# Patient Record
Sex: MALE | Race: WHITE | NOT HISPANIC OR LATINO | Employment: FULL TIME | URBAN - METROPOLITAN AREA
[De-identification: names, ages, dates, MRNs, and addresses within clinical notes are randomized per-mention and may not be internally consistent; named-entity substitution may affect disease eponyms.]

---

## 2018-01-19 ENCOUNTER — OFFICE VISIT (OUTPATIENT)
Dept: INTERNAL MEDICINE | Facility: CLINIC | Age: 58
End: 2018-01-19
Payer: COMMERCIAL

## 2018-01-19 ENCOUNTER — TELEPHONE (OUTPATIENT)
Dept: INTERNAL MEDICINE | Facility: CLINIC | Age: 58
End: 2018-01-19

## 2018-01-19 ENCOUNTER — LAB VISIT (OUTPATIENT)
Dept: LAB | Facility: HOSPITAL | Age: 58
End: 2018-01-19
Attending: FAMILY MEDICINE
Payer: COMMERCIAL

## 2018-01-19 VITALS
SYSTOLIC BLOOD PRESSURE: 110 MMHG | HEART RATE: 80 BPM | HEIGHT: 72 IN | WEIGHT: 198.44 LBS | DIASTOLIC BLOOD PRESSURE: 80 MMHG | TEMPERATURE: 98 F | BODY MASS INDEX: 26.88 KG/M2

## 2018-01-19 DIAGNOSIS — Z00.00 ANNUAL PHYSICAL EXAM: Primary | ICD-10-CM

## 2018-01-19 DIAGNOSIS — Z12.11 COLON CANCER SCREENING: ICD-10-CM

## 2018-01-19 DIAGNOSIS — Z00.00 ANNUAL PHYSICAL EXAM: ICD-10-CM

## 2018-01-19 LAB
BASOPHILS # BLD AUTO: 0.03 K/UL
BASOPHILS NFR BLD: 0.4 %
COMPLEXED PSA SERPL-MCNC: 0.37 NG/ML
DIFFERENTIAL METHOD: ABNORMAL
EOSINOPHIL # BLD AUTO: 0 K/UL
EOSINOPHIL NFR BLD: 0.4 %
ERYTHROCYTE [DISTWIDTH] IN BLOOD BY AUTOMATED COUNT: 13 %
HCT VFR BLD AUTO: 46.4 %
HGB BLD-MCNC: 15.6 G/DL
IMM GRANULOCYTES # BLD AUTO: 0.03 K/UL
IMM GRANULOCYTES NFR BLD AUTO: 0.4 %
LYMPHOCYTES # BLD AUTO: 2.3 K/UL
LYMPHOCYTES NFR BLD: 33.8 %
MCH RBC QN AUTO: 28.1 PG
MCHC RBC AUTO-ENTMCNC: 33.6 G/DL
MCV RBC AUTO: 84 FL
MONOCYTES # BLD AUTO: 1 K/UL
MONOCYTES NFR BLD: 15.4 %
NEUTROPHILS # BLD AUTO: 3.3 K/UL
NEUTROPHILS NFR BLD: 49.6 %
NRBC BLD-RTO: 0 /100 WBC
PLATELET # BLD AUTO: 197 K/UL
PMV BLD AUTO: 11.5 FL
RBC # BLD AUTO: 5.55 M/UL
TSH SERPL DL<=0.005 MIU/L-ACNC: 2.31 UIU/ML
WBC # BLD AUTO: 6.71 K/UL

## 2018-01-19 PROCEDURE — 99396 PREV VISIT EST AGE 40-64: CPT | Mod: S$GLB,,, | Performed by: FAMILY MEDICINE

## 2018-01-19 PROCEDURE — 84153 ASSAY OF PSA TOTAL: CPT

## 2018-01-19 PROCEDURE — 99999 PR PBB SHADOW E&M-EST. PATIENT-LVL III: CPT | Mod: PBBFAC,,, | Performed by: FAMILY MEDICINE

## 2018-01-19 PROCEDURE — 80061 LIPID PANEL: CPT

## 2018-01-19 PROCEDURE — 80053 COMPREHEN METABOLIC PANEL: CPT

## 2018-01-19 PROCEDURE — 85025 COMPLETE CBC W/AUTO DIFF WBC: CPT

## 2018-01-19 PROCEDURE — 84443 ASSAY THYROID STIM HORMONE: CPT

## 2018-01-19 PROCEDURE — 36415 COLL VENOUS BLD VENIPUNCTURE: CPT | Mod: PO

## 2018-01-19 RX ORDER — MELOXICAM 15 MG/1
15 TABLET ORAL DAILY PRN
Qty: 30 TABLET | Refills: 1 | Status: SHIPPED | OUTPATIENT
Start: 2018-01-19 | End: 2018-03-16 | Stop reason: SDUPTHER

## 2018-01-19 NOTE — TELEPHONE ENCOUNTER
Spoke with pt's wife, she said, pt complains of blood in stool. He can see the blood mixed in the brown stool. He recently had wrist surgery and was taking 4 ibuprofen every night. She doesn't know if that can be the cause. appt scheduled for today at 3:20

## 2018-01-19 NOTE — TELEPHONE ENCOUNTER
----- Message from Nazario Schwarz sent at 1/19/2018  8:17 AM CST -----  Contact: Pt-wife cheyenne    Pt-wife cheyenne called think may pt need a colonoscopy pt has blood in stools pt wife Cheyenne requested to be called back need to know how to proceed...[phone]

## 2018-01-20 LAB
ALBUMIN SERPL BCP-MCNC: 3.4 G/DL
ALP SERPL-CCNC: 98 U/L
ALT SERPL W/O P-5'-P-CCNC: 12 U/L
ANION GAP SERPL CALC-SCNC: 13 MMOL/L
AST SERPL-CCNC: 25 U/L
BILIRUB SERPL-MCNC: 0.8 MG/DL
BUN SERPL-MCNC: 16 MG/DL
CALCIUM SERPL-MCNC: 9.4 MG/DL
CHLORIDE SERPL-SCNC: 103 MMOL/L
CHOLEST SERPL-MCNC: 228 MG/DL
CHOLEST/HDLC SERPL: 5.1 {RATIO}
CO2 SERPL-SCNC: 21 MMOL/L
CREAT SERPL-MCNC: 1.3 MG/DL
EST. GFR  (AFRICAN AMERICAN): >60 ML/MIN/1.73 M^2
EST. GFR  (NON AFRICAN AMERICAN): >60 ML/MIN/1.73 M^2
GLUCOSE SERPL-MCNC: 92 MG/DL
HDLC SERPL-MCNC: 45 MG/DL
HDLC SERPL: 19.7 %
LDLC SERPL CALC-MCNC: 167.6 MG/DL
NONHDLC SERPL-MCNC: 183 MG/DL
POTASSIUM SERPL-SCNC: 4.6 MMOL/L
PROT SERPL-MCNC: 7.2 G/DL
SODIUM SERPL-SCNC: 137 MMOL/L
TRIGL SERPL-MCNC: 77 MG/DL
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
## 2018-01-24 RX ORDER — SODIUM, POTASSIUM,MAG SULFATES 17.5-3.13G
SOLUTION, RECONSTITUTED, ORAL ORAL
Qty: 354 ML | Refills: 0 | Status: ON HOLD | OUTPATIENT
Start: 2018-01-24 | End: 2018-02-09 | Stop reason: HOSPADM

## 2018-02-09 ENCOUNTER — SURGERY (OUTPATIENT)
Age: 58
End: 2018-02-09

## 2018-02-09 ENCOUNTER — ANESTHESIA (OUTPATIENT)
Dept: ENDOSCOPY | Facility: HOSPITAL | Age: 58
End: 2018-02-09
Payer: COMMERCIAL

## 2018-02-09 ENCOUNTER — ANESTHESIA EVENT (OUTPATIENT)
Dept: ENDOSCOPY | Facility: HOSPITAL | Age: 58
End: 2018-02-09
Payer: COMMERCIAL

## 2018-02-09 ENCOUNTER — HOSPITAL ENCOUNTER (OUTPATIENT)
Facility: HOSPITAL | Age: 58
Discharge: HOME | End: 2018-02-09
Attending: INTERNAL MEDICINE | Admitting: INTERNAL MEDICINE
Payer: COMMERCIAL

## 2018-02-09 DIAGNOSIS — D12.6 ADENOMATOUS POLYP OF COLON, UNSPECIFIED PART OF COLON: Primary | ICD-10-CM

## 2018-02-09 DIAGNOSIS — Z12.11 COLON CANCER SCREENING: ICD-10-CM

## 2018-02-09 DIAGNOSIS — K63.89 COLONIC MASS: ICD-10-CM

## 2018-02-09 PROCEDURE — 45385 COLONOSCOPY W/LESION REMOVAL: CPT | Mod: 33,,, | Performed by: INTERNAL MEDICINE

## 2018-02-09 PROCEDURE — 45380 COLONOSCOPY AND BIOPSY: CPT | Performed by: INTERNAL MEDICINE

## 2018-02-09 PROCEDURE — 27201089 HC SNARE, DISP (ANY): Performed by: INTERNAL MEDICINE

## 2018-02-09 PROCEDURE — 37000009 HC ANESTHESIA EA ADD 15 MINS: Performed by: INTERNAL MEDICINE

## 2018-02-09 PROCEDURE — 27201012 HC FORCEPS, HOT/COLD, DISP: Performed by: INTERNAL MEDICINE

## 2018-02-09 PROCEDURE — 45381 COLONOSCOPY SUBMUCOUS NJX: CPT | Mod: 51,,, | Performed by: INTERNAL MEDICINE

## 2018-02-09 PROCEDURE — 25000003 PHARM REV CODE 250: Performed by: NURSE ANESTHETIST, CERTIFIED REGISTERED

## 2018-02-09 PROCEDURE — 88305 TISSUE EXAM BY PATHOLOGIST: CPT | Performed by: PATHOLOGY

## 2018-02-09 PROCEDURE — 45385 COLONOSCOPY W/LESION REMOVAL: CPT | Performed by: INTERNAL MEDICINE

## 2018-02-09 PROCEDURE — 37000008 HC ANESTHESIA 1ST 15 MINUTES: Performed by: INTERNAL MEDICINE

## 2018-02-09 PROCEDURE — 63600175 PHARM REV CODE 636 W HCPCS: Performed by: NURSE ANESTHETIST, CERTIFIED REGISTERED

## 2018-02-09 PROCEDURE — 88305 TISSUE EXAM BY PATHOLOGIST: CPT | Mod: 26,,, | Performed by: PATHOLOGY

## 2018-02-09 PROCEDURE — 45380 COLONOSCOPY AND BIOPSY: CPT | Mod: 59,,, | Performed by: INTERNAL MEDICINE

## 2018-02-09 PROCEDURE — 45381 COLONOSCOPY SUBMUCOUS NJX: CPT | Performed by: INTERNAL MEDICINE

## 2018-02-09 RX ORDER — PROPOFOL 10 MG/ML
INJECTION, EMULSION INTRAVENOUS
Status: DISCONTINUED | OUTPATIENT
Start: 2018-02-09 | End: 2018-02-09

## 2018-02-09 RX ORDER — SODIUM CHLORIDE, SODIUM LACTATE, POTASSIUM CHLORIDE, CALCIUM CHLORIDE 600; 310; 30; 20 MG/100ML; MG/100ML; MG/100ML; MG/100ML
INJECTION, SOLUTION INTRAVENOUS CONTINUOUS
Status: DISCONTINUED | OUTPATIENT
Start: 2018-02-09 | End: 2018-02-09 | Stop reason: HOSPADM

## 2018-02-09 RX ORDER — LIDOCAINE HCL/PF 100 MG/5ML
SYRINGE (ML) INTRAVENOUS
Status: DISCONTINUED | OUTPATIENT
Start: 2018-02-09 | End: 2018-02-09

## 2018-02-09 RX ORDER — SODIUM CHLORIDE, SODIUM LACTATE, POTASSIUM CHLORIDE, CALCIUM CHLORIDE 600; 310; 30; 20 MG/100ML; MG/100ML; MG/100ML; MG/100ML
INJECTION, SOLUTION INTRAVENOUS CONTINUOUS PRN
Status: DISCONTINUED | OUTPATIENT
Start: 2018-02-09 | End: 2018-02-09

## 2018-02-09 RX ADMIN — PROPOFOL 40 MG: 10 INJECTION, EMULSION INTRAVENOUS at 03:02

## 2018-02-09 RX ADMIN — LIDOCAINE HYDROCHLORIDE 100 MG: 20 INJECTION, SOLUTION INTRAVENOUS at 03:02

## 2018-02-09 RX ADMIN — PROPOFOL 140 MG: 10 INJECTION, EMULSION INTRAVENOUS at 03:02

## 2018-02-09 RX ADMIN — SODIUM CHLORIDE, SODIUM LACTATE, POTASSIUM CHLORIDE, AND CALCIUM CHLORIDE: 600; 310; 30; 20 INJECTION, SOLUTION INTRAVENOUS at 02:02

## 2018-02-09 NOTE — ANESTHESIA RELEASE NOTE
Anesthesia Release from PACU Note    Patient: Armando Ingram JrAlthea    Procedure(s) Performed: Procedure(s) (LRB):  COLONOSCOPY (N/A)    Anesthesia type: MAC    Post pain: Adequate analgesia    Post assessment: no apparent anesthetic complications, tolerated procedure well and no evidence of recall    Last Vitals:   Visit Vitals  BP 90/62 (BP Location: Left arm, Patient Position: Lying)   Pulse 67   Temp 36.8 °C (98.2 °F) (Oral)   Resp 16   Ht 6' (1.829 m)   Wt 84.4 kg (186 lb)   SpO2 (!) 94%   BMI 25.23 kg/m²       Post vital signs: stable    Level of consciousness: awake    Nausea/Vomiting: no nausea/no vomiting    Complications: none    Airway Patency: patent    Respiratory: unassisted, spontaneous ventilation, room air    Cardiovascular: stable    Hydration: euvolemic

## 2018-02-09 NOTE — ANESTHESIA PREPROCEDURE EVALUATION
02/09/2018  Armando Ingram Jr. is a 57 y.o., male.    Anesthesia Evaluation    I have reviewed the Patient Summary Reports.    I have reviewed the Nursing Notes.   I have reviewed the Medications.     Review of Systems  Anesthesia Hx:  No problems with previous Anesthesia    Social:  Former Smoker, Social Alcohol Use    Hematology/Oncology:  Hematology Normal   Oncology Normal     EENT/Dental:EENT/Dental Normal   Pulmonary:  Pulmonary Normal Snore   Renal/:   Chronic Renal Disease    Hepatic/GI:   Bowel Prep. GERD, well controlled 0700 last drink of fluid.   Musculoskeletal:   Arthritis     Neurological:  Neurology Normal    Endocrine:  Endocrine Normal    Dermatological:  Skin Normal    Psych:  Psychiatric Normal           Physical Exam  General:  Well nourished    Airway/Jaw/Neck:  Airway Findings: Mallampati: II                Anesthesia Plan  Type of Anesthesia, risks & benefits discussed:  Anesthesia Type:  MAC  Patient's Preference:   Intra-op Monitoring Plan:   Intra-op Monitoring Plan Comments:   Post Op Pain Control Plan:   Post Op Pain Control Plan Comments:   Induction:   IV  Beta Blocker:  Patient is not currently on a Beta-Blocker (No further documentation required).       Informed Consent: Patient understands risks and agrees with Anesthesia plan.  Questions answered. Anesthesia consent signed with patient.  ASA Score: 2     Day of Surgery Review of History & Physical: I have interviewed and examined the patient. I have reviewed the patient's H&P dated: 02/09/18. There are no significant changes.  H&P update referred to the provider.         Ready For Surgery From Anesthesia Perspective.

## 2018-02-09 NOTE — DISCHARGE SUMMARY
[facility] -   Brief Operative Note     SUMMARY     Surgery Date: 2/9/2018     Surgeon(s) and Role:     * Ryan Villeda III, MD - Primary    Assisting Surgeon: None    Pre-op Diagnosis:  Colon cancer screening [Z12.11]    Post-op Diagnosis:  Post-Op Diagnosis Codes:     * Colon cancer screening [Z12.11]      - Colon Sigmoid Mass      - Colon Polyps  Procedure(s) (LRB):  COLONOSCOPY (N/A)    Anesthesia: Choice    Description of the findings of the procedure: Procedures completed. See Procedure note for full details.    Findings/Key Components: Procedures completed. See Procedure note for full details.    Prosthetics/Devices: None    Estimated Blood Loss: * No values recorded between 2/9/2018 12:00 AM and 2/9/2018  4:11 PM *         Specimens:   Specimen (12h ago through future)    Start     Ordered    02/09/18 1515  Specimen to Pathology - Surgery  Once     Comments:  1. Cecal polyps2. Transverse polyp3. Sigmoid polyp4. Sigmoid mass Bx      02/09/18 1540          Discharge Note    SUMMARY     Admit Date: 2/9/2018    Discharge Date and Time: 2/9/2018    Hospital Course (synopsis of major diagnoses, care, treatment, and services provided during the course of the hospital stay):  Procedures completed. See Procedure note for full details. Discharge patient when discharge criteria met.    Final Diagnosis: Post-Op Diagnosis Codes:     * Colon cancer screening [Z12.11]     Colon sigmoid mass     Colon Polyps  Disposition: Discharge patient when discharge criteria met.    Follow Up/Patient Instructions:       Medications:  Reconciled Home Medications: Current Discharge Medication List      CONTINUE these medications which have NOT CHANGED    Details   meloxicam (MOBIC) 15 MG tablet Take 1 tablet (15 mg total) by mouth daily as needed for Pain.  Qty: 30 tablet, Refills: 1         STOP taking these medications       sodium,potassium,mag sulfates (SUPREP BOWEL PREP KIT) 17.5-3.13-1.6 gram SolR Comments:    Reason for Stopping:                Discharge Procedure Orders  Diet general     Activity as tolerated

## 2018-02-09 NOTE — ANESTHESIA POSTPROCEDURE EVALUATION
Anesthesia Post Evaluation    Patient: Armando Ingram     Procedure(s) Performed: Procedure(s) (LRB):  COLONOSCOPY (N/A)      Patient location during evaluation: PACU  Patient participation: Yes- Able to Participate  Level of consciousness: awake and alert and oriented  Post-procedure vital signs: reviewed and stable  Pain management: adequate  Airway patency: patent  PONV status at discharge: No PONV  Anesthetic complications: no      Cardiovascular status: blood pressure returned to baseline  Respiratory status: unassisted, spontaneous ventilation and room air  Hydration status: euvolemic  Follow-up not needed.        Visit Vitals  BP 90/62 (BP Location: Left arm, Patient Position: Lying)   Pulse 67   Temp 36.8 °C (98.2 °F) (Oral)   Resp 16   Ht 6' (1.829 m)   Wt 84.4 kg (186 lb)   SpO2 (!) 94%   BMI 25.23 kg/m²       Pain/Brigitte Score: No Data Recorded

## 2018-02-09 NOTE — OR NURSING
+++++  1. Cecal polyps.  2. Transverse polyp.  3. Sigmoid polyp.  Injection 4ml SPOT at mass sigmoid colon. 4. Sigmoid mass Bx. Patient tolerated procedure well.

## 2018-02-09 NOTE — INTERVAL H&P NOTE
The patient has been examined and the H&P has been reviewed:I have reviewed this note and I agree with this assessment. The patient remains stable for endoscopy at the time of this present evaluation.         Anesthesia/Surgery risks, benefits and alternative options discussed and understood by patient/family.          There are no hospital problems to display for this patient.

## 2018-02-09 NOTE — TRANSFER OF CARE
Anesthesia Transfer of Care Note    Patient: Armando Ingram JrAlthea    Procedure(s) Performed: Procedure(s) (LRB):  COLONOSCOPY (N/A)    Patient location: PACU    Anesthesia Type: MAC    Transport from OR: Transported from OR on room air with adequate spontaneous ventilation    Post pain: adequate analgesia    Post assessment: no apparent anesthetic complications    Post vital signs: stable    Level of consciousness: sedated    Nausea/Vomiting: no nausea/vomiting    Complications: none    Transfer of care protocol was followed      Last vitals:   Visit Vitals  BP 90/62 (BP Location: Left arm, Patient Position: Lying)   Pulse 67   Temp 36.8 °C (98.2 °F) (Oral)   Resp 16   Ht 6' (1.829 m)   Wt 84.4 kg (186 lb)   SpO2 (!) 94%   BMI 25.23 kg/m²

## 2018-02-09 NOTE — DISCHARGE INSTRUCTIONS

## 2018-02-12 VITALS
BODY MASS INDEX: 25.19 KG/M2 | TEMPERATURE: 98 F | DIASTOLIC BLOOD PRESSURE: 70 MMHG | OXYGEN SATURATION: 95 % | RESPIRATION RATE: 18 BRPM | HEIGHT: 72 IN | WEIGHT: 186 LBS | SYSTOLIC BLOOD PRESSURE: 103 MMHG | HEART RATE: 66 BPM

## 2018-02-23 ENCOUNTER — TELEPHONE (OUTPATIENT)
Dept: SURGERY | Facility: CLINIC | Age: 58
End: 2018-02-23

## 2018-02-23 NOTE — TELEPHONE ENCOUNTER
----- Message from Cari Carrasquillo sent at 2/23/2018 11:08 AM CST -----  Contact: Richie Ingram/wife   States she needs to speak to the nurse, she wouldn't say what it was regarding. Please call Richie Ingram at [phone]. Thank you

## 2018-02-28 ENCOUNTER — OFFICE VISIT (OUTPATIENT)
Dept: SURGERY | Facility: CLINIC | Age: 58
End: 2018-02-28
Payer: COMMERCIAL

## 2018-02-28 ENCOUNTER — CLINICAL SUPPORT (OUTPATIENT)
Dept: CARDIOLOGY | Facility: CLINIC | Age: 58
End: 2018-02-28
Payer: COMMERCIAL

## 2018-02-28 ENCOUNTER — HOSPITAL ENCOUNTER (OUTPATIENT)
Dept: RADIOLOGY | Facility: HOSPITAL | Age: 58
Discharge: HOME | End: 2018-02-28
Attending: SURGERY
Payer: COMMERCIAL

## 2018-02-28 VITALS
HEIGHT: 72 IN | BODY MASS INDEX: 27.22 KG/M2 | DIASTOLIC BLOOD PRESSURE: 83 MMHG | SYSTOLIC BLOOD PRESSURE: 132 MMHG | TEMPERATURE: 98 F | HEART RATE: 88 BPM | WEIGHT: 201 LBS

## 2018-02-28 DIAGNOSIS — D12.6 TUBULOVILLOUS ADENOMA OF COLON: ICD-10-CM

## 2018-02-28 DIAGNOSIS — D12.6 TUBULOVILLOUS ADENOMA OF COLON: Primary | ICD-10-CM

## 2018-02-28 PROCEDURE — 71045 X-RAY EXAM CHEST 1 VIEW: CPT | Mod: TC

## 2018-02-28 PROCEDURE — 99999 PR PBB SHADOW E&M-EST. PATIENT-LVL IV: CPT | Mod: PBBFAC,,, | Performed by: SURGERY

## 2018-02-28 PROCEDURE — 71045 X-RAY EXAM CHEST 1 VIEW: CPT | Mod: 26,,, | Performed by: RADIOLOGY

## 2018-02-28 PROCEDURE — 93000 ELECTROCARDIOGRAM COMPLETE: CPT | Mod: S$GLB,,, | Performed by: INTERNAL MEDICINE

## 2018-02-28 PROCEDURE — 99204 OFFICE O/P NEW MOD 45 MIN: CPT | Mod: S$GLB,,, | Performed by: SURGERY

## 2018-02-28 RX ORDER — SODIUM CHLORIDE 9 MG/ML
INJECTION, SOLUTION INTRAVENOUS CONTINUOUS
Status: CANCELLED | OUTPATIENT
Start: 2018-02-28

## 2018-02-28 RX ORDER — NEOMYCIN SULFATE 500 MG/1
1000 TABLET ORAL SEE ADMIN INSTRUCTIONS
Qty: 6 TABLET | Refills: 0 | Status: SHIPPED | OUTPATIENT
Start: 2018-02-28 | End: 2018-04-03

## 2018-02-28 RX ORDER — METRONIDAZOLE 500 MG/100ML
500 INJECTION, SOLUTION INTRAVENOUS
Status: CANCELLED | OUTPATIENT
Start: 2018-02-28

## 2018-02-28 RX ORDER — POLYETHYLENE GLYCOL 3350, SODIUM SULFATE ANHYDROUS, SODIUM BICARBONATE, SODIUM CHLORIDE, POTASSIUM CHLORIDE 236; 22.74; 6.74; 5.86; 2.97 G/4L; G/4L; G/4L; G/4L; G/4L
4 POWDER, FOR SOLUTION ORAL ONCE
Qty: 4000 ML | Refills: 0 | Status: SHIPPED | OUTPATIENT
Start: 2018-02-28 | End: 2018-02-28

## 2018-02-28 RX ORDER — ACETAMINOPHEN 10 MG/ML
1000 INJECTION, SOLUTION INTRAVENOUS EVERY 8 HOURS
Status: CANCELLED | OUTPATIENT
Start: 2018-02-28 | End: 2018-02-28

## 2018-02-28 RX ORDER — INDOCYANINE GREEN AND WATER 25 MG
2.5 KIT INJECTION
Status: CANCELLED | OUTPATIENT
Start: 2018-02-28

## 2018-02-28 RX ORDER — METRONIDAZOLE 500 MG/1
500 TABLET ORAL SEE ADMIN INSTRUCTIONS
Qty: 3 TABLET | Refills: 0 | Status: SHIPPED | OUTPATIENT
Start: 2018-02-28 | End: 2018-04-03

## 2018-02-28 NOTE — LETTER
February 28, 2018      Ryan Villeda III, MD  [address] [facility] [address]           [facility]  [address] [place] [address]  Phone: [phone]  Fax: [phone]          Patient: Armando Ingram Jr.   MR Number: [id number]   YOB: 1960   Date of Visit: 2/28/2018       Dear Dr. Ryan Villeda III:    Thank you for referring Armando Ingram to me for evaluation. Attached you will find relevant portions of my assessment and plan of care.    If you have questions, please do not hesitate to call me. I look forward to following Armando Ingram along with you.    Sincerely,    Mervin Alexander MD    Enclosure  CC:  No Recipients    If you would like to receive this communication electronically, please contact [email or web address] or [phone] to request more information on [employer] Link access.    For providers and/or their staff who would like to refer a patient to [facility], please contact us through our one-stop-shop provider referral line, [facility] [place], at [phone].    If you feel you have received this communication in error or would no longer like to receive these types of communications, please e-mail [email or web address]
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
## 2018-03-01 DIAGNOSIS — D12.6 TUBULOVILLOUS ADENOMA OF COLON: Primary | ICD-10-CM

## 2018-03-05 ENCOUNTER — TELEPHONE (OUTPATIENT)
Dept: SURGERY | Facility: CLINIC | Age: 58
End: 2018-03-05

## 2018-03-05 DIAGNOSIS — D12.6 TUBULOVILLOUS ADENOMA OF COLON: ICD-10-CM

## 2018-03-05 NOTE — TELEPHONE ENCOUNTER
Wife was calling asking about prep that was sent to the pharmacy. I explained the the patient needs to use that for his surgery and explained what he could mix it with.

## 2018-03-05 NOTE — TELEPHONE ENCOUNTER
----- Message from Sommer Daniel sent at 3/5/2018  7:43 AM CST -----  Contact: Richie pt wife  Richie needs to speak to he nurse regarding her husbands surgery/please call  [phone]/ma

## 2018-03-09 ENCOUNTER — OFFICE VISIT (OUTPATIENT)
Dept: CARDIOLOGY | Facility: CLINIC | Age: 58
End: 2018-03-09
Payer: COMMERCIAL

## 2018-03-09 VITALS
HEIGHT: 71 IN | HEART RATE: 72 BPM | SYSTOLIC BLOOD PRESSURE: 120 MMHG | WEIGHT: 198 LBS | BODY MASS INDEX: 27.72 KG/M2 | DIASTOLIC BLOOD PRESSURE: 70 MMHG

## 2018-03-09 DIAGNOSIS — R94.31 ABNORMAL ECG: ICD-10-CM

## 2018-03-09 DIAGNOSIS — D12.6 TUBULOVILLOUS ADENOMA OF COLON: Primary | ICD-10-CM

## 2018-03-09 DIAGNOSIS — E78.00 HYPERCHOLESTEROLEMIA: ICD-10-CM

## 2018-03-09 DIAGNOSIS — Z01.810 PREOP CARDIOVASCULAR EXAM: ICD-10-CM

## 2018-03-09 PROCEDURE — 99999 PR PBB SHADOW E&M-EST. PATIENT-LVL III: CPT | Mod: PBBFAC,,, | Performed by: INTERNAL MEDICINE

## 2018-03-09 PROCEDURE — 99244 OFF/OP CNSLTJ NEW/EST MOD 40: CPT | Mod: S$GLB,,, | Performed by: INTERNAL MEDICINE

## 2018-03-09 RX ORDER — ROSUVASTATIN CALCIUM 10 MG/1
10 TABLET, COATED ORAL NIGHTLY
Qty: 90 TABLET | Refills: 3 | Status: SHIPPED | OUTPATIENT
Start: 2018-03-09 | End: 2019-01-23 | Stop reason: SDUPTHER

## 2018-03-09 NOTE — LETTER
March 9, 2018      Mervin Alexander MD  [address] [place] [address] [address] [place] [address]           [facility]  [address] [place] [address] [address] [address]  Phone: [phone]  Fax: [phone]          Patient: Armando Ingram Jr.   MR Number: [id number]   YOB: 1960   Date of Visit: 3/9/2018       Dear Dr. Mervin Alexander:    Thank you for referring Armando Ingram to me for evaluation. Attached you will find relevant portions of my assessment and plan of care.    If you have questions, please do not hesitate to call me. I look forward to following Armando Ingram along with you.    Sincerely,    Sekou Mckeon MD    Enclosure  CC:  No Recipients    If you would like to receive this communication electronically, please contact [email or web address] or [phone] to request more information on [employer] Link access.    For providers and/or their staff who would like to refer a patient to [facility], please contact us through our one-stop-shop provider referral line, [facility], at [phone].    If you feel you have received this communication in error or would no longer like to receive these types of communications, please e-mail [email or web address]

## 2018-03-09 NOTE — PROGRESS NOTES
"Subjective:    Patient ID:  Armando Ingram Jr. is a 57 y.o. male who presents for evaluation of Pre-op Exam (Colectomy 03/20/2018)    Pt referred by Dr. Alexander    HPI pt presents for preop eval.   Nonsmoker. +  Hyperlipidemia.  No prior h/o cad, chf.   ecg 2014 showed NSR, lateral st-t abnl.  Preop ecg 2/28/18 NSR, lateral st-t abnl.  He states could walk up 2 flights of stairs without problem.  He denies cp sxs with activity or any unusual dyspnea.   No dizziness, syncope.       Review of Systems   Constitution: Negative.   HENT: Negative.    Eyes: Negative.    Cardiovascular: Negative.    Respiratory: Negative.    Endocrine: Negative.    Hematologic/Lymphatic: Negative.    Skin: Negative.    Musculoskeletal: Negative.    Gastrointestinal: Negative.    Genitourinary: Negative.    Neurological: Negative.    Psychiatric/Behavioral: Negative.    Allergic/Immunologic: Negative.        /70 (BP Location: Right arm, Patient Position: Sitting, BP Method: Large (Manual))   Pulse 72   Ht 5' 11" (1.803 m)   Wt 89.8 kg (198 lb)   BMI 27.62 kg/m²     Wt Readings from Last 3 Encounters:   03/09/18 89.8 kg (198 lb)   02/28/18 91.2 kg (201 lb)   02/09/18 84.4 kg (186 lb)     Temp Readings from Last 3 Encounters:   02/28/18 98.3 °F (36.8 °C) (Oral)   02/09/18 98.2 °F (36.8 °C) (Oral)   01/19/18 98.3 °F (36.8 °C)     BP Readings from Last 3 Encounters:   03/09/18 120/70   02/28/18 132/83   02/09/18 103/70     Pulse Readings from Last 3 Encounters:   03/09/18 72   02/28/18 88   02/09/18 66          Objective:    Physical Exam   Constitutional: He is oriented to person, place, and time. He appears well-developed and well-nourished.   HENT:   Head: Normocephalic.   Neck: Normal range of motion. Neck supple. Normal carotid pulses, no hepatojugular reflux and no JVD present. Carotid bruit is not present. No thyromegaly present.   Cardiovascular: Normal rate, regular rhythm, S1 normal and S2 normal.  PMI is not displaced.  Exam " reveals no S3, no S4, no distant heart sounds, no friction rub, no midsystolic click and no opening snap.    No murmur heard.  Pulses:       Radial pulses are 2+ on the right side, and 2+ on the left side.   Pulmonary/Chest: Effort normal and breath sounds normal. He has no wheezes. He has no rales.   Abdominal: Soft. Bowel sounds are normal. He exhibits no distension, no abdominal bruit, no ascites and no mass. There is no tenderness.   Musculoskeletal: He exhibits no edema.   Neurological: He is alert and oriented to person, place, and time.   Skin: Skin is warm.   Psychiatric: He has a normal mood and affect. His behavior is normal.   Nursing note and vitals reviewed.      I have reviewed all pertinent labs and cardiac studies.      Chemistry        Component Value Date/Time     02/28/2018 1438    K 4.8 02/28/2018 1438     02/28/2018 1438    CO2 28 02/28/2018 1438    BUN 16 02/28/2018 1438    CREATININE 1.2 02/28/2018 1438    GLU 94 02/28/2018 1438        Component Value Date/Time    CALCIUM 9.0 02/28/2018 1438    ALKPHOS 98 02/28/2018 1438    AST 27 02/28/2018 1438    ALT 15 02/28/2018 1438    BILITOT 0.3 02/28/2018 1438    ESTGFRAFRICA >60.0 02/28/2018 1438    EGFRNONAA >60.0 02/28/2018 1438        Lab Results   Component Value Date    WBC 7.02 02/28/2018    HGB 15.6 02/28/2018    HCT 46.7 02/28/2018    MCV 85 02/28/2018     02/28/2018     No results found for: LABA1C, HGBA1C  Lab Results   Component Value Date    CHOL 228 (H) 01/19/2018    CHOL 222 (H) 11/16/2016     Lab Results   Component Value Date    HDL 45 01/19/2018    HDL 52 11/16/2016     Lab Results   Component Value Date    LDLCALC 167.6 (H) 01/19/2018    LDLCALC 149.2 11/16/2016     Lab Results   Component Value Date    TRIG 77 01/19/2018    TRIG 104 11/16/2016     Lab Results   Component Value Date    CHOLHDL 19.7 (L) 01/19/2018    CHOLHDL 23.4 11/16/2016           Assessment:       1. Tubulovillous adenoma of colon    2. Preop  cardiovascular exam    3. Abnormal ECG    4. Hypercholesterolemia         Plan:             - Chronic abnormal ecg with lateral st-t abnl suggestive of ischemia although clinically silent.  - Good exercise capacity  - In light of ecg abnormalities will proceed with echo/stress test; however, pt will need to get his surgery done due to tumor and do not anticipate delaying surgery unless stress test is high risk.  10 year ASCVD risk 8.1%.  Start Rosuvastatin 10 mg nightly and recheck lipids 3 months.  Indications for statin, side effects discussed.   Cardiac diet/exercise  Phone review for test results

## 2018-03-15 ENCOUNTER — HOSPITAL ENCOUNTER (OUTPATIENT)
Dept: RADIOLOGY | Facility: HOSPITAL | Age: 58
Discharge: HOME | End: 2018-03-15
Attending: INTERNAL MEDICINE
Payer: COMMERCIAL

## 2018-03-15 ENCOUNTER — CLINICAL SUPPORT (OUTPATIENT)
Dept: CARDIOLOGY | Facility: CLINIC | Age: 58
End: 2018-03-15
Attending: INTERNAL MEDICINE
Payer: COMMERCIAL

## 2018-03-15 DIAGNOSIS — R94.31 ABNORMAL ECG: ICD-10-CM

## 2018-03-15 DIAGNOSIS — Z01.810 PREOP CARDIOVASCULAR EXAM: ICD-10-CM

## 2018-03-15 PROCEDURE — 93306 TTE W/DOPPLER COMPLETE: CPT | Mod: S$GLB,,, | Performed by: INTERNAL MEDICINE

## 2018-03-15 PROCEDURE — 78452 HT MUSCLE IMAGE SPECT MULT: CPT | Mod: 26,,, | Performed by: INTERNAL MEDICINE

## 2018-03-15 PROCEDURE — 93015 CV STRESS TEST SUPVJ I&R: CPT | Mod: S$GLB,,, | Performed by: INTERNAL MEDICINE

## 2018-03-15 PROCEDURE — A9502 TC99M TETROFOSMIN: HCPCS | Mod: PO

## 2018-03-16 ENCOUNTER — DOCUMENTATION ONLY (OUTPATIENT)
Dept: CARDIOLOGY | Facility: CLINIC | Age: 58
End: 2018-03-16

## 2018-03-16 ENCOUNTER — TELEPHONE (OUTPATIENT)
Dept: CARDIOLOGY | Facility: CLINIC | Age: 58
End: 2018-03-16

## 2018-03-16 LAB
DIASTOLIC DYSFUNCTION: NO
DIASTOLIC DYSFUNCTION: NO
ESTIMATED PA SYSTOLIC PRESSURE: 16.48
RETIRED EF AND QEF - SEE NOTES: 60 (ref 55–65)

## 2018-03-16 RX ORDER — MELOXICAM 15 MG/1
15 TABLET ORAL DAILY PRN
Qty: 30 TABLET | Refills: 1 | Status: SHIPPED | OUTPATIENT
Start: 2018-03-16 | End: 2019-04-15

## 2018-03-16 NOTE — PROGRESS NOTES
Left ankle pain started at 1445. States was walking and twisted her foot.   Pt presented for Treadmill Nuclear stress test on 3/15/18. On initial assessment EKG abnormal indicating acute mi. Dr. Gregory in to see pt. EKG read by Dr. Gregory as early repolarization. Ok to proceed with nuclear sress test as a Lexiscan rather than the treadmill. Stress test completed.

## 2018-03-16 NOTE — TELEPHONE ENCOUNTER
----- Message from Cari Carrasquillo sent at 3/16/2018  9:56 AM CDT -----  Contact: Richie/[phone]/wife  States she needs to speak to the nurse, she wouldn't say what it was regarding. Please call Richie Ingram at [phone]. Thank you

## 2018-03-16 NOTE — TELEPHONE ENCOUNTER
Pt's spouse, Richie contacted regarding questions about Mr. Ingram's  Nuclear stress test from yesterday. All questions answered to her satisfaction- clarification given. She ended conversation well.

## 2018-03-16 NOTE — TELEPHONE ENCOUNTER
Returned call. Patient stated  stated patient was originally scheduled for test to walk on treadmill, treadmill was stopped and doctor came over and resquest test to be changed from treadmill.    Spouse would like to know why test was changed. Awaiting call fro Dr. Mckeon or Nurse that performed test.    I called and gave Laurita RN and Laurita to call spouse.

## 2018-03-18 ENCOUNTER — TELEPHONE (OUTPATIENT)
Dept: CARDIOLOGY | Facility: CLINIC | Age: 58
End: 2018-03-18

## 2018-03-19 ENCOUNTER — ANESTHESIA EVENT (OUTPATIENT)
Dept: SURGERY | Facility: HOSPITAL | Age: 58
DRG: 331 | End: 2018-03-19
Payer: COMMERCIAL

## 2018-03-19 NOTE — TELEPHONE ENCOUNTER
Called to patient and gave results of echo and nuclear stress test and informed may proceed with surgery--patient verbalizes understanding  
Please call pt  He passed his nuclear stress test.  No blockages noted  Echo shows normal heart strength  Pt may proceed with surgery at low CV risk.    Dr Mckeon    
[id number]/370

## 2018-03-19 NOTE — PRE-PROCEDURE INSTRUCTIONS
Pre op instructions reviewed with patient per phone:    To confirm, Your surgeon has instructed you:  Surgery is scheduled 3/20-18 at 1045.      Please report to [facility] 1st floor main lobby by 0915  Pre admit office will call this afternoon only if arrival time for surgery changes.      INSTRUCTIONS IMPORTANT!!!  ¨ No smoking after 12 midnight, the night before surgery.  ¨ No solid food after 12 midnight, but you may have clear liquids up until 3 hours prior to surgery.  This includes: grape, cranberry, and apple juice (not orange, and no coffee.)   ¨ OK to brush teeth, but no gum, candy or mints!    ¨ Take only these medicines with a small swallow of water-morning of surgery.  None    Pt will take 2 antibiotics today as instructed. Will start prep this afternoon and is on clear liq diet today.    ____  Do not wear makeup, including mascara.  ____  No powder, lotions or creams to surgical area.  ____  Please remove all jewelry, including piercings and leave at home.  ____  No money or valuables needed. Please leave at home.  ____  Please bring identification and insurance information to hospital.  ____  If going home the same day, arrange for a ride home. You will not be able to   drive if Anesthesia was used.  ____  Children, under 12 years old, must remain in the waiting room with an adult.  They are not allowed in patient areas.  ____  Wear loose fitting clothing. Allow for dressings, bandages.  ____  Stop Aspirin, Ibuprofen, Motrin and Aleve at least 5-7 days before surgery, unless otherwise instructed by your doctor, or the nurse.   You MAY use Tylenol/acetaminophen until day of surgery.  ____  If you take diabetic medication, do not take am of surgery unless instructed by   Doctor.  ____ Stop taking any Fish Oil supplement or any Vitamins that contain Vitamin E at least 5 days prior to surgery.          Bathing Instructions-- The night before surgery and the morning prior to coming  to the hospital:   -Do not shave the surgical area.   -Shower and wash your hair and body as usual with your regular soap and shampoo.   -Rinse your hair and body completely.   -Use one packet of hibiclens to wash the surgical site (using your hand) gently for 5 minutes.  Do not scrub you skin too hard.   -Do not use hibiclens on your head, face, or genitals.   -Do not wash with regular soap after you use the hibiclens.   -Rinse your body thoroughly.   -Dry with clean, soft towel.  Do not use lotion, cream, deodorant, or powders on   the surgical site.    Use antibacterial soap in place of hibiclens if your surgery is on the head, face or genitals.         Surgical Site Infection    Prevention of surgical site infections:     -Keep incisions clean and dry.   -Do not soak/submerge incisions in water until completely healed.   -Do not apply lotions, powders, creams, or deodorants to site.   -Always make sure hands are cleaned with antibacterial soap/ alcohol-based   prior to touching the surgical site.  (This includes doctors, nurses, staff, and yourself.)    Signs and symptoms:   -Redness and pain around the area where you had surgery   -Drainage of cloudy fluid from your surgical wound   -Fever over 100.4  I have read or had read and explained to me, and understand the above information.

## 2018-03-20 ENCOUNTER — ANESTHESIA (OUTPATIENT)
Dept: SURGERY | Facility: HOSPITAL | Age: 58
DRG: 331 | End: 2018-03-20
Payer: COMMERCIAL

## 2018-03-20 ENCOUNTER — TELEPHONE (OUTPATIENT)
Dept: SURGERY | Facility: CLINIC | Age: 58
End: 2018-03-20

## 2018-03-20 ENCOUNTER — SURGERY (OUTPATIENT)
Age: 58
End: 2018-03-20

## 2018-03-20 ENCOUNTER — HOSPITAL ENCOUNTER (INPATIENT)
Facility: HOSPITAL | Age: 58
LOS: 4 days | Discharge: HOME | DRG: 331 | End: 2018-03-24
Attending: SURGERY | Admitting: SURGERY
Payer: COMMERCIAL

## 2018-03-20 DIAGNOSIS — K63.5 COLON POLYP: ICD-10-CM

## 2018-03-20 PROCEDURE — 27000221 HC OXYGEN, UP TO 24 HOURS

## 2018-03-20 PROCEDURE — 99900035 HC TECH TIME PER 15 MIN (STAT)

## 2018-03-20 PROCEDURE — 63600175 PHARM REV CODE 636 W HCPCS: Performed by: SURGERY

## 2018-03-20 PROCEDURE — 36000712 HC OR TIME LEV V 1ST 15 MIN: Performed by: SURGERY

## 2018-03-20 PROCEDURE — 71000033 HC RECOVERY, INTIAL HOUR: Performed by: SURGERY

## 2018-03-20 PROCEDURE — 25000003 PHARM REV CODE 250: Performed by: NURSE ANESTHETIST, CERTIFIED REGISTERED

## 2018-03-20 PROCEDURE — 0DBN4ZZ EXCISION OF SIGMOID COLON, PERCUTANEOUS ENDOSCOPIC APPROACH: ICD-10-PCS | Performed by: SURGERY

## 2018-03-20 PROCEDURE — 44204 LAPARO PARTIAL COLECTOMY: CPT | Mod: ,,, | Performed by: SURGERY

## 2018-03-20 PROCEDURE — 25000003 PHARM REV CODE 250: Performed by: SURGERY

## 2018-03-20 PROCEDURE — 8E0W4CZ ROBOTIC ASSISTED PROCEDURE OF TRUNK REGION, PERCUTANEOUS ENDOSCOPIC APPROACH: ICD-10-PCS | Performed by: SURGERY

## 2018-03-20 PROCEDURE — 37000008 HC ANESTHESIA 1ST 15 MINUTES: Performed by: SURGERY

## 2018-03-20 PROCEDURE — 37000009 HC ANESTHESIA EA ADD 15 MINS: Performed by: SURGERY

## 2018-03-20 PROCEDURE — 25000003 PHARM REV CODE 250: Performed by: ANESTHESIOLOGY

## 2018-03-20 PROCEDURE — 44213 LAP MOBIL SPLENIC FL ADD-ON: CPT | Mod: 80,,, | Performed by: SURGERY

## 2018-03-20 PROCEDURE — 27201423 OPTIME MED/SURG SUP & DEVICES STERILE SUPPLY: Performed by: SURGERY

## 2018-03-20 PROCEDURE — 44204 LAPARO PARTIAL COLECTOMY: CPT | Mod: 80,,, | Performed by: SURGERY

## 2018-03-20 PROCEDURE — 44213 LAP MOBIL SPLENIC FL ADD-ON: CPT | Mod: ,,, | Performed by: SURGERY

## 2018-03-20 PROCEDURE — S0028 INJECTION, FAMOTIDINE, 20 MG: HCPCS | Performed by: SURGERY

## 2018-03-20 PROCEDURE — C9290 INJ, BUPIVACAINE LIPOSOME: HCPCS | Performed by: SURGERY

## 2018-03-20 PROCEDURE — 94799 UNLISTED PULMONARY SVC/PX: CPT

## 2018-03-20 PROCEDURE — 94770 HC EXHALED C02 TEST: CPT

## 2018-03-20 PROCEDURE — 88305 TISSUE EXAM BY PATHOLOGIST: CPT | Performed by: PATHOLOGY

## 2018-03-20 PROCEDURE — 71000039 HC RECOVERY, EACH ADD'L HOUR: Performed by: SURGERY

## 2018-03-20 PROCEDURE — 63600175 PHARM REV CODE 636 W HCPCS: Performed by: NURSE ANESTHETIST, CERTIFIED REGISTERED

## 2018-03-20 PROCEDURE — S0030 INJECTION, METRONIDAZOLE: HCPCS | Performed by: SURGERY

## 2018-03-20 PROCEDURE — 88305 TISSUE EXAM BY PATHOLOGIST: CPT | Mod: 26,,, | Performed by: PATHOLOGY

## 2018-03-20 PROCEDURE — 36000713 HC OR TIME LEV V EA ADD 15 MIN: Performed by: SURGERY

## 2018-03-20 PROCEDURE — 88309 TISSUE EXAM BY PATHOLOGIST: CPT | Mod: 26,,, | Performed by: PATHOLOGY

## 2018-03-20 PROCEDURE — 11000001 HC ACUTE MED/SURG PRIVATE ROOM

## 2018-03-20 PROCEDURE — 63600175 PHARM REV CODE 636 W HCPCS: Performed by: ANESTHESIOLOGY

## 2018-03-20 RX ORDER — INSULIN ASPART 100 [IU]/ML
0-5 INJECTION, SOLUTION INTRAVENOUS; SUBCUTANEOUS EVERY 6 HOURS PRN
Status: DISCONTINUED | OUTPATIENT
Start: 2018-03-20 | End: 2018-03-24 | Stop reason: HOSPADM

## 2018-03-20 RX ORDER — ENOXAPARIN SODIUM 100 MG/ML
40 INJECTION SUBCUTANEOUS EVERY 24 HOURS
Status: DISCONTINUED | OUTPATIENT
Start: 2018-03-21 | End: 2018-03-24 | Stop reason: HOSPADM

## 2018-03-20 RX ORDER — MEPERIDINE HYDROCHLORIDE 50 MG/ML
12.5 INJECTION INTRAMUSCULAR; INTRAVENOUS; SUBCUTANEOUS ONCE AS NEEDED
Status: COMPLETED | OUTPATIENT
Start: 2018-03-20 | End: 2018-03-20

## 2018-03-20 RX ORDER — MORPHINE SULFATE 4 MG/ML
2 INJECTION, SOLUTION INTRAMUSCULAR; INTRAVENOUS EVERY 5 MIN PRN
Status: DISCONTINUED | OUTPATIENT
Start: 2018-03-20 | End: 2018-03-20 | Stop reason: HOSPADM

## 2018-03-20 RX ORDER — ROCURONIUM BROMIDE 10 MG/ML
INJECTION, SOLUTION INTRAVENOUS
Status: DISCONTINUED | OUTPATIENT
Start: 2018-03-20 | End: 2018-03-20

## 2018-03-20 RX ORDER — GLUCAGON 1 MG
1 KIT INJECTION
Status: DISCONTINUED | OUTPATIENT
Start: 2018-03-20 | End: 2018-03-24 | Stop reason: HOSPADM

## 2018-03-20 RX ORDER — SODIUM CHLORIDE, SODIUM LACTATE, POTASSIUM CHLORIDE, CALCIUM CHLORIDE 600; 310; 30; 20 MG/100ML; MG/100ML; MG/100ML; MG/100ML
INJECTION, SOLUTION INTRAVENOUS CONTINUOUS
Status: DISCONTINUED | OUTPATIENT
Start: 2018-03-20 | End: 2018-03-22

## 2018-03-20 RX ORDER — FENTANYL CITRATE 50 UG/ML
INJECTION, SOLUTION INTRAMUSCULAR; INTRAVENOUS
Status: DISCONTINUED | OUTPATIENT
Start: 2018-03-20 | End: 2018-03-20

## 2018-03-20 RX ORDER — NEOSTIGMINE METHYLSULFATE 1 MG/ML
INJECTION, SOLUTION INTRAVENOUS
Status: DISCONTINUED | OUTPATIENT
Start: 2018-03-20 | End: 2018-03-20

## 2018-03-20 RX ORDER — MIDAZOLAM HYDROCHLORIDE 1 MG/ML
INJECTION, SOLUTION INTRAMUSCULAR; INTRAVENOUS
Status: DISCONTINUED | OUTPATIENT
Start: 2018-03-20 | End: 2018-03-20

## 2018-03-20 RX ORDER — SUCCINYLCHOLINE CHLORIDE 20 MG/ML
INJECTION INTRAMUSCULAR; INTRAVENOUS
Status: DISCONTINUED | OUTPATIENT
Start: 2018-03-20 | End: 2018-03-20

## 2018-03-20 RX ORDER — GLYCOPYRROLATE 0.2 MG/ML
INJECTION INTRAMUSCULAR; INTRAVENOUS
Status: DISCONTINUED | OUTPATIENT
Start: 2018-03-20 | End: 2018-03-20

## 2018-03-20 RX ORDER — DEXAMETHASONE SODIUM PHOSPHATE 4 MG/ML
INJECTION, SOLUTION INTRA-ARTICULAR; INTRALESIONAL; INTRAMUSCULAR; INTRAVENOUS; SOFT TISSUE
Status: DISCONTINUED | OUTPATIENT
Start: 2018-03-20 | End: 2018-03-20

## 2018-03-20 RX ORDER — HYDROMORPHONE HCL IN 0.9% NACL 6 MG/30 ML
PATIENT CONTROLLED ANALGESIA SYRINGE INTRAVENOUS CONTINUOUS
Status: DISCONTINUED | OUTPATIENT
Start: 2018-03-20 | End: 2018-03-23

## 2018-03-20 RX ORDER — PROPOFOL 10 MG/ML
VIAL (ML) INTRAVENOUS
Status: DISCONTINUED | OUTPATIENT
Start: 2018-03-20 | End: 2018-03-20

## 2018-03-20 RX ORDER — CHLORHEXIDINE GLUCONATE ORAL RINSE 1.2 MG/ML
10 SOLUTION DENTAL 2 TIMES DAILY
Status: DISCONTINUED | OUTPATIENT
Start: 2018-03-20 | End: 2018-03-24 | Stop reason: HOSPADM

## 2018-03-20 RX ORDER — METRONIDAZOLE 500 MG/100ML
500 INJECTION, SOLUTION INTRAVENOUS
Status: DISCONTINUED | OUTPATIENT
Start: 2018-03-20 | End: 2018-03-20 | Stop reason: HOSPADM

## 2018-03-20 RX ORDER — METOCLOPRAMIDE HYDROCHLORIDE 5 MG/ML
10 INJECTION INTRAMUSCULAR; INTRAVENOUS EVERY 10 MIN PRN
Status: DISCONTINUED | OUTPATIENT
Start: 2018-03-20 | End: 2018-03-20 | Stop reason: HOSPADM

## 2018-03-20 RX ORDER — BUPIVACAINE HYDROCHLORIDE 2.5 MG/ML
INJECTION, SOLUTION EPIDURAL; INFILTRATION; INTRACAUDAL
Status: DISCONTINUED | OUTPATIENT
Start: 2018-03-20 | End: 2018-03-20 | Stop reason: HOSPADM

## 2018-03-20 RX ORDER — ONDANSETRON 2 MG/ML
4 INJECTION INTRAMUSCULAR; INTRAVENOUS EVERY 8 HOURS PRN
Status: DISCONTINUED | OUTPATIENT
Start: 2018-03-20 | End: 2018-03-24 | Stop reason: HOSPADM

## 2018-03-20 RX ORDER — METRONIDAZOLE 500 MG/100ML
500 INJECTION, SOLUTION INTRAVENOUS
Status: COMPLETED | OUTPATIENT
Start: 2018-03-20 | End: 2018-03-21

## 2018-03-20 RX ORDER — OXYCODONE HYDROCHLORIDE 5 MG/1
5 TABLET ORAL
Status: DISCONTINUED | OUTPATIENT
Start: 2018-03-20 | End: 2018-03-20 | Stop reason: HOSPADM

## 2018-03-20 RX ORDER — ACETAMINOPHEN 10 MG/ML
1000 INJECTION, SOLUTION INTRAVENOUS EVERY 8 HOURS
Status: COMPLETED | OUTPATIENT
Start: 2018-03-21 | End: 2018-03-21

## 2018-03-20 RX ORDER — FAMOTIDINE 20 MG/50ML
20 INJECTION, SOLUTION INTRAVENOUS EVERY 12 HOURS
Status: DISCONTINUED | OUTPATIENT
Start: 2018-03-20 | End: 2018-03-24 | Stop reason: HOSPADM

## 2018-03-20 RX ORDER — INDOCYANINE GREEN AND WATER 25 MG
1.25 KIT INJECTION ONCE
Status: COMPLETED | OUTPATIENT
Start: 2018-03-20 | End: 2018-03-20

## 2018-03-20 RX ORDER — NALOXONE HCL 0.4 MG/ML
0.02 VIAL (ML) INJECTION
Status: DISCONTINUED | OUTPATIENT
Start: 2018-03-20 | End: 2018-03-23

## 2018-03-20 RX ORDER — DIPHENHYDRAMINE HYDROCHLORIDE 50 MG/ML
25 INJECTION INTRAMUSCULAR; INTRAVENOUS EVERY 4 HOURS PRN
Status: DISCONTINUED | OUTPATIENT
Start: 2018-03-20 | End: 2018-03-24 | Stop reason: HOSPADM

## 2018-03-20 RX ORDER — RAMELTEON 8 MG/1
8 TABLET ORAL NIGHTLY PRN
Status: DISCONTINUED | OUTPATIENT
Start: 2018-03-20 | End: 2018-03-24 | Stop reason: HOSPADM

## 2018-03-20 RX ORDER — ACETAMINOPHEN 10 MG/ML
INJECTION, SOLUTION INTRAVENOUS
Status: DISCONTINUED | OUTPATIENT
Start: 2018-03-20 | End: 2018-03-20

## 2018-03-20 RX ORDER — SODIUM CHLORIDE 0.9 % (FLUSH) 0.9 %
3 SYRINGE (ML) INJECTION EVERY 8 HOURS
Status: DISCONTINUED | OUTPATIENT
Start: 2018-03-20 | End: 2018-03-20 | Stop reason: HOSPADM

## 2018-03-20 RX ORDER — SODIUM CHLORIDE 0.9 % (FLUSH) 0.9 %
3 SYRINGE (ML) INJECTION
Status: DISCONTINUED | OUTPATIENT
Start: 2018-03-20 | End: 2018-03-24 | Stop reason: HOSPADM

## 2018-03-20 RX ORDER — PROMETHAZINE HYDROCHLORIDE 25 MG/1
25 SUPPOSITORY RECTAL EVERY 6 HOURS PRN
Status: DISCONTINUED | OUTPATIENT
Start: 2018-03-20 | End: 2018-03-24 | Stop reason: HOSPADM

## 2018-03-20 RX ORDER — LIDOCAINE HCL/PF 100 MG/5ML
SYRINGE (ML) INTRAVENOUS
Status: DISCONTINUED | OUTPATIENT
Start: 2018-03-20 | End: 2018-03-20

## 2018-03-20 RX ORDER — SODIUM CHLORIDE, SODIUM LACTATE, POTASSIUM CHLORIDE, CALCIUM CHLORIDE 600; 310; 30; 20 MG/100ML; MG/100ML; MG/100ML; MG/100ML
INJECTION, SOLUTION INTRAVENOUS CONTINUOUS
Status: DISCONTINUED | OUTPATIENT
Start: 2018-03-20 | End: 2018-03-21

## 2018-03-20 RX ADMIN — CEFTRIAXONE SODIUM 2 G: 2 INJECTION, POWDER, FOR SOLUTION INTRAMUSCULAR; INTRAVENOUS at 04:03

## 2018-03-20 RX ADMIN — Medication: at 06:03

## 2018-03-20 RX ADMIN — METRONIDAZOLE 500 MG: 500 INJECTION, SOLUTION INTRAVENOUS at 12:03

## 2018-03-20 RX ADMIN — ACETAMINOPHEN 1000 MG: 10 INJECTION, SOLUTION INTRAVENOUS at 04:03

## 2018-03-20 RX ADMIN — MEPERIDINE HYDROCHLORIDE 12.5 MG: 50 INJECTION INTRAMUSCULAR; INTRAVENOUS; SUBCUTANEOUS at 05:03

## 2018-03-20 RX ADMIN — PROPOFOL 180 MG: 10 INJECTION, EMULSION INTRAVENOUS at 11:03

## 2018-03-20 RX ADMIN — ROCURONIUM BROMIDE 10 MG: 10 INJECTION, SOLUTION INTRAVENOUS at 02:03

## 2018-03-20 RX ADMIN — ROCURONIUM BROMIDE 5 MG: 10 INJECTION, SOLUTION INTRAVENOUS at 11:03

## 2018-03-20 RX ADMIN — SODIUM CHLORIDE, POTASSIUM CHLORIDE, SODIUM LACTATE AND CALCIUM CHLORIDE: 600; 310; 30; 20 INJECTION, SOLUTION INTRAVENOUS at 06:03

## 2018-03-20 RX ADMIN — BUPIVACAINE 266 MG: 13.3 INJECTION, SUSPENSION, LIPOSOMAL INFILTRATION at 12:03

## 2018-03-20 RX ADMIN — INDOCYANINE GREEN AND WATER 1.25 MG: KIT at 03:03

## 2018-03-20 RX ADMIN — ROCURONIUM BROMIDE 10 MG: 10 INJECTION, SOLUTION INTRAVENOUS at 01:03

## 2018-03-20 RX ADMIN — CEFTRIAXONE SODIUM 2 G: 2 INJECTION, POWDER, FOR SOLUTION INTRAMUSCULAR; INTRAVENOUS at 12:03

## 2018-03-20 RX ADMIN — LIDOCAINE HYDROCHLORIDE 40 MG: 20 INJECTION, SOLUTION INTRAVENOUS at 11:03

## 2018-03-20 RX ADMIN — BUPIVACAINE HYDROCHLORIDE 30 ML: 2.5 INJECTION, SOLUTION EPIDURAL; INFILTRATION; INTRACAUDAL; PERINEURAL at 12:03

## 2018-03-20 RX ADMIN — ROBINUL 0.8 MG: 0.2 INJECTION INTRAMUSCULAR; INTRAVENOUS at 05:03

## 2018-03-20 RX ADMIN — SODIUM CHLORIDE, SODIUM LACTATE, POTASSIUM CHLORIDE, AND CALCIUM CHLORIDE: 600; 310; 30; 20 INJECTION, SOLUTION INTRAVENOUS at 12:03

## 2018-03-20 RX ADMIN — DEXAMETHASONE SODIUM PHOSPHATE 4 MG: 4 INJECTION, SOLUTION INTRA-ARTICULAR; INTRALESIONAL; INTRAMUSCULAR; INTRAVENOUS; SOFT TISSUE at 12:03

## 2018-03-20 RX ADMIN — FENTANYL CITRATE 50 MCG: 50 INJECTION, SOLUTION INTRAMUSCULAR; INTRAVENOUS at 12:03

## 2018-03-20 RX ADMIN — ROCURONIUM BROMIDE 10 MG: 10 INJECTION, SOLUTION INTRAVENOUS at 12:03

## 2018-03-20 RX ADMIN — CHLORHEXIDINE GLUCONATE 10 ML: 1.2 RINSE ORAL at 08:03

## 2018-03-20 RX ADMIN — FENTANYL CITRATE 50 MCG: 50 INJECTION, SOLUTION INTRAMUSCULAR; INTRAVENOUS at 01:03

## 2018-03-20 RX ADMIN — ROCURONIUM BROMIDE 20 MG: 10 INJECTION, SOLUTION INTRAVENOUS at 04:03

## 2018-03-20 RX ADMIN — FENTANYL CITRATE 100 MCG: 50 INJECTION, SOLUTION INTRAMUSCULAR; INTRAVENOUS at 04:03

## 2018-03-20 RX ADMIN — SODIUM CHLORIDE, SODIUM LACTATE, POTASSIUM CHLORIDE, AND CALCIUM CHLORIDE: 600; 310; 30; 20 INJECTION, SOLUTION INTRAVENOUS at 03:03

## 2018-03-20 RX ADMIN — METRONIDAZOLE 500 MG: 500 SOLUTION INTRAVENOUS at 08:03

## 2018-03-20 RX ADMIN — NEOSTIGMINE METHYLSULFATE 5 MG: 1 INJECTION INTRAVENOUS at 05:03

## 2018-03-20 RX ADMIN — SODIUM CHLORIDE, SODIUM LACTATE, POTASSIUM CHLORIDE, AND CALCIUM CHLORIDE: 600; 310; 30; 20 INJECTION, SOLUTION INTRAVENOUS at 05:03

## 2018-03-20 RX ADMIN — SUCCINYLCHOLINE CHLORIDE 100 MG: 20 INJECTION, SOLUTION INTRAMUSCULAR; INTRAVENOUS at 11:03

## 2018-03-20 RX ADMIN — ROCURONIUM BROMIDE 45 MG: 10 INJECTION, SOLUTION INTRAVENOUS at 12:03

## 2018-03-20 RX ADMIN — SODIUM CHLORIDE, SODIUM LACTATE, POTASSIUM CHLORIDE, AND CALCIUM CHLORIDE: 600; 310; 30; 20 INJECTION, SOLUTION INTRAVENOUS at 11:03

## 2018-03-20 RX ADMIN — MIDAZOLAM 2 MG: 1 INJECTION INTRAMUSCULAR; INTRAVENOUS at 11:03

## 2018-03-20 RX ADMIN — FAMOTIDINE 20 MG: 20 INJECTION, SOLUTION INTRAVENOUS at 08:03
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
## 2018-03-20 NOTE — TRANSFER OF CARE
"Anesthesia Transfer of Care Note    Patient: Armando Ingram Jr.    Procedure(s) Performed: Procedure(s) (LRB):  COLECTOMY-ROBOTIC SIGMOIDECTOMY (N/A)    Patient location: PACU    Anesthesia Type: general    Transport from OR: Transported from OR on room air with adequate spontaneous ventilation    Post pain: adequate analgesia    Post assessment: no apparent anesthetic complications    Post vital signs: stable    Level of consciousness: awake, alert and oriented    Nausea/Vomiting: no nausea/vomiting    Complications: none    Transfer of care protocol was followed      Last vitals:   Visit Vitals  /79 (BP Location: Right arm, Patient Position: Sitting)   Pulse 74   Temp 36.8 °C (98.2 °F) (Tympanic)   Resp 18   Ht 5' 11" (1.803 m)   Wt 86.2 kg (190 lb 0.6 oz)   SpO2 96%   BMI 26.50 kg/m²     "
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
## 2018-03-20 NOTE — TELEPHONE ENCOUNTER
----- Message from Annemarie Arnold sent at 3/19/2018  3:38 PM CDT -----  Contact: pt  The pt states he has questions concerning tomorrows appt, the pt can be reached at [phone]///[id number]

## 2018-03-21 LAB
ANION GAP SERPL CALC-SCNC: 7 MMOL/L
BUN SERPL-MCNC: 13 MG/DL
CALCIUM SERPL-MCNC: 8.2 MG/DL
CHLORIDE SERPL-SCNC: 108 MMOL/L
CO2 SERPL-SCNC: 23 MMOL/L
CREAT SERPL-MCNC: 0.8 MG/DL
ERYTHROCYTE [DISTWIDTH] IN BLOOD BY AUTOMATED COUNT: 13.5 %
EST. GFR  (AFRICAN AMERICAN): >60 ML/MIN/1.73 M^2
EST. GFR  (NON AFRICAN AMERICAN): >60 ML/MIN/1.73 M^2
GLUCOSE SERPL-MCNC: 121 MG/DL
HCT VFR BLD AUTO: 42.9 %
HGB BLD-MCNC: 14.6 G/DL
MCH RBC QN AUTO: 28.9 PG
MCHC RBC AUTO-ENTMCNC: 34 G/DL
MCV RBC AUTO: 85 FL
PLATELET # BLD AUTO: 164 K/UL
PMV BLD AUTO: 11.2 FL
POCT GLUCOSE: 103 MG/DL (ref 70–110)
POCT GLUCOSE: 106 MG/DL (ref 70–110)
POCT GLUCOSE: 128 MG/DL (ref 70–110)
POCT GLUCOSE: 72 MG/DL (ref 70–110)
POTASSIUM SERPL-SCNC: 4.3 MMOL/L
RBC # BLD AUTO: 5.06 M/UL
SODIUM SERPL-SCNC: 138 MMOL/L
WBC # BLD AUTO: 13.33 K/UL

## 2018-03-21 PROCEDURE — 96372 THER/PROPH/DIAG INJ SC/IM: CPT

## 2018-03-21 PROCEDURE — G8979 MOBILITY GOAL STATUS: HCPCS | Mod: CH

## 2018-03-21 PROCEDURE — 36415 COLL VENOUS BLD VENIPUNCTURE: CPT

## 2018-03-21 PROCEDURE — S0028 INJECTION, FAMOTIDINE, 20 MG: HCPCS | Performed by: SURGERY

## 2018-03-21 PROCEDURE — 94799 UNLISTED PULMONARY SVC/PX: CPT

## 2018-03-21 PROCEDURE — 85027 COMPLETE CBC AUTOMATED: CPT

## 2018-03-21 PROCEDURE — 97161 PT EVAL LOW COMPLEX 20 MIN: CPT

## 2018-03-21 PROCEDURE — 94770 HC EXHALED C02 TEST: CPT

## 2018-03-21 PROCEDURE — 63600175 PHARM REV CODE 636 W HCPCS: Performed by: SURGERY

## 2018-03-21 PROCEDURE — S0030 INJECTION, METRONIDAZOLE: HCPCS | Performed by: SURGERY

## 2018-03-21 PROCEDURE — 25000003 PHARM REV CODE 250: Performed by: SURGERY

## 2018-03-21 PROCEDURE — 80048 BASIC METABOLIC PNL TOTAL CA: CPT

## 2018-03-21 PROCEDURE — 97116 GAIT TRAINING THERAPY: CPT

## 2018-03-21 PROCEDURE — 99900035 HC TECH TIME PER 15 MIN (STAT)

## 2018-03-21 PROCEDURE — 27000221 HC OXYGEN, UP TO 24 HOURS

## 2018-03-21 PROCEDURE — 11000001 HC ACUTE MED/SURG PRIVATE ROOM

## 2018-03-21 PROCEDURE — 94761 N-INVAS EAR/PLS OXIMETRY MLT: CPT

## 2018-03-21 PROCEDURE — G8978 MOBILITY CURRENT STATUS: HCPCS | Mod: CH

## 2018-03-21 PROCEDURE — G8980 MOBILITY D/C STATUS: HCPCS | Mod: CH

## 2018-03-21 RX ADMIN — METRONIDAZOLE 500 MG: 500 SOLUTION INTRAVENOUS at 03:03

## 2018-03-21 RX ADMIN — SODIUM CHLORIDE, POTASSIUM CHLORIDE, SODIUM LACTATE AND CALCIUM CHLORIDE: 600; 310; 30; 20 INJECTION, SOLUTION INTRAVENOUS at 03:03

## 2018-03-21 RX ADMIN — CHLORHEXIDINE GLUCONATE 10 ML: 1.2 RINSE ORAL at 08:03

## 2018-03-21 RX ADMIN — Medication: at 02:03

## 2018-03-21 RX ADMIN — FAMOTIDINE 20 MG: 20 INJECTION, SOLUTION INTRAVENOUS at 08:03

## 2018-03-21 RX ADMIN — SODIUM CHLORIDE, POTASSIUM CHLORIDE, SODIUM LACTATE AND CALCIUM CHLORIDE: 600; 310; 30; 20 INJECTION, SOLUTION INTRAVENOUS at 08:03

## 2018-03-21 RX ADMIN — ACETAMINOPHEN 1000 MG: 10 INJECTION, SOLUTION INTRAVENOUS at 04:03

## 2018-03-21 RX ADMIN — ACETAMINOPHEN 1000 MG: 10 INJECTION, SOLUTION INTRAVENOUS at 01:03

## 2018-03-21 RX ADMIN — ENOXAPARIN SODIUM 40 MG: 100 INJECTION SUBCUTANEOUS at 05:03

## 2018-03-21 RX ADMIN — ACETAMINOPHEN 1000 MG: 10 INJECTION, SOLUTION INTRAVENOUS at 08:03

## 2018-03-21 RX ADMIN — SODIUM CHLORIDE, POTASSIUM CHLORIDE, SODIUM LACTATE AND CALCIUM CHLORIDE: 600; 310; 30; 20 INJECTION, SOLUTION INTRAVENOUS at 12:03

## 2018-03-21 NOTE — OP NOTE
[facility]  Surgery Department  Operative Note    SUMMARY     Date of Procedure: 3/20/2018     Procedure: Procedure(s) (LRB):  COLECTOMY-ROBOTIC SIGMOIDECTOMY (N/A)   Mobilization of splenic flexure    Surgeon(s) and Role:     * Mervin Alexander MD - Primary    Assisting Surgeon: Ryan Feldman    Pre-Operative Diagnosis: Tubulovillous adenoma of colon [D12.6]    Post-Operative Diagnosis: Post-Op Diagnosis Codes:     * Tubulovillous adenoma of colon [D12.6]    Anesthesia: General    Technical Procedures Used: robotic sigmoidectomy     Description of the Findings of the Procedure: tattoo noted in sigmoid colon     Significant Surgical Tasks Conducted by the Assistant(s), if Applicable: assistance with colorectal anastamosis    Complications: No    Estimated Blood Loss (EBL): 15 mL           Implants: * No implants in log *    Specimens:   Specimen (12h ago through future)    Start     Ordered    03/20/18 1654  Specimen to Pathology - Surgery  Once     Comments:  1) Sigmoid colon (perm)2) Anastomotic Rings (perm)Dx: Colon Mass      03/20/18 1655                  Condition: Good    Disposition: PACU - hemodynamically stable.    Procedure in Detail:  The patient was brought to the OR and underwent general anesthesia.  He was prepped and draped in the usual sterile fashion in the lithotomy position.  An incision was made just superior and to the patient's right of the umbilicus.  Fascia grasped and Veress needle inserted.  Insufflation was obtained to 15 mmHg.  An 8 mm robotic Optiview port was placed under direct visualization.  We then placed one robotic 8 mm ports and one 12 mm port in a line towards the ASIS.   We then placed a fourth robotic 8 mm port to the left of the falciform ligament in a line with the other ports.  The robot was docked.    We began the dissection in a medial to lateral fashion by incising the mesentery just lateral to the aorta. The mesentery and retroperitoneum were   by blunt dissection and vessel sealer. This was carried out laterally until the lateral to sigmoid colon. The ureter was identified and care was taken to avoid injury. The lateral attachments were taken down along the white line of toldt. Distally this dissection planes were carried out past the tattoo to the rectosigmoid junction. The LINA pedicle was isolated and divided using the stapler with a white load.The mesentery was divided using the vessel sealer. Indocyan green was administered and colon note to have good blood flow to area chosen for resection. The colon was divided proximally and distally to the tattoo areas using the stapler with blue loads. To obtain adequate length for reanastomosis  we mobilized the splenic flexure to allow enough mobilization for anastomosis to be tension free. Once we had adequate mobilization we elected to undocked the robot. The 12mm port incision was extended in an oblique fashion and muscles plit. An serafin wound protector was placed. The specimen removed. The end of the descending colon was brought up and opened. The anvil was placed and sewn in with a pursestring suture. This was placed back into the abdomen and insufflation obtained. We used an EEA 33 stapler to perform an end to end anastomosis. A leak test was performed and noted to be negative.   Exparel was injected into the fascia. The peritoneal layer and fascia was closed using #1 looped PDS in 2 layers. The incisions were closed with 4-0 monocryl suture.  Dermaflex was placed.  Patient was transferred to recovery in a stable and satisfactory condition

## 2018-03-21 NOTE — PLAN OF CARE
Problem: Patient Care Overview  Goal: Plan of Care Review  PT IND WITH ' NO LOB. PT WILL BE D/C TO MOBILITY PROGRAM   Outcome: Ongoing (interventions implemented as appropriate)  Pt on O2 tolerating well. No distress noted at this time.

## 2018-03-21 NOTE — PLAN OF CARE
Problem: Patient Care Overview  Goal: Plan of Care Review  Outcome: Ongoing (interventions implemented as appropriate)  Patient AAO and VSS.    IVF administered as ordered. Pain adequately managed with dilaudid via pca pump. Ledezma catheter in place and draining clear, yellow urine to gravity. Encouraged use of incentive spirometer 10 x/hour while awake.  Lap sites closed and open to air. Remains free of injury. Fall precautions maintained with bed low and wheels locked. Chart review for 24 hours completed. Will continue to monitor till discharge.
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
## 2018-03-21 NOTE — HOSPITAL COURSE
03/21/2018: pod1 - no nausea/emesis. Pain well controlled with pca  03/23/2018: POD3: no nausea/emesis. No flatus. Pain controlled  3/24/18:  Tolerated a regular diet, no nausea, small bowel movement, flatus

## 2018-03-21 NOTE — PLAN OF CARE
Met with patient. Patient is independent and works full time. He has arranged to be off work for 6 weeks. No discharge needs identified.   Provided Discharge Planning Begins upon Admission, Discharge Planning Packet including Advance Directive Admission, [facility] Delivery information and contact information for . Explained role of case management in the transition of care.     03/21/18 1330   Discharge Assessment   Assessment Type Discharge Planning Assessment   Confirmed/corrected address and phone number on facesheet? Yes   Assessment information obtained from? Patient;Medical Record   Prior to hospitilization cognitive status: Alert/Oriented   Prior to hospitalization functional status: Independent   Current cognitive status: Alert/Oriented   Current Functional Status: Independent   Facility Arrived From: (home)   Lives With spouse   Able to Return to Prior Arrangements yes   Is patient able to care for self after discharge? Yes   Patient's perception of discharge disposition home or selfcare   Readmission Within The Last 30 Days no previous admission in last 30 days   Patient currently being followed by outpatient case management? No   Equipment Currently Used at Home none   Do you have any problems affording any of your prescribed medications? No   Is the patient taking medications as prescribed? yes   Does the patient have transportation home? Yes   Transportation Available car;family or friend will provide   Discharge Plan A Home   Discharge Plan B Home with family   Patient/Family In Agreement With Plan yes

## 2018-03-21 NOTE — SUBJECTIVE & OBJECTIVE
Interval History: pain controlled. No n/v.     Medications:  Continuous Infusions:   hydromorphone in 0.9 % NaCl 6 mg/30 ml      lactated ringers      lactated ringers 125 mL/hr at 03/21/18 0329     Scheduled Meds:   acetaminophen  1,000 mg Intravenous Q8H    chlorhexidine  10 mL Mouth/Throat BID    enoxaparin  40 mg Subcutaneous Daily    famotidine  20 mg Intravenous Q12H    nozaseptin   Each Nare BID     PRN Meds:dextrose 50%, diphenhydrAMINE, glucagon (human recombinant), insulin aspart U-100, naloxone, ondansetron, promethazine, ramelteon, sodium chloride 0.9%     Review of patient's allergies indicates:  No Known Allergies  Objective:     Vital Signs (Most Recent):  Temp: 98.1 °F (36.7 °C) (03/21/18 0833)  Pulse: 64 (03/21/18 0833)  Resp: 12 (03/21/18 0833)  BP: 127/76 (03/21/18 0833)  SpO2: 96 % (03/21/18 0833) Vital Signs (24h Range):  Temp:  [97.9 °F (36.6 °C)-99 °F (37.2 °C)] 98.1 °F (36.7 °C)  Pulse:  [] 64  Resp:  [10-24] 12  SpO2:  [92 %-99 %] 96 %  BP: (122-201)/() 127/76     Weight: 92 kg (202 lb 13.2 oz)  Body mass index is 28.29 kg/m².    Intake/Output - Last 3 Shifts       03/19 0700 - 03/20 0659 03/20 0700 - 03/21 0659 03/21 0700 - 03/22 0659    P.O.  0 0    I.V. (mL/kg)  4504.7 (49) 418.6 (4.5)    IV Piggyback  350 150    Total Intake(mL/kg)  4854.7 (52.8) 568.6 (6.2)    Urine (mL/kg/hr)  1515     Blood  15     Total Output   1530      Net   +3324.7 +568.6                 Physical Exam   Constitutional: He is oriented to person, place, and time. He appears well-developed and well-nourished.   HENT:   Head: Normocephalic and atraumatic.   Eyes: EOM are normal.   Cardiovascular: Normal rate and regular rhythm.    Pulmonary/Chest: Effort normal. No respiratory distress.   Abdominal: Soft. He exhibits no distension. There is tenderness (mild incisional).   Incisions c/d/i   Musculoskeletal: Normal range of motion.   Neurological: He is alert and oriented to person, place, and time.    Skin: Skin is warm and dry.   Psychiatric: He has a normal mood and affect. Thought content normal.   Vitals reviewed.      Significant Labs:  CBC:   Recent Labs  Lab 03/21/18  0452   WBC 13.33*   RBC 5.06   HGB 14.6   HCT 42.9      MCV 85   MCH 28.9   MCHC 34.0     CMP:   Recent Labs  Lab 03/15/18  1128 03/21/18  0452   GLU 87 121*   CALCIUM 8.9 8.2*   ALBUMIN 3.4*  --    PROT 6.4  --     138   K 4.1 4.3   CO2 26 23    108   BUN 15 13   CREATININE 1.0 0.8   ALKPHOS 85  --    ALT 11  --    AST 25  --    BILITOT 0.5  --        Significant Diagnostics:  I have reviewed all pertinent imaging results/findings within the past 24 hours.

## 2018-03-21 NOTE — PLAN OF CARE
Problem: Patient Care Overview  Goal: Plan of Care Review  PT IND WITH ' NO LOB. PT WILL BE D/C TO MOBILITY PROGRAM   Outcome: Ongoing (interventions implemented as appropriate)  Fall precautions maintained, pt free from injuries/fall.  Repositions and ambulates   C/o mild abdominal pain, 2/10, PCA pump in use.  IVF as prescribed.   Blood sugar monitored.  POC and meds discussed with pt, pt verbalized understanding.  Side rails x 2, bed locked and low, phone and call light w/in reach.  Chart check done. Will cont to monitor.

## 2018-03-21 NOTE — PROGRESS NOTES
[facility] -   General Surgery  Progress Note    Subjective:     History of Present Illness:  Armando ARELLANO Juan Jose Giraldo. presented for Robotic assisted colon resection for tubulovillous adenoma.     Post-Op Info:  Procedure(s) (LRB):  COLECTOMY-ROBOTIC (N/A)  ANASTAMOSIS (N/A)   1 Day Post-Op     Interval History: pain controlled. No n/v.     Medications:  Continuous Infusions:   hydromorphone in 0.9 % NaCl 6 mg/30 ml      lactated ringers      lactated ringers 125 mL/hr at 03/21/18 0329     Scheduled Meds:   acetaminophen  1,000 mg Intravenous Q8H    chlorhexidine  10 mL Mouth/Throat BID    enoxaparin  40 mg Subcutaneous Daily    famotidine  20 mg Intravenous Q12H    nozaseptin   Each Nare BID     PRN Meds:dextrose 50%, diphenhydrAMINE, glucagon (human recombinant), insulin aspart U-100, naloxone, ondansetron, promethazine, ramelteon, sodium chloride 0.9%     Review of patient's allergies indicates:  No Known Allergies  Objective:     Vital Signs (Most Recent):  Temp: 98.1 °F (36.7 °C) (03/21/18 0833)  Pulse: 64 (03/21/18 0833)  Resp: 12 (03/21/18 0833)  BP: 127/76 (03/21/18 0833)  SpO2: 96 % (03/21/18 0833) Vital Signs (24h Range):  Temp:  [97.9 °F (36.6 °C)-99 °F (37.2 °C)] 98.1 °F (36.7 °C)  Pulse:  [] 64  Resp:  [10-24] 12  SpO2:  [92 %-99 %] 96 %  BP: (122-201)/() 127/76     Weight: 92 kg (202 lb 13.2 oz)  Body mass index is 28.29 kg/m².    Intake/Output - Last 3 Shifts       03/19 0700 - 03/20 0659 03/20 0700 - 03/21 0659 03/21 0700 - 03/22 0659    P.O.  0 0    I.V. (mL/kg)  4504.7 (49) 418.6 (4.5)    IV Piggyback  350 150    Total Intake(mL/kg)  4854.7 (52.8) 568.6 (6.2)    Urine (mL/kg/hr)  1515     Blood  15     Total Output   1530      Net   +3324.7 +568.6                 Physical Exam   Constitutional: He is oriented to person, place, and time. He appears well-developed and well-nourished.   HENT:   Head: Normocephalic and atraumatic.   Eyes: EOM are normal.   Cardiovascular:  Normal rate and regular rhythm.    Pulmonary/Chest: Effort normal. No respiratory distress.   Abdominal: Soft. He exhibits no distension. There is tenderness (mild incisional).   Incisions c/d/i   Musculoskeletal: Normal range of motion.   Neurological: He is alert and oriented to person, place, and time.   Skin: Skin is warm and dry.   Psychiatric: He has a normal mood and affect. Thought content normal.   Vitals reviewed.      Significant Labs:  CBC:   Recent Labs  Lab 03/21/18  0452   WBC 13.33*   RBC 5.06   HGB 14.6   HCT 42.9      MCV 85   MCH 28.9   MCHC 34.0     CMP:   Recent Labs  Lab 03/15/18  1128 03/21/18  0452   GLU 87 121*   CALCIUM 8.9 8.2*   ALBUMIN 3.4*  --    PROT 6.4  --     138   K 4.1 4.3   CO2 26 23    108   BUN 15 13   CREATININE 1.0 0.8   ALKPHOS 85  --    ALT 11  --    AST 25  --    BILITOT 0.5  --        Significant Diagnostics:  I have reviewed all pertinent imaging results/findings within the past 24 hours.    Assessment/Plan:     * Colon polyp    S/p robotic assisted sigmoidectomy   POD1  Continue PCA  Incentive spirometry  DVT/GI prophylaxis  PT eval and treat  Ok for ice chips            Juliane Cisneros PA-C  General Surgery  [facility]

## 2018-03-21 NOTE — ANESTHESIA POSTPROCEDURE EVALUATION
"Anesthesia Post Evaluation    Patient: Armando Ingram Jr.    Procedure(s) Performed: Procedure(s) (LRB):  COLECTOMY-ROBOTIC SIGMOIDECTOMY (N/A)    Final Anesthesia Type: general  Patient location during evaluation: PACU  Patient participation: Yes- Able to Participate  Level of consciousness: awake and alert  Post-procedure vital signs: reviewed and stable  Pain management: adequate  Airway patency: patent  PONV status at discharge: No PONV  Anesthetic complications: no      Cardiovascular status: blood pressure returned to baseline  Respiratory status: unassisted  Hydration status: euvolemic  Follow-up not needed.        Visit Vitals  BP (!) 149/74 (BP Location: Right arm, Patient Position: Lying)   Pulse 81   Temp 36.9 °C (98.4 °F) (Oral)   Resp 18   Ht 5' 11" (1.803 m)   Wt 86.2 kg (190 lb 0.6 oz)   SpO2 97%   BMI 26.50 kg/m²       Pain/Brigitte Score: Pain Assessment Performed: Yes (3/20/2018  6:30 PM)  Presence of Pain: non-verbal indicators absent (3/20/2018  6:30 PM)  Pain Rating Prior to Med Admin: 9 (3/20/2018  6:57 PM)  Brigitte Score: 8 (3/20/2018  6:30 PM)      "

## 2018-03-22 LAB
POCT GLUCOSE: 110 MG/DL (ref 70–110)
POCT GLUCOSE: 76 MG/DL (ref 70–110)
POCT GLUCOSE: 76 MG/DL (ref 70–110)

## 2018-03-22 PROCEDURE — 99900035 HC TECH TIME PER 15 MIN (STAT)

## 2018-03-22 PROCEDURE — S0028 INJECTION, FAMOTIDINE, 20 MG: HCPCS | Performed by: SURGERY

## 2018-03-22 PROCEDURE — 96372 THER/PROPH/DIAG INJ SC/IM: CPT

## 2018-03-22 PROCEDURE — 94770 HC EXHALED C02 TEST: CPT

## 2018-03-22 PROCEDURE — 11000001 HC ACUTE MED/SURG PRIVATE ROOM

## 2018-03-22 PROCEDURE — 25000003 PHARM REV CODE 250: Performed by: SURGERY

## 2018-03-22 PROCEDURE — 94799 UNLISTED PULMONARY SVC/PX: CPT

## 2018-03-22 PROCEDURE — 63600175 PHARM REV CODE 636 W HCPCS: Performed by: SURGERY

## 2018-03-22 PROCEDURE — 25000003 PHARM REV CODE 250: Performed by: PHYSICIAN ASSISTANT

## 2018-03-22 RX ORDER — DEXTROSE MONOHYDRATE, SODIUM CHLORIDE, AND POTASSIUM CHLORIDE 50; 1.49; 9 G/1000ML; G/1000ML; G/1000ML
INJECTION, SOLUTION INTRAVENOUS CONTINUOUS
Status: DISCONTINUED | OUTPATIENT
Start: 2018-03-22 | End: 2018-03-23

## 2018-03-22 RX ADMIN — ENOXAPARIN SODIUM 40 MG: 100 INJECTION SUBCUTANEOUS at 05:03

## 2018-03-22 RX ADMIN — FAMOTIDINE 20 MG: 20 INJECTION, SOLUTION INTRAVENOUS at 08:03

## 2018-03-22 RX ADMIN — SODIUM CHLORIDE, POTASSIUM CHLORIDE, SODIUM LACTATE AND CALCIUM CHLORIDE: 600; 310; 30; 20 INJECTION, SOLUTION INTRAVENOUS at 03:03

## 2018-03-22 RX ADMIN — DEXTROSE MONOHYDRATE, SODIUM CHLORIDE, AND POTASSIUM CHLORIDE: 50; 9; 1.49 INJECTION, SOLUTION INTRAVENOUS at 08:03

## 2018-03-22 RX ADMIN — ONDANSETRON 4 MG: 2 INJECTION INTRAMUSCULAR; INTRAVENOUS at 03:03

## 2018-03-22 RX ADMIN — CHLORHEXIDINE GLUCONATE 10 ML: 1.2 RINSE ORAL at 08:03

## 2018-03-22 RX ADMIN — PROMETHAZINE HYDROCHLORIDE 25 MG: 25 SUPPOSITORY RECTAL at 06:03

## 2018-03-22 NOTE — PLAN OF CARE
Problem: Patient Care Overview  Goal: Plan of Care Review  PT IND WITH ' NO LOB. PT WILL BE D/C TO MOBILITY PROGRAM   Outcome: Ongoing (interventions implemented as appropriate)  Pt remained free of injury during shift, stable condition, pain adequately controlled with PCA dilaudid and sleeping between care, ambulated once during shift, no acute distress, receiving IV fluids,remained NPO except ice chips, denied nausea, lap sites to abdomen CDI, blood glucose monitoring performed, and will continue to monitor. 24hr chart review performed.

## 2018-03-22 NOTE — PLAN OF CARE
Problem: Patient Care Overview  Goal: Plan of Care Review  PT IND WITH ' NO LOB. PT WILL BE D/C TO MOBILITY PROGRAM   Outcome: Ongoing (interventions implemented as appropriate)  Patient remained free from injury. no c/o pain at this time but c/o nausea. PRN meds administered as prescribed. PCA in use. Calm. Watching TV. No distress noted. Oriented x3. Respirations even and non labored. IV patent and infusing. Surgical incisions dry intact. Bed locked and low. Call light in reach. Safety measures in place. Will continue to monitor. Reviewed plan of care. Patient verbalized understanding and teach back.    12hr chart check complete.

## 2018-03-22 NOTE — NURSING
Patient ambulated in hallway with assistance. Reports slight increase in pain from 2 to 3 or 4/10 on pain scale. Tolerated well.

## 2018-03-23 ENCOUNTER — TELEPHONE (OUTPATIENT)
Dept: ORTHOPEDICS | Facility: CLINIC | Age: 58
End: 2018-03-23

## 2018-03-23 LAB
POCT GLUCOSE: 102 MG/DL (ref 70–110)
POCT GLUCOSE: 117 MG/DL (ref 70–110)
POCT GLUCOSE: 141 MG/DL (ref 70–110)
POCT GLUCOSE: 95 MG/DL (ref 70–110)

## 2018-03-23 PROCEDURE — 94760 N-INVAS EAR/PLS OXIMETRY 1: CPT

## 2018-03-23 PROCEDURE — 25000003 PHARM REV CODE 250: Performed by: SURGERY

## 2018-03-23 PROCEDURE — 25000003 PHARM REV CODE 250: Performed by: PHYSICIAN ASSISTANT

## 2018-03-23 PROCEDURE — 94799 UNLISTED PULMONARY SVC/PX: CPT

## 2018-03-23 PROCEDURE — 11000001 HC ACUTE MED/SURG PRIVATE ROOM

## 2018-03-23 PROCEDURE — S0028 INJECTION, FAMOTIDINE, 20 MG: HCPCS | Performed by: SURGERY

## 2018-03-23 PROCEDURE — 63600175 PHARM REV CODE 636 W HCPCS: Performed by: SURGERY

## 2018-03-23 PROCEDURE — 99900035 HC TECH TIME PER 15 MIN (STAT)

## 2018-03-23 PROCEDURE — 94770 HC EXHALED C02 TEST: CPT

## 2018-03-23 RX ORDER — HYDROMORPHONE HYDROCHLORIDE 1 MG/ML
0.5 INJECTION, SOLUTION INTRAMUSCULAR; INTRAVENOUS; SUBCUTANEOUS EVERY 6 HOURS PRN
Status: DISCONTINUED | OUTPATIENT
Start: 2018-03-23 | End: 2018-03-24 | Stop reason: HOSPADM

## 2018-03-23 RX ORDER — HYDROCODONE BITARTRATE AND ACETAMINOPHEN 5; 325 MG/1; MG/1
1 TABLET ORAL EVERY 4 HOURS PRN
Status: DISCONTINUED | OUTPATIENT
Start: 2018-03-23 | End: 2018-03-24 | Stop reason: HOSPADM

## 2018-03-23 RX ORDER — BISACODYL 5 MG
10 TABLET, DELAYED RELEASE (ENTERIC COATED) ORAL DAILY
Status: DISCONTINUED | OUTPATIENT
Start: 2018-03-23 | End: 2018-03-24 | Stop reason: HOSPADM

## 2018-03-23 RX ORDER — HYDROCODONE BITARTRATE AND ACETAMINOPHEN 10; 325 MG/1; MG/1
1 TABLET ORAL EVERY 4 HOURS PRN
Status: DISCONTINUED | OUTPATIENT
Start: 2018-03-23 | End: 2018-03-24 | Stop reason: HOSPADM

## 2018-03-23 RX ADMIN — HYDROCODONE BITARTRATE AND ACETAMINOPHEN 1 TABLET: 5; 325 TABLET ORAL at 11:03

## 2018-03-23 RX ADMIN — DEXTROSE MONOHYDRATE, SODIUM CHLORIDE, AND POTASSIUM CHLORIDE: 50; 9; 1.49 INJECTION, SOLUTION INTRAVENOUS at 08:03

## 2018-03-23 RX ADMIN — FAMOTIDINE 20 MG: 20 INJECTION, SOLUTION INTRAVENOUS at 08:03

## 2018-03-23 RX ADMIN — HYDROCODONE BITARTRATE AND ACETAMINOPHEN 1 TABLET: 5; 325 TABLET ORAL at 04:03

## 2018-03-23 RX ADMIN — BISACODYL 10 MG: 5 TABLET, COATED ORAL at 11:03

## 2018-03-23 RX ADMIN — CHLORHEXIDINE GLUCONATE 10 ML: 1.2 RINSE ORAL at 08:03

## 2018-03-23 RX ADMIN — DEXTROSE MONOHYDRATE, SODIUM CHLORIDE, AND POTASSIUM CHLORIDE: 50; 9; 1.49 INJECTION, SOLUTION INTRAVENOUS at 12:03

## 2018-03-23 RX ADMIN — ENOXAPARIN SODIUM 40 MG: 100 INJECTION SUBCUTANEOUS at 04:03

## 2018-03-23 RX ADMIN — HYDROCODONE BITARTRATE AND ACETAMINOPHEN 1 TABLET: 5; 325 TABLET ORAL at 08:03

## 2018-03-23 NOTE — TELEPHONE ENCOUNTER
Spoke with patient's wife, Richie.  Explained to her that we have the paperwork and that we can fill it out since he has now had his surgery.  She states that she will need to come pick it up after we fill it out because [employer] has some spots that they need to fill out, along with the patient.  Patient is still currently admitted in the hospital and Richie states that she will drive to whatever clinic we are at and pick it up.    I explained to her that Dr. Alexander will be back in clinic on Monday at [facility]

## 2018-03-23 NOTE — ASSESSMENT & PLAN NOTE
S/p robotic assisted sigmoidectomy   POD3  D/c pca, start PO analgesics  Clear liquid diet  Dulcolax

## 2018-03-23 NOTE — PLAN OF CARE
Problem: Patient Care Overview  Goal: Plan of Care Review  PT IND WITH ' NO LOB. PT WILL BE D/C TO MOBILITY PROGRAM   Outcome: Ongoing (interventions implemented as appropriate)  Fall precautions maintained, pt free from falls/injuries  PT repositions and ambulates independently  C/o pain, relieved by oral pain meds, pca DC  Clear liquids  POC and medications reviewed with pt, pt verbalized understanding.  Side rails x 2 up, bed locked and low.  No signs/symptoms of acute distress.  Chart check done. Will cont to monitor.

## 2018-03-23 NOTE — PLAN OF CARE
Problem: Patient Care Overview  Goal: Plan of Care Review  PT IND WITH ' NO LOB. PT WILL BE D/C TO MOBILITY PROGRAM   Outcome: Ongoing (interventions implemented as appropriate)  POC reviewed, including indications and possible side effects of administered medications. Patient verbalized understanding and teach back. No adverse reactions noted. Patient c/o incisional abdominal pain and nausea. PCA pump in use for pain management. Nausea relieved with ice chips, small sips of water, elevating HOB to 30 degrees, repositioning and relaxation techniques. Patient is tolerating clear liquids well. Incisions remain clean, intact and open to air. VS remain stable during shift. Wife at bedside. Patient remains free of injury and falls. Will continue to monitor.    12 hour chart check complete.

## 2018-03-23 NOTE — PROGRESS NOTES
[facility] -   General Surgery  Progress Note    Subjective:     History of Present Illness:  Armando ARELLANO Juan Jose Giraldo. presented for Robotic assisted colon resection for tubulovillous adenoma.     Post-Op Info:  Procedure(s) (LRB):  COLECTOMY-ROBOTIC (N/A)  ANASTAMOSIS (N/A)   3 Days Post-Op     Interval History: no flatus. No nausea/emesis. Tolerating sips of clears    Medications:  Continuous Infusions:   dextrose 5 % and 0.9 % NaCl with KCl 20 mEq 50 mL/hr at 03/23/18 1044     Scheduled Meds:   bisacodyl  10 mg Oral Daily    chlorhexidine  10 mL Mouth/Throat BID    enoxaparin  40 mg Subcutaneous Daily    famotidine  20 mg Intravenous Q12H    nozaseptin   Each Nare BID     PRN Meds:dextrose 50%, diphenhydrAMINE, glucagon (human recombinant), hydrocodone-acetaminophen 10-325mg, hydrocodone-acetaminophen 5-325mg, HYDROmorphone, insulin aspart U-100, ondansetron, promethazine, ramelteon, sodium chloride 0.9%     Review of patient's allergies indicates:  No Known Allergies  Objective:     Vital Signs (Most Recent):  Temp: 98.1 °F (36.7 °C) (03/23/18 1257)  Pulse: 82 (03/23/18 1257)  Resp: 17 (03/23/18 1257)  BP: 113/79 (03/23/18 1257)  SpO2: (!) 93 % (03/23/18 1257) Vital Signs (24h Range):  Temp:  [98.1 °F (36.7 °C)-98.8 °F (37.1 °C)] 98.1 °F (36.7 °C)  Pulse:  [72-82] 82  Resp:  [10-19] 17  SpO2:  [93 %-97 %] 93 %  BP: (113-133)/(69-84) 113/79     Weight: 92 kg (202 lb 13.2 oz)  Body mass index is 28.29 kg/m².    Intake/Output - Last 3 Shifts       03/21 0700 - 03/22 0659 03/22 0700 - 03/23 0659 03/23 0700 - 03/24 0659    P.O. 120 180     I.V. (mL/kg) 2961.8 (32.2) 1226.7 (13.3) 4 (0)    IV Piggyback 300 50     Total Intake(mL/kg) 3381.8 (36.8) 1456.7 (15.8) 4 (0)    Urine (mL/kg/hr) 4110 (1.9) 570 (0.3) 500 (0.8)    Stool 0 (0) 0 (0)     Blood       Total Output 4110 570 500    Net -728.2 +886.7 -496           Urine Occurrence  1100 x     Stool Occurrence 0 x 2 x           Physical Exam    Constitutional: He is oriented to person, place, and time. He appears well-developed and well-nourished.   HENT:   Head: Normocephalic and atraumatic.   Eyes: EOM are normal.   Cardiovascular: Normal rate and regular rhythm.    Pulmonary/Chest: Effort normal and breath sounds normal. No respiratory distress.   Abdominal: Soft. He exhibits no distension. There is no tenderness (mild incisional).   Incisions c/d/i   Musculoskeletal: Normal range of motion.   Neurological: He is alert and oriented to person, place, and time.   Skin: Skin is warm and dry.   Psychiatric: He has a normal mood and affect. Thought content normal.   Vitals reviewed.      Significant Labs:  CBC:   Recent Labs  Lab 03/21/18  0452   WBC 13.33*   RBC 5.06   HGB 14.6   HCT 42.9      MCV 85   MCH 28.9   MCHC 34.0     CMP:   Recent Labs  Lab 03/21/18  0452   *   CALCIUM 8.2*      K 4.3   CO2 23      BUN 13   CREATININE 0.8       Significant Diagnostics:  I have reviewed all pertinent imaging results/findings within the past 24 hours.    Assessment/Plan:     * Colon polyp    S/p robotic assisted sigmoidectomy   POD3  D/c pca, start PO analgesics  Clear liquid diet  Dulcolax              Juliane Cisneros PA-C  General Surgery  [facility]

## 2018-03-23 NOTE — SUBJECTIVE & OBJECTIVE
Interval History: no flatus. No nausea/emesis. Tolerating sips of clears    Medications:  Continuous Infusions:   dextrose 5 % and 0.9 % NaCl with KCl 20 mEq 50 mL/hr at 03/23/18 1044     Scheduled Meds:   bisacodyl  10 mg Oral Daily    chlorhexidine  10 mL Mouth/Throat BID    enoxaparin  40 mg Subcutaneous Daily    famotidine  20 mg Intravenous Q12H    nozaseptin   Each Nare BID     PRN Meds:dextrose 50%, diphenhydrAMINE, glucagon (human recombinant), hydrocodone-acetaminophen 10-325mg, hydrocodone-acetaminophen 5-325mg, HYDROmorphone, insulin aspart U-100, ondansetron, promethazine, ramelteon, sodium chloride 0.9%     Review of patient's allergies indicates:  No Known Allergies  Objective:     Vital Signs (Most Recent):  Temp: 98.1 °F (36.7 °C) (03/23/18 1257)  Pulse: 82 (03/23/18 1257)  Resp: 17 (03/23/18 1257)  BP: 113/79 (03/23/18 1257)  SpO2: (!) 93 % (03/23/18 1257) Vital Signs (24h Range):  Temp:  [98.1 °F (36.7 °C)-98.8 °F (37.1 °C)] 98.1 °F (36.7 °C)  Pulse:  [72-82] 82  Resp:  [10-19] 17  SpO2:  [93 %-97 %] 93 %  BP: (113-133)/(69-84) 113/79     Weight: 92 kg (202 lb 13.2 oz)  Body mass index is 28.29 kg/m².    Intake/Output - Last 3 Shifts       03/21 0700 - 03/22 0659 03/22 0700 - 03/23 0659 03/23 0700 - 03/24 0659    P.O. 120 180     I.V. (mL/kg) 2961.8 (32.2) 1226.7 (13.3) 4 (0)    IV Piggyback 300 50     Total Intake(mL/kg) 3381.8 (36.8) 1456.7 (15.8) 4 (0)    Urine (mL/kg/hr) 4110 (1.9) 570 (0.3) 500 (0.8)    Stool 0 (0) 0 (0)     Blood       Total Output 4110 570 500    Net -728.2 +886.7 -496           Urine Occurrence  1100 x     Stool Occurrence 0 x 2 x           Physical Exam   Constitutional: He is oriented to person, place, and time. He appears well-developed and well-nourished.   HENT:   Head: Normocephalic and atraumatic.   Eyes: EOM are normal.   Cardiovascular: Normal rate and regular rhythm.    Pulmonary/Chest: Effort normal and breath sounds normal. No respiratory distress.    Abdominal: Soft. He exhibits no distension. There is no tenderness (mild incisional).   Incisions c/d/i   Musculoskeletal: Normal range of motion.   Neurological: He is alert and oriented to person, place, and time.   Skin: Skin is warm and dry.   Psychiatric: He has a normal mood and affect. Thought content normal.   Vitals reviewed.      Significant Labs:  CBC:   Recent Labs  Lab 03/21/18  0452   WBC 13.33*   RBC 5.06   HGB 14.6   HCT 42.9      MCV 85   MCH 28.9   MCHC 34.0     CMP:   Recent Labs  Lab 03/21/18  0452   *   CALCIUM 8.2*      K 4.3   CO2 23      BUN 13   CREATININE 0.8       Significant Diagnostics:  I have reviewed all pertinent imaging results/findings within the past 24 hours.

## 2018-03-24 VITALS
OXYGEN SATURATION: 94 % | HEART RATE: 75 BPM | DIASTOLIC BLOOD PRESSURE: 83 MMHG | SYSTOLIC BLOOD PRESSURE: 118 MMHG | BODY MASS INDEX: 28.39 KG/M2 | WEIGHT: 202.81 LBS | RESPIRATION RATE: 18 BRPM | HEIGHT: 71 IN | TEMPERATURE: 98 F

## 2018-03-24 LAB
POCT GLUCOSE: 100 MG/DL (ref 70–110)
POCT GLUCOSE: 93 MG/DL (ref 70–110)
POCT GLUCOSE: 95 MG/DL (ref 70–110)

## 2018-03-24 PROCEDURE — 25000003 PHARM REV CODE 250: Performed by: SURGERY

## 2018-03-24 PROCEDURE — 25000003 PHARM REV CODE 250: Performed by: PHYSICIAN ASSISTANT

## 2018-03-24 PROCEDURE — S0028 INJECTION, FAMOTIDINE, 20 MG: HCPCS | Performed by: SURGERY

## 2018-03-24 PROCEDURE — 63600175 PHARM REV CODE 636 W HCPCS: Performed by: SURGERY

## 2018-03-24 PROCEDURE — 96372 THER/PROPH/DIAG INJ SC/IM: CPT

## 2018-03-24 RX ORDER — BISACODYL 5 MG
10 TABLET, DELAYED RELEASE (ENTERIC COATED) ORAL ONCE
Status: COMPLETED | OUTPATIENT
Start: 2018-03-24 | End: 2018-03-24

## 2018-03-24 RX ORDER — HYDROCODONE BITARTRATE AND ACETAMINOPHEN 5; 325 MG/1; MG/1
1 TABLET ORAL EVERY 4 HOURS PRN
Qty: 20 TABLET | Refills: 0 | Status: SHIPPED | OUTPATIENT
Start: 2018-03-24 | End: 2018-04-03

## 2018-03-24 RX ADMIN — CHLORHEXIDINE GLUCONATE 10 ML: 1.2 RINSE ORAL at 09:03

## 2018-03-24 RX ADMIN — HYDROCODONE BITARTRATE AND ACETAMINOPHEN 1 TABLET: 5; 325 TABLET ORAL at 02:03

## 2018-03-24 RX ADMIN — FAMOTIDINE 20 MG: 20 INJECTION, SOLUTION INTRAVENOUS at 09:03

## 2018-03-24 RX ADMIN — HYDROCODONE BITARTRATE AND ACETAMINOPHEN 1 TABLET: 5; 325 TABLET ORAL at 12:03

## 2018-03-24 RX ADMIN — HYDROCODONE BITARTRATE AND ACETAMINOPHEN 1 TABLET: 5; 325 TABLET ORAL at 06:03

## 2018-03-24 RX ADMIN — HYDROCODONE BITARTRATE AND ACETAMINOPHEN 1 TABLET: 5; 325 TABLET ORAL at 04:03

## 2018-03-24 RX ADMIN — ENOXAPARIN SODIUM 40 MG: 100 INJECTION SUBCUTANEOUS at 04:03

## 2018-03-24 RX ADMIN — BISACODYL 10 MG: 5 TABLET, COATED ORAL at 12:03

## 2018-03-24 NOTE — SUBJECTIVE & OBJECTIVE
Interval History: pain controlled, tolerated diet    Medications:  Continuous Infusions:  Scheduled Meds:   bisacodyl  10 mg Oral Daily    bisacodyl  10 mg Oral Once    chlorhexidine  10 mL Mouth/Throat BID    enoxaparin  40 mg Subcutaneous Daily    famotidine  20 mg Intravenous Q12H    nozaseptin   Each Nare BID     PRN Meds:dextrose 50%, diphenhydrAMINE, glucagon (human recombinant), hydrocodone-acetaminophen 10-325mg, hydrocodone-acetaminophen 5-325mg, HYDROmorphone, insulin aspart U-100, ondansetron, promethazine, ramelteon, sodium chloride 0.9%     Review of patient's allergies indicates:  No Known Allergies  Objective:     Vital Signs (Most Recent):  Temp: 98.5 °F (36.9 °C) (03/24/18 0738)  Pulse: 75 (03/24/18 0738)  Resp: 18 (03/24/18 0738)  BP: 124/77 (03/24/18 0738)  SpO2: (!) 94 % (03/24/18 0738) Vital Signs (24h Range):  Temp:  [98.1 °F (36.7 °C)-98.9 °F (37.2 °C)] 98.5 °F (36.9 °C)  Pulse:  [75-90] 75  Resp:  [16-18] 18  SpO2:  [92 %-95 %] 94 %  BP: (109-124)/(68-79) 124/77     Weight: 92 kg (202 lb 13.2 oz)  Body mass index is 28.29 kg/m².    Intake/Output - Last 3 Shifts       03/22 0700 - 03/23 0659 03/23 0700 - 03/24 0659 03/24 0700 - 03/25 0659    P.O. 180 700     I.V. (mL/kg) 1226.7 (13.3) 4 (0)     IV Piggyback 50 50     Total Intake(mL/kg) 1456.7 (15.8) 754 (8.2)     Urine (mL/kg/hr) 570 (0.3) 1200 (0.5)     Stool 0 (0)      Total Output 570 1200      Net +886.7 -446             Urine Occurrence 1100 x 1 x     Stool Occurrence 2 x  0 x          Physical Exam   Constitutional: He is oriented to person, place, and time. He appears well-developed and well-nourished. No distress.   HENT:   Head: Normocephalic.   Eyes: No scleral icterus.   Neck: Normal range of motion. Neck supple.   Cardiovascular: Normal rate and regular rhythm.    Pulmonary/Chest: Effort normal.   Abdominal: Soft. Bowel sounds are normal. He exhibits no distension (miminal).   Musculoskeletal: He exhibits no edema.    Neurological: He is oriented to person, place, and time.   Skin: Skin is warm and dry. Capillary refill takes less than 2 seconds.   Psychiatric: He has a normal mood and affect. His behavior is normal. Judgment and thought content normal.   Vitals reviewed.      Significant Labs:  CBC:   Recent Labs  Lab 03/21/18 0452   WBC 13.33*   RBC 5.06   HGB 14.6   HCT 42.9      MCV 85   MCH 28.9   MCHC 34.0     BMP:   Recent Labs  Lab 03/21/18 0452   *      K 4.3      CO2 23   BUN 13   CREATININE 0.8   CALCIUM 8.2*     CMP:   Recent Labs  Lab 03/21/18  0452   *   CALCIUM 8.2*      K 4.3   CO2 23      BUN 13   CREATININE 0.8       Significant Diagnostics:  none

## 2018-03-24 NOTE — PLAN OF CARE
Problem: Patient Care Overview  Goal: Plan of Care Review  PT IND WITH ' NO LOB. PT WILL BE D/C TO MOBILITY PROGRAM   Outcome: Ongoing (interventions implemented as appropriate)  POC reviewed, including indications and possible side effects of administered medications. Patient verbalized understanding and teach back. No adverse reactions noted. Patient c/o of moderate pain to abdominal incision and relieved with pain medication, repositioning, ambulation and relaxation techniques. Patient tolerating low fiber diet well. D/C fluids per orders. Ambulates and repositions independently. Incisions remain clean and intact. VS stable. No falls or injuries during shift. Will continue to monitor.     12 hour chart check complete.

## 2018-03-24 NOTE — DISCHARGE INSTRUCTIONS
Please call for any fever, increase in pain, nausea or vomiting or redness or drainage from incision(s).    No lifting more than 30 pounds for 2 weeks    May shower   If you become constipated from the pain medication you can use over the counter laxatives,  Miralax or Glycolax, or Magnesium Citrate for severe constipation.

## 2018-03-24 NOTE — NURSING
Pt tolerating diet. Ambulating in hallway. Passing flatus. PRN pain med controlling pt's mild abd pain. Pt's right hand IV removed. Gauze and tape applied. D/C instructions given to pt. Demonstrated learning by teach back method.

## 2018-03-24 NOTE — PLAN OF CARE
Problem: Patient Care Overview  Goal: Plan of Care Review  PT IND WITH ' NO LOB. PT WILL BE D/C TO MOBILITY PROGRAM   Outcome: Ongoing (interventions implemented as appropriate)  Pt remains free from injury/falls. Fall precautions in place. Pt tolerating diet. Pt ambulating in the hallway. Waiting for pt to have a BM today before d/c'ed. Denies pain and nausea at this time. abd lap sites x4 clean and dry with slight ecchymosis. Pt able to verbalize wants and needs. Bed in low, locked position. Call light and personal items within reach of pt. VSS. Will cont to monitor.

## 2018-03-24 NOTE — NURSING
Paper RX given for pain med with d/c instructions. Pt dressed and walked down to awaiting vehicle. Pt refused wheelchair for d/c.

## 2018-03-24 NOTE — PROGRESS NOTES
[facility] -   General Surgery  Progress Note    Subjective:     History of Present Illness:  Armando ARELLANO Juan Jose Giraldo. presented for Robotic assisted colon resection for tubulovillous adenoma.     Post-Op Info:  Procedure(s) (LRB):  COLECTOMY-ROBOTIC (N/A)  ANASTAMOSIS (N/A)   4 Days Post-Op     Interval History: pain controlled, tolerated diet    Medications:  Continuous Infusions:  Scheduled Meds:   bisacodyl  10 mg Oral Daily    bisacodyl  10 mg Oral Once    chlorhexidine  10 mL Mouth/Throat BID    enoxaparin  40 mg Subcutaneous Daily    famotidine  20 mg Intravenous Q12H    nozaseptin   Each Nare BID     PRN Meds:dextrose 50%, diphenhydrAMINE, glucagon (human recombinant), hydrocodone-acetaminophen 10-325mg, hydrocodone-acetaminophen 5-325mg, HYDROmorphone, insulin aspart U-100, ondansetron, promethazine, ramelteon, sodium chloride 0.9%     Review of patient's allergies indicates:  No Known Allergies  Objective:     Vital Signs (Most Recent):  Temp: 98.5 °F (36.9 °C) (03/24/18 0738)  Pulse: 75 (03/24/18 0738)  Resp: 18 (03/24/18 0738)  BP: 124/77 (03/24/18 0738)  SpO2: (!) 94 % (03/24/18 0738) Vital Signs (24h Range):  Temp:  [98.1 °F (36.7 °C)-98.9 °F (37.2 °C)] 98.5 °F (36.9 °C)  Pulse:  [75-90] 75  Resp:  [16-18] 18  SpO2:  [92 %-95 %] 94 %  BP: (109-124)/(68-79) 124/77     Weight: 92 kg (202 lb 13.2 oz)  Body mass index is 28.29 kg/m².    Intake/Output - Last 3 Shifts       03/22 0700 - 03/23 0659 03/23 0700 - 03/24 0659 03/24 0700 - 03/25 0659    P.O. 180 700     I.V. (mL/kg) 1226.7 (13.3) 4 (0)     IV Piggyback 50 50     Total Intake(mL/kg) 1456.7 (15.8) 754 (8.2)     Urine (mL/kg/hr) 570 (0.3) 1200 (0.5)     Stool 0 (0)      Total Output 570 1200      Net +886.7 -446             Urine Occurrence 1100 x 1 x     Stool Occurrence 2 x  0 x          Physical Exam   Constitutional: He is oriented to person, place, and time. He appears well-developed and well-nourished. No distress.   HENT:    Head: Normocephalic.   Eyes: No scleral icterus.   Neck: Normal range of motion. Neck supple.   Cardiovascular: Normal rate and regular rhythm.    Pulmonary/Chest: Effort normal.   Abdominal: Soft. Bowel sounds are normal. He exhibits no distension (miminal).   Musculoskeletal: He exhibits no edema.   Neurological: He is oriented to person, place, and time.   Skin: Skin is warm and dry. Capillary refill takes less than 2 seconds.   Psychiatric: He has a normal mood and affect. His behavior is normal. Judgment and thought content normal.   Vitals reviewed.      Significant Labs:  CBC:   Recent Labs  Lab 03/21/18 0452   WBC 13.33*   RBC 5.06   HGB 14.6   HCT 42.9      MCV 85   MCH 28.9   MCHC 34.0     BMP:   Recent Labs  Lab 03/21/18 0452   *      K 4.3      CO2 23   BUN 13   CREATININE 0.8   CALCIUM 8.2*     CMP:   Recent Labs  Lab 03/21/18  0452   *   CALCIUM 8.2*      K 4.3   CO2 23      BUN 13   CREATININE 0.8       Significant Diagnostics:  none    Assessment/Plan:     * Colon polyp    S/p robotic assisted sigmoidectomy   POD4  Regular diet  Dulcolax  Home if lunch is tolerated            Ryan Feldman MD  General Surgery  [facility]

## 2018-03-25 NOTE — DISCHARGE SUMMARY
[facility]  General Surgery  Discharge Summary      Patient Name: Armando Ingram Jr.  MRN: [id number]  Admission Date: 3/20/2018  Hospital Length of Stay: 4 days  Discharge Date and Time: 3/24/2018  6:16 PM  Attending Physician: No att. providers found   Discharging Provider: Ryan Feldman MD  Primary Care Provider: Ciro Mendez MD    HPI:   Armando Ingram Jr. presented for Robotic assisted colon resection for tubulovillous adenoma.     Procedure(s) (LRB):  COLECTOMY-ROBOTIC (N/A)  ANASTAMOSIS (N/A)      Indwelling Lines/Drains at time of discharge:   Lines/Drains/Airways          No matching active lines, drains, or airways        Hospital Course: 03/21/2018: pod1 - no nausea/emesis. Pain well controlled with pca  03/23/2018: POD3: no nausea/emesis. No flatus. Pain controlled  3/24/18:  Tolerated a regular diet, no nausea, small bowel movement, flatus    Consults:   Consults         Status Ordering Provider     Consult to Case Management/Social Work  Once     Provider:  (Not yet assigned)    RHONDA Sesay          Significant Diagnostic Studies:   Labs: BMP: No results for input(s): GLU, NA, K, CL, CO2, BUN, CREATININE, CALCIUM, MG in the last 48 hours., CMP No results for input(s): NA, K, CL, CO2, GLU, BUN, CREATININE, CALCIUM, PROT, ALBUMIN, BILITOT, ALKPHOS, AST, ALT, ANIONGAP, ESTGFRAFRICA, EGFRNONAA in the last 48 hours. and CBC No results for input(s): WBC, HGB, HCT, PLT in the last 48 hours.  Specimen (12h ago through future)    None          Pending Diagnostic Studies:     None        Final Active Diagnoses:    Diagnosis Date Noted POA    PRINCIPAL PROBLEM:  Colon polyp [K63.5] 03/20/2018 Yes      Problems Resolved During this Admission:    Diagnosis Date Noted Date Resolved POA      Discharged Condition: stable    Disposition: Home or Self Care    Follow Up:  Follow-up Information     Rhonda Alexander MD.    Specialties:  General Surgery, Bariatrics  Why:  post op appt,  or as sscheduled  Contact information:  [address] [place] [address]  [phone]                 Patient Instructions:     Lifting restrictions   Order Comments: No lifting more than 30 pounds for 2 weeks     No dressing needed   Order Comments: Ok to shower       Medications:  Reconciled Home Medications:   Discharge Medication List as of 3/24/2018 11:01 AM      START taking these medications    Details   hydrocodone-acetaminophen 5-325mg (NORCO) 5-325 mg per tablet Take 1 tablet by mouth every 4 (four) hours as needed., Starting Sat 3/24/2018, Print         CONTINUE these medications which have NOT CHANGED    Details   meloxicam (MOBIC) 15 MG tablet TAKE 1 TABLET (15 MG TOTAL) BY MOUTH DAILY AS NEEDED FOR PAIN., Starting Fri 3/16/2018, Normal      metroNIDAZOLE (FLAGYL) 500 MG tablet Take 1 tablet (500 mg total) by mouth As instructed. Take 1 tabs at 1pm, 2pm and 11pm the day prior to surgery, Starting Wed 2/28/2018, Normal      neomycin (MYCIFRADIN) 500 mg Tab Take 2 tablets (1,000 mg total) by mouth As instructed. Take 2 tabs at 1pm, 2pm and 11pm the day prior to surgery, Starting Wed 2/28/2018, Normal      rosuvastatin (CRESTOR) 10 MG tablet Take 1 tablet (10 mg total) by mouth every evening., Starting Fri 3/9/2018, Until Sat 3/9/2019, Normal           Time spent on the discharge of patient: 5 minutes    Ryan Feldman MD  General Surgery  [facility]

## 2018-04-02 NOTE — PROGRESS NOTES
Armando ARELLANO Juan Jose Lobo is status post colon resection for tubulovillous adenoma and presents today for follow-up care.  He is tolerating a regular diet and denies fevers, nausea or vomiting.    PE: Abdomen is soft, non-tender, non-distended.  Incisions clean, dry, and intact.    Pathology report shows: FINAL PATHOLOGIC DIAGNOSIS  1. Sigmoid colon:  Adenocarcinoma of colon, 2.0 cm, pathologic staging [id number], with negative surgical margins; see synoptic  report.  Fifteen (15) lymph nodes negative for carcinoma.  2. Anastomotic rings:  Two anastomotic rings.    A/P:  Normal post-operative course.    The patient is instructed to avoid heavy lifting until 4 weeks after the surgery date.  Return to clinic in 1 month for follow up.  Colonoscopy in 1 year

## 2018-04-03 ENCOUNTER — OFFICE VISIT (OUTPATIENT)
Dept: SURGERY | Facility: CLINIC | Age: 58
End: 2018-04-03
Payer: COMMERCIAL

## 2018-04-03 VITALS
WEIGHT: 202 LBS | SYSTOLIC BLOOD PRESSURE: 119 MMHG | DIASTOLIC BLOOD PRESSURE: 78 MMHG | TEMPERATURE: 99 F | HEART RATE: 83 BPM | BODY MASS INDEX: 28.17 KG/M2

## 2018-04-03 DIAGNOSIS — C18.7 ADENOCARCINOMA OF SIGMOID COLON: ICD-10-CM

## 2018-04-03 DIAGNOSIS — Z98.890 POST-OPERATIVE STATE: Primary | ICD-10-CM

## 2018-04-03 DIAGNOSIS — D12.6 TUBULOVILLOUS ADENOMA OF COLON: ICD-10-CM

## 2018-04-03 PROCEDURE — 99024 POSTOP FOLLOW-UP VISIT: CPT | Mod: S$GLB,,, | Performed by: PHYSICIAN ASSISTANT

## 2018-04-03 PROCEDURE — 99999 PR PBB SHADOW E&M-EST. PATIENT-LVL II: CPT | Mod: PBBFAC,,, | Performed by: PHYSICIAN ASSISTANT

## 2018-04-09 ENCOUNTER — TELEPHONE (OUTPATIENT)
Dept: SURGERY | Facility: CLINIC | Age: 58
End: 2018-04-09

## 2018-04-09 NOTE — PHYSICIAN QUERY
PT Name: Armando Ingram Jr.  MR #: [id number]    Physician Query Form - Pathology Findings Clarification     CDS/: Kristina Lopes RN, CCDS               Contact information:  [email or web address]        This form is a permanent document in the medical record.     Query Date: April 9, 2018      By submitting this query, we are merely seeking further clarification of documentation.  Please utilize your independent clinical judgment when addressing the question(s) below.      The medical record contains the following:     Findings Supporting Clinical Information Location in Medical Record                                     FINAL PATHOLOGIC DIAGNOSIS  1. Sigmoid colon:  Adenocarcinoma of colon, 2.0 cm, pathologic staging [id number], with negative surgical margins; see synoptic  report.  Fifteen (15) lymph nodes negative for carcinoma.  2. Anastomotic rings:  Two anastomotic rings.  Colon Cancer Synoptic Report  Procedure: Sigmoidectomy  Tumor site: Sigmoid colon  Tumor size: Greatest dimension 2.0 cm  Macroscopic tumor perforation: Not identified  Histologic type: Adenocarcinoma  Histologic grade: G1 - well-differentiated  Tumor extension: Tumor invades submucosa  Margins: All margins are uninvolved by invasive carcinoma, high grade dysplasia, intramucosal adenocarcinoma,  and adenoma  Margins examined: Proximal and distal  Treatment effect: No known presurgical therapy  Lymphovascular invasion: Not identified  Perineural invasion: Not identified  Tumor deposits: Not identified  Regional lymph nodes:  Number of lymph nodes involved: 0  Number of lymph nodes examined: 15  Pathologic stage classification:  pT1: Tumor invades the submucosa  pN0: No regional lymph node metastasis Date of procedure: 3/20/2018   Procedure:  Colectomy-robotic sigmoidectomy (N/A)   Mobilization of splenic flexure  Pre-operative diagnosis: Tubulovillous adenoma of colon   Post-operative diagnosis:  * Tubulovillous adenoma of colon   Specimen to  pathology - surgery  Once  Comments:  1) Sigmoid colon (perm)2) Anastomotic Rings (perm)Dx: Colon Mass     Collection Date  03/20/2018  SPECIMEN  1) Sigmoid colon.  2) Anastomotic rings.    FINAL PATHOLOGIC DIAGNOSIS  1. Sigmoid colon:  Adenocarcinoma of colon, 2.0 cm, pathologic staging [id number], with negative surgical margins; see synoptic  report.  Fifteen (15) lymph nodes negative for carcinoma.  2. Anastomotic rings:  Two anastomotic rings.  Colon Cancer Synoptic Report  Procedure: Sigmoidectomy  Tumor site: Sigmoid colon  Tumor size: Greatest dimension 2.0 cm  Macroscopic tumor perforation: Not identified  Histologic type: Adenocarcinoma  Histologic grade: G1 - well-differentiated  Tumor extension: Tumor invades submucosa  Margins: All margins are uninvolved by invasive carcinoma, high grade dysplasia, intramucosal adenocarcinoma,  and adenoma  Margins examined: Proximal and distal  Treatment effect: No known presurgical therapy  Lymphovascular invasion: Not identified  Perineural invasion: Not identified  Tumor deposits: Not identified  Regional lymph nodes:  Number of lymph nodes involved: 0  Number of lymph nodes examined: 15  Pathologic stage classification:  pT1: Tumor invades the submucosa  pN0: No regional lymph node metastasis Op note                          Pathology report     Please document the clinical significance of the Pathologists findings of FINAL PATHOLOGIC DIAGNOSIS  1. Sigmoid colon:  Adenocarcinoma of colon, 2.0 cm, pathologic staging [id number], with negative surgical margins; see synoptic  report.  Fifteen (15) lymph nodes negative for carcinoma.  2. Anastomotic rings:  Two anastomotic rings.  Colon Cancer Synoptic Report  Procedure: Sigmoidectomy  Tumor site: Sigmoid colon  Tumor size: Greatest dimension 2.0 cm  Macroscopic tumor perforation: Not identified  Histologic type: Adenocarcinoma  Histologic grade: G1 - well-differentiated  Tumor extension: Tumor invades submucosa  Margins: All  margins are uninvolved by invasive carcinoma, high grade dysplasia, intramucosal adenocarcinoma,  and adenoma  Margins examined: Proximal and distal  Treatment effect: No known presurgical therapy  Lymphovascular invasion: Not identified  Perineural invasion: Not identified  Tumor deposits: Not identified  Regional lymph nodes:  Number of lymph nodes involved: 0  Number of lymph nodes examined: 15  Pathologic stage classification:  pT1: Tumor invades the submucosa  pN0: No regional lymph node metastasis            [X  ] I agree with the Pathology Findings        [  ] I do not agree with the Pathology Findings        [  ] Clinically Insignificant        [  ] Clinically Undetermined        [  ] Other/Clarification of Findings: ______________________________________________    Please document in your progress notes daily for the duration of treatment until resolved and include in your discharge summary.

## 2018-04-09 NOTE — TELEPHONE ENCOUNTER
----- Message from Samara Martinez sent at 4/9/2018  9:38 AM CDT -----  needs to know if he can take a bath now...[phone]

## 2018-04-16 DIAGNOSIS — C18.7 ADENOCARCINOMA OF SIGMOID COLON: Primary | ICD-10-CM

## 2018-04-17 ENCOUNTER — TELEPHONE (OUTPATIENT)
Dept: HEMATOLOGY/ONCOLOGY | Facility: CLINIC | Age: 58
End: 2018-04-17

## 2018-04-17 ENCOUNTER — PATIENT MESSAGE (OUTPATIENT)
Dept: INFUSION THERAPY | Facility: HOSPITAL | Age: 58
End: 2018-04-17

## 2018-04-17 NOTE — TELEPHONE ENCOUNTER
Spoke with patient about upcoming appointment with Dr. Dow this Thursday at the cancer center. Verified location and time. Patient verbalized understanding.

## 2018-04-19 ENCOUNTER — LAB VISIT (OUTPATIENT)
Dept: LAB | Facility: HOSPITAL | Age: 58
End: 2018-04-19
Attending: INTERNAL MEDICINE
Payer: COMMERCIAL

## 2018-04-19 ENCOUNTER — INITIAL CONSULT (OUTPATIENT)
Dept: HEMATOLOGY/ONCOLOGY | Facility: CLINIC | Age: 58
End: 2018-04-19
Payer: COMMERCIAL

## 2018-04-19 VITALS
HEIGHT: 71 IN | OXYGEN SATURATION: 96 % | HEART RATE: 89 BPM | DIASTOLIC BLOOD PRESSURE: 85 MMHG | TEMPERATURE: 98 F | BODY MASS INDEX: 27.34 KG/M2 | SYSTOLIC BLOOD PRESSURE: 147 MMHG | WEIGHT: 195.31 LBS

## 2018-04-19 DIAGNOSIS — D49.0 COLORECTAL NEOPLASM: ICD-10-CM

## 2018-04-19 DIAGNOSIS — C18.7 ADENOCARCINOMA OF SIGMOID COLON: Primary | ICD-10-CM

## 2018-04-19 PROBLEM — Z12.11 COLON CANCER SCREENING: Status: RESOLVED | Noted: 2018-02-09 | Resolved: 2018-04-19

## 2018-04-19 PROBLEM — K63.5 COLON POLYP: Status: RESOLVED | Noted: 2018-03-20 | Resolved: 2018-04-19

## 2018-04-19 PROBLEM — D12.6 TUBULOVILLOUS ADENOMA OF COLON: Status: RESOLVED | Noted: 2018-03-05 | Resolved: 2018-04-19

## 2018-04-19 PROBLEM — Z98.890 POST-OPERATIVE STATE: Status: RESOLVED | Noted: 2018-04-03 | Resolved: 2018-04-19

## 2018-04-19 LAB
ALBUMIN SERPL BCP-MCNC: 3.6 G/DL
ALP SERPL-CCNC: 105 U/L
ALT SERPL W/O P-5'-P-CCNC: 27 U/L
ANION GAP SERPL CALC-SCNC: 11 MMOL/L
AST SERPL-CCNC: 30 U/L
BASOPHILS # BLD AUTO: 0.03 K/UL
BASOPHILS NFR BLD: 0.3 %
BILIRUB SERPL-MCNC: 0.4 MG/DL
BUN SERPL-MCNC: 17 MG/DL
CALCIUM SERPL-MCNC: 9.1 MG/DL
CHLORIDE SERPL-SCNC: 105 MMOL/L
CO2 SERPL-SCNC: 23 MMOL/L
CREAT SERPL-MCNC: 1 MG/DL
DIFFERENTIAL METHOD: NORMAL
EOSINOPHIL # BLD AUTO: 0.2 K/UL
EOSINOPHIL NFR BLD: 1.7 %
ERYTHROCYTE [DISTWIDTH] IN BLOOD BY AUTOMATED COUNT: 13.5 %
EST. GFR  (AFRICAN AMERICAN): >60 ML/MIN/1.73 M^2
EST. GFR  (NON AFRICAN AMERICAN): >60 ML/MIN/1.73 M^2
GLUCOSE SERPL-MCNC: 88 MG/DL
HCT VFR BLD AUTO: 47.2 %
HGB BLD-MCNC: 16.5 G/DL
LYMPHOCYTES # BLD AUTO: 3.4 K/UL
LYMPHOCYTES NFR BLD: 37 %
MCH RBC QN AUTO: 29.6 PG
MCHC RBC AUTO-ENTMCNC: 35 G/DL
MCV RBC AUTO: 85 FL
MONOCYTES # BLD AUTO: 0.8 K/UL
MONOCYTES NFR BLD: 9 %
NEUTROPHILS # BLD AUTO: 4.8 K/UL
NEUTROPHILS NFR BLD: 52 %
PLATELET # BLD AUTO: 166 K/UL
PMV BLD AUTO: 10.6 FL
POTASSIUM SERPL-SCNC: 4.2 MMOL/L
PROT SERPL-MCNC: 7 G/DL
RBC # BLD AUTO: 5.57 M/UL
SODIUM SERPL-SCNC: 139 MMOL/L
WBC # BLD AUTO: 9.21 K/UL

## 2018-04-19 PROCEDURE — 99245 OFF/OP CONSLTJ NEW/EST HI 55: CPT | Mod: S$GLB,,, | Performed by: INTERNAL MEDICINE

## 2018-04-19 PROCEDURE — 36415 COLL VENOUS BLD VENIPUNCTURE: CPT

## 2018-04-19 PROCEDURE — 85025 COMPLETE CBC W/AUTO DIFF WBC: CPT

## 2018-04-19 PROCEDURE — 80053 COMPREHEN METABOLIC PANEL: CPT

## 2018-04-19 PROCEDURE — 99999 PR PBB SHADOW E&M-EST. PATIENT-LVL III: CPT | Mod: PBBFAC,,, | Performed by: INTERNAL MEDICINE

## 2018-04-19 NOTE — LETTER
April 19, 2018      Mervin Alexander MD  [id number] [place] [address] [address] [place] [address]           [place] - Hematology Oncology  [address] [place] [address]  Phone: [phone]  Fax: [phone]          Patient: Armando Ingram Jr.   MR Number: [id number]   YOB: 1960   Date of Visit: 4/19/2018       Dear Dr. Mervin Alexander:    Thank you for referring Armando Ingram to me for evaluation. Attached you will find relevant portions of my assessment and plan of care.    If you have questions, please do not hesitate to call me. I look forward to following Armando Ingram along with you.    Sincerely,    Samuel Dow MD    Enclosure  CC:  No Recipients    If you would like to receive this communication electronically, please contact [email or web address] or [phone] to request more information on [employer] access.    For providers and/or their staff who would like to refer a patient to [facility], please contact us through our one-stop-shop provider referral line, [facility] , at [phone].    If you feel you have received this communication in error or would no longer like to receive these types of communications, please e-mail [email or web address]
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
## 2018-04-23 ENCOUNTER — HOSPITAL ENCOUNTER (OUTPATIENT)
Dept: RADIOLOGY | Facility: HOSPITAL | Age: 58
Discharge: HOME | End: 2018-04-23
Attending: INTERNAL MEDICINE
Payer: COMMERCIAL

## 2018-04-23 DIAGNOSIS — D49.0 COLORECTAL NEOPLASM: ICD-10-CM

## 2018-04-23 PROCEDURE — 74178 CT ABD&PLV WO CNTR FLWD CNTR: CPT | Mod: TC

## 2018-04-23 PROCEDURE — 71260 CT THORAX DX C+: CPT | Mod: TC

## 2018-04-23 PROCEDURE — 25500020 PHARM REV CODE 255: Performed by: INTERNAL MEDICINE

## 2018-04-23 RX ADMIN — IOHEXOL 75 ML: 350 INJECTION, SOLUTION INTRAVENOUS at 02:04

## 2018-04-23 RX ADMIN — IOHEXOL 30 ML: 350 INJECTION, SOLUTION INTRAVENOUS at 12:04

## 2018-05-09 ENCOUNTER — OFFICE VISIT (OUTPATIENT)
Dept: SURGERY | Facility: CLINIC | Age: 58
End: 2018-05-09
Payer: COMMERCIAL

## 2018-05-09 VITALS
WEIGHT: 206.13 LBS | DIASTOLIC BLOOD PRESSURE: 82 MMHG | TEMPERATURE: 98 F | HEIGHT: 71 IN | SYSTOLIC BLOOD PRESSURE: 137 MMHG | HEART RATE: 88 BPM | BODY MASS INDEX: 28.86 KG/M2

## 2018-05-09 DIAGNOSIS — C18.7 ADENOCARCINOMA OF SIGMOID COLON: Primary | ICD-10-CM

## 2018-05-09 PROCEDURE — 99999 PR PBB SHADOW E&M-EST. PATIENT-LVL III: CPT | Mod: PBBFAC,,, | Performed by: SURGERY

## 2018-05-09 PROCEDURE — 99024 POSTOP FOLLOW-UP VISIT: CPT | Mod: S$GLB,,, | Performed by: SURGERY

## 2018-05-09 RX ORDER — DEXTROMETHORPHAN HYDROBROMIDE, GUAIFENESIN 5; 100 MG/5ML; MG/5ML
650 LIQUID ORAL EVERY 8 HOURS
COMMUNITY
End: 2019-04-15

## 2018-05-09 RX ORDER — IBUPROFEN 200 MG
200 TABLET ORAL EVERY 6 HOURS PRN
COMMUNITY
End: 2019-04-15

## 2018-05-10 NOTE — PROGRESS NOTES
Subjective:       Patient ID: Armando Ingram Jr. is a 58 y.o. male.    Chief Complaint: Post-op Evaluation    HPI   Status post robotic sigmoidectomy 3/20/1 8 presents for follow-up. He is doing well today with no complaints.  Review of Systems    Objective:      Physical Exam   Constitutional: He appears well-developed and well-nourished. No distress.   Abdominal: Soft. He exhibits no distension. There is no tenderness.   Well-healed surgical incisions   Vitals reviewed.      FINAL PATHOLOGIC DIAGNOSIS  1. Sigmoid colon:  Adenocarcinoma of colon, 2.0 cm, pathologic staging [id number], with negative surgical margins; see synoptic  report.  Fifteen (15) lymph nodes negative for carcinoma.  2. Anastomotic rings:  Two anastomotic rings.  Colon Cancer Synoptic Report  Procedure: Sigmoidectomy  Tumor site: Sigmoid colon  Tumor size: Greatest dimension 2.0 cm  Macroscopic tumor perforation: Not identified  Histologic type: Adenocarcinoma  Histologic grade: G1 - well-differentiated  Tumor extension: Tumor invades submucosa  Margins: All margins are uninvolved by invasive carcinoma, high grade dysplasia, intramucosal adenocarcinoma,  and adenoma  Margins examined: Proximal and distal  Treatment effect: No known presurgical therapy  Lymphovascular invasion: Not identified  Perineural invasion: Not identified  Tumor deposits: Not identified  Regional lymph nodes:  Number of lymph nodes involved: 0  Number of lymph nodes examined: 15  Pathologic stage classification:  pT1: Tumor invades the submucosa  pN0: No regional lymph node metastasis  Assessment:   Status post robotic sigmoidectomy  Plan:       Continue surveillance with Oncology  Colonoscopy in 1 year

## 2018-07-16 ENCOUNTER — LAB VISIT (OUTPATIENT)
Dept: LAB | Facility: HOSPITAL | Age: 58
End: 2018-07-16
Attending: INTERNAL MEDICINE
Payer: COMMERCIAL

## 2018-07-16 DIAGNOSIS — D49.0 COLORECTAL NEOPLASM: ICD-10-CM

## 2018-07-16 LAB — CEA SERPL-MCNC: 1.5 NG/ML

## 2018-07-16 PROCEDURE — 82378 CARCINOEMBRYONIC ANTIGEN: CPT

## 2018-07-16 PROCEDURE — 36415 COLL VENOUS BLD VENIPUNCTURE: CPT | Mod: PO

## 2018-07-27 NOTE — [employer] PATIENT INSTRUCTIONS
Discharge Summary/Instructions after an Endoscopic Procedure  Patient Name: Armando Ingram  Patient MRN: [id number]  Patient YOB: 1960 Friday, February 09, 2018 Ryan Villeda III, MD  RESTRICTIONS:  During your procedure today, you received medications for sedation.  These   medications may affect your judgment, balance and coordination.  Therefore,   for 24 hours, you have the following restrictions:   - DO NOT drive a car, operate machinery, make legal/financial decisions,   sign important papers or drink alcohol.    ACTIVITY:  Today: no heavy lifting, straining or running due to procedural   sedation/anesthesia.  The following day: return to full activity including work.  DIET:  Eat and drink normally unless instructed otherwise.     TREATMENT FOR COMMON SIDE EFFECTS:  - Mild abdominal pain, nausea, belching, bloating or excessive gas:  rest,   eat lightly and use a heating pad.  - Sore Throat: treat with throat lozenges and/or gargle with warm salt   water.  - Because air was used during the procedure, expelling large amounts of air   from your rectum or belching is normal.  - If a bowel prep was taken, you may not have a bowel movement for 1-3 days.    This is normal.  SYMPTOMS TO WATCH FOR AND REPORT TO YOUR PHYSICIAN:  1. Abdominal pain or bloating, other than gas cramps.  2. Chest pain.  3. Back pain.  4. Signs of infection such as: chills or fever occurring within 24 hours   after the procedure.  5. Rectal bleeding, which would show as bright red, maroon, or black stools.   (A tablespoon of blood from the rectum is not serious, especially if   hemorrhoids are present.)  6. Vomiting.  7. Weakness or dizziness.  GO DIRECTLY TO THE NEAREST EMERGENCY ROOM IF YOU HAVE ANY OF THE FOLLOWING:      Difficulty breathing              Chills and/or fever over 101 F   Persistent vomiting and/or vomiting blood   Severe abdominal pain   Severe chest pain   Black, tarry stools   Bleeding- more than one  tablespoon   Any other symptom or condition that you feel may need urgent attention  Your doctor recommends these additional instructions:  If any biopsies were taken, your doctors clinic will contact you in 1 to 2   weeks with any results.  - Discharge patient to home (via wheelchair).   - High fiber diet.   - Continue present medications.   - Await pathology results.   - Repeat colonoscopy after studies are complete for surveillance based on   pathology results.   - Return to GI clinic in 1 week.   - Discharge patient to home (via wheelchair).   - High fiber diet.   - Continue present medications.   - Await pathology results.   - Repeat colonoscopy after studies are complete for surveillance based on   pathology results.   - Return to GI clinic in 1 week.   - Refer to a surgeon at appointment to be scheduled.  For questions, problems or results please call your physician Ryan Villeda III, MD at Work:  [phone]  If you have any questions about the above instructions, call the GI   department at [phone] or call the endoscopy unit at [phone]   from 7am until 3 pm.  [facility] [place], EMERGENCY ROOM PHONE NUMBER:   [phone]  IF A COMPLICATION OR EMERGENCY SITUATION ARISES AND YOU ARE UNABLE TO REACH   YOUR PHYSICIAN - GO DIRECTLY TO THE EMERGENCY ROOM.  I have read or have had read to me these discharge instructions for my   procedure and have received a written copy.  I understand these   instructions and will follow-up with my physician if I have any questions.     __________________________________       _____________________________________  Nurse Signature                                          Patient/Designated   Responsible Party Signature  Ryan Villeda III, MD  2/9/2018 4:28:31 PM  This report has been verified and signed electronically.  [employer]

## 2018-10-22 ENCOUNTER — LAB VISIT (OUTPATIENT)
Dept: LAB | Facility: HOSPITAL | Age: 58
End: 2018-10-22
Attending: INTERNAL MEDICINE
Payer: COMMERCIAL

## 2018-10-22 DIAGNOSIS — D49.0 COLORECTAL NEOPLASM: ICD-10-CM

## 2018-10-22 LAB — CEA SERPL-MCNC: 1.7 NG/ML

## 2018-10-22 PROCEDURE — 82378 CARCINOEMBRYONIC ANTIGEN: CPT

## 2018-10-22 PROCEDURE — 36415 COLL VENOUS BLD VENIPUNCTURE: CPT | Mod: PO

## 2019-01-16 ENCOUNTER — LAB VISIT (OUTPATIENT)
Dept: LAB | Facility: HOSPITAL | Age: 59
End: 2019-01-16
Attending: INTERNAL MEDICINE
Payer: COMMERCIAL

## 2019-01-16 DIAGNOSIS — D49.0 COLORECTAL NEOPLASM: ICD-10-CM

## 2019-01-16 LAB — CEA SERPL-MCNC: 1.1 NG/ML

## 2019-01-16 PROCEDURE — 36415 COLL VENOUS BLD VENIPUNCTURE: CPT | Mod: PO

## 2019-01-16 PROCEDURE — 82378 CARCINOEMBRYONIC ANTIGEN: CPT

## 2019-01-23 DIAGNOSIS — E78.00 HYPERCHOLESTEROLEMIA: ICD-10-CM

## 2019-01-23 RX ORDER — ROSUVASTATIN CALCIUM 10 MG/1
TABLET, COATED ORAL
Qty: 90 TABLET | Refills: 2 | Status: SHIPPED | OUTPATIENT
Start: 2019-01-23 | End: 2021-03-08 | Stop reason: SINTOL

## 2019-04-09 ENCOUNTER — TELEPHONE (OUTPATIENT)
Dept: INTERNAL MEDICINE | Facility: CLINIC | Age: 59
End: 2019-04-09

## 2019-04-09 NOTE — TELEPHONE ENCOUNTER
----- Message from Brigid Medina sent at 4/9/2019  1:16 PM CDT -----  Mr Ingram wants to know when he is due for his yearly/annual exam, and wanted to have blood work prior to appointment. tc

## 2019-04-10 ENCOUNTER — LAB VISIT (OUTPATIENT)
Dept: LAB | Facility: HOSPITAL | Age: 59
End: 2019-04-10
Attending: INTERNAL MEDICINE
Payer: COMMERCIAL

## 2019-04-10 DIAGNOSIS — Z00.00 ROUTINE GENERAL MEDICAL EXAMINATION AT A HEALTH CARE FACILITY: Primary | ICD-10-CM

## 2019-04-10 DIAGNOSIS — D49.0 COLORECTAL NEOPLASM: ICD-10-CM

## 2019-04-10 LAB — CEA SERPL-MCNC: 1.3 NG/ML (ref 0–5)

## 2019-04-10 PROCEDURE — 36415 COLL VENOUS BLD VENIPUNCTURE: CPT | Mod: PO

## 2019-04-10 PROCEDURE — 82378 CARCINOEMBRYONIC ANTIGEN: CPT

## 2019-04-15 ENCOUNTER — OFFICE VISIT (OUTPATIENT)
Dept: HEMATOLOGY/ONCOLOGY | Facility: CLINIC | Age: 59
End: 2019-04-15
Payer: COMMERCIAL

## 2019-04-15 ENCOUNTER — PATIENT MESSAGE (OUTPATIENT)
Dept: HEMATOLOGY/ONCOLOGY | Facility: CLINIC | Age: 59
End: 2019-04-15

## 2019-04-15 VITALS
TEMPERATURE: 98 F | WEIGHT: 201.5 LBS | OXYGEN SATURATION: 95 % | HEART RATE: 76 BPM | HEIGHT: 71 IN | SYSTOLIC BLOOD PRESSURE: 111 MMHG | DIASTOLIC BLOOD PRESSURE: 76 MMHG | BODY MASS INDEX: 28.21 KG/M2

## 2019-04-15 DIAGNOSIS — C18.7 ADENOCARCINOMA OF SIGMOID COLON: Primary | ICD-10-CM

## 2019-04-15 PROCEDURE — 99999 PR PBB SHADOW E&M-EST. PATIENT-LVL III: ICD-10-PCS | Mod: PBBFAC,,, | Performed by: INTERNAL MEDICINE

## 2019-04-15 PROCEDURE — 99214 PR OFFICE/OUTPT VISIT, EST, LEVL IV, 30-39 MIN: ICD-10-PCS | Mod: S$GLB,,, | Performed by: INTERNAL MEDICINE

## 2019-04-15 PROCEDURE — 99214 OFFICE O/P EST MOD 30 MIN: CPT | Mod: S$GLB,,, | Performed by: INTERNAL MEDICINE

## 2019-04-15 PROCEDURE — 3008F PR BODY MASS INDEX (BMI) DOCUMENTED: ICD-10-PCS | Mod: CPTII,S$GLB,, | Performed by: INTERNAL MEDICINE

## 2019-04-15 PROCEDURE — 3008F BODY MASS INDEX DOCD: CPT | Mod: CPTII,S$GLB,, | Performed by: INTERNAL MEDICINE

## 2019-04-15 PROCEDURE — 99999 PR PBB SHADOW E&M-EST. PATIENT-LVL III: CPT | Mod: PBBFAC,,, | Performed by: INTERNAL MEDICINE
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
## 2019-04-16 ENCOUNTER — TELEPHONE (OUTPATIENT)
Dept: GASTROENTEROLOGY | Facility: CLINIC | Age: 59
End: 2019-04-16

## 2019-04-16 NOTE — TELEPHONE ENCOUNTER
----- Message from Rosibel Moreno sent at 4/16/2019 12:26 PM CDT -----  Contact: [phone]  .Type:  Patient Returning Call    Who Called:Richie Ingram  Who Left Message for Patient:Sindy  Does the patient know what this is regarding?:no  Would the patient rather a call back or a response via [facility]? Call back  Best Call Back Number:[phone]  Additional Information:

## 2019-08-15 ENCOUNTER — LAB VISIT (OUTPATIENT)
Dept: LAB | Facility: HOSPITAL | Age: 59
End: 2019-08-15
Attending: INTERNAL MEDICINE
Payer: COMMERCIAL

## 2019-08-15 DIAGNOSIS — C18.7 ADENOCARCINOMA OF SIGMOID COLON: ICD-10-CM

## 2019-08-15 LAB — CEA SERPL-MCNC: 1.5 NG/ML (ref 0–5)

## 2019-08-15 PROCEDURE — 82378 CARCINOEMBRYONIC ANTIGEN: CPT

## 2019-08-15 PROCEDURE — 36415 COLL VENOUS BLD VENIPUNCTURE: CPT | Mod: PO

## 2019-10-07 ENCOUNTER — TELEPHONE (OUTPATIENT)
Dept: ENDOSCOPY | Facility: HOSPITAL | Age: 59
End: 2019-10-07

## 2019-10-07 NOTE — TELEPHONE ENCOUNTER

## 2019-12-13 ENCOUNTER — HOSPITAL ENCOUNTER (OUTPATIENT)
Facility: HOSPITAL | Age: 59
Discharge: HOME | End: 2019-12-13
Attending: INTERNAL MEDICINE | Admitting: INTERNAL MEDICINE
Payer: COMMERCIAL

## 2019-12-13 ENCOUNTER — ANESTHESIA EVENT (OUTPATIENT)
Dept: ENDOSCOPY | Facility: HOSPITAL | Age: 59
End: 2019-12-13
Payer: COMMERCIAL

## 2019-12-13 ENCOUNTER — ANESTHESIA (OUTPATIENT)
Dept: ENDOSCOPY | Facility: HOSPITAL | Age: 59
End: 2019-12-13
Payer: COMMERCIAL

## 2019-12-13 VITALS
SYSTOLIC BLOOD PRESSURE: 118 MMHG | HEART RATE: 70 BPM | HEIGHT: 71 IN | BODY MASS INDEX: 28.24 KG/M2 | DIASTOLIC BLOOD PRESSURE: 78 MMHG | TEMPERATURE: 98 F | RESPIRATION RATE: 18 BRPM | OXYGEN SATURATION: 98 % | WEIGHT: 201.75 LBS

## 2019-12-13 DIAGNOSIS — Z85.038 HISTORY OF COLON CANCER: Primary | ICD-10-CM

## 2019-12-13 PROCEDURE — 25000003 PHARM REV CODE 250: Performed by: NURSE ANESTHETIST, CERTIFIED REGISTERED

## 2019-12-13 PROCEDURE — 63600175 PHARM REV CODE 636 W HCPCS: Performed by: INTERNAL MEDICINE

## 2019-12-13 PROCEDURE — 37000009 HC ANESTHESIA EA ADD 15 MINS: Performed by: INTERNAL MEDICINE

## 2019-12-13 PROCEDURE — G0105 COLORECTAL SCRN; HI RISK IND: HCPCS | Performed by: INTERNAL MEDICINE

## 2019-12-13 PROCEDURE — G0105 COLORECTAL SCRN; HI RISK IND: HCPCS | Mod: ,,, | Performed by: INTERNAL MEDICINE

## 2019-12-13 PROCEDURE — 63600175 PHARM REV CODE 636 W HCPCS: Performed by: NURSE ANESTHETIST, CERTIFIED REGISTERED

## 2019-12-13 PROCEDURE — 37000008 HC ANESTHESIA 1ST 15 MINUTES: Performed by: INTERNAL MEDICINE

## 2019-12-13 PROCEDURE — G0105 COLORECTAL SCRN; HI RISK IND: ICD-10-PCS | Mod: ,,, | Performed by: INTERNAL MEDICINE

## 2019-12-13 RX ORDER — SODIUM CHLORIDE, SODIUM LACTATE, POTASSIUM CHLORIDE, CALCIUM CHLORIDE 600; 310; 30; 20 MG/100ML; MG/100ML; MG/100ML; MG/100ML
INJECTION, SOLUTION INTRAVENOUS CONTINUOUS
Status: DISCONTINUED | OUTPATIENT
Start: 2019-12-13 | End: 2019-12-13 | Stop reason: HOSPADM

## 2019-12-13 RX ORDER — PROPOFOL 10 MG/ML
VIAL (ML) INTRAVENOUS
Status: DISCONTINUED | OUTPATIENT
Start: 2019-12-13 | End: 2019-12-13

## 2019-12-13 RX ORDER — LIDOCAINE HYDROCHLORIDE 10 MG/ML
INJECTION, SOLUTION EPIDURAL; INFILTRATION; INTRACAUDAL; PERINEURAL
Status: DISCONTINUED | OUTPATIENT
Start: 2019-12-13 | End: 2019-12-13

## 2019-12-13 RX ADMIN — PROPOFOL 50 MG: 10 INJECTION, EMULSION INTRAVENOUS at 07:12

## 2019-12-13 RX ADMIN — LIDOCAINE HYDROCHLORIDE 50 MG: 10 INJECTION, SOLUTION EPIDURAL; INFILTRATION; INTRACAUDAL; PERINEURAL at 07:12

## 2019-12-13 RX ADMIN — SODIUM CHLORIDE, SODIUM LACTATE, POTASSIUM CHLORIDE, AND CALCIUM CHLORIDE: 600; 310; 30; 20 INJECTION, SOLUTION INTRAVENOUS at 07:12

## 2019-12-13 NOTE — TRANSFER OF CARE
"Anesthesia Transfer of Care Note    Patient: Armando Ingram Jr.    Procedure(s) Performed: Procedure(s) (LRB):  COLONOSCOPY (N/A)    Patient location: GI    Anesthesia Type: MAC    Transport from OR: Transported from OR on room air with adequate spontaneous ventilation    Post pain: adequate analgesia    Post assessment: no apparent anesthetic complications    Post vital signs: stable    Level of consciousness: awake, alert and oriented    Nausea/Vomiting: no nausea/vomiting    Complications: none    Transfer of care protocol was followed      Last vitals:   Visit Vitals  /76 (BP Location: Left arm, Patient Position: Lying)   Pulse 75   Temp 36.5 °C (97.7 °F) (Temporal)   Resp 18   Ht 5' 11" (1.803 m)   Wt 91.5 kg (201 lb 11.5 oz)   SpO2 (!) 94%   BMI 28.13 kg/m²     "

## 2019-12-13 NOTE — ANESTHESIA RELEASE NOTE
"Anesthesia Release from PACU Note    Patient: Armando Ingram Jr.    Procedure(s) Performed: Procedure(s) (LRB):  COLONOSCOPY (N/A)    Anesthesia type: MAC    Post pain: Adequate analgesia    Post assessment: no apparent anesthetic complications, tolerated procedure well and no evidence of recall    Last Vitals:   Visit Vitals  /78 (BP Location: Left arm, Patient Position: Lying)   Pulse 70   Temp 36.6 °C (97.9 °F) (Temporal)   Resp 18   Ht 5' 11" (1.803 m)   Wt 91.5 kg (201 lb 11.5 oz)   SpO2 98%   BMI 28.13 kg/m²       Post vital signs: stable    Level of consciousness: awake, alert  and oriented    Nausea/Vomiting: no nausea/no vomiting    Complications: none    Airway Patency: patent    Respiratory: unassisted, spontaneous ventilation, room air    Cardiovascular: stable and blood pressure at baseline    Hydration: euvolemic  "
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
## 2019-12-13 NOTE — DISCHARGE INSTRUCTIONS
Colonoscopy     A camera attached to a flexible tube with a viewing lens is used to take video pictures.     Colonoscopy is a test to view the inside of your lower digestive tract (colon and rectum). Sometimes it can show the last part of the small intestine (ileum). During the test, small pieces of tissue may be removed for testing. This is called a biopsy. Small growths, such as polyps, may also be removed.   Why is colonoscopy done?  The test is done to help look for colon cancer. And it can help find the source of abdominal pain, bleeding, and changes in bowel habits. It may be needed once a year, depending on factors such as your:  · Age  · Health history  · Family health history  · Symptoms  · Results from any prior colonoscopy  Risks and possible complications  These include:  · Bleeding               · A puncture or tear in the colon   · Risks of anesthesia  · A cancer lesion not being seen  Getting ready   To prepare for the test:  · Talk with your healthcare provider about the risks of the test (see below). Also ask your healthcare provider about alternatives to the test.  · Tell your healthcare provider about any medicines you take. Also tell him or her about any health conditions you may have.  · Make sure your rectum and colon are empty for the test. Follow the diet and bowel prep instructions exactly. If you dont, the test may need to be rescheduled.  · Plan for a friend or family member to drive you home after the test.     Colonoscopy provides an inside view of the entire colon.     You may discuss the results with your doctor right away or at a future visit.  During the test   The test is usually done in the hospital on an outpatient basis. This means you go home the same day. The procedure takes about 30 minutes. During that time:  · You are given relaxing (sedating) medicine through an IV line. You may be drowsy, or fully asleep.  · The healthcare provider will first give you a physical exam to  check for anal and rectal problems.  · Then the anus is lubricated and the scope inserted.  · If you are awake, you may have a feeling similar to needing to have a bowel movement. You may also feel pressure as air is pumped into the colon. Its OK to pass gas during the procedure.  · Biopsy, polyp removal, or other treatments may be done during the test.  After the test   You may have gas right after the test. It can help to try to pass it to help prevent later bloating. Your healthcare provider may discuss the results with you right away. Or you may need to schedule a follow-up visit to talk about the results. After the test, you can go back to your normal eating and other activities. You may be tired from the sedation and need to rest for a few hours.  Date Last Reviewed: 11/1/2016 © 2000-2017 [employer]. [address] [place] [address], [place] [address]. All rights reserved. This information is not intended as a substitute for professional medical care. Always follow your healthcare professional's instructions.

## 2019-12-13 NOTE — [employer] PATIENT INSTRUCTIONS
Discharge Summary/Instructions after an Endoscopic Procedure  Patient Name: Armando Ingram  Patient MRN: [id number]  Patient YOB: 1960 Friday, December 13, 2019 Trinidad Larson MD  RESTRICTIONS:  During your procedure today, you received medications for sedation.  These   medications may affect your judgment, balance and coordination.  Therefore,   for 24 hours, you have the following restrictions:   - DO NOT drive a car, operate machinery, make legal/financial decisions,   sign important papers or drink alcohol.    ACTIVITY:  Today: no heavy lifting, straining or running due to procedural   sedation/anesthesia.  The following day: return to full activity including work.  DIET:  Eat and drink normally unless instructed otherwise.     TREATMENT FOR COMMON SIDE EFFECTS:  - Mild abdominal pain, nausea, belching, bloating or excessive gas:  rest,   eat lightly and use a heating pad.  - Sore Throat: treat with throat lozenges and/or gargle with warm salt   water.  - Because air was used during the procedure, expelling large amounts of air   from your rectum or belching is normal.  - If a bowel prep was taken, you may not have a bowel movement for 1-3 days.    This is normal.  SYMPTOMS TO WATCH FOR AND REPORT TO YOUR PHYSICIAN:  1. Abdominal pain or bloating, other than gas cramps.  2. Chest pain.  3. Back pain.  4. Signs of infection such as: chills or fever occurring within 24 hours   after the procedure.  5. Rectal bleeding, which would show as bright red, maroon, or black stools.   (A tablespoon of blood from the rectum is not serious, especially if   hemorrhoids are present.)  6. Vomiting.  7. Weakness or dizziness.  GO DIRECTLY TO THE NEAREST EMERGENCY ROOM IF YOU HAVE ANY OF THE FOLLOWING:      Difficulty breathing              Chills and/or fever over 101 F   Persistent vomiting and/or vomiting blood   Severe abdominal pain   Severe chest pain   Black, tarry stools   Bleeding- more than one  tablespoon   Any other symptom or condition that you feel may need urgent attention  Your doctor recommends these additional instructions:  If any biopsies were taken, your doctors clinic will contact you in 1 to 2   weeks with any results.  - Patient has a contact number available for emergencies.  The signs and   symptoms of potential delayed complications were discussed with the   patient.  Return to normal activities tomorrow.  Written discharge   instructions were provided to the patient.   - Discharge patient to home (via wheelchair).   - Resume previous diet today.   - Continue present medications.   - Repeat colonoscopy in 3 years for surveillance.  For questions, problems or results please call your physician Trinidad Larson MD at Work:  [phone]  If you have any questions about the above instructions, call the GI   department at [phone] or call the endoscopy unit at [phone]   from 7am until 3 pm.  [facility] [place], EMERGENCY ROOM PHONE NUMBER:   [phone]  IF A COMPLICATION OR EMERGENCY SITUATION ARISES AND YOU ARE UNABLE TO REACH   YOUR PHYSICIAN - GO DIRECTLY TO THE EMERGENCY ROOM.  I have read or have had read to me these discharge instructions for my   procedure and have received a written copy.  I understand these   instructions and will follow-up with my physician if I have any questions.     __________________________________       _____________________________________  Nurse Signature                                          Patient/Designated   Responsible Party Signature  MD Trinidad Spears MD  12/13/2019 7:45:46 AM  This report has been verified and signed electronically.  [employer]

## 2019-12-13 NOTE — ANESTHESIA PREPROCEDURE EVALUATION
12/13/2019  Armando Ingram Jr. is a 59 y.o., male.    Anesthesia Evaluation    I have reviewed the Patient Summary Reports.    I have reviewed the Nursing Notes.   I have reviewed the Medications.     Review of Systems  Anesthesia Hx:  No problems with previous Anesthesia    Social:  Former Smoker    Cardiovascular:   CONCLUSIONS     1 - Concentric remodeling.     2 - No wall motion abnormalities.     3 - Normal left ventricular systolic function (EF 60-65%).     4 - Normal left ventricular diastolic function.     5 - Normal right ventricular systolic function .     6 - The estimated PA systolic pressure is greater than 16 mmHg.  Deep Venous Thrombosis (DVT), Hx of DVT [place] filter   Renal/:   renal calculi    Hepatic/GI:   Bowel Prep.  Bowel Conditions:  Bowel Neoplasm:, sigmoid colon s/p surgical resection       Physical Exam  General:  Well nourished    Airway/Jaw/Neck:  Airway Findings: Mouth Opening: Normal Tongue: Normal  General Airway Assessment: Adult       Chest/Lungs:  Chest/Lungs Findings: Normal Respiratory Rate     Heart/Vascular:  Heart Findings: Rate: Normal             Anesthesia Plan  Type of Anesthesia, risks & benefits discussed:  Anesthesia Type:  MAC  Patient's Preference:   Intra-op Monitoring Plan: standard ASA monitors  Intra-op Monitoring Plan Comments:   Post Op Pain Control Plan:   Post Op Pain Control Plan Comments:   Induction:   IV  Beta Blocker:  Patient is not currently on a Beta-Blocker (No further documentation required).       Informed Consent: Patient understands risks and agrees with Anesthesia plan.  Questions answered. Anesthesia consent signed with patient.  ASA Score: 2     Day of Surgery Review of History & Physical: I have interviewed and examined the patient. I have reviewed the patient's H&P dated:  There are no significant changes.          Ready For  Surgery From Anesthesia Perspective.

## 2019-12-13 NOTE — ANESTHESIA POSTPROCEDURE EVALUATION
Anesthesia Post Evaluation    Patient: Armando Ingram     Procedure(s) Performed: Procedure(s) (LRB):  COLONOSCOPY (N/A)    Final Anesthesia Type: MAC    Patient location during evaluation: GI PACU  Patient participation: Yes- Able to Participate  Level of consciousness: awake and alert and oriented  Post-procedure vital signs: reviewed and stable  Pain management: adequate  Airway patency: patent    PONV status at discharge: No PONV  Anesthetic complications: no      Cardiovascular status: blood pressure returned to baseline  Respiratory status: unassisted, room air and spontaneous ventilation  Hydration status: euvolemic  Follow-up not needed.          Vitals Value Taken Time   /78 12/13/2019  8:00 AM   Temp 36.6 °C (97.9 °F) 12/13/2019  7:40 AM   Pulse 70 12/13/2019  8:00 AM   Resp 18 12/13/2019  8:00 AM   SpO2 98 % 12/13/2019  8:00 AM         Event Time     Out of Recovery 08:00:59          Pain/Brigitte Score: Brigitte Score: 10 (12/13/2019  8:00 AM)

## 2019-12-13 NOTE — DISCHARGE SUMMARY
Endoscopy Discharge Summary      Admit Date: 12/13/2019    Discharge Date and Time:  12/13/2019 7:41 AM    Attending Physician: Trinidad Larson MD     Discharge Physician: Trinidad Larson MD     Principal Admitting Diagnoses: History of colon cancer         Discharge Diagnosis: The encounter diagnosis was History of colon cancer.     Discharged Condition: Good    Indication for Admission: History of colon cancer     Hospital Course: Patient was admitted for an inpatient procedure and tolerated the procedure well with no complications.    Significant Diagnostic Studies: Colonoscopy     Pathology (if any):  Specimen (12h ago, onward)    None          Estimated Blood Loss: 0 ml.    Discussed with: patient.    Disposition: Home.    Follow Up/Patient Instructions:   Current Discharge Medication List      CONTINUE these medications which have NOT CHANGED    Details   rosuvastatin (CRESTOR) 10 MG tablet TAKE 1 TABLET BY MOUTH EVERY EVENING.  Qty: 90 tablet, Refills: 2    Associated Diagnoses: Hypercholesterolemia             Discharge Procedure Orders   Diet general     Call MD for:  temperature >100.4     Call MD for:  persistent nausea and vomiting     Call MD for:  severe uncontrolled pain     Call MD for:  difficulty breathing, headache or visual disturbances     Activity as tolerated

## 2019-12-16 ENCOUNTER — LAB VISIT (OUTPATIENT)
Dept: LAB | Facility: HOSPITAL | Age: 59
End: 2019-12-16
Attending: INTERNAL MEDICINE
Payer: COMMERCIAL

## 2019-12-16 DIAGNOSIS — C18.7 ADENOCARCINOMA OF SIGMOID COLON: ICD-10-CM

## 2019-12-16 PROCEDURE — 36415 COLL VENOUS BLD VENIPUNCTURE: CPT | Mod: PO

## 2019-12-16 PROCEDURE — 82378 CARCINOEMBRYONIC ANTIGEN: CPT

## 2019-12-17 LAB — CEA SERPL-MCNC: 1.1 NG/ML (ref 0–5)

## 2020-02-05 ENCOUNTER — LAB VISIT (OUTPATIENT)
Dept: LAB | Facility: HOSPITAL | Age: 60
End: 2020-02-05
Attending: INTERNAL MEDICINE
Payer: COMMERCIAL

## 2020-02-05 ENCOUNTER — OFFICE VISIT (OUTPATIENT)
Dept: INTERNAL MEDICINE | Facility: CLINIC | Age: 60
End: 2020-02-05
Payer: COMMERCIAL

## 2020-02-05 VITALS
SYSTOLIC BLOOD PRESSURE: 100 MMHG | DIASTOLIC BLOOD PRESSURE: 76 MMHG | HEART RATE: 82 BPM | BODY MASS INDEX: 27.47 KG/M2 | TEMPERATURE: 99 F | WEIGHT: 196.19 LBS | HEIGHT: 71 IN

## 2020-02-05 DIAGNOSIS — Z00.00 ANNUAL PHYSICAL EXAM: ICD-10-CM

## 2020-02-05 DIAGNOSIS — Z00.00 ANNUAL PHYSICAL EXAM: Primary | ICD-10-CM

## 2020-02-05 DIAGNOSIS — Z23 NEED FOR PROPHYLACTIC VACCINATION AGAINST STREPTOCOCCUS PNEUMONIAE (PNEUMOCOCCUS): ICD-10-CM

## 2020-02-05 LAB
ALBUMIN SERPL BCP-MCNC: 3.7 G/DL (ref 3.5–5.2)
ALP SERPL-CCNC: 95 U/L (ref 55–135)
ALT SERPL W/O P-5'-P-CCNC: 6 U/L (ref 10–44)
ANION GAP SERPL CALC-SCNC: 9 MMOL/L (ref 8–16)
AST SERPL-CCNC: 20 U/L (ref 10–40)
BASOPHILS # BLD AUTO: 0.06 K/UL (ref 0–0.2)
BASOPHILS NFR BLD: 0.7 % (ref 0–1.9)
BILIRUB SERPL-MCNC: 0.7 MG/DL (ref 0.1–1)
BUN SERPL-MCNC: 18 MG/DL (ref 6–20)
CALCIUM SERPL-MCNC: 9.2 MG/DL (ref 8.7–10.5)
CHLORIDE SERPL-SCNC: 104 MMOL/L (ref 95–110)
CHOLEST SERPL-MCNC: 223 MG/DL (ref 120–199)
CHOLEST/HDLC SERPL: 4.2 {RATIO} (ref 2–5)
CO2 SERPL-SCNC: 25 MMOL/L (ref 23–29)
CREAT SERPL-MCNC: 1.2 MG/DL (ref 0.5–1.4)
DIFFERENTIAL METHOD: ABNORMAL
EOSINOPHIL # BLD AUTO: 0.1 K/UL (ref 0–0.5)
EOSINOPHIL NFR BLD: 1.6 % (ref 0–8)
ERYTHROCYTE [DISTWIDTH] IN BLOOD BY AUTOMATED COUNT: 13.5 % (ref 11.5–14.5)
EST. GFR  (AFRICAN AMERICAN): >60 ML/MIN/1.73 M^2
EST. GFR  (NON AFRICAN AMERICAN): >60 ML/MIN/1.73 M^2
GLUCOSE SERPL-MCNC: 83 MG/DL (ref 70–110)
HCT VFR BLD AUTO: 48.1 % (ref 40–54)
HDLC SERPL-MCNC: 53 MG/DL (ref 40–75)
HDLC SERPL: 23.8 % (ref 20–50)
HGB BLD-MCNC: 15.1 G/DL (ref 14–18)
IMM GRANULOCYTES # BLD AUTO: 0.03 K/UL (ref 0–0.04)
IMM GRANULOCYTES NFR BLD AUTO: 0.4 % (ref 0–0.5)
LDLC SERPL CALC-MCNC: 153 MG/DL (ref 63–159)
LYMPHOCYTES # BLD AUTO: 3.1 K/UL (ref 1–4.8)
LYMPHOCYTES NFR BLD: 36.7 % (ref 18–48)
MCH RBC QN AUTO: 27 PG (ref 27–31)
MCHC RBC AUTO-ENTMCNC: 31.4 G/DL (ref 32–36)
MCV RBC AUTO: 86 FL (ref 82–98)
MONOCYTES # BLD AUTO: 0.8 K/UL (ref 0.3–1)
MONOCYTES NFR BLD: 9.5 % (ref 4–15)
NEUTROPHILS # BLD AUTO: 4.3 K/UL (ref 1.8–7.7)
NEUTROPHILS NFR BLD: 51.1 % (ref 38–73)
NONHDLC SERPL-MCNC: 170 MG/DL
NRBC BLD-RTO: 0 /100 WBC
PLATELET # BLD AUTO: 208 K/UL (ref 150–350)
PMV BLD AUTO: 12 FL (ref 9.2–12.9)
POTASSIUM SERPL-SCNC: 4.7 MMOL/L (ref 3.5–5.1)
PROT SERPL-MCNC: 7.2 G/DL (ref 6–8.4)
RBC # BLD AUTO: 5.59 M/UL (ref 4.6–6.2)
SODIUM SERPL-SCNC: 138 MMOL/L (ref 136–145)
TRIGL SERPL-MCNC: 85 MG/DL (ref 30–150)
TSH SERPL DL<=0.005 MIU/L-ACNC: 1.8 UIU/ML (ref 0.4–4)
WBC # BLD AUTO: 8.34 K/UL (ref 3.9–12.7)

## 2020-02-05 PROCEDURE — 85025 COMPLETE CBC W/AUTO DIFF WBC: CPT

## 2020-02-05 PROCEDURE — 84153 ASSAY OF PSA TOTAL: CPT

## 2020-02-05 PROCEDURE — 86703 HIV-1/HIV-2 1 RESULT ANTBDY: CPT

## 2020-02-05 PROCEDURE — 90670 PNEUMOCOCCAL CONJUGATE VACCINE 13-VALENT LESS THAN 5YO & GREATER THAN: ICD-10-PCS | Mod: S$GLB,,, | Performed by: NURSE PRACTITIONER

## 2020-02-05 PROCEDURE — 99396 PR PREVENTIVE VISIT,EST,40-64: ICD-10-PCS | Mod: 25,S$GLB,, | Performed by: NURSE PRACTITIONER

## 2020-02-05 PROCEDURE — 80061 LIPID PANEL: CPT

## 2020-02-05 PROCEDURE — 80053 COMPREHEN METABOLIC PANEL: CPT

## 2020-02-05 PROCEDURE — 90471 IMMUNIZATION ADMIN: CPT | Mod: S$GLB,,, | Performed by: NURSE PRACTITIONER

## 2020-02-05 PROCEDURE — 99396 PREV VISIT EST AGE 40-64: CPT | Mod: 25,S$GLB,, | Performed by: NURSE PRACTITIONER

## 2020-02-05 PROCEDURE — 36415 COLL VENOUS BLD VENIPUNCTURE: CPT | Mod: PO

## 2020-02-05 PROCEDURE — 99999 PR PBB SHADOW E&M-EST. PATIENT-LVL III: ICD-10-PCS | Mod: PBBFAC,,, | Performed by: NURSE PRACTITIONER

## 2020-02-05 PROCEDURE — 83036 HEMOGLOBIN GLYCOSYLATED A1C: CPT

## 2020-02-05 PROCEDURE — 90670 PCV13 VACCINE IM: CPT | Mod: S$GLB,,, | Performed by: NURSE PRACTITIONER

## 2020-02-05 PROCEDURE — 84443 ASSAY THYROID STIM HORMONE: CPT

## 2020-02-05 PROCEDURE — 99999 PR PBB SHADOW E&M-EST. PATIENT-LVL III: CPT | Mod: PBBFAC,,, | Performed by: NURSE PRACTITIONER

## 2020-02-05 PROCEDURE — 90471 PNEUMOCOCCAL CONJUGATE VACCINE 13-VALENT LESS THAN 5YO & GREATER THAN: ICD-10-PCS | Mod: S$GLB,,, | Performed by: NURSE PRACTITIONER

## 2020-02-05 NOTE — PROGRESS NOTES
"Subjective:       Patient ID: Armando Ingram Jr. is a 59 y.o. male.    Chief Complaint: Annual Exam    59 year old male here for annual.  Doing well, followed by Dr. Dow for colon cancer  c scope 12/2019, repeat 3 years  Sleep is good  Mood is good  Bowels move daily  Was prescribed crestor 10 in the past, states it caused gerd and joint pains. He states those symptoms resolved after stopping crestor. Has been off crestor for about 9 months.  No Emergency Room visits  No falls      /76 (BP Location: Right arm, Patient Position: Sitting, BP Method: Large (Manual))   Pulse 82   Temp 98.5 °F (36.9 °C) (Oral)   Ht 5' 11" (1.803 m)   Wt 89 kg (196 lb 3.4 oz)   BMI 27.37 kg/m²     Review of Systems   Constitutional: Negative for activity change, appetite change, chills, diaphoresis, fatigue, fever and unexpected weight change.   HENT: Negative for congestion, ear pain, nosebleeds, postnasal drip, rhinorrhea, sinus pressure, sneezing, sore throat and trouble swallowing.    Eyes: Negative for photophobia, pain and visual disturbance.   Respiratory: Negative for apnea, cough, choking, chest tightness, shortness of breath and wheezing.    Cardiovascular: Negative for chest pain, palpitations and leg swelling.   Gastrointestinal: Negative for abdominal pain, blood in stool, constipation, diarrhea, nausea and vomiting.   Genitourinary: Negative for decreased urine volume, difficulty urinating, dysuria, hematuria and urgency.   Musculoskeletal: Negative for arthralgias, gait problem, joint swelling and myalgias.   Skin: Negative for rash.   Neurological: Negative for dizziness, tremors, seizures, syncope, weakness, light-headedness, numbness and headaches.   Psychiatric/Behavioral: Negative for agitation, confusion, decreased concentration, hallucinations and sleep disturbance. The patient is not nervous/anxious.        Objective:      Physical Exam   Constitutional: He is oriented to person, place, and time. He " appears well-developed and well-nourished. He is cooperative. No distress.   HENT:   Head: Normocephalic and atraumatic.   Right Ear: Tympanic membrane, external ear and ear canal normal.   Left Ear: Tympanic membrane, external ear and ear canal normal.   Nose: Nose normal. No mucosal edema or rhinorrhea. Right sinus exhibits no maxillary sinus tenderness and no frontal sinus tenderness. Left sinus exhibits no maxillary sinus tenderness and no frontal sinus tenderness.   Mouth/Throat: Uvula is midline and mucous membranes are normal. No oropharyngeal exudate, posterior oropharyngeal edema or posterior oropharyngeal erythema.   Eyes: Pupils are equal, round, and reactive to light. Conjunctivae, EOM and lids are normal. Right eye exhibits no discharge. Left eye exhibits no discharge. Right conjunctiva is not injected. Right conjunctiva has no hemorrhage. Left conjunctiva is not injected. Left conjunctiva has no hemorrhage.   Neck: Trachea normal, normal range of motion and full passive range of motion without pain. Neck supple. No tracheal tenderness present. No thyroid mass and no thyromegaly present.   Cardiovascular: Normal rate, regular rhythm and normal heart sounds.   No murmur heard.  Pulmonary/Chest: Effort normal and breath sounds normal. No stridor. No respiratory distress. He has no decreased breath sounds. He has no wheezes. He has no rhonchi. He has no rales. He exhibits no tenderness.   Abdominal: Soft. Bowel sounds are normal. He exhibits no distension, no pulsatile liver, no fluid wave and no ascites. There is no tenderness.   Musculoskeletal: Normal range of motion.   Lymphadenopathy:        Head (right side): No submental, no submandibular and no tonsillar adenopathy present.        Head (left side): No submental, no submandibular and no tonsillar adenopathy present.     He has no cervical adenopathy.   Neurological: He is alert and oriented to person, place, and time.   Skin: Skin is warm and dry.  No rash noted. He is not diaphoretic.   Psychiatric: He has a normal mood and affect. His behavior is normal. Judgment and thought content normal.   Nursing note and vitals reviewed.      Assessment:       1. Annual physical exam    2. Need for prophylactic vaccination against Streptococcus pneumoniae (pneumococcus)        Plan:       Armando was seen today for annual exam.    Diagnoses and all orders for this visit:    Annual physical exam  -     CBC auto differential; Future  -     Comprehensive metabolic panel; Future  -     Hemoglobin A1c; Future  -     Lipid panel; Future  -     PSA, Screening; Future  -     TSH; Future  -     HIV 1/2 Ag/Ab (4th Gen); Future    Need for prophylactic vaccination against Streptococcus pneumoniae (pneumococcus)  -     (In Office Administered) Pneumococcal Conjugate Vaccine (13 Valent) (IM)      Patient Instructions   Ask your pharmacy for the new shingrix shot.   Follow up with Dr. Mendez in 1 year  due to see Dr. Dow in April 2020  Age appropriate anticipatory guidance discussed

## 2020-02-06 LAB
COMPLEXED PSA SERPL-MCNC: 0.34 NG/ML (ref 0–4)
ESTIMATED AVG GLUCOSE: 126 MG/DL (ref 68–131)
HBA1C MFR BLD HPLC: 6 % (ref 4–5.6)
HIV 1+2 AB+HIV1 P24 AG SERPL QL IA: NEGATIVE

## 2020-02-07 ENCOUNTER — PATIENT MESSAGE (OUTPATIENT)
Dept: INTERNAL MEDICINE | Facility: CLINIC | Age: 60
End: 2020-02-07

## 2020-02-07 DIAGNOSIS — R73.03 PRE-DIABETES: Primary | ICD-10-CM

## 2020-02-11 ENCOUNTER — PATIENT MESSAGE (OUTPATIENT)
Dept: INTERNAL MEDICINE | Facility: CLINIC | Age: 60
End: 2020-02-11

## 2021-01-19 ENCOUNTER — PATIENT MESSAGE (OUTPATIENT)
Dept: HEMATOLOGY/ONCOLOGY | Facility: CLINIC | Age: 61
End: 2021-01-19

## 2021-01-26 ENCOUNTER — TELEPHONE (OUTPATIENT)
Dept: INTERNAL MEDICINE | Facility: CLINIC | Age: 61
End: 2021-01-26

## 2021-01-26 DIAGNOSIS — Z00.00 ANNUAL PHYSICAL EXAM: Primary | ICD-10-CM

## 2021-03-01 ENCOUNTER — LAB VISIT (OUTPATIENT)
Dept: LAB | Facility: HOSPITAL | Age: 61
End: 2021-03-01
Attending: FAMILY MEDICINE
Payer: COMMERCIAL

## 2021-03-01 DIAGNOSIS — Z00.00 ANNUAL PHYSICAL EXAM: ICD-10-CM

## 2021-03-01 LAB
ALBUMIN SERPL BCP-MCNC: 3.7 G/DL (ref 3.5–5.2)
ALP SERPL-CCNC: 87 U/L (ref 55–135)
ALT SERPL W/O P-5'-P-CCNC: 6 U/L (ref 10–44)
ANION GAP SERPL CALC-SCNC: 10 MMOL/L (ref 8–16)
AST SERPL-CCNC: 18 U/L (ref 10–40)
BASOPHILS # BLD AUTO: 0.04 K/UL (ref 0–0.2)
BASOPHILS NFR BLD: 0.6 % (ref 0–1.9)
BILIRUB SERPL-MCNC: 0.5 MG/DL (ref 0.1–1)
BUN SERPL-MCNC: 19 MG/DL (ref 8–23)
CALCIUM SERPL-MCNC: 8.7 MG/DL (ref 8.7–10.5)
CHLORIDE SERPL-SCNC: 108 MMOL/L (ref 95–110)
CHOLEST SERPL-MCNC: 233 MG/DL (ref 120–199)
CHOLEST/HDLC SERPL: 4.7 {RATIO} (ref 2–5)
CO2 SERPL-SCNC: 21 MMOL/L (ref 23–29)
CREAT SERPL-MCNC: 1.2 MG/DL (ref 0.5–1.4)
DIFFERENTIAL METHOD: NORMAL
EOSINOPHIL # BLD AUTO: 0.1 K/UL (ref 0–0.5)
EOSINOPHIL NFR BLD: 0.9 % (ref 0–8)
ERYTHROCYTE [DISTWIDTH] IN BLOOD BY AUTOMATED COUNT: 13.2 % (ref 11.5–14.5)
EST. GFR  (AFRICAN AMERICAN): >60 ML/MIN/1.73 M^2
EST. GFR  (NON AFRICAN AMERICAN): >60 ML/MIN/1.73 M^2
ESTIMATED AVG GLUCOSE: 117 MG/DL (ref 68–131)
GLUCOSE SERPL-MCNC: 110 MG/DL (ref 70–110)
HBA1C MFR BLD: 5.7 % (ref 4–5.6)
HCT VFR BLD AUTO: 47.1 % (ref 40–54)
HDLC SERPL-MCNC: 50 MG/DL (ref 40–75)
HDLC SERPL: 21.5 % (ref 20–50)
HGB BLD-MCNC: 15.5 G/DL (ref 14–18)
IMM GRANULOCYTES # BLD AUTO: 0.02 K/UL (ref 0–0.04)
IMM GRANULOCYTES NFR BLD AUTO: 0.3 % (ref 0–0.5)
LDLC SERPL CALC-MCNC: 164.6 MG/DL (ref 63–159)
LYMPHOCYTES # BLD AUTO: 1.8 K/UL (ref 1–4.8)
LYMPHOCYTES NFR BLD: 27.9 % (ref 18–48)
MCH RBC QN AUTO: 28.5 PG (ref 27–31)
MCHC RBC AUTO-ENTMCNC: 32.9 G/DL (ref 32–36)
MCV RBC AUTO: 87 FL (ref 82–98)
MONOCYTES # BLD AUTO: 0.5 K/UL (ref 0.3–1)
MONOCYTES NFR BLD: 8.4 % (ref 4–15)
NEUTROPHILS # BLD AUTO: 4 K/UL (ref 1.8–7.7)
NEUTROPHILS NFR BLD: 61.9 % (ref 38–73)
NONHDLC SERPL-MCNC: 183 MG/DL
NRBC BLD-RTO: 0 /100 WBC
PLATELET # BLD AUTO: 201 K/UL (ref 150–350)
PMV BLD AUTO: 12.3 FL (ref 9.2–12.9)
POTASSIUM SERPL-SCNC: 4.5 MMOL/L (ref 3.5–5.1)
PROT SERPL-MCNC: 7 G/DL (ref 6–8.4)
RBC # BLD AUTO: 5.43 M/UL (ref 4.6–6.2)
SODIUM SERPL-SCNC: 139 MMOL/L (ref 136–145)
TRIGL SERPL-MCNC: 92 MG/DL (ref 30–150)
TSH SERPL DL<=0.005 MIU/L-ACNC: 2.1 UIU/ML (ref 0.4–4)
WBC # BLD AUTO: 6.46 K/UL (ref 3.9–12.7)

## 2021-03-01 PROCEDURE — 36415 COLL VENOUS BLD VENIPUNCTURE: CPT | Mod: PO

## 2021-03-01 PROCEDURE — 83036 HEMOGLOBIN GLYCOSYLATED A1C: CPT

## 2021-03-01 PROCEDURE — 84443 ASSAY THYROID STIM HORMONE: CPT

## 2021-03-01 PROCEDURE — 84153 ASSAY OF PSA TOTAL: CPT

## 2021-03-01 PROCEDURE — 80061 LIPID PANEL: CPT

## 2021-03-01 PROCEDURE — 85025 COMPLETE CBC W/AUTO DIFF WBC: CPT

## 2021-03-01 PROCEDURE — 80053 COMPREHEN METABOLIC PANEL: CPT

## 2021-03-02 LAB — COMPLEXED PSA SERPL-MCNC: 0.48 NG/ML (ref 0–4)

## 2021-03-08 ENCOUNTER — LAB VISIT (OUTPATIENT)
Dept: LAB | Facility: HOSPITAL | Age: 61
End: 2021-03-08
Attending: FAMILY MEDICINE
Payer: COMMERCIAL

## 2021-03-08 ENCOUNTER — OFFICE VISIT (OUTPATIENT)
Dept: INTERNAL MEDICINE | Facility: CLINIC | Age: 61
End: 2021-03-08
Payer: COMMERCIAL

## 2021-03-08 VITALS
HEIGHT: 71 IN | DIASTOLIC BLOOD PRESSURE: 84 MMHG | SYSTOLIC BLOOD PRESSURE: 128 MMHG | TEMPERATURE: 99 F | BODY MASS INDEX: 28.58 KG/M2 | HEART RATE: 76 BPM | WEIGHT: 204.13 LBS

## 2021-03-08 DIAGNOSIS — Z85.038 HISTORY OF COLON CANCER: ICD-10-CM

## 2021-03-08 DIAGNOSIS — Z00.00 ROUTINE GENERAL MEDICAL EXAMINATION AT A HEALTH CARE FACILITY: Primary | ICD-10-CM

## 2021-03-08 PROCEDURE — 1126F PR PAIN SEVERITY QUANTIFIED, NO PAIN PRESENT: ICD-10-PCS | Mod: S$GLB,,, | Performed by: FAMILY MEDICINE

## 2021-03-08 PROCEDURE — 3008F BODY MASS INDEX DOCD: CPT | Mod: CPTII,S$GLB,, | Performed by: FAMILY MEDICINE

## 2021-03-08 PROCEDURE — 1126F AMNT PAIN NOTED NONE PRSNT: CPT | Mod: S$GLB,,, | Performed by: FAMILY MEDICINE

## 2021-03-08 PROCEDURE — 99999 PR PBB SHADOW E&M-EST. PATIENT-LVL III: CPT | Mod: PBBFAC,,, | Performed by: FAMILY MEDICINE

## 2021-03-08 PROCEDURE — 3008F PR BODY MASS INDEX (BMI) DOCUMENTED: ICD-10-PCS | Mod: CPTII,S$GLB,, | Performed by: FAMILY MEDICINE

## 2021-03-08 PROCEDURE — 99396 PR PREVENTIVE VISIT,EST,40-64: ICD-10-PCS | Mod: S$GLB,,, | Performed by: FAMILY MEDICINE

## 2021-03-08 PROCEDURE — 36415 COLL VENOUS BLD VENIPUNCTURE: CPT | Mod: PO | Performed by: FAMILY MEDICINE

## 2021-03-08 PROCEDURE — 82378 CARCINOEMBRYONIC ANTIGEN: CPT | Performed by: FAMILY MEDICINE

## 2021-03-08 PROCEDURE — 99396 PREV VISIT EST AGE 40-64: CPT | Mod: S$GLB,,, | Performed by: FAMILY MEDICINE

## 2021-03-08 PROCEDURE — 99999 PR PBB SHADOW E&M-EST. PATIENT-LVL III: ICD-10-PCS | Mod: PBBFAC,,, | Performed by: FAMILY MEDICINE

## 2021-03-09 LAB — CEA SERPL-MCNC: 1.5 NG/ML (ref 0–5)

## 2021-03-13 ENCOUNTER — IMMUNIZATION (OUTPATIENT)
Dept: PHARMACY | Facility: CLINIC | Age: 61
End: 2021-03-13
Payer: COMMERCIAL

## 2021-03-13 DIAGNOSIS — Z23 NEED FOR VACCINATION: Primary | ICD-10-CM

## 2021-04-10 ENCOUNTER — IMMUNIZATION (OUTPATIENT)
Dept: PHARMACY | Facility: CLINIC | Age: 61
End: 2021-04-10
Payer: COMMERCIAL

## 2021-04-10 DIAGNOSIS — Z23 NEED FOR VACCINATION: Primary | ICD-10-CM

## 2022-03-16 ENCOUNTER — OFFICE VISIT (OUTPATIENT)
Dept: INTERNAL MEDICINE | Facility: CLINIC | Age: 62
End: 2022-03-16
Payer: COMMERCIAL

## 2022-03-16 ENCOUNTER — LAB VISIT (OUTPATIENT)
Dept: LAB | Facility: HOSPITAL | Age: 62
End: 2022-03-16
Attending: NURSE PRACTITIONER
Payer: COMMERCIAL

## 2022-03-16 VITALS
BODY MASS INDEX: 28.7 KG/M2 | SYSTOLIC BLOOD PRESSURE: 122 MMHG | DIASTOLIC BLOOD PRESSURE: 80 MMHG | HEART RATE: 68 BPM | TEMPERATURE: 98 F | HEIGHT: 71 IN | WEIGHT: 205 LBS

## 2022-03-16 DIAGNOSIS — Z00.00 ANNUAL PHYSICAL EXAM: Primary | ICD-10-CM

## 2022-03-16 DIAGNOSIS — Z23 NEED FOR PROPHYLACTIC VACCINATION AGAINST STREPTOCOCCUS PNEUMONIAE (PNEUMOCOCCUS): ICD-10-CM

## 2022-03-16 DIAGNOSIS — Z00.00 ANNUAL PHYSICAL EXAM: ICD-10-CM

## 2022-03-16 PROBLEM — Z01.810 PREOP CARDIOVASCULAR EXAM: Status: RESOLVED | Noted: 2018-03-09 | Resolved: 2022-03-16

## 2022-03-16 LAB
ALBUMIN SERPL BCP-MCNC: 3.6 G/DL (ref 3.5–5.2)
ALP SERPL-CCNC: 89 U/L (ref 55–135)
ALT SERPL W/O P-5'-P-CCNC: 10 U/L (ref 10–44)
ANION GAP SERPL CALC-SCNC: 9 MMOL/L (ref 8–16)
AST SERPL-CCNC: 19 U/L (ref 10–40)
BASOPHILS # BLD AUTO: 0.05 K/UL (ref 0–0.2)
BASOPHILS NFR BLD: 0.8 % (ref 0–1.9)
BILIRUB SERPL-MCNC: 0.5 MG/DL (ref 0.1–1)
BUN SERPL-MCNC: 21 MG/DL (ref 8–23)
CALCIUM SERPL-MCNC: 9.3 MG/DL (ref 8.7–10.5)
CHLORIDE SERPL-SCNC: 104 MMOL/L (ref 95–110)
CHOLEST SERPL-MCNC: 216 MG/DL (ref 120–199)
CHOLEST/HDLC SERPL: 4.3 {RATIO} (ref 2–5)
CO2 SERPL-SCNC: 24 MMOL/L (ref 23–29)
COMPLEXED PSA SERPL-MCNC: 0.6 NG/ML (ref 0–4)
CREAT SERPL-MCNC: 1 MG/DL (ref 0.5–1.4)
DIFFERENTIAL METHOD: ABNORMAL
EOSINOPHIL # BLD AUTO: 0.1 K/UL (ref 0–0.5)
EOSINOPHIL NFR BLD: 1.8 % (ref 0–8)
ERYTHROCYTE [DISTWIDTH] IN BLOOD BY AUTOMATED COUNT: 15.1 % (ref 11.5–14.5)
EST. GFR  (AFRICAN AMERICAN): >60 ML/MIN/1.73 M^2
EST. GFR  (NON AFRICAN AMERICAN): >60 ML/MIN/1.73 M^2
ESTIMATED AVG GLUCOSE: 123 MG/DL (ref 68–131)
GLUCOSE SERPL-MCNC: 90 MG/DL (ref 70–110)
HBA1C MFR BLD: 5.9 % (ref 4–5.6)
HCT VFR BLD AUTO: 43.6 % (ref 40–54)
HDLC SERPL-MCNC: 50 MG/DL (ref 40–75)
HDLC SERPL: 23.1 % (ref 20–50)
HGB BLD-MCNC: 13.6 G/DL (ref 14–18)
IMM GRANULOCYTES # BLD AUTO: 0.02 K/UL (ref 0–0.04)
IMM GRANULOCYTES NFR BLD AUTO: 0.3 % (ref 0–0.5)
LDLC SERPL CALC-MCNC: 149.6 MG/DL (ref 63–159)
LYMPHOCYTES # BLD AUTO: 2.7 K/UL (ref 1–4.8)
LYMPHOCYTES NFR BLD: 40.1 % (ref 18–48)
MCH RBC QN AUTO: 25.2 PG (ref 27–31)
MCHC RBC AUTO-ENTMCNC: 31.2 G/DL (ref 32–36)
MCV RBC AUTO: 81 FL (ref 82–98)
MONOCYTES # BLD AUTO: 0.5 K/UL (ref 0.3–1)
MONOCYTES NFR BLD: 8 % (ref 4–15)
NEUTROPHILS # BLD AUTO: 3.3 K/UL (ref 1.8–7.7)
NEUTROPHILS NFR BLD: 49 % (ref 38–73)
NONHDLC SERPL-MCNC: 166 MG/DL
NRBC BLD-RTO: 0 /100 WBC
PLATELET # BLD AUTO: 207 K/UL (ref 150–450)
PMV BLD AUTO: 12.3 FL (ref 9.2–12.9)
POTASSIUM SERPL-SCNC: 4.1 MMOL/L (ref 3.5–5.1)
PROT SERPL-MCNC: 7 G/DL (ref 6–8.4)
RBC # BLD AUTO: 5.4 M/UL (ref 4.6–6.2)
SODIUM SERPL-SCNC: 137 MMOL/L (ref 136–145)
TRIGL SERPL-MCNC: 82 MG/DL (ref 30–150)
WBC # BLD AUTO: 6.64 K/UL (ref 3.9–12.7)

## 2022-03-16 PROCEDURE — 80061 LIPID PANEL: CPT | Performed by: NURSE PRACTITIONER

## 2022-03-16 PROCEDURE — 99396 PREV VISIT EST AGE 40-64: CPT | Mod: 25,S$GLB,, | Performed by: NURSE PRACTITIONER

## 2022-03-16 PROCEDURE — 80053 COMPREHEN METABOLIC PANEL: CPT | Performed by: NURSE PRACTITIONER

## 2022-03-16 PROCEDURE — 90471 IMMUNIZATION ADMIN: CPT | Mod: S$GLB,,, | Performed by: NURSE PRACTITIONER

## 2022-03-16 PROCEDURE — 83036 HEMOGLOBIN GLYCOSYLATED A1C: CPT | Performed by: NURSE PRACTITIONER

## 2022-03-16 PROCEDURE — 90732 PNEUMOCOCCAL POLYSACCHARIDE VACCINE 23-VALENT =>2YO SQ IM: ICD-10-PCS | Mod: S$GLB,,, | Performed by: NURSE PRACTITIONER

## 2022-03-16 PROCEDURE — 90732 PPSV23 VACC 2 YRS+ SUBQ/IM: CPT | Mod: S$GLB,,, | Performed by: NURSE PRACTITIONER

## 2022-03-16 PROCEDURE — 99999 PR PBB SHADOW E&M-EST. PATIENT-LVL III: CPT | Mod: PBBFAC,,, | Performed by: NURSE PRACTITIONER

## 2022-03-16 PROCEDURE — 36415 COLL VENOUS BLD VENIPUNCTURE: CPT | Mod: PO | Performed by: NURSE PRACTITIONER

## 2022-03-16 PROCEDURE — 85025 COMPLETE CBC W/AUTO DIFF WBC: CPT | Performed by: NURSE PRACTITIONER

## 2022-03-16 PROCEDURE — 90471 PNEUMOCOCCAL POLYSACCHARIDE VACCINE 23-VALENT =>2YO SQ IM: ICD-10-PCS | Mod: S$GLB,,, | Performed by: NURSE PRACTITIONER

## 2022-03-16 PROCEDURE — 99999 PR PBB SHADOW E&M-EST. PATIENT-LVL III: ICD-10-PCS | Mod: PBBFAC,,, | Performed by: NURSE PRACTITIONER

## 2022-03-16 PROCEDURE — 99396 PR PREVENTIVE VISIT,EST,40-64: ICD-10-PCS | Mod: 25,S$GLB,, | Performed by: NURSE PRACTITIONER

## 2022-03-16 PROCEDURE — 84153 ASSAY OF PSA TOTAL: CPT | Performed by: NURSE PRACTITIONER

## 2022-03-16 NOTE — PROGRESS NOTES
"Subjective:       Patient ID: Armando Ingram Jr. is a 61 y.o. male.    Chief Complaint: Annual Exam    61 year old male here for annual  Doing well  Had covid in Jan, did well, felt like a cold  Bowels are good, has hx of colon cancer with resection 2018, c scope due dec 2022  No blood in stool/abd pain  Sleep is good  Mood stable        /80   Pulse 68   Temp 98.2 °F (36.8 °C)   Ht 5' 11" (1.803 m)   Wt 93 kg (205 lb 0.4 oz)   BMI 28.60 kg/m²     Review of Systems   Constitutional: Negative for appetite change, chills, diaphoresis, fever and unexpected weight change.   HENT: Negative for congestion, ear pain, nosebleeds, postnasal drip, rhinorrhea, sinus pressure, sneezing, sore throat and trouble swallowing.    Eyes: Negative for photophobia, pain and visual disturbance.   Respiratory: Negative for apnea, cough, choking, chest tightness, shortness of breath and wheezing.    Cardiovascular: Negative for chest pain, palpitations and leg swelling.   Gastrointestinal: Negative for abdominal pain, blood in stool, constipation, diarrhea, nausea and vomiting.   Genitourinary: Negative for decreased urine volume, difficulty urinating, dysuria, hematuria and urgency.   Musculoskeletal: Negative for arthralgias, gait problem, joint swelling and myalgias.   Skin: Negative for rash.   Neurological: Negative for dizziness, tremors, seizures, syncope, weakness, light-headedness, numbness and headaches.   Psychiatric/Behavioral: Negative for agitation, confusion, decreased concentration, hallucinations and sleep disturbance. The patient is not nervous/anxious.        Objective:      Physical Exam  Vitals and nursing note reviewed.   Constitutional:       General: He is not in acute distress.     Appearance: He is well-developed. He is not diaphoretic.   HENT:      Head: Normocephalic and atraumatic.      Right Ear: Tympanic membrane, ear canal and external ear normal.      Left Ear: Tympanic membrane, ear canal and " external ear normal.      Nose: Nose normal. No mucosal edema or rhinorrhea.      Right Sinus: No maxillary sinus tenderness or frontal sinus tenderness.      Left Sinus: No maxillary sinus tenderness or frontal sinus tenderness.      Mouth/Throat:      Pharynx: Uvula midline. No oropharyngeal exudate or posterior oropharyngeal erythema.   Eyes:      General: Lids are normal.         Right eye: No discharge.         Left eye: No discharge.      Conjunctiva/sclera: Conjunctivae normal.      Right eye: Right conjunctiva is not injected. No hemorrhage.     Left eye: Left conjunctiva is not injected. No hemorrhage.     Pupils: Pupils are equal, round, and reactive to light.   Neck:      Thyroid: No thyroid mass or thyromegaly.      Trachea: Trachea normal. No tracheal tenderness.   Cardiovascular:      Rate and Rhythm: Normal rate and regular rhythm.      Heart sounds: Normal heart sounds. No murmur heard.  Pulmonary:      Effort: Pulmonary effort is normal. No respiratory distress.      Breath sounds: Normal breath sounds. No stridor. No decreased breath sounds, wheezing, rhonchi or rales.   Chest:      Chest wall: No tenderness.   Abdominal:      General: Bowel sounds are normal. There is no distension.      Palpations: Abdomen is soft. There is no fluid wave.      Tenderness: There is no abdominal tenderness.   Musculoskeletal:         General: Normal range of motion.      Cervical back: Full passive range of motion without pain, normal range of motion and neck supple.   Lymphadenopathy:      Head:      Right side of head: No submental, submandibular or tonsillar adenopathy.      Left side of head: No submental, submandibular or tonsillar adenopathy.      Cervical: No cervical adenopathy.   Skin:     General: Skin is warm and dry.      Findings: No rash.   Neurological:      Mental Status: He is alert and oriented to person, place, and time.   Psychiatric:         Behavior: Behavior normal. Behavior is cooperative.          Thought Content: Thought content normal.         Judgment: Judgment normal.         Assessment:       1. Annual physical exam    2. Need for prophylactic vaccination against Streptococcus pneumoniae (pneumococcus)        Plan:       Armando was seen today for annual exam.    Diagnoses and all orders for this visit:    Annual physical exam  -     CBC Auto Differential; Future  -     Comprehensive Metabolic Panel; Future  -     Lipid Panel; Future  -     Hemoglobin A1C; Future  -     PSA, Screening; Future    Need for prophylactic vaccination against Streptococcus pneumoniae (pneumococcus)  -     (In Office Administered) Pneumococcal Polysaccharide Vaccine (23 Valent) (SQ/IM)    declines shingrix  Pneumonia shot today  c scope due dec 2022  Discussed age appropriate anticipatory guidance, healthy diet and lifestyle, regular exercise, weight management.  Fasting labs ordered  Follow up with Dr. BULLOCK annually and prn

## 2022-03-18 ENCOUNTER — PATIENT MESSAGE (OUTPATIENT)
Dept: INTERNAL MEDICINE | Facility: CLINIC | Age: 62
End: 2022-03-18
Payer: COMMERCIAL

## 2022-03-18 ENCOUNTER — TELEPHONE (OUTPATIENT)
Dept: INTERNAL MEDICINE | Facility: CLINIC | Age: 62
End: 2022-03-18
Payer: COMMERCIAL

## 2022-03-18 ENCOUNTER — LAB VISIT (OUTPATIENT)
Dept: LAB | Facility: HOSPITAL | Age: 62
End: 2022-03-18
Attending: NURSE PRACTITIONER
Payer: COMMERCIAL

## 2022-03-18 DIAGNOSIS — D64.9 LOW HEMOGLOBIN: ICD-10-CM

## 2022-03-18 DIAGNOSIS — D64.9 LOW HEMOGLOBIN: Primary | ICD-10-CM

## 2022-03-18 PROCEDURE — 82728 ASSAY OF FERRITIN: CPT | Performed by: NURSE PRACTITIONER

## 2022-03-18 PROCEDURE — 82607 VITAMIN B-12: CPT | Performed by: NURSE PRACTITIONER

## 2022-03-18 PROCEDURE — 36415 COLL VENOUS BLD VENIPUNCTURE: CPT | Mod: PO | Performed by: NURSE PRACTITIONER

## 2022-03-18 PROCEDURE — 84466 ASSAY OF TRANSFERRIN: CPT | Performed by: NURSE PRACTITIONER

## 2022-03-18 NOTE — TELEPHONE ENCOUNTER
----- Message from Tran Hdez, FANNY-KOBE sent at 3/18/2022 12:47 PM CDT -----  Needs labs and a stool test

## 2022-03-19 LAB
FERRITIN SERPL-MCNC: 21 NG/ML (ref 20–300)
IRON SERPL-MCNC: 61 UG/DL (ref 45–160)
SATURATED IRON: 13 % (ref 20–50)
TOTAL IRON BINDING CAPACITY: 469 UG/DL (ref 250–450)
TRANSFERRIN SERPL-MCNC: 317 MG/DL (ref 200–375)
VIT B12 SERPL-MCNC: 294 PG/ML (ref 210–950)

## 2022-03-21 ENCOUNTER — LAB VISIT (OUTPATIENT)
Dept: LAB | Facility: HOSPITAL | Age: 62
End: 2022-03-21
Attending: FAMILY MEDICINE
Payer: COMMERCIAL

## 2022-03-21 DIAGNOSIS — D64.9 LOW HEMOGLOBIN: ICD-10-CM

## 2022-03-21 LAB — OB PNL STL: NEGATIVE

## 2022-03-21 PROCEDURE — 82272 OCCULT BLD FECES 1-3 TESTS: CPT | Performed by: NURSE PRACTITIONER

## 2022-03-22 ENCOUNTER — PATIENT MESSAGE (OUTPATIENT)
Dept: INTERNAL MEDICINE | Facility: CLINIC | Age: 62
End: 2022-03-22
Payer: COMMERCIAL

## 2022-03-22 ENCOUNTER — TELEPHONE (OUTPATIENT)
Dept: INTERNAL MEDICINE | Facility: CLINIC | Age: 62
End: 2022-03-22
Payer: COMMERCIAL

## 2022-03-22 NOTE — TELEPHONE ENCOUNTER
----- Message from Tran Hdez, FNP-C sent at 3/22/2022 11:06 AM CDT -----  Labs show low iron. Given his hx of colon cancer, he needs to follow up with Victor M within 1 month. Please arrange this. He can start oral otc iron ferrous sulfate 325mg for now. Stool test for blood is Negative.

## 2022-03-23 ENCOUNTER — OFFICE VISIT (OUTPATIENT)
Dept: HEMATOLOGY/ONCOLOGY | Facility: CLINIC | Age: 62
End: 2022-03-23
Payer: COMMERCIAL

## 2022-03-23 ENCOUNTER — LAB VISIT (OUTPATIENT)
Dept: LAB | Facility: HOSPITAL | Age: 62
End: 2022-03-23
Attending: INTERNAL MEDICINE
Payer: COMMERCIAL

## 2022-03-23 VITALS
DIASTOLIC BLOOD PRESSURE: 71 MMHG | HEIGHT: 71 IN | HEART RATE: 72 BPM | TEMPERATURE: 98 F | SYSTOLIC BLOOD PRESSURE: 112 MMHG | BODY MASS INDEX: 26.82 KG/M2 | OXYGEN SATURATION: 98 % | WEIGHT: 191.56 LBS

## 2022-03-23 DIAGNOSIS — D50.0 IRON DEFICIENCY ANEMIA DUE TO CHRONIC BLOOD LOSS: Primary | ICD-10-CM

## 2022-03-23 DIAGNOSIS — Z85.038 HISTORY OF COLON CANCER: ICD-10-CM

## 2022-03-23 DIAGNOSIS — C18.7 ADENOCARCINOMA OF SIGMOID COLON: ICD-10-CM

## 2022-03-23 LAB — CEA SERPL-MCNC: <1.7 NG/ML (ref 0–5)

## 2022-03-23 PROCEDURE — 99999 PR PBB SHADOW E&M-EST. PATIENT-LVL III: CPT | Mod: PBBFAC,,, | Performed by: INTERNAL MEDICINE

## 2022-03-23 PROCEDURE — 36415 COLL VENOUS BLD VENIPUNCTURE: CPT | Performed by: INTERNAL MEDICINE

## 2022-03-23 PROCEDURE — 99215 PR OFFICE/OUTPT VISIT, EST, LEVL V, 40-54 MIN: ICD-10-PCS | Mod: S$GLB,,, | Performed by: INTERNAL MEDICINE

## 2022-03-23 PROCEDURE — 99215 OFFICE O/P EST HI 40 MIN: CPT | Mod: S$GLB,,, | Performed by: INTERNAL MEDICINE

## 2022-03-23 PROCEDURE — 82378 CARCINOEMBRYONIC ANTIGEN: CPT | Performed by: INTERNAL MEDICINE

## 2022-03-23 PROCEDURE — 99999 PR PBB SHADOW E&M-EST. PATIENT-LVL III: ICD-10-PCS | Mod: PBBFAC,,, | Performed by: INTERNAL MEDICINE

## 2022-03-23 RX ORDER — FERROUS SULFATE 325(65) MG
325 TABLET ORAL DAILY
Qty: 30 TABLET | Refills: 3 | Status: SHIPPED | OUTPATIENT
Start: 2022-03-23 | End: 2023-03-21
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
## 2022-03-24 ENCOUNTER — PATIENT MESSAGE (OUTPATIENT)
Dept: ENDOSCOPY | Facility: HOSPITAL | Age: 62
End: 2022-03-24
Payer: COMMERCIAL

## 2022-03-25 RX ORDER — SODIUM, POTASSIUM,MAG SULFATES 17.5-3.13G
1 SOLUTION, RECONSTITUTED, ORAL ORAL DAILY
Qty: 1 KIT | Refills: 0 | Status: SHIPPED | OUTPATIENT
Start: 2022-03-25 | End: 2022-03-27

## 2022-03-31 ENCOUNTER — PATIENT MESSAGE (OUTPATIENT)
Dept: ENDOSCOPY | Facility: HOSPITAL | Age: 62
End: 2022-03-31
Payer: COMMERCIAL

## 2022-04-11 ENCOUNTER — PATIENT MESSAGE (OUTPATIENT)
Dept: ENDOSCOPY | Facility: HOSPITAL | Age: 62
End: 2022-04-11
Payer: COMMERCIAL

## 2022-04-12 ENCOUNTER — PATIENT MESSAGE (OUTPATIENT)
Dept: ENDOSCOPY | Facility: HOSPITAL | Age: 62
End: 2022-04-12
Payer: COMMERCIAL

## 2022-04-22 ENCOUNTER — ANESTHESIA (OUTPATIENT)
Dept: ENDOSCOPY | Facility: HOSPITAL | Age: 62
End: 2022-04-22
Payer: COMMERCIAL

## 2022-04-22 ENCOUNTER — HOSPITAL ENCOUNTER (OUTPATIENT)
Facility: HOSPITAL | Age: 62
Discharge: HOME | End: 2022-04-22
Attending: INTERNAL MEDICINE | Admitting: INTERNAL MEDICINE
Payer: COMMERCIAL

## 2022-04-22 ENCOUNTER — ANESTHESIA EVENT (OUTPATIENT)
Dept: ENDOSCOPY | Facility: HOSPITAL | Age: 62
End: 2022-04-22
Payer: COMMERCIAL

## 2022-04-22 DIAGNOSIS — D50.9 IDA (IRON DEFICIENCY ANEMIA): ICD-10-CM

## 2022-04-22 PROCEDURE — 88342 IMHCHEM/IMCYTCHM 1ST ANTB: CPT | Mod: 26,,, | Performed by: PATHOLOGY

## 2022-04-22 PROCEDURE — 43239 PR EGD, FLEX, W/BIOPSY, SGL/MULTI: ICD-10-PCS | Mod: 51,,, | Performed by: INTERNAL MEDICINE

## 2022-04-22 PROCEDURE — 88305 TISSUE EXAM BY PATHOLOGIST: ICD-10-PCS | Mod: 26,,, | Performed by: PATHOLOGY

## 2022-04-22 PROCEDURE — 88305 TISSUE EXAM BY PATHOLOGIST: CPT | Mod: 26,,, | Performed by: PATHOLOGY

## 2022-04-22 PROCEDURE — 25000003 PHARM REV CODE 250: Performed by: NURSE ANESTHETIST, CERTIFIED REGISTERED

## 2022-04-22 PROCEDURE — 88342 IMHCHEM/IMCYTCHM 1ST ANTB: CPT | Performed by: PATHOLOGY

## 2022-04-22 PROCEDURE — 45378 DIAGNOSTIC COLONOSCOPY: CPT | Mod: ,,, | Performed by: INTERNAL MEDICINE

## 2022-04-22 PROCEDURE — 43239 EGD BIOPSY SINGLE/MULTIPLE: CPT | Mod: 51,,, | Performed by: INTERNAL MEDICINE

## 2022-04-22 PROCEDURE — 88305 TISSUE EXAM BY PATHOLOGIST: CPT | Performed by: PATHOLOGY

## 2022-04-22 PROCEDURE — 88342 CHG IMMUNOCYTOCHEMISTRY: ICD-10-PCS | Mod: 26,,, | Performed by: PATHOLOGY

## 2022-04-22 PROCEDURE — 63600175 PHARM REV CODE 636 W HCPCS: Performed by: NURSE ANESTHETIST, CERTIFIED REGISTERED

## 2022-04-22 PROCEDURE — 37000008 HC ANESTHESIA 1ST 15 MINUTES: Performed by: INTERNAL MEDICINE

## 2022-04-22 PROCEDURE — 27201012 HC FORCEPS, HOT/COLD, DISP: Performed by: INTERNAL MEDICINE

## 2022-04-22 PROCEDURE — 45378 PR COLONOSCOPY,DIAGNOSTIC: ICD-10-PCS | Mod: ,,, | Performed by: INTERNAL MEDICINE

## 2022-04-22 PROCEDURE — 45378 DIAGNOSTIC COLONOSCOPY: CPT | Performed by: INTERNAL MEDICINE

## 2022-04-22 PROCEDURE — 43239 EGD BIOPSY SINGLE/MULTIPLE: CPT | Performed by: INTERNAL MEDICINE

## 2022-04-22 PROCEDURE — 37000009 HC ANESTHESIA EA ADD 15 MINS: Performed by: INTERNAL MEDICINE

## 2022-04-22 RX ORDER — PROPOFOL 10 MG/ML
VIAL (ML) INTRAVENOUS
Status: DISCONTINUED | OUTPATIENT
Start: 2022-04-22 | End: 2022-04-22

## 2022-04-22 RX ORDER — SODIUM CHLORIDE, SODIUM LACTATE, POTASSIUM CHLORIDE, CALCIUM CHLORIDE 600; 310; 30; 20 MG/100ML; MG/100ML; MG/100ML; MG/100ML
INJECTION, SOLUTION INTRAVENOUS CONTINUOUS
Status: DISCONTINUED | OUTPATIENT
Start: 2022-04-22 | End: 2022-04-22 | Stop reason: HOSPADM

## 2022-04-22 RX ORDER — LIDOCAINE HYDROCHLORIDE 10 MG/ML
INJECTION, SOLUTION EPIDURAL; INFILTRATION; INTRACAUDAL; PERINEURAL
Status: DISCONTINUED | OUTPATIENT
Start: 2022-04-22 | End: 2022-04-22

## 2022-04-22 RX ADMIN — PROPOFOL 100 MG: 10 INJECTION, EMULSION INTRAVENOUS at 10:04

## 2022-04-22 RX ADMIN — LIDOCAINE HYDROCHLORIDE 50 MG: 10 INJECTION, SOLUTION EPIDURAL; INFILTRATION; INTRACAUDAL; PERINEURAL at 10:04

## 2022-04-22 RX ADMIN — PROPOFOL 50 MG: 10 INJECTION, EMULSION INTRAVENOUS at 11:04

## 2022-04-22 RX ADMIN — SODIUM CHLORIDE, SODIUM LACTATE, POTASSIUM CHLORIDE, AND CALCIUM CHLORIDE: .6; .31; .03; .02 INJECTION, SOLUTION INTRAVENOUS at 10:04

## 2022-04-22 NOTE — ANESTHESIA PREPROCEDURE EVALUATION
04/22/2022  Armando Ingram Jr. is a 62 y.o., male.    Pre-op Assessment    I have reviewed the Patient Summary Reports.    I have reviewed the Nursing Notes. I have reviewed the NPO Status.   I have reviewed the Medications.     Review of Systems  Anesthesia Hx:  No problems with previous Anesthesia    Social:  Former Smoker    Hematology/Oncology:  Hematology Normal   Oncology Normal     EENT/Dental:EENT/Dental Normal   Cardiovascular:   hyperlipidemia CONCLUSIONS     1 - Concentric remodeling.     2 - No wall motion abnormalities.     3 - Normal left ventricular systolic function (EF 60-65%).     4 - Normal left ventricular diastolic function.     5 - Normal right ventricular systolic function .     6 - The estimated PA systolic pressure is greater than 16 mmHg.     DVT Deep Venous Thrombosis (DVT), Hx of DVT Joe filter   Pulmonary:  Pulmonary Normal    Renal/:   Chronic Renal Disease renal calculi    Hepatic/GI:  Hepatic/GI Normal Bowel Prep.  Bowel Conditions:  Bowel Neoplasm:, sigmoid colon s/p surgical resection   Musculoskeletal:  Musculoskeletal Normal    Neurological:  Neurology Normal    Endocrine:  Endocrine Normal    Dermatological:  Skin Normal    Psych:  Psychiatric Normal           Physical Exam  General:  Well nourished      Airway/Jaw/Neck:  Airway Findings: Mouth Opening: Normal   Tongue: Normal   Pre-Existing Airway Tube(s): Oral Endotracheal tube General Airway Assessment: Adult  Mallampati: II  Neck ROM: Normal ROM       Dental:  Intact     Chest/Lungs:  Chest/Lungs Findings: Normal Respiratory Rate      Heart/Vascular:  Heart Findings: Rate: Normal             Anesthesia Plan  Type of Anesthesia, risks & benefits discussed:  Anesthesia Type:  MAC    Patient's Preference:   Plan Factors:          Intra-op Monitoring Plan: Standard ASA Monitors  Intra-op Monitoring Plan  Comments:   Post Op Pain Control Plan: multimodal analgesia  Post Op Pain Control Plan Comments:     Induction:   IV  Beta Blocker:  Patient is not currently on a Beta-Blocker (No further documentation required).       Informed Consent: Informed consent signed with the Patient and all parties understand the risks and agree with anesthesia plan.  All questions answered.  Anesthesia consent signed with patient.  ASA Score: 2     Day of Surgery Review of History & Physical: I have interviewed and examined the patient. I have reviewed the patient's H&P dated:  There are no significant changes.  H&P Update referred to the surgeon/provider.          Ready For Surgery From Anesthesia Perspective.           Physical Exam  General: Well nourished    Airway:  Mallampati: II   Mouth Opening: Normal  Tongue: Normal  Neck ROM: Normal ROM  Pre-Existing Airway: Oral Endotracheal tube    Dental:  Intact    Chest/Lungs:  Normal Respiratory Rate    Heart:  Rate: Normal          Anesthesia Plan  Type of Anesthesia, risks & benefits discussed:    Anesthesia Type: MAC  Intra-op Monitoring Plan: Standard ASA Monitors  Post Op Pain Control Plan: multimodal analgesia  Induction:  IV  Informed Consent: Informed consent signed with the Patient and all parties understand the risks and agree with anesthesia plan.  All questions answered.   ASA Score: 2  Day of Surgery Review of History & Physical: H&P Update referred to the surgeon/provider.I have interviewed and examined the patient. I have reviewed the patient's H&P dated: There are no significant changes.     Ready For Surgery From Anesthesia Perspective.       .

## 2022-04-22 NOTE — PROVATION PATIENT INSTRUCTIONS
Discharge Summary/Instructions after an Endoscopic Procedure  Patient Name: Armando Ingram  Patient MRN: [id number]  Patient YOB: 1960 Friday, April 22, 2022 Amy Barrera MD  Dear patient,  As a result of recent federal legislation (The Federal Cures Act), you may   receive lab or pathology results from your procedure in your [facility]   account before your physician is able to contact you. Your physician or   their representative will relay the results to you with their   recommendations at their soonest availability.  Thank you,  RESTRICTIONS:  During your procedure today, you received medications for sedation.  These   medications may affect your judgment, balance and coordination.  Therefore,   for 24 hours, you have the following restrictions:   - DO NOT drive a car, operate machinery, make legal/financial decisions,   sign important papers or drink alcohol.    ACTIVITY:  Today: no heavy lifting, straining or running due to procedural   sedation/anesthesia.  The following day: return to full activity including work.  DIET:  Eat and drink normally unless instructed otherwise.     TREATMENT FOR COMMON SIDE EFFECTS:  - Mild abdominal pain, nausea, belching, bloating or excessive gas:  rest,   eat lightly and use a heating pad.  - Sore Throat: treat with throat lozenges and/or gargle with warm salt   water.  - Because air was used during the procedure, expelling large amounts of air   from your rectum or belching is normal.  - If a bowel prep was taken, you may not have a bowel movement for 1-3 days.    This is normal.  SYMPTOMS TO WATCH FOR AND REPORT TO YOUR PHYSICIAN:  1. Abdominal pain or bloating, other than gas cramps.  2. Chest pain.  3. Back pain.  4. Signs of infection such as: chills or fever occurring within 24 hours   after the procedure.  5. Rectal bleeding, which would show as bright red, maroon, or black stools.   (A tablespoon of blood from the rectum is not serious, especially  if   hemorrhoids are present.)  6. Vomiting.  7. Weakness or dizziness.  GO DIRECTLY TO THE NEAREST EMERGENCY ROOM IF YOU HAVE ANY OF THE FOLLOWING:      Difficulty breathing              Chills and/or fever over 101 F   Persistent vomiting and/or vomiting blood   Severe abdominal pain   Severe chest pain   Black, tarry stools   Bleeding- more than one tablespoon   Any other symptom or condition that you feel may need urgent attention  Your doctor recommends these additional instructions:  If any biopsies were taken, your doctors clinic will contact you in 1 to 2   weeks with any results.  - Discharge patient to home.   - Resume previous diet.   - Continue present medications.   - Repeat colonoscopy in 5 years for surveillance.   - Return to referring physician.   - Patient has a contact number available for emergencies.  The signs and   symptoms of potential delayed complications were discussed with the   patient.  Return to normal activities tomorrow.  Written discharge   instructions were provided to the patient.  For questions, problems or results please call your physician Amy Barrera MD at Work:  [phone]  If you have any questions about the above instructions, call the GI   department at [phone] or call the endoscopy unit at [phone]   from 7am until 3 pm.  [facility] [place], EMERGENCY ROOM PHONE NUMBER:   [phone]  IF A COMPLICATION OR EMERGENCY SITUATION ARISES AND YOU ARE UNABLE TO REACH   YOUR PHYSICIAN - GO DIRECTLY TO THE EMERGENCY ROOM.  I have read or have had read to me these discharge instructions for my   procedure and have received a written copy.  I understand these   instructions and will follow-up with my physician if I have any questions.     __________________________________       _____________________________________  Nurse Signature                                          Patient/Designated   Responsible Party Signature  Amy VAIL  MD Bruce  4/22/2022 11:20:41 AM  This report has been verified and signed electronically.  Dear patient,  As a result of recent federal legislation (The Federal Cures Act), you may   receive lab or pathology results from your procedure in your [facility]   account before your physician is able to contact you. Your physician or   their representative will relay the results to you with their   recommendations at their soonest availability.  Thank you,  PROVATION
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
## 2022-04-22 NOTE — TRANSFER OF CARE
"Anesthesia Transfer of Care Note    Patient: Armando Ingram JrAlthea    Procedure(s) Performed: Procedure(s) (LRB):  ESOPHAGOGASTRODUODENOSCOPY (EGD) (N/A)  COLONOSCOPY (N/A)    Patient location: GI    Anesthesia Type: MAC    Transport from OR: Transported from OR on room air with adequate spontaneous ventilation    Post pain: adequate analgesia    Post assessment: no apparent anesthetic complications    Post vital signs: stable    Level of consciousness: sedated    Nausea/Vomiting: no nausea/vomiting    Complications: none    Transfer of care protocol was followed      Last vitals:   Visit Vitals  /75 (BP Location: Left arm, Patient Position: Lying)   Pulse 65   Temp 36.4 °C (97.5 °F) (Temporal)   Resp 16   Ht 5' 11" (1.803 m)   Wt 89.8 kg (198 lb)   SpO2 97%   BMI 27.62 kg/m²     "

## 2022-04-22 NOTE — ANESTHESIA POSTPROCEDURE EVALUATION
Anesthesia Post Evaluation    Patient: Armando ARELLANO Juan Jose Lobo    Procedure(s) Performed: Procedure(s) (LRB):  ESOPHAGOGASTRODUODENOSCOPY (EGD) (N/A)  COLONOSCOPY (N/A)    Final Anesthesia Type: MAC      Patient location during evaluation: PACU  Patient participation: Yes- Able to Participate  Level of consciousness: awake and alert  Post-procedure vital signs: reviewed and stable  Pain management: adequate  Airway patency: patent    PONV status at discharge: No PONV  Anesthetic complications: no      Cardiovascular status: blood pressure returned to baseline  Respiratory status: unassisted  Hydration status: euvolemic  Follow-up not needed.          Vitals Value Taken Time   /70 04/22/22 1142   Temp  04/22/22 1235   Pulse 60 04/22/22 1142   Resp 18 04/22/22 1142   SpO2 97 % 04/22/22 1142         Event Time   Out of Recovery 11:43:36         Pain/Brigitte Score: Brigitte Score: 10 (4/22/2022 11:42 AM)

## 2022-04-22 NOTE — PROVATION PATIENT INSTRUCTIONS
Discharge Summary/Instructions after an Endoscopic Procedure  Patient Name: Armando Ingram  Patient MRN: [id number]  Patient YOB: 1960 Friday, April 22, 2022 Amy Barrera MD  Dear patient,  As a result of recent federal legislation (The Federal Cures Act), you may   receive lab or pathology results from your procedure in your [facility]   account before your physician is able to contact you. Your physician or   their representative will relay the results to you with their   recommendations at their soonest availability.  Thank you,  RESTRICTIONS:  During your procedure today, you received medications for sedation.  These   medications may affect your judgment, balance and coordination.  Therefore,   for 24 hours, you have the following restrictions:   - DO NOT drive a car, operate machinery, make legal/financial decisions,   sign important papers or drink alcohol.    ACTIVITY:  Today: no heavy lifting, straining or running due to procedural   sedation/anesthesia.  The following day: return to full activity including work.  DIET:  Eat and drink normally unless instructed otherwise.     TREATMENT FOR COMMON SIDE EFFECTS:  - Mild abdominal pain, nausea, belching, bloating or excessive gas:  rest,   eat lightly and use a heating pad.  - Sore Throat: treat with throat lozenges and/or gargle with warm salt   water.  - Because air was used during the procedure, expelling large amounts of air   from your rectum or belching is normal.  - If a bowel prep was taken, you may not have a bowel movement for 1-3 days.    This is normal.  SYMPTOMS TO WATCH FOR AND REPORT TO YOUR PHYSICIAN:  1. Abdominal pain or bloating, other than gas cramps.  2. Chest pain.  3. Back pain.  4. Signs of infection such as: chills or fever occurring within 24 hours   after the procedure.  5. Rectal bleeding, which would show as bright red, maroon, or black stools.   (A tablespoon of blood from the rectum is not serious, especially  if   hemorrhoids are present.)  6. Vomiting.  7. Weakness or dizziness.  GO DIRECTLY TO THE NEAREST EMERGENCY ROOM IF YOU HAVE ANY OF THE FOLLOWING:      Difficulty breathing              Chills and/or fever over 101 F   Persistent vomiting and/or vomiting blood   Severe abdominal pain   Severe chest pain   Black, tarry stools   Bleeding- more than one tablespoon   Any other symptom or condition that you feel may need urgent attention  Your doctor recommends these additional instructions:  If any biopsies were taken, your doctors clinic will contact you in 1 to 2   weeks with any results.  - Discharge patient to home.   - Resume previous diet.   - Continue present medications.   - Await pathology results.   - Return to referring physician.  For questions, problems or results please call your physician Amy Barrera MD at Work:  [phone]  If you have any questions about the above instructions, call the GI   department at [phone] or call the endoscopy unit at [phone]   from 7am until 3 pm.  [facility] [place], EMERGENCY ROOM PHONE NUMBER:   [phone]  IF A COMPLICATION OR EMERGENCY SITUATION ARISES AND YOU ARE UNABLE TO REACH   YOUR PHYSICIAN - GO DIRECTLY TO THE EMERGENCY ROOM.  I have read or have had read to me these discharge instructions for my   procedure and have received a written copy.  I understand these   instructions and will follow-up with my physician if I have any questions.     __________________________________       _____________________________________  Nurse Signature                                          Patient/Designated   Responsible Party Signature  Amy Barrera MD  4/22/2022 11:10:09 AM  This report has been verified and signed electronically.  Dear patient,  As a result of recent federal legislation (The Federal Cures Act), you may   receive lab or pathology results from your procedure in your [facility]   account before your  physician is able to contact you. Your physician or   their representative will relay the results to you with their   recommendations at their soonest availability.  Thank you,  PROVATION

## 2022-04-25 VITALS
TEMPERATURE: 98 F | BODY MASS INDEX: 27.72 KG/M2 | HEART RATE: 60 BPM | RESPIRATION RATE: 18 BRPM | OXYGEN SATURATION: 97 % | DIASTOLIC BLOOD PRESSURE: 70 MMHG | SYSTOLIC BLOOD PRESSURE: 108 MMHG | WEIGHT: 198 LBS | HEIGHT: 71 IN

## 2022-05-03 ENCOUNTER — PATIENT MESSAGE (OUTPATIENT)
Dept: GASTROENTEROLOGY | Facility: CLINIC | Age: 62
End: 2022-05-03
Payer: COMMERCIAL

## 2022-05-03 LAB
COMMENT: NORMAL
FINAL PATHOLOGIC DIAGNOSIS: NORMAL
GROSS: NORMAL
Lab: NORMAL
MICROSCOPIC EXAM: NORMAL

## 2022-05-03 RX ORDER — AMOXICILLIN 500 MG/1
1000 TABLET, FILM COATED ORAL EVERY 12 HOURS
Qty: 56 TABLET | Refills: 0 | Status: SHIPPED | OUTPATIENT
Start: 2022-05-03 | End: 2022-05-17

## 2022-05-03 RX ORDER — CLARITHROMYCIN 500 MG/1
500 TABLET, FILM COATED ORAL EVERY 12 HOURS
Qty: 28 TABLET | Refills: 0 | Status: SHIPPED | OUTPATIENT
Start: 2022-05-03 | End: 2022-05-17

## 2022-05-03 RX ORDER — PANTOPRAZOLE SODIUM 20 MG/1
20 TABLET, DELAYED RELEASE ORAL
Qty: 28 TABLET | Refills: 0 | Status: SHIPPED | OUTPATIENT
Start: 2022-05-03 | End: 2023-03-21

## 2022-06-22 ENCOUNTER — LAB VISIT (OUTPATIENT)
Dept: LAB | Facility: HOSPITAL | Age: 62
End: 2022-06-22
Attending: INTERNAL MEDICINE
Payer: COMMERCIAL

## 2022-06-22 DIAGNOSIS — Z85.038 HISTORY OF COLON CANCER: ICD-10-CM

## 2022-06-22 DIAGNOSIS — D50.0 IRON DEFICIENCY ANEMIA DUE TO CHRONIC BLOOD LOSS: ICD-10-CM

## 2022-06-22 LAB
BASOPHILS # BLD AUTO: 0.02 K/UL (ref 0–0.2)
BASOPHILS NFR BLD: 0.4 % (ref 0–1.9)
DIFFERENTIAL METHOD: ABNORMAL
EOSINOPHIL # BLD AUTO: 0.1 K/UL (ref 0–0.5)
EOSINOPHIL NFR BLD: 1.6 % (ref 0–8)
ERYTHROCYTE [DISTWIDTH] IN BLOOD BY AUTOMATED COUNT: 16.8 % (ref 11.5–14.5)
FERRITIN SERPL-MCNC: 46 NG/ML (ref 20–300)
HCT VFR BLD AUTO: 45.9 % (ref 40–54)
HGB BLD-MCNC: 15.1 G/DL (ref 14–18)
IMM GRANULOCYTES # BLD AUTO: 0.01 K/UL (ref 0–0.04)
IMM GRANULOCYTES NFR BLD AUTO: 0.2 % (ref 0–0.5)
IRON SERPL-MCNC: 78 UG/DL (ref 45–160)
LYMPHOCYTES # BLD AUTO: 1.8 K/UL (ref 1–4.8)
LYMPHOCYTES NFR BLD: 38.9 % (ref 18–48)
MCH RBC QN AUTO: 27.3 PG (ref 27–31)
MCHC RBC AUTO-ENTMCNC: 32.9 G/DL (ref 32–36)
MCV RBC AUTO: 83 FL (ref 82–98)
MONOCYTES # BLD AUTO: 0.3 K/UL (ref 0.3–1)
MONOCYTES NFR BLD: 5.6 % (ref 4–15)
NEUTROPHILS # BLD AUTO: 2.4 K/UL (ref 1.8–7.7)
NEUTROPHILS NFR BLD: 53.3 % (ref 38–73)
NRBC BLD-RTO: 0 /100 WBC
PLATELET # BLD AUTO: 160 K/UL (ref 150–450)
PMV BLD AUTO: 11.3 FL (ref 9.2–12.9)
RBC # BLD AUTO: 5.54 M/UL (ref 4.6–6.2)
SATURATED IRON: 20 % (ref 20–50)
TOTAL IRON BINDING CAPACITY: 382 UG/DL (ref 250–450)
TRANSFERRIN SERPL-MCNC: 258 MG/DL (ref 200–375)
WBC # BLD AUTO: 4.5 K/UL (ref 3.9–12.7)

## 2022-06-22 PROCEDURE — 85025 COMPLETE CBC W/AUTO DIFF WBC: CPT | Performed by: INTERNAL MEDICINE

## 2022-06-22 PROCEDURE — 82728 ASSAY OF FERRITIN: CPT | Performed by: INTERNAL MEDICINE

## 2022-06-22 PROCEDURE — 36415 COLL VENOUS BLD VENIPUNCTURE: CPT | Mod: PO | Performed by: INTERNAL MEDICINE

## 2022-06-22 PROCEDURE — 84466 ASSAY OF TRANSFERRIN: CPT | Performed by: INTERNAL MEDICINE

## 2022-07-11 DIAGNOSIS — Z85.038 HISTORY OF COLON CANCER: ICD-10-CM

## 2022-07-11 DIAGNOSIS — D50.0 IRON DEFICIENCY ANEMIA DUE TO CHRONIC BLOOD LOSS: ICD-10-CM

## 2022-07-11 DIAGNOSIS — C18.7 ADENOCARCINOMA OF SIGMOID COLON: Primary | ICD-10-CM

## 2022-08-01 ENCOUNTER — LAB VISIT (OUTPATIENT)
Dept: LAB | Facility: HOSPITAL | Age: 62
End: 2022-08-01
Attending: INTERNAL MEDICINE
Payer: COMMERCIAL

## 2022-08-01 DIAGNOSIS — C18.7 ADENOCARCINOMA OF SIGMOID COLON: ICD-10-CM

## 2022-08-01 DIAGNOSIS — D50.0 IRON DEFICIENCY ANEMIA DUE TO CHRONIC BLOOD LOSS: ICD-10-CM

## 2022-08-01 DIAGNOSIS — Z85.038 HISTORY OF COLON CANCER: ICD-10-CM

## 2022-08-01 LAB
ALBUMIN SERPL BCP-MCNC: 3.7 G/DL (ref 3.5–5.2)
ALP SERPL-CCNC: 67 U/L (ref 55–135)
ALT SERPL W/O P-5'-P-CCNC: 9 U/L (ref 10–44)
ANION GAP SERPL CALC-SCNC: 10 MMOL/L (ref 8–16)
AST SERPL-CCNC: 19 U/L (ref 10–40)
BASOPHILS # BLD AUTO: 0.03 K/UL (ref 0–0.2)
BASOPHILS NFR BLD: 0.5 % (ref 0–1.9)
BILIRUB SERPL-MCNC: 0.6 MG/DL (ref 0.1–1)
BUN SERPL-MCNC: 22 MG/DL (ref 8–23)
CALCIUM SERPL-MCNC: 8.9 MG/DL (ref 8.7–10.5)
CEA SERPL-MCNC: <1.7 NG/ML (ref 0–5)
CHLORIDE SERPL-SCNC: 105 MMOL/L (ref 95–110)
CO2 SERPL-SCNC: 25 MMOL/L (ref 23–29)
CREAT SERPL-MCNC: 1 MG/DL (ref 0.5–1.4)
DIFFERENTIAL METHOD: ABNORMAL
EOSINOPHIL # BLD AUTO: 0 K/UL (ref 0–0.5)
EOSINOPHIL NFR BLD: 0.5 % (ref 0–8)
ERYTHROCYTE [DISTWIDTH] IN BLOOD BY AUTOMATED COUNT: 14.6 % (ref 11.5–14.5)
EST. GFR  (NO RACE VARIABLE): >60 ML/MIN/1.73 M^2
FERRITIN SERPL-MCNC: 56 NG/ML (ref 20–300)
GLUCOSE SERPL-MCNC: 102 MG/DL (ref 70–110)
HCT VFR BLD AUTO: 46.2 % (ref 40–54)
HGB BLD-MCNC: 14.9 G/DL (ref 14–18)
IMM GRANULOCYTES # BLD AUTO: 0.01 K/UL (ref 0–0.04)
IMM GRANULOCYTES NFR BLD AUTO: 0.2 % (ref 0–0.5)
IRON SERPL-MCNC: 112 UG/DL (ref 45–160)
LDH SERPL L TO P-CCNC: 184 U/L (ref 110–260)
LYMPHOCYTES # BLD AUTO: 1.9 K/UL (ref 1–4.8)
LYMPHOCYTES NFR BLD: 32 % (ref 18–48)
MCH RBC QN AUTO: 28.3 PG (ref 27–31)
MCHC RBC AUTO-ENTMCNC: 32.3 G/DL (ref 32–36)
MCV RBC AUTO: 88 FL (ref 82–98)
MONOCYTES # BLD AUTO: 0.3 K/UL (ref 0.3–1)
MONOCYTES NFR BLD: 4.4 % (ref 4–15)
NEUTROPHILS # BLD AUTO: 3.7 K/UL (ref 1.8–7.7)
NEUTROPHILS NFR BLD: 62.4 % (ref 38–73)
NRBC BLD-RTO: 0 /100 WBC
PLATELET # BLD AUTO: 160 K/UL (ref 150–450)
PMV BLD AUTO: 12 FL (ref 9.2–12.9)
POTASSIUM SERPL-SCNC: 5.1 MMOL/L (ref 3.5–5.1)
PROT SERPL-MCNC: 6.3 G/DL (ref 6–8.4)
RBC # BLD AUTO: 5.26 M/UL (ref 4.6–6.2)
SATURATED IRON: 28 % (ref 20–50)
SODIUM SERPL-SCNC: 140 MMOL/L (ref 136–145)
TOTAL IRON BINDING CAPACITY: 394 UG/DL (ref 250–450)
TRANSFERRIN SERPL-MCNC: 266 MG/DL (ref 200–375)
WBC # BLD AUTO: 5.93 K/UL (ref 3.9–12.7)

## 2022-08-01 PROCEDURE — 82728 ASSAY OF FERRITIN: CPT | Performed by: INTERNAL MEDICINE

## 2022-08-01 PROCEDURE — 85025 COMPLETE CBC W/AUTO DIFF WBC: CPT | Performed by: INTERNAL MEDICINE

## 2022-08-01 PROCEDURE — 82378 CARCINOEMBRYONIC ANTIGEN: CPT | Performed by: INTERNAL MEDICINE

## 2022-08-01 PROCEDURE — 80053 COMPREHEN METABOLIC PANEL: CPT | Performed by: INTERNAL MEDICINE

## 2022-08-01 PROCEDURE — 83615 LACTATE (LD) (LDH) ENZYME: CPT | Performed by: INTERNAL MEDICINE

## 2022-08-01 PROCEDURE — 84466 ASSAY OF TRANSFERRIN: CPT | Performed by: INTERNAL MEDICINE

## 2022-08-01 PROCEDURE — 36415 COLL VENOUS BLD VENIPUNCTURE: CPT | Mod: PO | Performed by: INTERNAL MEDICINE

## 2022-08-03 ENCOUNTER — TELEPHONE (OUTPATIENT)
Dept: HEMATOLOGY/ONCOLOGY | Facility: CLINIC | Age: 62
End: 2022-08-03
Payer: COMMERCIAL

## 2022-08-03 NOTE — TELEPHONE ENCOUNTER
----- Message from Kasie Davenport sent at 8/3/2022  2:33 PM CDT -----  Contact: [phone]  Type:  Patient Returning Call    Who Called:maribel  Who Left Message for Patient:nurse  Does the patient know what this is regarding?:n./a  Would the patient rather a call back or a response via [facility]? Call bacl   Best Call Back Number:[phone]  Additional Information: n/a      Thanks   KB

## 2022-08-04 ENCOUNTER — OFFICE VISIT (OUTPATIENT)
Dept: HEMATOLOGY/ONCOLOGY | Facility: CLINIC | Age: 62
End: 2022-08-04
Payer: COMMERCIAL

## 2022-08-04 VITALS
RESPIRATION RATE: 20 BRPM | TEMPERATURE: 98 F | HEART RATE: 81 BPM | OXYGEN SATURATION: 99 % | HEIGHT: 71 IN | BODY MASS INDEX: 25.96 KG/M2 | DIASTOLIC BLOOD PRESSURE: 70 MMHG | WEIGHT: 185.44 LBS | SYSTOLIC BLOOD PRESSURE: 111 MMHG

## 2022-08-04 DIAGNOSIS — C18.7 ADENOCARCINOMA OF SIGMOID COLON: Primary | ICD-10-CM

## 2022-08-04 DIAGNOSIS — Z86.19 HISTORY OF HELICOBACTER PYLORI INFECTION: ICD-10-CM

## 2022-08-04 DIAGNOSIS — D50.0 IRON DEFICIENCY ANEMIA DUE TO CHRONIC BLOOD LOSS: ICD-10-CM

## 2022-08-04 PROBLEM — Z85.038 HISTORY OF COLON CANCER: Status: RESOLVED | Noted: 2019-12-13 | Resolved: 2022-08-04

## 2022-08-04 PROCEDURE — 99999 PR PBB SHADOW E&M-EST. PATIENT-LVL III: CPT | Mod: PBBFAC,,, | Performed by: INTERNAL MEDICINE

## 2022-08-04 PROCEDURE — 99999 PR PBB SHADOW E&M-EST. PATIENT-LVL III: ICD-10-PCS | Mod: PBBFAC,,, | Performed by: INTERNAL MEDICINE

## 2022-08-04 PROCEDURE — 99214 OFFICE O/P EST MOD 30 MIN: CPT | Mod: S$GLB,,, | Performed by: INTERNAL MEDICINE

## 2022-08-04 PROCEDURE — 99214 PR OFFICE/OUTPT VISIT, EST, LEVL IV, 30-39 MIN: ICD-10-PCS | Mod: S$GLB,,, | Performed by: INTERNAL MEDICINE

## 2022-08-04 RX ORDER — FERROUS SULFATE, DRIED 160(50) MG
1 TABLET, EXTENDED RELEASE ORAL 2 TIMES DAILY WITH MEALS
COMMUNITY
End: 2023-03-21
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
## 2022-12-27 ENCOUNTER — LAB VISIT (OUTPATIENT)
Dept: LAB | Facility: HOSPITAL | Age: 62
End: 2022-12-27
Attending: INTERNAL MEDICINE
Payer: COMMERCIAL

## 2022-12-27 DIAGNOSIS — D50.0 IRON DEFICIENCY ANEMIA DUE TO CHRONIC BLOOD LOSS: ICD-10-CM

## 2022-12-27 DIAGNOSIS — C18.7 ADENOCARCINOMA OF SIGMOID COLON: ICD-10-CM

## 2022-12-27 LAB
BASOPHILS # BLD AUTO: 0.03 K/UL (ref 0–0.2)
BASOPHILS NFR BLD: 0.6 % (ref 0–1.9)
DIFFERENTIAL METHOD: ABNORMAL
EOSINOPHIL # BLD AUTO: 0.1 K/UL (ref 0–0.5)
EOSINOPHIL NFR BLD: 1 % (ref 0–8)
ERYTHROCYTE [DISTWIDTH] IN BLOOD BY AUTOMATED COUNT: 12.8 % (ref 11.5–14.5)
FERRITIN SERPL-MCNC: 39 NG/ML (ref 20–300)
HCT VFR BLD AUTO: 47.5 % (ref 40–54)
HGB BLD-MCNC: 15.5 G/DL (ref 14–18)
IMM GRANULOCYTES # BLD AUTO: 0.01 K/UL (ref 0–0.04)
IMM GRANULOCYTES NFR BLD AUTO: 0.2 % (ref 0–0.5)
LYMPHOCYTES # BLD AUTO: 1.7 K/UL (ref 1–4.8)
LYMPHOCYTES NFR BLD: 35.6 % (ref 18–48)
MCH RBC QN AUTO: 28.5 PG (ref 27–31)
MCHC RBC AUTO-ENTMCNC: 32.6 G/DL (ref 32–36)
MCV RBC AUTO: 88 FL (ref 82–98)
MONOCYTES # BLD AUTO: 0.2 K/UL (ref 0.3–1)
MONOCYTES NFR BLD: 4.6 % (ref 4–15)
NEUTROPHILS # BLD AUTO: 2.8 K/UL (ref 1.8–7.7)
NEUTROPHILS NFR BLD: 58 % (ref 38–73)
NRBC BLD-RTO: 0 /100 WBC
PLATELET # BLD AUTO: 146 K/UL (ref 150–450)
PMV BLD AUTO: 11.7 FL (ref 9.2–12.9)
RBC # BLD AUTO: 5.43 M/UL (ref 4.6–6.2)
WBC # BLD AUTO: 4.83 K/UL (ref 3.9–12.7)

## 2022-12-27 PROCEDURE — 84466 ASSAY OF TRANSFERRIN: CPT | Performed by: INTERNAL MEDICINE

## 2022-12-27 PROCEDURE — 36415 COLL VENOUS BLD VENIPUNCTURE: CPT | Mod: PO | Performed by: INTERNAL MEDICINE

## 2022-12-27 PROCEDURE — 82378 CARCINOEMBRYONIC ANTIGEN: CPT | Performed by: INTERNAL MEDICINE

## 2022-12-27 PROCEDURE — 82728 ASSAY OF FERRITIN: CPT | Performed by: INTERNAL MEDICINE

## 2022-12-27 PROCEDURE — 85025 COMPLETE CBC W/AUTO DIFF WBC: CPT | Performed by: INTERNAL MEDICINE
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
## 2023-01-05 ENCOUNTER — OFFICE VISIT (OUTPATIENT)
Dept: HEMATOLOGY/ONCOLOGY | Facility: CLINIC | Age: 63
End: 2023-01-05
Payer: COMMERCIAL

## 2023-01-05 VITALS
OXYGEN SATURATION: 98 % | DIASTOLIC BLOOD PRESSURE: 72 MMHG | SYSTOLIC BLOOD PRESSURE: 114 MMHG | HEIGHT: 71 IN | WEIGHT: 186.31 LBS | HEART RATE: 69 BPM | TEMPERATURE: 98 F | BODY MASS INDEX: 26.08 KG/M2

## 2023-01-05 DIAGNOSIS — C18.7 ADENOCARCINOMA OF SIGMOID COLON: ICD-10-CM

## 2023-01-05 DIAGNOSIS — Z86.19 HISTORY OF HELICOBACTER PYLORI INFECTION: ICD-10-CM

## 2023-01-05 DIAGNOSIS — D50.0 IRON DEFICIENCY ANEMIA DUE TO CHRONIC BLOOD LOSS: Primary | ICD-10-CM

## 2023-01-05 PROCEDURE — 99214 PR OFFICE/OUTPT VISIT, EST, LEVL IV, 30-39 MIN: ICD-10-PCS | Mod: S$GLB,,, | Performed by: NURSE PRACTITIONER

## 2023-01-05 PROCEDURE — 99999 PR PBB SHADOW E&M-EST. PATIENT-LVL III: ICD-10-PCS | Mod: PBBFAC,,, | Performed by: NURSE PRACTITIONER

## 2023-01-05 PROCEDURE — 99999 PR PBB SHADOW E&M-EST. PATIENT-LVL III: CPT | Mod: PBBFAC,,, | Performed by: NURSE PRACTITIONER

## 2023-01-05 PROCEDURE — 99214 OFFICE O/P EST MOD 30 MIN: CPT | Mod: S$GLB,,, | Performed by: NURSE PRACTITIONER

## 2023-03-21 ENCOUNTER — OFFICE VISIT (OUTPATIENT)
Dept: INTERNAL MEDICINE | Facility: CLINIC | Age: 63
End: 2023-03-21
Payer: COMMERCIAL

## 2023-03-21 ENCOUNTER — LAB VISIT (OUTPATIENT)
Dept: LAB | Facility: HOSPITAL | Age: 63
End: 2023-03-21
Attending: FAMILY MEDICINE
Payer: COMMERCIAL

## 2023-03-21 VITALS
DIASTOLIC BLOOD PRESSURE: 70 MMHG | HEIGHT: 71 IN | BODY MASS INDEX: 26.85 KG/M2 | WEIGHT: 191.81 LBS | SYSTOLIC BLOOD PRESSURE: 104 MMHG | HEART RATE: 70 BPM | TEMPERATURE: 98 F

## 2023-03-21 DIAGNOSIS — Z00.00 ANNUAL PHYSICAL EXAM: ICD-10-CM

## 2023-03-21 DIAGNOSIS — Z00.00 ANNUAL PHYSICAL EXAM: Primary | ICD-10-CM

## 2023-03-21 LAB
ALBUMIN SERPL BCP-MCNC: 3.7 G/DL (ref 3.5–5.2)
ALP SERPL-CCNC: 87 U/L (ref 55–135)
ALT SERPL W/O P-5'-P-CCNC: 14 U/L (ref 10–44)
ANION GAP SERPL CALC-SCNC: 9 MMOL/L (ref 8–16)
AST SERPL-CCNC: 32 U/L (ref 10–40)
BASOPHILS # BLD AUTO: 0.02 K/UL (ref 0–0.2)
BASOPHILS NFR BLD: 0.4 % (ref 0–1.9)
BILIRUB SERPL-MCNC: 0.4 MG/DL (ref 0.1–1)
BUN SERPL-MCNC: 24 MG/DL (ref 8–23)
CALCIUM SERPL-MCNC: 9.2 MG/DL (ref 8.7–10.5)
CEA SERPL-MCNC: <1.7 NG/ML (ref 0–5)
CHLORIDE SERPL-SCNC: 107 MMOL/L (ref 95–110)
CHOLEST SERPL-MCNC: 220 MG/DL (ref 120–199)
CHOLEST/HDLC SERPL: 4.1 {RATIO} (ref 2–5)
CO2 SERPL-SCNC: 23 MMOL/L (ref 23–29)
COMPLEXED PSA SERPL-MCNC: 0.52 NG/ML (ref 0–4)
CREAT SERPL-MCNC: 1.1 MG/DL (ref 0.5–1.4)
DIFFERENTIAL METHOD: ABNORMAL
EOSINOPHIL # BLD AUTO: 0.1 K/UL (ref 0–0.5)
EOSINOPHIL NFR BLD: 1.1 % (ref 0–8)
ERYTHROCYTE [DISTWIDTH] IN BLOOD BY AUTOMATED COUNT: 13.9 % (ref 11.5–14.5)
EST. GFR  (NO RACE VARIABLE): >60 ML/MIN/1.73 M^2
ESTIMATED AVG GLUCOSE: 117 MG/DL (ref 68–131)
GLUCOSE SERPL-MCNC: 111 MG/DL (ref 70–110)
HBA1C MFR BLD: 5.7 % (ref 4–5.6)
HCT VFR BLD AUTO: 48.7 % (ref 40–54)
HDLC SERPL-MCNC: 54 MG/DL (ref 40–75)
HDLC SERPL: 24.5 % (ref 20–50)
HGB BLD-MCNC: 15.3 G/DL (ref 14–18)
IMM GRANULOCYTES # BLD AUTO: 0.01 K/UL (ref 0–0.04)
IMM GRANULOCYTES NFR BLD AUTO: 0.2 % (ref 0–0.5)
IRON SERPL-MCNC: 64 UG/DL (ref 45–160)
LDLC SERPL CALC-MCNC: 151.2 MG/DL (ref 63–159)
LYMPHOCYTES # BLD AUTO: 1.6 K/UL (ref 1–4.8)
LYMPHOCYTES NFR BLD: 33 % (ref 18–48)
MCH RBC QN AUTO: 28.2 PG (ref 27–31)
MCHC RBC AUTO-ENTMCNC: 31.4 G/DL (ref 32–36)
MCV RBC AUTO: 90 FL (ref 82–98)
MONOCYTES # BLD AUTO: 0.2 K/UL (ref 0.3–1)
MONOCYTES NFR BLD: 3.8 % (ref 4–15)
NEUTROPHILS # BLD AUTO: 2.9 K/UL (ref 1.8–7.7)
NEUTROPHILS NFR BLD: 61.5 % (ref 38–73)
NONHDLC SERPL-MCNC: 166 MG/DL
NRBC BLD-RTO: 0 /100 WBC
PLATELET # BLD AUTO: 148 K/UL (ref 150–450)
PMV BLD AUTO: 11.8 FL (ref 9.2–12.9)
POTASSIUM SERPL-SCNC: 5.2 MMOL/L (ref 3.5–5.1)
PROT SERPL-MCNC: 7.3 G/DL (ref 6–8.4)
RBC # BLD AUTO: 5.42 M/UL (ref 4.6–6.2)
SATURATED IRON: 14 % (ref 20–50)
SODIUM SERPL-SCNC: 139 MMOL/L (ref 136–145)
TOTAL IRON BINDING CAPACITY: 468 UG/DL (ref 250–450)
TRANSFERRIN SERPL-MCNC: 316 MG/DL (ref 200–375)
TRIGL SERPL-MCNC: 74 MG/DL (ref 30–150)
TSH SERPL DL<=0.005 MIU/L-ACNC: 2.21 UIU/ML (ref 0.4–4)
WBC # BLD AUTO: 4.69 K/UL (ref 3.9–12.7)

## 2023-03-21 PROCEDURE — 82378 CARCINOEMBRYONIC ANTIGEN: CPT | Performed by: FAMILY MEDICINE

## 2023-03-21 PROCEDURE — 99999 PR PBB SHADOW E&M-EST. PATIENT-LVL III: ICD-10-PCS | Mod: PBBFAC,,, | Performed by: FAMILY MEDICINE

## 2023-03-21 PROCEDURE — 83036 HEMOGLOBIN GLYCOSYLATED A1C: CPT | Performed by: FAMILY MEDICINE

## 2023-03-21 PROCEDURE — 99396 PR PREVENTIVE VISIT,EST,40-64: ICD-10-PCS | Mod: S$GLB,,, | Performed by: FAMILY MEDICINE

## 2023-03-21 PROCEDURE — 80061 LIPID PANEL: CPT | Performed by: FAMILY MEDICINE

## 2023-03-21 PROCEDURE — 80053 COMPREHEN METABOLIC PANEL: CPT | Performed by: FAMILY MEDICINE

## 2023-03-21 PROCEDURE — 36415 COLL VENOUS BLD VENIPUNCTURE: CPT | Mod: PO | Performed by: FAMILY MEDICINE

## 2023-03-21 PROCEDURE — 84443 ASSAY THYROID STIM HORMONE: CPT | Performed by: FAMILY MEDICINE

## 2023-03-21 PROCEDURE — 84466 ASSAY OF TRANSFERRIN: CPT | Performed by: FAMILY MEDICINE

## 2023-03-21 PROCEDURE — 99999 PR PBB SHADOW E&M-EST. PATIENT-LVL III: CPT | Mod: PBBFAC,,, | Performed by: FAMILY MEDICINE

## 2023-03-21 PROCEDURE — 99396 PREV VISIT EST AGE 40-64: CPT | Mod: S$GLB,,, | Performed by: FAMILY MEDICINE

## 2023-03-21 PROCEDURE — 84153 ASSAY OF PSA TOTAL: CPT | Performed by: FAMILY MEDICINE

## 2023-03-21 PROCEDURE — 85025 COMPLETE CBC W/AUTO DIFF WBC: CPT | Performed by: FAMILY MEDICINE
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
## 2023-03-22 ENCOUNTER — PATIENT MESSAGE (OUTPATIENT)
Dept: INTERNAL MEDICINE | Facility: CLINIC | Age: 63
End: 2023-03-22
Payer: COMMERCIAL

## 2023-03-22 NOTE — TELEPHONE ENCOUNTER
Please inform pt that I would like him to start OtC ferrous sulfate supplements, 65mg daily.  Iron lisa diet is good as well.  Looks like he has labs in July with Hematology//be sure to do those labs and follow up.

## 2023-05-11 ENCOUNTER — PATIENT MESSAGE (OUTPATIENT)
Dept: INTERNAL MEDICINE | Facility: CLINIC | Age: 63
End: 2023-05-11
Payer: COMMERCIAL

## 2023-07-10 ENCOUNTER — PATIENT MESSAGE (OUTPATIENT)
Dept: HEMATOLOGY/ONCOLOGY | Facility: CLINIC | Age: 63
End: 2023-07-10

## 2023-07-10 ENCOUNTER — LAB VISIT (OUTPATIENT)
Dept: LAB | Facility: HOSPITAL | Age: 63
End: 2023-07-10
Attending: INTERNAL MEDICINE
Payer: COMMERCIAL

## 2023-07-10 ENCOUNTER — OFFICE VISIT (OUTPATIENT)
Dept: HEMATOLOGY/ONCOLOGY | Facility: CLINIC | Age: 63
End: 2023-07-10
Payer: COMMERCIAL

## 2023-07-10 VITALS
HEART RATE: 77 BPM | SYSTOLIC BLOOD PRESSURE: 118 MMHG | OXYGEN SATURATION: 97 % | WEIGHT: 192.25 LBS | DIASTOLIC BLOOD PRESSURE: 72 MMHG | BODY MASS INDEX: 26.91 KG/M2 | HEIGHT: 71 IN | TEMPERATURE: 97 F | RESPIRATION RATE: 18 BRPM

## 2023-07-10 DIAGNOSIS — C18.7 ADENOCARCINOMA OF SIGMOID COLON: ICD-10-CM

## 2023-07-10 DIAGNOSIS — D50.0 IRON DEFICIENCY ANEMIA DUE TO CHRONIC BLOOD LOSS: ICD-10-CM

## 2023-07-10 DIAGNOSIS — Z86.19 HISTORY OF HELICOBACTER PYLORI INFECTION: ICD-10-CM

## 2023-07-10 DIAGNOSIS — D50.0 IRON DEFICIENCY ANEMIA DUE TO CHRONIC BLOOD LOSS: Primary | ICD-10-CM

## 2023-07-10 LAB
ALBUMIN SERPL BCP-MCNC: 3.3 G/DL (ref 3.5–5.2)
ALP SERPL-CCNC: 75 U/L (ref 55–135)
ALT SERPL W/O P-5'-P-CCNC: 8 U/L (ref 10–44)
ANION GAP SERPL CALC-SCNC: 6 MMOL/L (ref 8–16)
AST SERPL-CCNC: 18 U/L (ref 10–40)
BASOPHILS # BLD AUTO: 0.02 K/UL (ref 0–0.2)
BASOPHILS NFR BLD: 0.4 % (ref 0–1.9)
BILIRUB SERPL-MCNC: 0.3 MG/DL (ref 0.1–1)
BUN SERPL-MCNC: 19 MG/DL (ref 8–23)
CALCIUM SERPL-MCNC: 8.7 MG/DL (ref 8.7–10.5)
CEA SERPL-MCNC: <1.7 NG/ML (ref 0–5)
CHLORIDE SERPL-SCNC: 108 MMOL/L (ref 95–110)
CO2 SERPL-SCNC: 24 MMOL/L (ref 23–29)
CREAT SERPL-MCNC: 0.9 MG/DL (ref 0.5–1.4)
DIFFERENTIAL METHOD: ABNORMAL
EOSINOPHIL # BLD AUTO: 0.1 K/UL (ref 0–0.5)
EOSINOPHIL NFR BLD: 2.6 % (ref 0–8)
ERYTHROCYTE [DISTWIDTH] IN BLOOD BY AUTOMATED COUNT: 13.8 % (ref 11.5–14.5)
EST. GFR  (NO RACE VARIABLE): >60 ML/MIN/1.73 M^2
FERRITIN SERPL-MCNC: 66 NG/ML (ref 20–300)
GLUCOSE SERPL-MCNC: 99 MG/DL (ref 70–110)
HCT VFR BLD AUTO: 46.4 % (ref 40–54)
HGB BLD-MCNC: 15.3 G/DL (ref 14–18)
IMM GRANULOCYTES # BLD AUTO: 0.02 K/UL (ref 0–0.04)
IMM GRANULOCYTES NFR BLD AUTO: 0.4 % (ref 0–0.5)
IRON SERPL-MCNC: 76 UG/DL (ref 45–160)
LYMPHOCYTES # BLD AUTO: 1.7 K/UL (ref 1–4.8)
LYMPHOCYTES NFR BLD: 33.9 % (ref 18–48)
MCH RBC QN AUTO: 29.4 PG (ref 27–31)
MCHC RBC AUTO-ENTMCNC: 33 G/DL (ref 32–36)
MCV RBC AUTO: 89 FL (ref 82–98)
MONOCYTES # BLD AUTO: 0.1 K/UL (ref 0.3–1)
MONOCYTES NFR BLD: 2.4 % (ref 4–15)
NEUTROPHILS # BLD AUTO: 3 K/UL (ref 1.8–7.7)
NEUTROPHILS NFR BLD: 60.3 % (ref 38–73)
NRBC BLD-RTO: 0 /100 WBC
PLATELET # BLD AUTO: 160 K/UL (ref 150–450)
PMV BLD AUTO: 10.2 FL (ref 9.2–12.9)
POTASSIUM SERPL-SCNC: 4.3 MMOL/L (ref 3.5–5.1)
PROT SERPL-MCNC: 6.6 G/DL (ref 6–8.4)
RBC # BLD AUTO: 5.2 M/UL (ref 4.6–6.2)
SATURATED IRON: 21 % (ref 20–50)
SODIUM SERPL-SCNC: 138 MMOL/L (ref 136–145)
TOTAL IRON BINDING CAPACITY: 354 UG/DL (ref 250–450)
TRANSFERRIN SERPL-MCNC: 239 MG/DL (ref 200–375)
WBC # BLD AUTO: 4.99 K/UL (ref 3.9–12.7)

## 2023-07-10 PROCEDURE — 99999 PR PBB SHADOW E&M-EST. PATIENT-LVL IV: ICD-10-PCS | Mod: PBBFAC,,, | Performed by: INTERNAL MEDICINE

## 2023-07-10 PROCEDURE — 99214 PR OFFICE/OUTPT VISIT, EST, LEVL IV, 30-39 MIN: ICD-10-PCS | Mod: S$GLB,,, | Performed by: INTERNAL MEDICINE

## 2023-07-10 PROCEDURE — 36415 COLL VENOUS BLD VENIPUNCTURE: CPT | Performed by: INTERNAL MEDICINE

## 2023-07-10 PROCEDURE — 84466 ASSAY OF TRANSFERRIN: CPT | Performed by: INTERNAL MEDICINE

## 2023-07-10 PROCEDURE — 99214 OFFICE O/P EST MOD 30 MIN: CPT | Mod: S$GLB,,, | Performed by: INTERNAL MEDICINE

## 2023-07-10 PROCEDURE — 82728 ASSAY OF FERRITIN: CPT | Performed by: INTERNAL MEDICINE

## 2023-07-10 PROCEDURE — 99999 PR PBB SHADOW E&M-EST. PATIENT-LVL IV: CPT | Mod: PBBFAC,,, | Performed by: INTERNAL MEDICINE

## 2023-07-10 PROCEDURE — 80053 COMPREHEN METABOLIC PANEL: CPT | Performed by: INTERNAL MEDICINE

## 2023-07-10 PROCEDURE — 82378 CARCINOEMBRYONIC ANTIGEN: CPT | Performed by: INTERNAL MEDICINE

## 2023-07-10 PROCEDURE — 85025 COMPLETE CBC W/AUTO DIFF WBC: CPT | Performed by: INTERNAL MEDICINE

## 2023-07-10 RX ORDER — FERROUS SULFATE 325(65) MG
325 TABLET, DELAYED RELEASE (ENTERIC COATED) ORAL EVERY OTHER DAY
COMMUNITY
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
## 2023-11-09 ENCOUNTER — LAB VISIT (OUTPATIENT)
Dept: LAB | Facility: HOSPITAL | Age: 63
End: 2023-11-09
Attending: INTERNAL MEDICINE
Payer: COMMERCIAL

## 2023-11-09 DIAGNOSIS — Z86.19 HISTORY OF HELICOBACTER PYLORI INFECTION: ICD-10-CM

## 2023-11-09 DIAGNOSIS — C18.7 ADENOCARCINOMA OF SIGMOID COLON: ICD-10-CM

## 2023-11-09 DIAGNOSIS — D50.0 IRON DEFICIENCY ANEMIA DUE TO CHRONIC BLOOD LOSS: ICD-10-CM

## 2023-11-09 LAB
BASOPHILS # BLD AUTO: 0.02 K/UL (ref 0–0.2)
BASOPHILS NFR BLD: 0.5 % (ref 0–1.9)
CEA SERPL-MCNC: 1.7 NG/ML (ref 0–5)
DIFFERENTIAL METHOD: ABNORMAL
EOSINOPHIL # BLD AUTO: 0.1 K/UL (ref 0–0.5)
EOSINOPHIL NFR BLD: 1.3 % (ref 0–8)
ERYTHROCYTE [DISTWIDTH] IN BLOOD BY AUTOMATED COUNT: 13.1 % (ref 11.5–14.5)
FERRITIN SERPL-MCNC: 131 NG/ML (ref 20–300)
HCT VFR BLD AUTO: 42.8 % (ref 40–54)
HGB BLD-MCNC: 14.8 G/DL (ref 14–18)
IMM GRANULOCYTES # BLD AUTO: 0.01 K/UL (ref 0–0.04)
IMM GRANULOCYTES NFR BLD AUTO: 0.3 % (ref 0–0.5)
IRON SERPL-MCNC: 139 UG/DL (ref 45–160)
LYMPHOCYTES # BLD AUTO: 2.2 K/UL (ref 1–4.8)
LYMPHOCYTES NFR BLD: 56.1 % (ref 18–48)
MCH RBC QN AUTO: 31 PG (ref 27–31)
MCHC RBC AUTO-ENTMCNC: 34.6 G/DL (ref 32–36)
MCV RBC AUTO: 90 FL (ref 82–98)
MONOCYTES # BLD AUTO: 0.1 K/UL (ref 0.3–1)
MONOCYTES NFR BLD: 3.3 % (ref 4–15)
NEUTROPHILS # BLD AUTO: 1.5 K/UL (ref 1.8–7.7)
NEUTROPHILS NFR BLD: 38.5 % (ref 38–73)
NRBC BLD-RTO: 0 /100 WBC
PLATELET # BLD AUTO: 132 K/UL (ref 150–450)
PMV BLD AUTO: 10.9 FL (ref 9.2–12.9)
RBC # BLD AUTO: 4.78 M/UL (ref 4.6–6.2)
SATURATED IRON: 41 % (ref 20–50)
TOTAL IRON BINDING CAPACITY: 339 UG/DL (ref 250–450)
TRANSFERRIN SERPL-MCNC: 229 MG/DL (ref 200–375)
WBC # BLD AUTO: 3.94 K/UL (ref 3.9–12.7)

## 2023-11-09 PROCEDURE — 36415 COLL VENOUS BLD VENIPUNCTURE: CPT | Mod: PO | Performed by: INTERNAL MEDICINE

## 2023-11-09 PROCEDURE — 82728 ASSAY OF FERRITIN: CPT | Performed by: INTERNAL MEDICINE

## 2023-11-09 PROCEDURE — 84466 ASSAY OF TRANSFERRIN: CPT | Performed by: INTERNAL MEDICINE

## 2023-11-09 PROCEDURE — 85025 COMPLETE CBC W/AUTO DIFF WBC: CPT | Performed by: INTERNAL MEDICINE

## 2023-11-09 PROCEDURE — 83540 ASSAY OF IRON: CPT | Performed by: INTERNAL MEDICINE

## 2023-11-09 PROCEDURE — 82378 CARCINOEMBRYONIC ANTIGEN: CPT | Performed by: INTERNAL MEDICINE

## 2023-11-14 ENCOUNTER — OFFICE VISIT (OUTPATIENT)
Dept: HEMATOLOGY/ONCOLOGY | Facility: CLINIC | Age: 63
End: 2023-11-14
Payer: COMMERCIAL

## 2023-11-14 VITALS
DIASTOLIC BLOOD PRESSURE: 79 MMHG | OXYGEN SATURATION: 97 % | TEMPERATURE: 98 F | HEART RATE: 72 BPM | WEIGHT: 194.75 LBS | HEIGHT: 71 IN | SYSTOLIC BLOOD PRESSURE: 124 MMHG | BODY MASS INDEX: 27.27 KG/M2

## 2023-11-14 DIAGNOSIS — D50.0 IRON DEFICIENCY ANEMIA DUE TO CHRONIC BLOOD LOSS: ICD-10-CM

## 2023-11-14 DIAGNOSIS — Z86.19 HISTORY OF HELICOBACTER PYLORI INFECTION: ICD-10-CM

## 2023-11-14 DIAGNOSIS — C18.7 ADENOCARCINOMA OF SIGMOID COLON: Primary | ICD-10-CM

## 2023-11-14 PROCEDURE — 99214 OFFICE O/P EST MOD 30 MIN: CPT | Mod: S$GLB,,, | Performed by: INTERNAL MEDICINE

## 2023-11-14 PROCEDURE — 99999 PR PBB SHADOW E&M-EST. PATIENT-LVL III: CPT | Mod: PBBFAC,,, | Performed by: INTERNAL MEDICINE

## 2023-11-14 PROCEDURE — 99214 PR OFFICE/OUTPT VISIT, EST, LEVL IV, 30-39 MIN: ICD-10-PCS | Mod: S$GLB,,, | Performed by: INTERNAL MEDICINE

## 2023-11-14 PROCEDURE — 99999 PR PBB SHADOW E&M-EST. PATIENT-LVL III: ICD-10-PCS | Mod: PBBFAC,,, | Performed by: INTERNAL MEDICINE
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
## 2024-04-22 ENCOUNTER — LAB VISIT (OUTPATIENT)
Dept: LAB | Facility: HOSPITAL | Age: 64
End: 2024-04-22
Attending: FAMILY MEDICINE
Payer: MEDICARE

## 2024-04-22 ENCOUNTER — OFFICE VISIT (OUTPATIENT)
Dept: FAMILY MEDICINE | Facility: CLINIC | Age: 64
End: 2024-04-22
Payer: MEDICARE

## 2024-04-22 VITALS
WEIGHT: 194.75 LBS | SYSTOLIC BLOOD PRESSURE: 106 MMHG | DIASTOLIC BLOOD PRESSURE: 72 MMHG | HEART RATE: 74 BPM | HEIGHT: 71 IN | BODY MASS INDEX: 27.27 KG/M2 | OXYGEN SATURATION: 96 %

## 2024-04-22 DIAGNOSIS — R73.03 PREDIABETES: ICD-10-CM

## 2024-04-22 DIAGNOSIS — H54.40 BLINDNESS OF LEFT EYE WITH NORMAL VISION IN CONTRALATERAL EYE: ICD-10-CM

## 2024-04-22 DIAGNOSIS — D50.0 IRON DEFICIENCY ANEMIA DUE TO CHRONIC BLOOD LOSS: ICD-10-CM

## 2024-04-22 DIAGNOSIS — Z13.6 SCREENING FOR AAA (ABDOMINAL AORTIC ANEURYSM): ICD-10-CM

## 2024-04-22 DIAGNOSIS — Z87.891 HISTORY OF CIGARETTE SMOKING: ICD-10-CM

## 2024-04-22 DIAGNOSIS — Z12.5 PROSTATE CANCER SCREENING ENCOUNTER, OPTIONS AND RISKS DISCUSSED: ICD-10-CM

## 2024-04-22 DIAGNOSIS — Z00.00 WELL ADULT EXAM: ICD-10-CM

## 2024-04-22 DIAGNOSIS — Z00.00 WELL ADULT EXAM: Primary | ICD-10-CM

## 2024-04-22 PROBLEM — C18.7 ADENOCARCINOMA OF SIGMOID COLON: Status: RESOLVED | Noted: 2018-04-03 | Resolved: 2024-04-22

## 2024-04-22 LAB
ALBUMIN SERPL BCP-MCNC: 3.7 G/DL (ref 3.5–5.2)
ALP SERPL-CCNC: 71 U/L (ref 55–135)
ALT SERPL W/O P-5'-P-CCNC: 8 U/L (ref 10–44)
ANION GAP SERPL CALC-SCNC: 6 MMOL/L (ref 8–16)
AST SERPL-CCNC: 16 U/L (ref 10–40)
BASOPHILS # BLD AUTO: 0.01 K/UL (ref 0–0.2)
BASOPHILS NFR BLD: 0.3 % (ref 0–1.9)
BILIRUB SERPL-MCNC: 0.4 MG/DL (ref 0.1–1)
BUN SERPL-MCNC: 24 MG/DL (ref 8–23)
CALCIUM SERPL-MCNC: 9 MG/DL (ref 8.7–10.5)
CHLORIDE SERPL-SCNC: 109 MMOL/L (ref 95–110)
CHOLEST SERPL-MCNC: 216 MG/DL (ref 120–199)
CHOLEST/HDLC SERPL: 4.5 {RATIO} (ref 2–5)
CO2 SERPL-SCNC: 24 MMOL/L (ref 23–29)
COMPLEXED PSA SERPL-MCNC: 0.54 NG/ML (ref 0–4)
CREAT SERPL-MCNC: 1 MG/DL (ref 0.5–1.4)
DIFFERENTIAL METHOD BLD: ABNORMAL
EOSINOPHIL # BLD AUTO: 0 K/UL (ref 0–0.5)
EOSINOPHIL NFR BLD: 1.1 % (ref 0–8)
ERYTHROCYTE [DISTWIDTH] IN BLOOD BY AUTOMATED COUNT: 13.2 % (ref 11.5–14.5)
EST. GFR  (NO RACE VARIABLE): >60 ML/MIN/1.73 M^2
ESTIMATED AVG GLUCOSE: 120 MG/DL (ref 68–131)
GLUCOSE SERPL-MCNC: 101 MG/DL (ref 70–110)
HBA1C MFR BLD: 5.8 % (ref 4–5.6)
HCT VFR BLD AUTO: 47.4 % (ref 40–54)
HDLC SERPL-MCNC: 48 MG/DL (ref 40–75)
HDLC SERPL: 22.2 % (ref 20–50)
HGB BLD-MCNC: 15.8 G/DL (ref 14–18)
IMM GRANULOCYTES # BLD AUTO: 0.01 K/UL (ref 0–0.04)
IMM GRANULOCYTES NFR BLD AUTO: 0.3 % (ref 0–0.5)
IRON SERPL-MCNC: 93 UG/DL (ref 45–160)
LDLC SERPL CALC-MCNC: 150 MG/DL (ref 63–159)
LYMPHOCYTES # BLD AUTO: 1.8 K/UL (ref 1–4.8)
LYMPHOCYTES NFR BLD: 49.9 % (ref 18–48)
MCH RBC QN AUTO: 31.3 PG (ref 27–31)
MCHC RBC AUTO-ENTMCNC: 33.3 G/DL (ref 32–36)
MCV RBC AUTO: 94 FL (ref 82–98)
MONOCYTES # BLD AUTO: 0.1 K/UL (ref 0.3–1)
MONOCYTES NFR BLD: 2.5 % (ref 4–15)
NEUTROPHILS # BLD AUTO: 1.7 K/UL (ref 1.8–7.7)
NEUTROPHILS NFR BLD: 45.9 % (ref 38–73)
NONHDLC SERPL-MCNC: 168 MG/DL
NRBC BLD-RTO: 0 /100 WBC
PLATELET # BLD AUTO: 107 K/UL (ref 150–450)
PMV BLD AUTO: 11.8 FL (ref 9.2–12.9)
POTASSIUM SERPL-SCNC: 4.7 MMOL/L (ref 3.5–5.1)
PROT SERPL-MCNC: 6.8 G/DL (ref 6–8.4)
RBC # BLD AUTO: 5.05 M/UL (ref 4.6–6.2)
SATURATED IRON: 26 % (ref 20–50)
SODIUM SERPL-SCNC: 139 MMOL/L (ref 136–145)
TOTAL IRON BINDING CAPACITY: 360 UG/DL (ref 250–450)
TRANSFERRIN SERPL-MCNC: 243 MG/DL (ref 200–375)
TRIGL SERPL-MCNC: 90 MG/DL (ref 30–150)
WBC # BLD AUTO: 3.59 K/UL (ref 3.9–12.7)

## 2024-04-22 PROCEDURE — 83036 HEMOGLOBIN GLYCOSYLATED A1C: CPT | Mod: DBM | Performed by: FAMILY MEDICINE

## 2024-04-22 PROCEDURE — 83540 ASSAY OF IRON: CPT | Performed by: FAMILY MEDICINE

## 2024-04-22 PROCEDURE — 3074F SYST BP LT 130 MM HG: CPT | Mod: CPTII,S$GLB,, | Performed by: FAMILY MEDICINE

## 2024-04-22 PROCEDURE — 80061 LIPID PANEL: CPT | Mod: DBM | Performed by: FAMILY MEDICINE

## 2024-04-22 PROCEDURE — 85025 COMPLETE CBC W/AUTO DIFF WBC: CPT | Performed by: FAMILY MEDICINE

## 2024-04-22 PROCEDURE — 99999 PR PBB SHADOW E&M-EST. PATIENT-LVL IV: CPT | Mod: PBBFAC,,, | Performed by: FAMILY MEDICINE

## 2024-04-22 PROCEDURE — G0009 ADMIN PNEUMOCOCCAL VACCINE: HCPCS | Mod: S$GLB,,, | Performed by: FAMILY MEDICINE

## 2024-04-22 PROCEDURE — 36415 COLL VENOUS BLD VENIPUNCTURE: CPT | Mod: PO | Performed by: FAMILY MEDICINE

## 2024-04-22 PROCEDURE — 80053 COMPREHEN METABOLIC PANEL: CPT | Performed by: FAMILY MEDICINE

## 2024-04-22 PROCEDURE — 90677 PCV20 VACCINE IM: CPT | Mod: S$GLB,,, | Performed by: FAMILY MEDICINE

## 2024-04-22 PROCEDURE — 1159F MED LIST DOCD IN RCRD: CPT | Mod: CPTII,S$GLB,, | Performed by: FAMILY MEDICINE

## 2024-04-22 PROCEDURE — 3078F DIAST BP <80 MM HG: CPT | Mod: CPTII,S$GLB,, | Performed by: FAMILY MEDICINE

## 2024-04-22 PROCEDURE — 84153 ASSAY OF PSA TOTAL: CPT | Performed by: FAMILY MEDICINE

## 2024-04-22 PROCEDURE — 99397 PER PM REEVAL EST PAT 65+ YR: CPT | Mod: S$GLB,,, | Performed by: FAMILY MEDICINE

## 2024-04-22 PROCEDURE — 3008F BODY MASS INDEX DOCD: CPT | Mod: CPTII,S$GLB,, | Performed by: FAMILY MEDICINE

## 2024-04-22 RX ORDER — OMEPRAZOLE 20 MG/1
20 CAPSULE, DELAYED RELEASE ORAL DAILY
COMMUNITY

## 2024-04-22 RX ORDER — MULTIVITAMIN
1 TABLET ORAL DAILY
COMMUNITY
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
## 2024-04-23 ENCOUNTER — PATIENT MESSAGE (OUTPATIENT)
Dept: FAMILY MEDICINE | Facility: CLINIC | Age: 64
End: 2024-04-23
Payer: MEDICARE

## 2024-04-23 ENCOUNTER — TELEPHONE (OUTPATIENT)
Dept: FAMILY MEDICINE | Facility: CLINIC | Age: 64
End: 2024-04-23
Payer: MEDICARE

## 2024-04-23 DIAGNOSIS — D69.6 LOW PLATELET COUNT: Primary | ICD-10-CM

## 2024-04-25 ENCOUNTER — APPOINTMENT (OUTPATIENT)
Dept: RADIOLOGY | Facility: HOSPITAL | Age: 64
End: 2024-04-25
Attending: FAMILY MEDICINE
Payer: MEDICARE

## 2024-04-25 DIAGNOSIS — Z13.6 SCREENING FOR AAA (ABDOMINAL AORTIC ANEURYSM): ICD-10-CM

## 2024-04-25 PROCEDURE — 76775 US EXAM ABDO BACK WALL LIM: CPT | Mod: TC,PO

## 2024-04-25 PROCEDURE — 76775 US EXAM ABDO BACK WALL LIM: CPT | Mod: 26,,, | Performed by: RADIOLOGY

## 2024-05-06 ENCOUNTER — LAB VISIT (OUTPATIENT)
Dept: LAB | Facility: HOSPITAL | Age: 64
End: 2024-05-06
Attending: FAMILY MEDICINE
Payer: MEDICARE

## 2024-05-06 DIAGNOSIS — D69.6 LOW PLATELET COUNT: ICD-10-CM

## 2024-05-06 LAB
BASOPHILS # BLD AUTO: 0.02 K/UL (ref 0–0.2)
BASOPHILS NFR BLD: 0.5 % (ref 0–1.9)
DIFFERENTIAL METHOD BLD: ABNORMAL
EOSINOPHIL # BLD AUTO: 0.1 K/UL (ref 0–0.5)
EOSINOPHIL NFR BLD: 1.3 % (ref 0–8)
ERYTHROCYTE [DISTWIDTH] IN BLOOD BY AUTOMATED COUNT: 13.2 % (ref 11.5–14.5)
HCT VFR BLD AUTO: 48.1 % (ref 40–54)
HGB BLD-MCNC: 15.9 G/DL (ref 14–18)
IMM GRANULOCYTES # BLD AUTO: 0.02 K/UL (ref 0–0.04)
IMM GRANULOCYTES NFR BLD AUTO: 0.5 % (ref 0–0.5)
LYMPHOCYTES # BLD AUTO: 2.1 K/UL (ref 1–4.8)
LYMPHOCYTES NFR BLD: 55.2 % (ref 18–48)
MCH RBC QN AUTO: 31.5 PG (ref 27–31)
MCHC RBC AUTO-ENTMCNC: 33.1 G/DL (ref 32–36)
MCV RBC AUTO: 95 FL (ref 82–98)
MONOCYTES # BLD AUTO: 0.1 K/UL (ref 0.3–1)
MONOCYTES NFR BLD: 2.1 % (ref 4–15)
NEUTROPHILS # BLD AUTO: 1.5 K/UL (ref 1.8–7.7)
NEUTROPHILS NFR BLD: 40.4 % (ref 38–73)
NRBC BLD-RTO: 0 /100 WBC
PLATELET # BLD AUTO: 103 K/UL (ref 150–450)
PLATELET BLD QL SMEAR: ABNORMAL
PMV BLD AUTO: 11.6 FL (ref 9.2–12.9)
RBC # BLD AUTO: 5.04 M/UL (ref 4.6–6.2)
WBC # BLD AUTO: 3.82 K/UL (ref 3.9–12.7)

## 2024-05-06 PROCEDURE — 36415 COLL VENOUS BLD VENIPUNCTURE: CPT | Mod: PO | Performed by: FAMILY MEDICINE

## 2024-05-06 PROCEDURE — 85025 COMPLETE CBC W/AUTO DIFF WBC: CPT | Performed by: FAMILY MEDICINE

## 2024-05-08 ENCOUNTER — PATIENT MESSAGE (OUTPATIENT)
Dept: FAMILY MEDICINE | Facility: CLINIC | Age: 64
End: 2024-05-08
Payer: MEDICARE

## 2024-05-09 ENCOUNTER — PATIENT MESSAGE (OUTPATIENT)
Dept: FAMILY MEDICINE | Facility: CLINIC | Age: 64
End: 2024-05-09
Payer: MEDICARE

## 2024-05-20 ENCOUNTER — LAB VISIT (OUTPATIENT)
Dept: LAB | Facility: HOSPITAL | Age: 64
End: 2024-05-20
Attending: INTERNAL MEDICINE
Payer: MEDICARE

## 2024-05-20 ENCOUNTER — OFFICE VISIT (OUTPATIENT)
Dept: HEMATOLOGY/ONCOLOGY | Facility: CLINIC | Age: 64
End: 2024-05-20
Payer: MEDICARE

## 2024-05-20 DIAGNOSIS — R94.5 ABNORMAL RESULTS OF LIVER FUNCTION STUDIES: ICD-10-CM

## 2024-05-20 DIAGNOSIS — D50.0 IRON DEFICIENCY ANEMIA DUE TO CHRONIC BLOOD LOSS: ICD-10-CM

## 2024-05-20 DIAGNOSIS — D69.6 THROMBOCYTOPENIA, UNSPECIFIED: ICD-10-CM

## 2024-05-20 DIAGNOSIS — D61.818 PANCYTOPENIA: Primary | ICD-10-CM

## 2024-05-20 DIAGNOSIS — C18.7 ADENOCARCINOMA OF SIGMOID COLON: ICD-10-CM

## 2024-05-20 DIAGNOSIS — Z86.19 HISTORY OF HELICOBACTER PYLORI INFECTION: ICD-10-CM

## 2024-05-20 LAB
ALBUMIN SERPL BCP-MCNC: 3.4 G/DL (ref 3.5–5.2)
ALP SERPL-CCNC: 65 U/L (ref 55–135)
ALT SERPL W/O P-5'-P-CCNC: 6 U/L (ref 10–44)
ANION GAP SERPL CALC-SCNC: 7 MMOL/L (ref 8–16)
AST SERPL-CCNC: 17 U/L (ref 10–40)
BASOPHILS # BLD AUTO: 0.01 K/UL (ref 0–0.2)
BASOPHILS NFR BLD: 0.3 % (ref 0–1.9)
BILIRUB SERPL-MCNC: 0.5 MG/DL (ref 0.1–1)
BUN SERPL-MCNC: 19 MG/DL (ref 8–23)
CALCIUM SERPL-MCNC: 9.1 MG/DL (ref 8.7–10.5)
CEA SERPL-MCNC: 1.7 NG/ML (ref 0–5)
CHLORIDE SERPL-SCNC: 107 MMOL/L (ref 95–110)
CO2 SERPL-SCNC: 23 MMOL/L (ref 23–29)
CREAT SERPL-MCNC: 1.1 MG/DL (ref 0.5–1.4)
DIFFERENTIAL METHOD BLD: ABNORMAL
EOSINOPHIL # BLD AUTO: 0.1 K/UL (ref 0–0.5)
EOSINOPHIL NFR BLD: 1.3 % (ref 0–8)
ERYTHROCYTE [DISTWIDTH] IN BLOOD BY AUTOMATED COUNT: 13 % (ref 11.5–14.5)
EST. GFR  (NO RACE VARIABLE): >60 ML/MIN/1.73 M^2
FERRITIN SERPL-MCNC: 224 NG/ML (ref 20–300)
GLUCOSE SERPL-MCNC: 107 MG/DL (ref 70–110)
HCT VFR BLD AUTO: 46.1 % (ref 40–54)
HGB BLD-MCNC: 15.5 G/DL (ref 14–18)
IMM GRANULOCYTES # BLD AUTO: 0.02 K/UL (ref 0–0.04)
IMM GRANULOCYTES NFR BLD AUTO: 0.5 % (ref 0–0.5)
IRON SERPL-MCNC: 104 UG/DL (ref 45–160)
LYMPHOCYTES # BLD AUTO: 2.1 K/UL (ref 1–4.8)
LYMPHOCYTES NFR BLD: 54.8 % (ref 18–48)
MCH RBC QN AUTO: 31.3 PG (ref 27–31)
MCHC RBC AUTO-ENTMCNC: 33.6 G/DL (ref 32–36)
MCV RBC AUTO: 93 FL (ref 82–98)
MONOCYTES # BLD AUTO: 0.1 K/UL (ref 0.3–1)
MONOCYTES NFR BLD: 2.7 % (ref 4–15)
NEUTROPHILS # BLD AUTO: 1.5 K/UL (ref 1.8–7.7)
NEUTROPHILS NFR BLD: 40.4 % (ref 38–73)
NRBC BLD-RTO: 0 /100 WBC
PLATELET # BLD AUTO: 108 K/UL (ref 150–450)
PMV BLD AUTO: 10.5 FL (ref 9.2–12.9)
POTASSIUM SERPL-SCNC: 4.6 MMOL/L (ref 3.5–5.1)
PROT SERPL-MCNC: 6.7 G/DL (ref 6–8.4)
RBC # BLD AUTO: 4.96 M/UL (ref 4.6–6.2)
SATURATED IRON: 32 % (ref 20–50)
SODIUM SERPL-SCNC: 137 MMOL/L (ref 136–145)
TOTAL IRON BINDING CAPACITY: 327 UG/DL (ref 250–450)
TRANSFERRIN SERPL-MCNC: 221 MG/DL (ref 200–375)
WBC # BLD AUTO: 3.76 K/UL (ref 3.9–12.7)

## 2024-05-20 PROCEDURE — 82378 CARCINOEMBRYONIC ANTIGEN: CPT | Performed by: INTERNAL MEDICINE

## 2024-05-20 PROCEDURE — 80053 COMPREHEN METABOLIC PANEL: CPT | Performed by: INTERNAL MEDICINE

## 2024-05-20 PROCEDURE — 1160F RVW MEDS BY RX/DR IN RCRD: CPT | Mod: CPTII,95,, | Performed by: INTERNAL MEDICINE

## 2024-05-20 PROCEDURE — 1159F MED LIST DOCD IN RCRD: CPT | Mod: CPTII,95,, | Performed by: INTERNAL MEDICINE

## 2024-05-20 PROCEDURE — 82728 ASSAY OF FERRITIN: CPT | Performed by: INTERNAL MEDICINE

## 2024-05-20 PROCEDURE — 85025 COMPLETE CBC W/AUTO DIFF WBC: CPT | Performed by: INTERNAL MEDICINE

## 2024-05-20 PROCEDURE — 3044F HG A1C LEVEL LT 7.0%: CPT | Mod: CPTII,95,, | Performed by: INTERNAL MEDICINE

## 2024-05-20 PROCEDURE — 99214 OFFICE O/P EST MOD 30 MIN: CPT | Mod: 95,,, | Performed by: INTERNAL MEDICINE

## 2024-05-20 PROCEDURE — 36415 COLL VENOUS BLD VENIPUNCTURE: CPT | Performed by: INTERNAL MEDICINE

## 2024-05-20 PROCEDURE — 83540 ASSAY OF IRON: CPT | Performed by: INTERNAL MEDICINE
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
## 2024-05-21 ENCOUNTER — PATIENT MESSAGE (OUTPATIENT)
Dept: HEMATOLOGY/ONCOLOGY | Facility: CLINIC | Age: 64
End: 2024-05-21
Payer: MEDICARE

## 2024-05-21 ENCOUNTER — APPOINTMENT (OUTPATIENT)
Dept: RADIOLOGY | Facility: HOSPITAL | Age: 64
End: 2024-05-21
Attending: INTERNAL MEDICINE
Payer: MEDICARE

## 2024-05-21 ENCOUNTER — TELEPHONE (OUTPATIENT)
Dept: HEMATOLOGY/ONCOLOGY | Facility: CLINIC | Age: 64
End: 2024-05-21
Payer: MEDICARE

## 2024-05-21 DIAGNOSIS — D61.818 PANCYTOPENIA: ICD-10-CM

## 2024-05-21 PROCEDURE — 76700 US EXAM ABDOM COMPLETE: CPT | Mod: 26,,, | Performed by: RADIOLOGY

## 2024-05-21 PROCEDURE — 76700 US EXAM ABDOM COMPLETE: CPT | Mod: TC,PO

## 2024-05-21 NOTE — TELEPHONE ENCOUNTER
Called radiology department to schedule Biopsy as ordered per Dr. Dow. No answer. Left message for Biopsy to be scheduled and to notify the patient.

## 2024-05-28 ENCOUNTER — PATIENT MESSAGE (OUTPATIENT)
Dept: HEMATOLOGY/ONCOLOGY | Facility: CLINIC | Age: 64
End: 2024-05-28
Payer: MEDICARE

## 2024-05-28 ENCOUNTER — TELEPHONE (OUTPATIENT)
Dept: RADIOLOGY | Facility: HOSPITAL | Age: 64
End: 2024-05-28
Payer: MEDICARE

## 2024-05-28 NOTE — TELEPHONE ENCOUNTER
Interventional Radiology  Scheduled 10:30AM bone marrow biopsy for 5/30/24 w/patient and his wife, Richie.  Instructed pt. to arrive by 9:00AM at hospital ([address]/ [address]tr[address]) off [address] in [place] and check in at Outpatient Registration located on the first floor.  He must have a ride and NPO after midnight the night before.  Pt. denies taking ASA and other blood thinners, NSAIDs, fish oil, or GLP-1/GIP agonists.  I gave patient our direct callback number [phone] and he verbalized understanding of all instructions.

## 2024-05-29 ENCOUNTER — TELEPHONE (OUTPATIENT)
Dept: RADIOLOGY | Facility: HOSPITAL | Age: 64
End: 2024-05-29
Payer: MEDICARE

## 2024-05-29 NOTE — TELEPHONE ENCOUNTER
Pre-procedure phone call made at this time. Informed pt to be NPO after midnight, show up to [facility] on [address] at 0915, either have a ride with them or have a phone number for the person driving them home so that we can get in contact with them to keep them updated. Pt denies any use of blood thinners, fish oils, or weight loss injections such as ozempic. Answered all questions that the pt had and pt verbalized understanding of all discussed.   
car

## 2024-05-30 ENCOUNTER — HOSPITAL ENCOUNTER (OUTPATIENT)
Dept: RADIOLOGY | Facility: HOSPITAL | Age: 64
Discharge: HOME | End: 2024-05-30
Attending: INTERNAL MEDICINE
Payer: MEDICARE

## 2024-05-30 VITALS
BODY MASS INDEX: 25.9 KG/M2 | DIASTOLIC BLOOD PRESSURE: 68 MMHG | HEART RATE: 68 BPM | HEIGHT: 71 IN | OXYGEN SATURATION: 93 % | SYSTOLIC BLOOD PRESSURE: 106 MMHG | RESPIRATION RATE: 16 BRPM | WEIGHT: 185 LBS

## 2024-05-30 DIAGNOSIS — D61.818 PANCYTOPENIA: ICD-10-CM

## 2024-05-30 LAB
INR PPP: 1 (ref 0.8–1.2)
PROTHROMBIN TIME: 11.7 SEC (ref 9–12.5)

## 2024-05-30 PROCEDURE — 88341 IMHCHEM/IMCYTCHM EA ADD ANTB: CPT | Mod: 26,,, | Performed by: PATHOLOGY

## 2024-05-30 PROCEDURE — 88185 FLOWCYTOMETRY/TC ADD-ON: CPT | Performed by: PATHOLOGY

## 2024-05-30 PROCEDURE — 38221 DX BONE MARROW BIOPSIES: CPT | Mod: RT,,, | Performed by: RADIOLOGY

## 2024-05-30 PROCEDURE — 88264 CHROMOSOME ANALYSIS 20-25: CPT | Performed by: INTERNAL MEDICINE

## 2024-05-30 PROCEDURE — 85097 BONE MARROW INTERPRETATION: CPT | Mod: ,,, | Performed by: PATHOLOGY

## 2024-05-30 PROCEDURE — 77012 CT SCAN FOR NEEDLE BIOPSY: CPT | Mod: TC

## 2024-05-30 PROCEDURE — 77012 CT SCAN FOR NEEDLE BIOPSY: CPT | Mod: 26,,, | Performed by: RADIOLOGY

## 2024-05-30 PROCEDURE — 88305 TISSUE EXAM BY PATHOLOGIST: CPT | Performed by: PATHOLOGY

## 2024-05-30 PROCEDURE — 88313 SPECIAL STAINS GROUP 2: CPT | Mod: 26,,, | Performed by: PATHOLOGY

## 2024-05-30 PROCEDURE — 85610 PROTHROMBIN TIME: CPT | Performed by: INTERNAL MEDICINE

## 2024-05-30 PROCEDURE — 38222 DX BONE MARROW BX & ASPIR: CPT | Mod: RT

## 2024-05-30 PROCEDURE — C1830 POWER BONE MARROW BX NEEDLE: HCPCS

## 2024-05-30 PROCEDURE — 88313 SPECIAL STAINS GROUP 2: CPT | Performed by: PATHOLOGY

## 2024-05-30 PROCEDURE — 88237 TISSUE CULTURE BONE MARROW: CPT | Performed by: INTERNAL MEDICINE

## 2024-05-30 PROCEDURE — 88184 FLOWCYTOMETRY/ TC 1 MARKER: CPT | Performed by: PATHOLOGY

## 2024-05-30 PROCEDURE — 63600175 PHARM REV CODE 636 W HCPCS: Performed by: RADIOLOGY

## 2024-05-30 PROCEDURE — 88311 DECALCIFY TISSUE: CPT | Mod: 26,,, | Performed by: PATHOLOGY

## 2024-05-30 PROCEDURE — 88305 TISSUE EXAM BY PATHOLOGIST: CPT | Mod: 26,,, | Performed by: PATHOLOGY

## 2024-05-30 PROCEDURE — 88189 FLOWCYTOMETRY/READ 16 & >: CPT | Mod: ,,, | Performed by: PATHOLOGY

## 2024-05-30 PROCEDURE — 88342 IMHCHEM/IMCYTCHM 1ST ANTB: CPT | Performed by: PATHOLOGY

## 2024-05-30 PROCEDURE — 25000003 PHARM REV CODE 250: Performed by: RADIOLOGY

## 2024-05-30 PROCEDURE — 88342 IMHCHEM/IMCYTCHM 1ST ANTB: CPT | Mod: 26,59,, | Performed by: PATHOLOGY

## 2024-05-30 PROCEDURE — 88311 DECALCIFY TISSUE: CPT | Performed by: PATHOLOGY

## 2024-05-30 PROCEDURE — 88341 IMHCHEM/IMCYTCHM EA ADD ANTB: CPT | Mod: 59 | Performed by: PATHOLOGY

## 2024-05-30 RX ORDER — LIDOCAINE HYDROCHLORIDE 10 MG/ML
INJECTION INFILTRATION; PERINEURAL CODE/TRAUMA/SEDATION MEDICATION
Status: COMPLETED | OUTPATIENT
Start: 2024-05-30 | End: 2024-05-30

## 2024-05-30 RX ORDER — FENTANYL CITRATE 50 UG/ML
INJECTION, SOLUTION INTRAMUSCULAR; INTRAVENOUS CODE/TRAUMA/SEDATION MEDICATION
Status: COMPLETED | OUTPATIENT
Start: 2024-05-30 | End: 2024-05-30

## 2024-05-30 RX ORDER — MIDAZOLAM HYDROCHLORIDE 1 MG/ML
INJECTION, SOLUTION INTRAMUSCULAR; INTRAVENOUS CODE/TRAUMA/SEDATION MEDICATION
Status: COMPLETED | OUTPATIENT
Start: 2024-05-30 | End: 2024-05-30

## 2024-05-30 RX ADMIN — MIDAZOLAM HYDROCHLORIDE 1 MG: 1 INJECTION, SOLUTION INTRAMUSCULAR; INTRAVENOUS at 10:05

## 2024-05-30 RX ADMIN — FENTANYL CITRATE 50 MCG: 50 INJECTION, SOLUTION INTRAMUSCULAR; INTRAVENOUS at 10:05

## 2024-05-30 RX ADMIN — LIDOCAINE HYDROCHLORIDE 5 ML: 10 INJECTION, SOLUTION INFILTRATION; PERINEURAL at 10:05

## 2024-05-30 NOTE — DISCHARGE INSTRUCTIONS
Please return to ER if any of these symptoms occur:  Fever over 101 degrees,  Any purulent drainage from site (pus, yellow or has foul odor), or any redness or swelling to site  Bleeding from the puncture site not controlled, If bleeding occurs at site hold pressure for 5 mins.  If bleeding continues go to ER  Pain not controlled with Aleve or Tylenol,     No driving for 24 hours after procedure due to sedation given during procedure.      Do not submerge in standing water for 2 days after biopsy but you may shower.     May change bandage if it becomes soiled and bandage may be removed in 2 days.     Rest for the next couple of days. Do not lift any thing heavier than a gallon of milk.  Increase activity as tolerated.     Resume home medications and diet     Biopsy results will be with Dr. Dow in 5-7 days, please follow up with him for results and any other questions or concerns that you may have.

## 2024-05-30 NOTE — PLAN OF CARE
Band aid to lower back, C/D/I with no bleeding/redness/swelling noted. VSS, NADN, and pt meets criteria for discharge. Discharge instructions given to and reviewed with pt, and pt verbalized understanding of all. Pt discharged to home, taken out via wheelchair and driven home by spouse.

## 2024-05-30 NOTE — DISCHARGE SUMMARY
[facility] (Hospital)  Discharge Note  Short Stay    CT Biopsy Bone Marrow (xpd)      OUTCOME: Patient tolerated treatment/procedure well without complication and is now ready for discharge.    DISPOSITION: Home or Self Care    FINAL DIAGNOSIS:  <principal problem not specified>    FOLLOWUP: In clinic    DISCHARGE INSTRUCTIONS:  No discharge procedures on file.      Clinical Reference Documents Added to Patient Instructions         Document    BONE MARROW ASPIRATION OR BIOPSY (ENGLISH)    PROCEDURAL SEDATION, ADULT ED (ENGLISH)            TIME SPENT ON DISCHARGE: 15 minutes    Pre Op Diagnosis: pancytopenia     Post Op Diagnosis: same     Procedure:  Bone marrow biopsy     Procedure performed by: Jordyn SARAVIA, Flaco GIRON     Written Informed Consent Obtained: Yes     Specimen Removed:  yes     Estimated Blood Loss:  minimal     Findings: Local anesthesia and moderate sedation were used.     The patient tolerated the procedure well and there were no complications.      Disposition:  F/U in clinic    Discharge instructions:  Light activity for 24 hours.  Remove band aid in 24 hours.  No baths (showers are appropriate).    F/U with ordering physician    Sterile technique was performed in the right iliac, lidocaine was used as a local anesthetic.  Multiple samples taken percutaneously from the right iliac bone.  Pt tolerated the procedure well without immediate complications.  Please see radiologist report for details. F/u with PCP and/or ordering physician.

## 2024-06-03 ENCOUNTER — TELEPHONE (OUTPATIENT)
Dept: HEMATOLOGY/ONCOLOGY | Facility: CLINIC | Age: 64
End: 2024-06-03
Payer: MEDICARE

## 2024-06-03 ENCOUNTER — PATIENT MESSAGE (OUTPATIENT)
Dept: HEMATOLOGY/ONCOLOGY | Facility: CLINIC | Age: 64
End: 2024-06-03
Payer: MEDICARE

## 2024-06-03 LAB
BODY SITE - BONE MARROW: NORMAL
CLINICAL DIAGNOSIS - BONE MARROW: NORMAL
FLOW CYTOMETRY ANTIBODIES ANALYZED - BONE MARROW: NORMAL
FLOW CYTOMETRY COMMENT - BONE MARROW: NORMAL
FLOW CYTOMETRY INTERPRETATION - BONE MARROW: NORMAL

## 2024-06-03 NOTE — TELEPHONE ENCOUNTER
----- Message from Samuel Dow MD sent at 6/3/2024  9:36 AM CDT -----  I need to see this patient back to review his bone marrow this week

## 2024-06-03 NOTE — TELEPHONE ENCOUNTER
Patient cancelled appt. with Dr. Dow for 06/04/24 at 10:40 am stating she decided to f/u with her PCP instead.

## 2024-06-06 ENCOUNTER — TELEPHONE (OUTPATIENT)
Dept: HEMATOLOGY/ONCOLOGY | Facility: CLINIC | Age: 64
End: 2024-06-06

## 2024-06-06 ENCOUNTER — OFFICE VISIT (OUTPATIENT)
Dept: HEMATOLOGY/ONCOLOGY | Facility: CLINIC | Age: 64
End: 2024-06-06
Payer: MEDICARE

## 2024-06-06 VITALS
HEART RATE: 69 BPM | HEIGHT: 71 IN | OXYGEN SATURATION: 96 % | SYSTOLIC BLOOD PRESSURE: 109 MMHG | TEMPERATURE: 99 F | DIASTOLIC BLOOD PRESSURE: 72 MMHG | WEIGHT: 192 LBS | BODY MASS INDEX: 26.88 KG/M2

## 2024-06-06 DIAGNOSIS — D69.6 THROMBOCYTOPENIA, UNSPECIFIED: ICD-10-CM

## 2024-06-06 DIAGNOSIS — C91.40 HAIRY CELL LEUKEMIA NOT HAVING ACHIEVED REMISSION: Primary | ICD-10-CM

## 2024-06-06 DIAGNOSIS — D61.818 PANCYTOPENIA: ICD-10-CM

## 2024-06-06 PROCEDURE — 3078F DIAST BP <80 MM HG: CPT | Mod: CPTII,S$GLB,, | Performed by: INTERNAL MEDICINE

## 2024-06-06 PROCEDURE — 1101F PT FALLS ASSESS-DOCD LE1/YR: CPT | Mod: CPTII,S$GLB,, | Performed by: INTERNAL MEDICINE

## 2024-06-06 PROCEDURE — 3044F HG A1C LEVEL LT 7.0%: CPT | Mod: CPTII,S$GLB,, | Performed by: INTERNAL MEDICINE

## 2024-06-06 PROCEDURE — 99215 OFFICE O/P EST HI 40 MIN: CPT | Mod: S$GLB,,, | Performed by: INTERNAL MEDICINE

## 2024-06-06 PROCEDURE — 99999 PR PBB SHADOW E&M-EST. PATIENT-LVL IV: CPT | Mod: PBBFAC,,, | Performed by: INTERNAL MEDICINE

## 2024-06-06 PROCEDURE — 3288F FALL RISK ASSESSMENT DOCD: CPT | Mod: CPTII,S$GLB,, | Performed by: INTERNAL MEDICINE

## 2024-06-06 PROCEDURE — 1159F MED LIST DOCD IN RCRD: CPT | Mod: CPTII,S$GLB,, | Performed by: INTERNAL MEDICINE

## 2024-06-06 PROCEDURE — 1160F RVW MEDS BY RX/DR IN RCRD: CPT | Mod: CPTII,S$GLB,, | Performed by: INTERNAL MEDICINE

## 2024-06-06 PROCEDURE — 3074F SYST BP LT 130 MM HG: CPT | Mod: CPTII,S$GLB,, | Performed by: INTERNAL MEDICINE

## 2024-06-06 PROCEDURE — 3008F BODY MASS INDEX DOCD: CPT | Mod: CPTII,S$GLB,, | Performed by: INTERNAL MEDICINE

## 2024-06-06 NOTE — TELEPHONE ENCOUNTER
"Received referral from Dr. Dow on 6/6. Nurse contacted pt's wife on 6/6, explained role in scheduling appt with next available provider; referred to Dr. Nicolas or Dr. Pulliam. Offered the first next available. The next available is 9/13 with Dr. Pulliam @ . Pt's wife expressed wanted to be seen sooner. [facility] NN reached out to [facility] CONKLIN.   "per dr nicolas: by review of patients CBC and Dr. Dow notes he does not need treatment (not urgent). what is our availability. Can he do a virtual visit on an non-BR day with Clay or Thomas in the leukemia group (to increase to pool of availability)"  Voiced this information to pt's wife. Scheduled for VV on 7/10 10:00 with Dr. Delgado Guzman. Provided appt date, time, and provider. Scheduled CT and f/u with Victor M, per pt request. Pt verbalized understanding and confirmed appts. All questions and concerns addressed. Nurse provided clinic number if pt has any questions, concerns or needs rescheduling.     "
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
## 2024-06-11 LAB
CHROM BANDING METHOD: NORMAL
CHROMOSOME ANALYSIS BM ADDITIONAL INFORMATION: NORMAL
CHROMOSOME ANALYSIS BM RELEASED BY: NORMAL
CHROMOSOME ANALYSIS BM RESULT SUMMARY: NORMAL
CLINICAL CYTOGENETICIST REVIEW: NORMAL
COMMENT: NORMAL
FINAL PATHOLOGIC DIAGNOSIS: NORMAL
GROSS: NORMAL
KARYOTYP MAR: NORMAL
Lab: NORMAL
MICROSCOPIC EXAM: NORMAL
REASON FOR REFERRAL (NARRATIVE): NORMAL
REF LAB TEST METHOD: NORMAL
SPECIMEN SOURCE: NORMAL
SPECIMEN: NORMAL
SUPPLEMENTAL DIAGNOSIS: NORMAL

## 2024-06-24 ENCOUNTER — HOSPITAL ENCOUNTER (OUTPATIENT)
Dept: RADIOLOGY | Facility: HOSPITAL | Age: 64
Discharge: HOME | End: 2024-06-24
Attending: INTERNAL MEDICINE
Payer: MEDICARE

## 2024-06-24 DIAGNOSIS — C91.40 HAIRY CELL LEUKEMIA NOT HAVING ACHIEVED REMISSION: ICD-10-CM

## 2024-06-24 PROCEDURE — 74177 CT ABD & PELVIS W/CONTRAST: CPT | Mod: TC

## 2024-06-24 PROCEDURE — 74177 CT ABD & PELVIS W/CONTRAST: CPT | Mod: 26,,, | Performed by: RADIOLOGY

## 2024-06-24 PROCEDURE — 25500020 PHARM REV CODE 255: Performed by: INTERNAL MEDICINE

## 2024-06-24 PROCEDURE — A9698 NON-RAD CONTRAST MATERIALNOC: HCPCS | Performed by: INTERNAL MEDICINE

## 2024-06-24 PROCEDURE — 71260 CT THORAX DX C+: CPT | Mod: 26,,, | Performed by: RADIOLOGY

## 2024-06-24 RX ADMIN — IOHEXOL 1000 ML: 12 SOLUTION ORAL at 03:06

## 2024-06-24 RX ADMIN — IOHEXOL 100 ML: 350 INJECTION, SOLUTION INTRAVENOUS at 03:06

## 2024-07-10 ENCOUNTER — OFFICE VISIT (OUTPATIENT)
Dept: HEMATOLOGY/ONCOLOGY | Facility: CLINIC | Age: 64
End: 2024-07-10
Payer: MEDICARE

## 2024-07-10 DIAGNOSIS — D61.818 PANCYTOPENIA: ICD-10-CM

## 2024-07-10 PROBLEM — D50.0 IRON DEFICIENCY ANEMIA DUE TO CHRONIC BLOOD LOSS: Status: RESOLVED | Noted: 2022-03-23 | Resolved: 2024-07-10

## 2024-07-10 PROBLEM — D69.6 THROMBOCYTOPENIA, UNSPECIFIED: Status: RESOLVED | Noted: 2024-05-20 | Resolved: 2024-07-10

## 2024-07-10 PROCEDURE — 99215 OFFICE O/P EST HI 40 MIN: CPT | Mod: 95,,, | Performed by: INTERNAL MEDICINE

## 2024-07-10 PROCEDURE — 1160F RVW MEDS BY RX/DR IN RCRD: CPT | Mod: CPTII,95,, | Performed by: INTERNAL MEDICINE

## 2024-07-10 PROCEDURE — 3044F HG A1C LEVEL LT 7.0%: CPT | Mod: CPTII,95,, | Performed by: INTERNAL MEDICINE

## 2024-07-10 PROCEDURE — 1159F MED LIST DOCD IN RCRD: CPT | Mod: CPTII,95,, | Performed by: INTERNAL MEDICINE
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
## 2024-07-15 ENCOUNTER — LAB VISIT (OUTPATIENT)
Dept: LAB | Facility: HOSPITAL | Age: 64
End: 2024-07-15
Attending: INTERNAL MEDICINE
Payer: MEDICARE

## 2024-07-15 ENCOUNTER — OFFICE VISIT (OUTPATIENT)
Dept: HEMATOLOGY/ONCOLOGY | Facility: CLINIC | Age: 64
End: 2024-07-15
Payer: MEDICARE

## 2024-07-15 VITALS
BODY MASS INDEX: 26.94 KG/M2 | HEIGHT: 71 IN | RESPIRATION RATE: 18 BRPM | OXYGEN SATURATION: 97 % | DIASTOLIC BLOOD PRESSURE: 73 MMHG | HEART RATE: 67 BPM | SYSTOLIC BLOOD PRESSURE: 108 MMHG | WEIGHT: 192.44 LBS | TEMPERATURE: 97 F

## 2024-07-15 DIAGNOSIS — C91.40 HAIRY CELL LEUKEMIA NOT HAVING ACHIEVED REMISSION: Primary | ICD-10-CM

## 2024-07-15 DIAGNOSIS — D61.818 PANCYTOPENIA: ICD-10-CM

## 2024-07-15 DIAGNOSIS — Z87.891 HISTORY OF CIGARETTE SMOKING: ICD-10-CM

## 2024-07-15 DIAGNOSIS — H54.40 BLINDNESS OF LEFT EYE WITH NORMAL VISION IN CONTRALATERAL EYE: ICD-10-CM

## 2024-07-15 LAB
BASOPHILS # BLD AUTO: 0.01 K/UL (ref 0–0.2)
BASOPHILS NFR BLD: 0.2 % (ref 0–1.9)
DIFFERENTIAL METHOD BLD: ABNORMAL
EOSINOPHIL # BLD AUTO: 0.1 K/UL (ref 0–0.5)
EOSINOPHIL NFR BLD: 1.4 % (ref 0–8)
ERYTHROCYTE [DISTWIDTH] IN BLOOD BY AUTOMATED COUNT: 13.7 % (ref 11.5–14.5)
HCT VFR BLD AUTO: 42.8 % (ref 40–54)
HGB BLD-MCNC: 14.4 G/DL (ref 14–18)
IMM GRANULOCYTES # BLD AUTO: 0.02 K/UL (ref 0–0.04)
IMM GRANULOCYTES NFR BLD AUTO: 0.5 % (ref 0–0.5)
LYMPHOCYTES # BLD AUTO: 2.6 K/UL (ref 1–4.8)
LYMPHOCYTES NFR BLD: 60.8 % (ref 18–48)
MCH RBC QN AUTO: 31.7 PG (ref 27–31)
MCHC RBC AUTO-ENTMCNC: 33.6 G/DL (ref 32–36)
MCV RBC AUTO: 94 FL (ref 82–98)
MONOCYTES # BLD AUTO: 0.1 K/UL (ref 0.3–1)
MONOCYTES NFR BLD: 2.4 % (ref 4–15)
NEUTROPHILS # BLD AUTO: 1.5 K/UL (ref 1.8–7.7)
NEUTROPHILS NFR BLD: 34.7 % (ref 38–73)
NRBC BLD-RTO: 0 /100 WBC
PLATELET # BLD AUTO: 99 K/UL (ref 150–450)
PMV BLD AUTO: 10.9 FL (ref 9.2–12.9)
RBC # BLD AUTO: 4.54 M/UL (ref 4.6–6.2)
WBC # BLD AUTO: 4.21 K/UL (ref 3.9–12.7)

## 2024-07-15 PROCEDURE — 1160F RVW MEDS BY RX/DR IN RCRD: CPT | Mod: CPTII,S$GLB,, | Performed by: INTERNAL MEDICINE

## 2024-07-15 PROCEDURE — 3078F DIAST BP <80 MM HG: CPT | Mod: CPTII,S$GLB,, | Performed by: INTERNAL MEDICINE

## 2024-07-15 PROCEDURE — 3008F BODY MASS INDEX DOCD: CPT | Mod: CPTII,S$GLB,, | Performed by: INTERNAL MEDICINE

## 2024-07-15 PROCEDURE — 99999 PR PBB SHADOW E&M-EST. PATIENT-LVL IV: CPT | Mod: PBBFAC,,, | Performed by: INTERNAL MEDICINE

## 2024-07-15 PROCEDURE — 36415 COLL VENOUS BLD VENIPUNCTURE: CPT | Performed by: INTERNAL MEDICINE

## 2024-07-15 PROCEDURE — 1101F PT FALLS ASSESS-DOCD LE1/YR: CPT | Mod: CPTII,S$GLB,, | Performed by: INTERNAL MEDICINE

## 2024-07-15 PROCEDURE — 3074F SYST BP LT 130 MM HG: CPT | Mod: CPTII,S$GLB,, | Performed by: INTERNAL MEDICINE

## 2024-07-15 PROCEDURE — 3288F FALL RISK ASSESSMENT DOCD: CPT | Mod: CPTII,S$GLB,, | Performed by: INTERNAL MEDICINE

## 2024-07-15 PROCEDURE — 99214 OFFICE O/P EST MOD 30 MIN: CPT | Mod: S$GLB,,, | Performed by: INTERNAL MEDICINE

## 2024-07-15 PROCEDURE — 85025 COMPLETE CBC W/AUTO DIFF WBC: CPT | Performed by: INTERNAL MEDICINE

## 2024-07-15 PROCEDURE — 1159F MED LIST DOCD IN RCRD: CPT | Mod: CPTII,S$GLB,, | Performed by: INTERNAL MEDICINE

## 2024-07-15 PROCEDURE — 3044F HG A1C LEVEL LT 7.0%: CPT | Mod: CPTII,S$GLB,, | Performed by: INTERNAL MEDICINE
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
## 2024-09-22 ENCOUNTER — PATIENT MESSAGE (OUTPATIENT)
Dept: HEMATOLOGY/ONCOLOGY | Facility: CLINIC | Age: 64
End: 2024-09-22
Payer: MEDICARE

## 2024-09-22 ENCOUNTER — OFFICE VISIT (OUTPATIENT)
Dept: URGENT CARE | Facility: CLINIC | Age: 64
End: 2024-09-22
Payer: MEDICARE

## 2024-09-22 VITALS
RESPIRATION RATE: 18 BRPM | HEART RATE: 96 BPM | OXYGEN SATURATION: 95 % | HEIGHT: 71 IN | DIASTOLIC BLOOD PRESSURE: 71 MMHG | WEIGHT: 187 LBS | SYSTOLIC BLOOD PRESSURE: 111 MMHG | BODY MASS INDEX: 26.18 KG/M2 | TEMPERATURE: 99 F

## 2024-09-22 DIAGNOSIS — R09.81 SINUS CONGESTION: ICD-10-CM

## 2024-09-22 DIAGNOSIS — R50.9 SUBJECTIVE FEVER: Primary | ICD-10-CM

## 2024-09-22 DIAGNOSIS — J06.9 VIRAL URI: ICD-10-CM

## 2024-09-22 LAB
CTP QC/QA: YES
MOLECULAR STREP A: NEGATIVE
POC MOLECULAR INFLUENZA A AGN: NEGATIVE
POC MOLECULAR INFLUENZA B AGN: NEGATIVE
RSV RAPID ANTIGEN: NEGATIVE
SARS-COV-2 AG RESP QL IA.RAPID: NEGATIVE

## 2024-09-22 PROCEDURE — 99213 OFFICE O/P EST LOW 20 MIN: CPT | Mod: S$GLB,,,

## 2024-09-22 PROCEDURE — 87651 STREP A DNA AMP PROBE: CPT | Mod: QW,S$GLB,,

## 2024-09-22 PROCEDURE — 87807 RSV ASSAY W/OPTIC: CPT | Mod: QW,S$GLB,,

## 2024-09-22 PROCEDURE — 87811 SARS-COV-2 COVID19 W/OPTIC: CPT | Mod: QW,S$GLB,,

## 2024-09-22 PROCEDURE — 87502 INFLUENZA DNA AMP PROBE: CPT | Mod: QW,S$GLB,,

## 2024-09-22 NOTE — PROGRESS NOTES
"Subjective:      Patient ID: Armando Ingram Jr. is a 64 y.o. male.    Vitals:  height is 5' 11" (1.803 m) and weight is 84.8 kg (187 lb). His oral temperature is 98.5 °F (36.9 °C). His blood pressure is 111/71 and his pulse is 96. His respiration is 18 and oxygen saturation is 93% (abnormal).     Chief Complaint: Sinus Problem    65yo male pt is presenting sinus problems and a fever, starting yesterday. He had a fever of 100.2 today and took an ibuprofen about an hour ago. He has also tried Equate Sinus PE and Allergy. Wife reports she had something similar last week but has improved since. 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 Pt denies any shortness of breath, wheezing, chest pain, dizziness.    Sinus Problem  This is a new problem. The current episode started yesterday. The problem is unchanged. The maximum temperature recorded prior to his arrival was 100.4 - 100.9 F. The fever has been present for Less than 1 day. His pain is at a severity of 0/10. He is experiencing no pain. Associated symptoms include congestion, coughing, headaches, sinus pressure and sneezing. Pertinent negatives include no chills, diaphoresis, ear pain, hoarse voice, neck pain, shortness of breath, sore throat or swollen glands. The treatment provided no relief.       Constitution: Negative for chills and sweating.   HENT:  Positive for congestion and sinus pressure. Negative for ear pain and sore throat.    Neck: Negative for neck pain.   Respiratory:  Positive for cough. Negative for shortness of breath.    Allergic/Immunologic: Positive for sneezing.   Neurological:  Positive for headaches.    Objective:     Physical Exam   Constitutional: He is oriented to person, place, and time. He appears well-developed. He is cooperative.  Non-toxic appearance. He does not appear ill. No distress.      Comments:Patient sitting in chair " with no signs of distress. Patient able to complete sentences without pausing.       HENT:   Head: Normocephalic and atraumatic.   Ears:   Right Ear: Hearing, tympanic membrane, external ear and ear canal normal.   Left Ear: Hearing, tympanic membrane, external ear and ear canal normal.   Nose: Congestion present. No mucosal edema, rhinorrhea or nasal deformity. Right sinus exhibits no maxillary sinus tenderness and no frontal sinus tenderness. Left sinus exhibits no maxillary sinus tenderness and no frontal sinus tenderness.   Mouth/Throat: Uvula is midline, oropharynx is clear and moist and mucous membranes are normal. No trismus in the jaw. No uvula swelling. No oropharyngeal exudate, posterior oropharyngeal edema or posterior oropharyngeal erythema.   Eyes: Conjunctivae and lids are normal.   Neck: Trachea normal and phonation normal. Neck supple. No edema present. No erythema present. No neck rigidity present.   Cardiovascular: Normal rate, regular rhythm, normal heart sounds and normal pulses.   Pulmonary/Chest: Effort normal and breath sounds normal. No respiratory distress. He has no decreased breath sounds. He has no rhonchi.   Abdominal: Normal appearance.   Musculoskeletal: Normal range of motion.         General: Normal range of motion.   Neurological: He is alert and oriented to person, place, and time. He exhibits normal muscle tone.   Skin: Skin is warm, dry, intact and not diaphoretic.   Psychiatric: His speech is normal and behavior is normal. Judgment and thought content normal.   Nursing note and vitals reviewed.    Results for orders placed or performed in visit on 09/22/24   SARS Coronavirus 2 Antigen, POCT Manual Read   Result Value Ref Range    SARS Coronavirus 2 Antigen Negative Negative     Acceptable Yes    POCT Influenza A/B MOLECULAR   Result Value Ref Range    POC Molecular Influenza A Ag Negative Negative    POC Molecular Influenza B Ag Negative Negative    Quality  Control Acceptable Yes    POCT respiratory syncytial virus   Result Value Ref Range    RSV Rapid Ag Negative Negative     Acceptable Yes    POCT Strep A, Molecular   Result Value Ref Range    Molecular Strep A, POC Negative Negative     Acceptable Yes      Patient in no acute distress.  Vitals reassuring.  Discussed results/diagnosis/plan in depth with patient in clinic. Strict precautions given to patient to monitor for worsening signs and symptoms. Advised to follow up with primary.All questions answered. Strict ER precautions given. If your symptoms worsens or fail to improve you should go to the Emergency Room. Discharge and follow-up instructions given verbally/printed. Discharge and follow-up instructions discussed with the patient who expressed understanding and willingness to comply with my recommendations.Patient voiced understanding and in agreement with current treatment plan.     Please be advised this text was dictated with Spootr software and may contain errors due to translation.    Assessment:     1. Subjective fever    2. Sinus congestion    3. Viral URI        Plan:       Subjective fever  -     SARS Coronavirus 2 Antigen, POCT Manual Read  -     POCT Influenza A/B MOLECULAR  -     POCT respiratory syncytial virus  -     POCT Strep A, Molecular    Sinus congestion    Viral URI            Medical Decision Making:   History:   Old Medical Records: I decided to obtain old medical records.  Urgent Care Management:  Pt in no acute distress. Vitals reassuring. Non-toxic appearing. Lungs CTA (no concern for PNA, lungs clear, sats 95%, no SOB). No concerns for otitis media, bacterial tonsillitis. Discussed negative COVID, flu, strep and RSV. Pt and wife wanted to be tested for everything due to hairy cell leukemia and reports from the oncologist to get evaluated with any fever. Discussed that this is likely due to a viral illness but f/u with oncologist if not improving. ER  precautions were given. Discussed the importance of further evaluation if symptoms worsen. Patient stated verbal understanding.           Patient Instructions     Patient Instructions   PLEASE READ YOUR DISCHARGE INSTRUCTIONS ENTIRELY AS IT CONTAINS IMPORTANT INFORMATION.     Please drink plenty of fluids.     Please get plenty of rest.     Please return here or go to the Emergency Department for any concerns or worsening of condition.     Please take an over the counter antihistamine medication (allegra/Claritin/Zyrtec) of your choice as directed.     Try an over the counter decongestant like Mucinex D or Sudafed. You buy this behind the pharmacy counter     If not allergic, please take over the counter Tylenol (Acetaminophen) and/or Motrin (Ibuprofen) as directed for control of pain and/or fever.  Please follow up with your primary care doctor or specialist as needed.     Sore throat recommendations: Warm fluids, warm salt water gargles, throat lozenges, tea, honey, soup, rest, hydration.     Use over the counter flonase: one spray each nostril twice daily OR two sprays each nostril once daily.      If you  smoke, please stop smoking.     Please return or see your primary care doctor if you develop new or worsening symptoms.      Please arrange follow up with your primary medical clinic as soon as possible. You must understand that you've received an Urgent Care treatment only and that you may be released before all of your medical problems are known or treated. You, the patient, will arrange for follow up as instructed. If your symptoms worsen or fail to improve you should go to the Emergency Room.

## 2024-09-23 ENCOUNTER — HOSPITAL ENCOUNTER (OUTPATIENT)
Dept: RADIOLOGY | Facility: HOSPITAL | Age: 64
Discharge: HOME | End: 2024-09-23
Attending: INTERNAL MEDICINE
Payer: MEDICARE

## 2024-09-23 DIAGNOSIS — C91.40 HAIRY CELL LEUKEMIA NOT HAVING ACHIEVED REMISSION: Primary | ICD-10-CM

## 2024-09-23 DIAGNOSIS — C91.40 HAIRY CELL LEUKEMIA NOT HAVING ACHIEVED REMISSION: ICD-10-CM

## 2024-09-23 DIAGNOSIS — R05.1 ACUTE COUGH: ICD-10-CM

## 2024-09-23 PROCEDURE — 71046 X-RAY EXAM CHEST 2 VIEWS: CPT | Mod: 26,,, | Performed by: RADIOLOGY

## 2024-09-23 PROCEDURE — 71046 X-RAY EXAM CHEST 2 VIEWS: CPT | Mod: TC,PO

## 2024-10-07 ENCOUNTER — CLINICAL SUPPORT (OUTPATIENT)
Facility: CLINIC | Age: 64
End: 2024-10-07
Payer: MEDICARE

## 2024-10-07 ENCOUNTER — NURSE TRIAGE (OUTPATIENT)
Dept: ADMINISTRATIVE | Facility: CLINIC | Age: 64
End: 2024-10-07
Payer: MEDICARE

## 2024-10-07 ENCOUNTER — HOSPITAL ENCOUNTER (OUTPATIENT)
Dept: RADIOLOGY | Facility: HOSPITAL | Age: 64
Discharge: HOME | End: 2024-10-07
Attending: EMERGENCY MEDICINE
Payer: MEDICARE

## 2024-10-07 ENCOUNTER — TELEPHONE (OUTPATIENT)
Dept: FAMILY MEDICINE | Facility: CLINIC | Age: 64
End: 2024-10-07
Payer: MEDICARE

## 2024-10-07 ENCOUNTER — TELEPHONE (OUTPATIENT)
Facility: CLINIC | Age: 64
End: 2024-10-07

## 2024-10-07 DIAGNOSIS — M79.89 RIGHT LEG SWELLING: Primary | ICD-10-CM

## 2024-10-07 DIAGNOSIS — M79.661 PAIN IN RIGHT LOWER LEG: ICD-10-CM

## 2024-10-07 DIAGNOSIS — M79.89 RIGHT LEG SWELLING: ICD-10-CM

## 2024-10-07 PROCEDURE — 93971 EXTREMITY STUDY: CPT | Mod: 26,RT,, | Performed by: RADIOLOGY

## 2024-10-07 PROCEDURE — 99213 OFFICE O/P EST LOW 20 MIN: CPT | Mod: 95,,, | Performed by: EMERGENCY MEDICINE

## 2024-10-07 PROCEDURE — 93971 EXTREMITY STUDY: CPT | Mod: TC,RT

## 2024-10-07 RX ORDER — RIVAROXABAN 15 MG-20MG
KIT ORAL
Qty: 1 EACH | Refills: 0 | Status: SHIPPED | OUTPATIENT
Start: 2024-10-07

## 2024-10-07 NOTE — TELEPHONE ENCOUNTER
"Pt with swelling to his right leg below his knee, wife got concerned as it began to "feel warm"  Wife states swelling is from below the knee to his ankle but not his foot.  Wife states swelling does improve with resting the leg.   Pt did come home from hunting early due to pain and swelling.   Care advice states to see a provider within 4 hours or pcp triage.      Secure chat sent to [facility].  VV was requested by Dr. Iraida Duarte and pt/pts wife agreed, scheduled for 8:40am to give time for pre check to be completed.    Patient and his wife verbally understands, all questions answered, advised to call back for any worsening symptoms or further needs.     Reason for Disposition   [1] Thigh, calf, or ankle swelling AND [2] only 1 side    Additional Information   Negative: SEVERE difficulty breathing (e.g., struggling for each breath, speaks in single words)   Negative: Looks like a broken bone or dislocated joint (e.g., crooked or deformed)   Negative: Sounds like a life-threatening emergency to the triager   Negative: Difficulty breathing at rest   Negative: Entire foot is cool or blue in comparison to other side   Negative: [1] Can't walk or can barely walk AND [2] new-onset   Negative: [1] Difficulty breathing with exertion (e.g., walking) AND [2] new-onset or getting worse   Negative: [1] Red area or streak AND [2] fever   Negative: [1] Swelling is painful to touch AND [2] fever   Negative: [1] Cast on leg or ankle AND [2] now increased pain   Negative: Patient sounds very sick or weak to the triager   Negative: SEVERE leg swelling (e.g., swelling extends above knee, entire leg is swollen, weeping fluid)   Negative: [1] Red area or streak [2] large (> 2 in. or 5 cm)   Negative: [1] Thigh or calf pain AND [2] only 1 side AND [3] present > 1 hour    Protocols used: Leg Swelling and Edema-A-AH    "
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
## 2024-10-07 NOTE — TELEPHONE ENCOUNTER
Scheduled Ultrasound of Lower Extremities- Right at Hospital off [facility] ([address], [place] [address]) this morning for 10:00 a.m. as ordered by JENNIFER SARAVIA. Called and spoke to patient's spouse, Richie Ingram, and informed her of this and she verbalized understanding and stated this appointment date/time works for them.

## 2024-10-07 NOTE — TELEPHONE ENCOUNTER
Patient is ordered for ultrasound of right lower extremity veins to evaluate for DVT.  Ultrasound showed acute on chronic DVT.  Patient called and informed of findings.  He continues to deny chest pain, shortness of breath, palpitations.  He denies bleeding.  Chart reviewed and patient is most recent CBC had platelets of 99.  Patient does not have history of renal insufficiency.  Patient ordered for anticoagulant to begin and referred to heme/on and primary phsician.  Patient and wife counseled to monitor for signs of bleeding, given instructions to report to nearest emergency department for chest pain, shortness of breath, palpitations, syncope, significant color changes in the leg. counseled on supportive care, appropriate medication usage, concerning symptoms for which to return to ER and the importance of follow up. Understanding and agreement with treatment plan was expressed.     This chart was completed using dictation software, as a result there may be some transcription errors.

## 2024-10-07 NOTE — TELEPHONE ENCOUNTER
Pt is c/o of right leg issues at the calf and sometimes knee off and on for a month. He is wanting to be worked in this week

## 2024-10-09 ENCOUNTER — OFFICE VISIT (OUTPATIENT)
Dept: FAMILY MEDICINE | Facility: CLINIC | Age: 64
End: 2024-10-09
Payer: MEDICARE

## 2024-10-09 VITALS
HEART RATE: 73 BPM | WEIGHT: 186.63 LBS | SYSTOLIC BLOOD PRESSURE: 118 MMHG | OXYGEN SATURATION: 98 % | DIASTOLIC BLOOD PRESSURE: 70 MMHG | BODY MASS INDEX: 26.03 KG/M2

## 2024-10-09 DIAGNOSIS — I82.4Z1 LOWER LEG DVT (DEEP VENOUS THROMBOEMBOLISM), ACUTE, RIGHT: Primary | ICD-10-CM

## 2024-10-09 DIAGNOSIS — C91.40 HAIRY CELL LEUKEMIA NOT HAVING ACHIEVED REMISSION: ICD-10-CM

## 2024-10-09 PROCEDURE — 3008F BODY MASS INDEX DOCD: CPT | Mod: CPTII,S$GLB,, | Performed by: FAMILY MEDICINE

## 2024-10-09 PROCEDURE — G2211 COMPLEX E/M VISIT ADD ON: HCPCS | Mod: S$GLB,,, | Performed by: FAMILY MEDICINE

## 2024-10-09 PROCEDURE — 3074F SYST BP LT 130 MM HG: CPT | Mod: CPTII,S$GLB,, | Performed by: FAMILY MEDICINE

## 2024-10-09 PROCEDURE — 99999 PR PBB SHADOW E&M-EST. PATIENT-LVL III: CPT | Mod: PBBFAC,,, | Performed by: FAMILY MEDICINE

## 2024-10-09 PROCEDURE — 3044F HG A1C LEVEL LT 7.0%: CPT | Mod: CPTII,S$GLB,, | Performed by: FAMILY MEDICINE

## 2024-10-09 PROCEDURE — 3078F DIAST BP <80 MM HG: CPT | Mod: CPTII,S$GLB,, | Performed by: FAMILY MEDICINE

## 2024-10-09 PROCEDURE — 99214 OFFICE O/P EST MOD 30 MIN: CPT | Mod: S$GLB,,, | Performed by: FAMILY MEDICINE

## 2024-10-09 PROCEDURE — 1159F MED LIST DOCD IN RCRD: CPT | Mod: CPTII,S$GLB,, | Performed by: FAMILY MEDICINE
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
## 2024-10-14 ENCOUNTER — PATIENT MESSAGE (OUTPATIENT)
Dept: HEMATOLOGY/ONCOLOGY | Facility: CLINIC | Age: 64
End: 2024-10-14
Payer: MEDICARE

## 2024-10-14 ENCOUNTER — TELEPHONE (OUTPATIENT)
Dept: RADIOLOGY | Facility: HOSPITAL | Age: 64
End: 2024-10-14
Payer: MEDICARE

## 2024-10-14 ENCOUNTER — LAB VISIT (OUTPATIENT)
Dept: LAB | Facility: HOSPITAL | Age: 64
End: 2024-10-14
Attending: INTERNAL MEDICINE
Payer: MEDICARE

## 2024-10-14 DIAGNOSIS — D61.818 PANCYTOPENIA: ICD-10-CM

## 2024-10-14 DIAGNOSIS — D68.9 COAGULATION DEFECT: ICD-10-CM

## 2024-10-14 DIAGNOSIS — H54.40 BLINDNESS OF LEFT EYE WITH NORMAL VISION IN CONTRALATERAL EYE: ICD-10-CM

## 2024-10-14 DIAGNOSIS — D68.9 COAGULATION DEFECT: Primary | ICD-10-CM

## 2024-10-14 DIAGNOSIS — M79.89 RIGHT LEG SWELLING: Primary | ICD-10-CM

## 2024-10-14 DIAGNOSIS — C91.40 HAIRY CELL LEUKEMIA NOT HAVING ACHIEVED REMISSION: ICD-10-CM

## 2024-10-14 LAB
ALBUMIN SERPL BCP-MCNC: 3.4 G/DL (ref 3.5–5.2)
ALP SERPL-CCNC: 79 U/L (ref 55–135)
ALT SERPL W/O P-5'-P-CCNC: 8 U/L (ref 10–44)
ANION GAP SERPL CALC-SCNC: 8 MMOL/L (ref 8–16)
AST SERPL-CCNC: 19 U/L (ref 10–40)
BASOPHILS # BLD AUTO: 0.01 K/UL (ref 0–0.2)
BASOPHILS NFR BLD: 0.2 % (ref 0–1.9)
BILIRUB SERPL-MCNC: 0.3 MG/DL (ref 0.1–1)
BUN SERPL-MCNC: 19 MG/DL (ref 8–23)
CALCIUM SERPL-MCNC: 9.2 MG/DL (ref 8.7–10.5)
CHLORIDE SERPL-SCNC: 106 MMOL/L (ref 95–110)
CO2 SERPL-SCNC: 24 MMOL/L (ref 23–29)
CREAT SERPL-MCNC: 0.9 MG/DL (ref 0.5–1.4)
CRP SERPL-MCNC: 11.1 MG/L (ref 0–8.2)
DIFFERENTIAL METHOD BLD: ABNORMAL
EOSINOPHIL # BLD AUTO: 0.1 K/UL (ref 0–0.5)
EOSINOPHIL NFR BLD: 1.5 % (ref 0–8)
ERYTHROCYTE [DISTWIDTH] IN BLOOD BY AUTOMATED COUNT: 13.8 % (ref 11.5–14.5)
EST. GFR  (NO RACE VARIABLE): >60 ML/MIN/1.73 M^2
FERRITIN SERPL-MCNC: 473 NG/ML (ref 20–300)
GLUCOSE SERPL-MCNC: 95 MG/DL (ref 70–110)
HCT VFR BLD AUTO: 43.3 % (ref 40–54)
HGB BLD-MCNC: 14.5 G/DL (ref 14–18)
IMM GRANULOCYTES # BLD AUTO: 0.03 K/UL (ref 0–0.04)
IMM GRANULOCYTES NFR BLD AUTO: 0.7 % (ref 0–0.5)
INR PPP: 1.4 (ref 0.8–1.2)
IRON SERPL-MCNC: 78 UG/DL (ref 45–160)
LDH SERPL L TO P-CCNC: 244 U/L (ref 110–260)
LYMPHOCYTES # BLD AUTO: 2.1 K/UL (ref 1–4.8)
LYMPHOCYTES NFR BLD: 52.6 % (ref 18–48)
MCH RBC QN AUTO: 31.1 PG (ref 27–31)
MCHC RBC AUTO-ENTMCNC: 33.5 G/DL (ref 32–36)
MCV RBC AUTO: 93 FL (ref 82–98)
MONOCYTES # BLD AUTO: 0.1 K/UL (ref 0.3–1)
MONOCYTES NFR BLD: 3 % (ref 4–15)
NEUTROPHILS # BLD AUTO: 1.7 K/UL (ref 1.8–7.7)
NEUTROPHILS NFR BLD: 42 % (ref 38–73)
NRBC BLD-RTO: 0 /100 WBC
PLATELET # BLD AUTO: 123 K/UL (ref 150–450)
PMV BLD AUTO: 10.4 FL (ref 9.2–12.9)
POTASSIUM SERPL-SCNC: 4.7 MMOL/L (ref 3.5–5.1)
PROT SERPL-MCNC: 7.3 G/DL (ref 6–8.4)
PROTHROMBIN TIME: 15.1 SEC (ref 9–12.5)
RBC # BLD AUTO: 4.66 M/UL (ref 4.6–6.2)
SATURATED IRON: 24 % (ref 20–50)
SODIUM SERPL-SCNC: 138 MMOL/L (ref 136–145)
TOTAL IRON BINDING CAPACITY: 324 UG/DL (ref 250–450)
TRANSFERRIN SERPL-MCNC: 219 MG/DL (ref 200–375)
WBC # BLD AUTO: 4.05 K/UL (ref 3.9–12.7)

## 2024-10-14 PROCEDURE — 85610 PROTHROMBIN TIME: CPT | Performed by: INTERNAL MEDICINE

## 2024-10-14 PROCEDURE — 83540 ASSAY OF IRON: CPT | Performed by: INTERNAL MEDICINE

## 2024-10-14 PROCEDURE — 86140 C-REACTIVE PROTEIN: CPT | Performed by: INTERNAL MEDICINE

## 2024-10-14 PROCEDURE — 82728 ASSAY OF FERRITIN: CPT | Performed by: INTERNAL MEDICINE

## 2024-10-14 PROCEDURE — 80053 COMPREHEN METABOLIC PANEL: CPT | Performed by: INTERNAL MEDICINE

## 2024-10-14 PROCEDURE — 85025 COMPLETE CBC W/AUTO DIFF WBC: CPT | Performed by: INTERNAL MEDICINE

## 2024-10-14 PROCEDURE — 83615 LACTATE (LD) (LDH) ENZYME: CPT | Performed by: INTERNAL MEDICINE

## 2024-10-14 NOTE — TELEPHONE ENCOUNTER
Interventional Radiology  Called pt. and confirmed his appointment tomorrow, 10/15/24 at 1:00PM.  Pt. is to arrive by 11:30AM to the hospital ([address]/ [address]) off [facility] in [place].  He confirmed he will have a ride and be NPO after 4:00AM tomorrow.  Explained that he can take morning medications the day of the procedure with a small sip of water.  Pt. is on Xarelto and does not take any medication that needs to be stopped prior to this procedure.  He verbalized understanding of all instructions.

## 2024-10-15 ENCOUNTER — HOSPITAL ENCOUNTER (OUTPATIENT)
Facility: HOSPITAL | Age: 64
LOS: 1 days | Discharge: HOME | End: 2024-10-16
Attending: FAMILY MEDICINE | Admitting: FAMILY MEDICINE
Payer: MEDICARE

## 2024-10-15 ENCOUNTER — HOSPITAL ENCOUNTER (OUTPATIENT)
Dept: RADIOLOGY | Facility: HOSPITAL | Age: 64
Discharge: HOME | End: 2024-10-15
Attending: PHYSICIAN ASSISTANT
Payer: MEDICARE

## 2024-10-15 VITALS
SYSTOLIC BLOOD PRESSURE: 120 MMHG | OXYGEN SATURATION: 92 % | BODY MASS INDEX: 25.06 KG/M2 | TEMPERATURE: 99 F | RESPIRATION RATE: 18 BRPM | HEART RATE: 74 BPM | WEIGHT: 179 LBS | HEIGHT: 71 IN | DIASTOLIC BLOOD PRESSURE: 69 MMHG

## 2024-10-15 DIAGNOSIS — M79.89 RIGHT LEG SWELLING: ICD-10-CM

## 2024-10-15 DIAGNOSIS — R07.9 CHEST PAIN: ICD-10-CM

## 2024-10-15 DIAGNOSIS — I82.409 DVT (DEEP VENOUS THROMBOSIS): ICD-10-CM

## 2024-10-15 PROBLEM — I82.401 ACUTE DEEP VEIN THROMBOSIS (DVT) OF RIGHT LOWER EXTREMITY: Status: ACTIVE | Noted: 2024-10-15

## 2024-10-15 LAB
POC ACTIVATED CLOTTING TIME K: 232 SEC (ref 74–137)
SAMPLE: ABNORMAL

## 2024-10-15 PROCEDURE — C1887 CATHETER, GUIDING: HCPCS

## 2024-10-15 PROCEDURE — 63600175 PHARM REV CODE 636 W HCPCS: Performed by: RADIOLOGY

## 2024-10-15 PROCEDURE — 25000003 PHARM REV CODE 250: Performed by: NURSE PRACTITIONER

## 2024-10-15 PROCEDURE — C1894 INTRO/SHEATH, NON-LASER: HCPCS

## 2024-10-15 PROCEDURE — 25500020 PHARM REV CODE 255: Performed by: PHYSICIAN ASSISTANT

## 2024-10-15 RX ORDER — SODIUM CHLORIDE 0.9 % (FLUSH) 0.9 %
10 SYRINGE (ML) INJECTION EVERY 12 HOURS PRN
Status: DISCONTINUED | OUTPATIENT
Start: 2024-10-15 | End: 2024-10-16 | Stop reason: HOSPADM

## 2024-10-15 RX ORDER — NALOXONE HCL 0.4 MG/ML
0.02 VIAL (ML) INJECTION
Status: DISCONTINUED | OUTPATIENT
Start: 2024-10-15 | End: 2024-10-16 | Stop reason: HOSPADM

## 2024-10-15 RX ORDER — FENTANYL CITRATE 50 UG/ML
INJECTION, SOLUTION INTRAMUSCULAR; INTRAVENOUS CODE/TRAUMA/SEDATION MEDICATION
Status: COMPLETED | OUTPATIENT
Start: 2024-10-15 | End: 2024-10-15

## 2024-10-15 RX ORDER — RIVAROXABAN 15 MG/1
15 TABLET, FILM COATED ORAL 2 TIMES DAILY
COMMUNITY
Start: 2024-10-10

## 2024-10-15 RX ORDER — GLUCAGON 1 MG
1 KIT INJECTION
Status: DISCONTINUED | OUTPATIENT
Start: 2024-10-15 | End: 2024-10-16 | Stop reason: HOSPADM

## 2024-10-15 RX ORDER — HEPARIN SODIUM 1000 [USP'U]/ML
INJECTION, SOLUTION INTRAVENOUS; SUBCUTANEOUS CODE/TRAUMA/SEDATION MEDICATION
Status: COMPLETED | OUTPATIENT
Start: 2024-10-15 | End: 2024-10-15

## 2024-10-15 RX ORDER — IBUPROFEN 200 MG
24 TABLET ORAL
Status: DISCONTINUED | OUTPATIENT
Start: 2024-10-15 | End: 2024-10-16 | Stop reason: HOSPADM

## 2024-10-15 RX ORDER — ONDANSETRON HYDROCHLORIDE 2 MG/ML
4 INJECTION, SOLUTION INTRAVENOUS EVERY 8 HOURS PRN
Status: DISCONTINUED | OUTPATIENT
Start: 2024-10-15 | End: 2024-10-16 | Stop reason: HOSPADM

## 2024-10-15 RX ORDER — MIDAZOLAM HYDROCHLORIDE 1 MG/ML
INJECTION, SOLUTION INTRAMUSCULAR; INTRAVENOUS CODE/TRAUMA/SEDATION MEDICATION
Status: COMPLETED | OUTPATIENT
Start: 2024-10-15 | End: 2024-10-15

## 2024-10-15 RX ORDER — PROCHLORPERAZINE EDISYLATE 5 MG/ML
5 INJECTION INTRAMUSCULAR; INTRAVENOUS EVERY 6 HOURS PRN
Status: DISCONTINUED | OUTPATIENT
Start: 2024-10-15 | End: 2024-10-16 | Stop reason: HOSPADM

## 2024-10-15 RX ORDER — IBUPROFEN 200 MG
16 TABLET ORAL
Status: DISCONTINUED | OUTPATIENT
Start: 2024-10-15 | End: 2024-10-16 | Stop reason: HOSPADM

## 2024-10-15 RX ORDER — IPRATROPIUM BROMIDE AND ALBUTEROL SULFATE 2.5; .5 MG/3ML; MG/3ML
3 SOLUTION RESPIRATORY (INHALATION) EVERY 6 HOURS PRN
Status: DISCONTINUED | OUTPATIENT
Start: 2024-10-15 | End: 2024-10-16 | Stop reason: HOSPADM

## 2024-10-15 RX ORDER — PANTOPRAZOLE SODIUM 40 MG/1
40 TABLET, DELAYED RELEASE ORAL DAILY
Status: DISCONTINUED | OUTPATIENT
Start: 2024-10-16 | End: 2024-10-16 | Stop reason: HOSPADM

## 2024-10-15 RX ORDER — ACETAMINOPHEN 325 MG/1
650 TABLET ORAL EVERY 4 HOURS PRN
Status: DISCONTINUED | OUTPATIENT
Start: 2024-10-15 | End: 2024-10-16 | Stop reason: HOSPADM

## 2024-10-15 RX ORDER — RIVAROXABAN 20 MG/1
20 TABLET, FILM COATED ORAL DAILY
COMMUNITY
Start: 2024-10-08 | End: 2024-10-29 | Stop reason: SDUPTHER

## 2024-10-15 RX ORDER — TALC
6 POWDER (GRAM) TOPICAL NIGHTLY PRN
Status: DISCONTINUED | OUTPATIENT
Start: 2024-10-15 | End: 2024-10-16 | Stop reason: HOSPADM

## 2024-10-15 RX ORDER — ALUMINUM HYDROXIDE, MAGNESIUM HYDROXIDE, AND SIMETHICONE 1200; 120; 1200 MG/30ML; MG/30ML; MG/30ML
30 SUSPENSION ORAL 4 TIMES DAILY PRN
Status: DISCONTINUED | OUTPATIENT
Start: 2024-10-15 | End: 2024-10-16 | Stop reason: HOSPADM

## 2024-10-15 RX ORDER — POLYETHYLENE GLYCOL 3350 17 G/17G
17 POWDER, FOR SOLUTION ORAL DAILY
Status: DISCONTINUED | OUTPATIENT
Start: 2024-10-16 | End: 2024-10-16 | Stop reason: HOSPADM

## 2024-10-15 RX ADMIN — IOHEXOL 60 ML: 300 INJECTION, SOLUTION INTRAVENOUS at 04:10

## 2024-10-15 RX ADMIN — HEPARIN SODIUM 5000 UNITS: 1000 INJECTION, SOLUTION INTRAVENOUS; SUBCUTANEOUS at 03:10

## 2024-10-15 RX ADMIN — FENTANYL CITRATE 50 MCG: 50 INJECTION, SOLUTION INTRAMUSCULAR; INTRAVENOUS at 03:10

## 2024-10-15 RX ADMIN — RIVAROXABAN 15 MG: 15 TABLET, FILM COATED ORAL at 09:10

## 2024-10-15 RX ADMIN — MIDAZOLAM HYDROCHLORIDE 1 MG: 1 INJECTION, SOLUTION INTRAMUSCULAR; INTRAVENOUS at 03:10

## 2024-10-15 RX ADMIN — HEPARIN SODIUM 3000 UNITS: 1000 INJECTION, SOLUTION INTRAVENOUS; SUBCUTANEOUS at 03:10

## 2024-10-15 NOTE — NURSING
Pt d/c from recovery to telemetry; NADN; no c/o pain; VSS; pulses palpable in the rt foot +2 and audible with doppler to the lt foot; report called to Nurse Ayana on telemetry

## 2024-10-15 NOTE — OP NOTE
Radiology Post-Procedure Note    Pre Op Diagnosis: Acute on chronic RLE DVT with pain.   Post Op Diagnosis: Same    Procedure: RLE thrombectomy.     Procedure performed by: Dr Aaron Shahid    Written Informed Consent Obtained: Yes  Specimen Removed: NO  Estimated Blood Loss: Minimal    No complication.    Findings:   Technically successful RLE DVT thrombectomy.  Venotomy closure device will need to be removed before discharge tomorrow.     Patient tolerated procedure well.    Aaron Shahid MD  Interventional Radiology

## 2024-10-15 NOTE — DISCHARGE SUMMARY
[facility] (Hospital)  Discharge Note  Short Stay    IR Thrombectomy [facility] Thrombolysis a      OUTCOME: Patient tolerated treatment/procedure well without complication and is now ready for discharge.    DISPOSITION: Home or Self Care    FINAL DIAGNOSIS:  <principal problem not specified>    FOLLOWUP: In clinic    DISCHARGE INSTRUCTIONS:  No discharge procedures on file.     TIME SPENT ON DISCHARGE: 15 minutes    Pre Op Diagnosis: right leg DVT     Post Op Diagnosis: same     Procedure:  thrombectomy     Procedure performed by: Zehra SARAVIA, Flaco GIRON     Written Informed Consent Obtained: Yes     Specimen Removed:  yes     Estimated Blood Loss:  minimal     Findings: Local anesthesia     Sedation:  yes     The patient tolerated the procedure well and there were no complications.      Disposition:  F/U in clinic or with ordering physician    Discharge instructions:  Light activity for 24 hours.  Remove band aid in 24 hours.  No baths (showers are appropriate).      Sterile technique was performed in the posterior right knee, lidocaine was used as a local anesthetic.  Successful RLE thrombectomy.  Pt tolerated the procedure well without immediate complications.  Please see radiologist report for details. F/u with PCP and/or ordering physician.
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
## 2024-10-15 NOTE — ASSESSMENT & PLAN NOTE
Underwent thrombectomy on 10/15 with IR. Recommended for overnight observation following procedure. Elevated risk if DVT due to hx of Hairy Cell Leukemia    --Tele  --Vitals per protocol  --Continue Xarelto 15mg PO BID with meals  --F/U with Hem/Onc as OP  --Pain med per MAR PRN
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
## 2024-10-15 NOTE — HPI
64 year old male, Comorbid conditions: HLD, Prediabetes, Hairy Cell Leukemia. S/P thrombectomy for acute DVT to RLE. Patient tolerated procedure well with no significant complications. IR recommended to observe patient overnight. Hemodynamically stable following procedure. Denies significant pain. NVI intact to RLE. Patient is a full code. Placed in Outpatient Extended Recovery following Thrombectomy under the care of [facility].

## 2024-10-16 VITALS
TEMPERATURE: 97 F | HEIGHT: 71 IN | DIASTOLIC BLOOD PRESSURE: 93 MMHG | RESPIRATION RATE: 19 BRPM | BODY MASS INDEX: 25.06 KG/M2 | HEART RATE: 77 BPM | SYSTOLIC BLOOD PRESSURE: 137 MMHG | WEIGHT: 179 LBS | OXYGEN SATURATION: 96 %

## 2024-10-16 LAB
ALBUMIN SERPL BCP-MCNC: 3.3 G/DL (ref 3.5–5.2)
ALP SERPL-CCNC: 79 U/L (ref 55–135)
ALT SERPL W/O P-5'-P-CCNC: 13 U/L (ref 10–44)
ANION GAP SERPL CALC-SCNC: 8 MMOL/L (ref 8–16)
AST SERPL-CCNC: 25 U/L (ref 10–40)
BASOPHILS # BLD AUTO: 0.01 K/UL (ref 0–0.2)
BASOPHILS NFR BLD: 0.3 % (ref 0–1.9)
BILIRUB SERPL-MCNC: 0.5 MG/DL (ref 0.1–1)
BUN SERPL-MCNC: 17 MG/DL (ref 8–23)
CALCIUM SERPL-MCNC: 8.7 MG/DL (ref 8.7–10.5)
CHLORIDE SERPL-SCNC: 106 MMOL/L (ref 95–110)
CO2 SERPL-SCNC: 24 MMOL/L (ref 23–29)
CREAT SERPL-MCNC: 1.1 MG/DL (ref 0.5–1.4)
DIFFERENTIAL METHOD BLD: ABNORMAL
EOSINOPHIL # BLD AUTO: 0.1 K/UL (ref 0–0.5)
EOSINOPHIL NFR BLD: 1.5 % (ref 0–8)
ERYTHROCYTE [DISTWIDTH] IN BLOOD BY AUTOMATED COUNT: 13.9 % (ref 11.5–14.5)
EST. GFR  (NO RACE VARIABLE): >60 ML/MIN/1.73 M^2
GLUCOSE SERPL-MCNC: 103 MG/DL (ref 70–110)
HCT VFR BLD AUTO: 43.7 % (ref 40–54)
HGB BLD-MCNC: 14.6 G/DL (ref 14–18)
IMM GRANULOCYTES # BLD AUTO: 0.03 K/UL (ref 0–0.04)
IMM GRANULOCYTES NFR BLD AUTO: 0.8 % (ref 0–0.5)
LYMPHOCYTES # BLD AUTO: 1.7 K/UL (ref 1–4.8)
LYMPHOCYTES NFR BLD: 43.2 % (ref 18–48)
MAGNESIUM SERPL-MCNC: 1.9 MG/DL (ref 1.6–2.6)
MCH RBC QN AUTO: 31 PG (ref 27–31)
MCHC RBC AUTO-ENTMCNC: 33.4 G/DL (ref 32–36)
MCV RBC AUTO: 93 FL (ref 82–98)
MONOCYTES # BLD AUTO: 0.1 K/UL (ref 0.3–1)
MONOCYTES NFR BLD: 2.3 % (ref 4–15)
NEUTROPHILS # BLD AUTO: 2.1 K/UL (ref 1.8–7.7)
NEUTROPHILS NFR BLD: 51.9 % (ref 38–73)
NRBC BLD-RTO: 0 /100 WBC
PHOSPHATE SERPL-MCNC: 3.5 MG/DL (ref 2.7–4.5)
PLATELET # BLD AUTO: 112 K/UL (ref 150–450)
PMV BLD AUTO: 10.3 FL (ref 9.2–12.9)
POTASSIUM SERPL-SCNC: 4.7 MMOL/L (ref 3.5–5.1)
PROT SERPL-MCNC: 7.1 G/DL (ref 6–8.4)
RBC # BLD AUTO: 4.71 M/UL (ref 4.6–6.2)
SODIUM SERPL-SCNC: 138 MMOL/L (ref 136–145)
WBC # BLD AUTO: 3.98 K/UL (ref 3.9–12.7)

## 2024-10-16 PROCEDURE — 80053 COMPREHEN METABOLIC PANEL: CPT | Performed by: NURSE PRACTITIONER

## 2024-10-16 PROCEDURE — 85025 COMPLETE CBC W/AUTO DIFF WBC: CPT | Performed by: NURSE PRACTITIONER

## 2024-10-16 PROCEDURE — 36415 COLL VENOUS BLD VENIPUNCTURE: CPT | Performed by: NURSE PRACTITIONER

## 2024-10-16 PROCEDURE — 25000003 PHARM REV CODE 250: Performed by: NURSE PRACTITIONER

## 2024-10-16 PROCEDURE — 84100 ASSAY OF PHOSPHORUS: CPT | Performed by: NURSE PRACTITIONER

## 2024-10-16 PROCEDURE — 83735 ASSAY OF MAGNESIUM: CPT | Performed by: NURSE PRACTITIONER

## 2024-10-16 RX ADMIN — RIVAROXABAN 15 MG: 15 TABLET, FILM COATED ORAL at 08:10

## 2024-10-16 RX ADMIN — PANTOPRAZOLE SODIUM 40 MG: 40 TABLET, DELAYED RELEASE ORAL at 08:10

## 2024-10-16 NOTE — HOSPITAL COURSE
10/16  Admitted for outpatient extended recovery after thrombectomy per interventional radiology. Flowstasis removed and discharge instructions provided by interventional radiology.     No acute distress. No respiratory distress. Normal heart rate. Soft, non tender abdomen. On room air. AO3. Right leg wrapped in ace bandage. Wife at bedside.    Patient seen and evaluated by me. Patient was determined to be suitable for discharge. Patient deemed stable for discharge to home with nurse practitioner to visit home program.

## 2024-10-16 NOTE — NURSING
Discharge instructions received and reviewed with pt and family at bedside with megan nurseStella.   Pt voiced understanding and all questions answered to satisfaction.  Stressed importance to making and keeping all follow up appointments.  Medications sent to pt pharmacy and reviewed with pt.  Tele monitor removed and brought to monitor tech.  IV d/c'd with tip intact, pressure dressing applied.  Pt transported to front of hospital via w/c by PCT to be discharged home.

## 2024-10-16 NOTE — PLAN OF CARE
[facility] - Telemetry (Hospital)  Discharge Final Note    Primary Care Provider: Brian Soares MD    Expected Discharge Date: 10/16/2024    Final Discharge Note (most recent)       Final Note - 10/16/24 1141          Final Note    Assessment Type Final Discharge Note     Anticipated Discharge Disposition Home or Self Care     Hospital Resources/Appts/Education Provided Post-Acute resouces added to AVS        Post-Acute Status    Discharge Delays None known at this time                   Pt to discharge home today.   Hospital follow up scheduled with PCP on AVS: Hospital Follow Up with Brian Soares MD  Tuesday Oct 22, 2024 8:40 AM    Important Message from Medicare             Contact Info       Brian Soares MD   Specialty: Family Medicine   Relationship: PCP - General    [id number] [place] [address]   Phone: [phone]       Next Steps: Schedule an appointment as soon as possible for a visit in 3 day(s)    Instructions: hospital follow up

## 2024-10-16 NOTE — PLAN OF CARE
[facility] - Tele[facility] (Hospital)  Discharge Assessment    Primary Care Provider: Brian Soares MD     Discharge Assessment (most recent)       BRIEF DISCHARGE ASSESSMENT - 10/16/24 1140          Discharge Planning    Assessment Type Discharge Planning Brief Assessment     Resource/Environmental Concerns none     Support Systems Spouse/significant other;Family members     Assistance Needed NA     Equipment Currently Used at Home none     Current Living Arrangements home     Patient/Family Anticipates Transition to home;home with family     Patient/Family Anticipated Services at Transition none     DME Needed Upon Discharge  none     Discharge Plan A Home     Discharge Plan B Home                   No CM needs expressed during assessment. Pt anticipated to DC home today.     Pt's whiteboard updated with CM contact and anticipated discharge disposition. SW to remain available as needed.

## 2024-10-16 NOTE — DISCHARGE SUMMARY
[facility] ([facility])  [facility]  Discharge Summary      Patient Name: Armando Ingram Jr.  MRN: [id number]  DEEP: [id number]  Patient Class: OP- Outpatient Recovery  Admission Date: 10/15/2024  Hospital Length of Stay: 1 days  Discharge Date and Time:  10/16/2024 11:19 AM  Attending Physician: Lonnie Schaefer MD   Discharging Provider: Lonnie Schaefer MD  Primary Care Provider: Brian Soares MD    Primary Care Team: Networked reference to record PCT     HPI:   64 year old male, Comorbid conditions: HLD, Prediabetes, Hairy Cell Leukemia. S/P thrombectomy for acute DVT to RLE. Patient tolerated procedure well with no significant complications. IR recommended to observe patient overnight. Hemodynamically stable following procedure. Denies significant pain. NVI intact to RLE. Patient is a full code. Placed in Outpatient Extended Recovery following Thrombectomy under the care of [facility].     * No surgery found *      Hospital Course:   10/16  Admitted for outpatient extended recovery after thrombectomy per interventional radiology. Flowstasis removed and discharge instructions provided by interventional radiology.     No acute distress. No respiratory distress. Normal heart rate. Soft, non tender abdomen. On room air. AO3. Right leg wrapped in ace bandage. Wife at bedside.    Patient seen and evaluated by me. Patient was determined to be suitable for discharge. Patient deemed stable for discharge to home with nurse practitioner to visit home program.       Goals of Care Treatment Preferences:  Code Status: Full Code         Consults:     No new Assessment & Plan notes have been filed under this hospital service since the last note was generated.  Service: Hospital Medicine    Final Active Diagnoses:    Diagnosis Date Noted POA    PRINCIPAL PROBLEM:  Acute deep vein thrombosis (DVT) of right lower extremity [I82.401] 10/15/2024 Yes    Hairy cell leukemia [C91.40] 06/06/2024 Yes     Hypercholesterolemia [E78.00] 03/09/2018 Yes      Problems Resolved During this Admission:       Discharged Condition: stable    Disposition:     Follow Up:   Follow-up Information       Brian Soares MD. Schedule an appointment as soon as possible for a visit in 3 day(s).    Specialty: Family Medicine  Why: hospital follow up  Contact information:  [address] [address] [id number] [phone]                           Patient Instructions:      Ambulatory referral/consult to [facility]       Significant Diagnostic Studies: Labs: CMP   Recent Labs   Lab 10/16/24  0518      K 4.7      CO2 24      BUN 17   CREATININE 1.1   CALCIUM 8.7   PROT 7.1   ALBUMIN 3.3*   BILITOT 0.5   ALKPHOS 79   AST 25   ALT 13   ANIONGAP 8   , CBC   Recent Labs   Lab 10/16/24  0518   WBC 3.98   HGB 14.6   HCT 43.7   *   , INR   Lab Results   Component Value Date    INR 1.4 (H) 10/14/2024    INR 1.0 05/30/2024    INR 1.1 01/26/2014   , Lipid Panel   Lab Results   Component Value Date    CHOL 216 (H) 04/22/2024    HDL 48 04/22/2024    LDLCALC 150.0 04/22/2024    TRIG 90 04/22/2024    CHOLHDL 22.2 04/22/2024   , and All labs within the past 24 hours have been reviewed  Radiology: Ultrasound: lower extremity veins right  Interventional radiology thrombectomy     Pending Diagnostic Studies:       None           Medications:  Reconciled Home Medications:      Medication List        CONTINUE taking these medications      multivitamin per tablet  Commonly known as: THERAGRAN  Take 1 tablet by mouth once daily.     omeprazole 20 MG capsule  Commonly known as: PRILOSEC  Take 20 mg by mouth once daily.     * XARELTO DVT-PE TREAT 30D START 15 mg (42)- 20 mg (9) tablet dose pack  Generic drug: rivaroxaban  Take 1 tablet (15 mg) by mouth twice daily with food for 21 days followed by 1 tablet (20 mg) by mouth once daily with food     * XARELTO 20 mg Tab  Generic drug: rivaroxaban  Take 20 mg by  mouth once daily.     * XARELTO 15 mg Tab  Generic drug: rivaroxaban  Take 15 mg by mouth 2 (two) times daily.           * This list has 3 medication(s) that are the same as other medications prescribed for you. Read the directions carefully, and ask your doctor or other care provider to review them with you.                STOP taking these medications      ferrous sulfate 325 (65 FE) MG EC tablet              Indwelling Lines/Drains at time of discharge:   Lines/Drains/Airways       None                   Time spent on the discharge of patient: 49 minutes         Lonnie Schaefer MD  Department of Hospital Medicine  [facility] Tele[facility] ([facility])
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
## 2024-10-18 ENCOUNTER — TELEPHONE (OUTPATIENT)
Dept: RADIOLOGY | Facility: HOSPITAL | Age: 64
End: 2024-10-18
Payer: MEDICARE

## 2024-10-22 ENCOUNTER — OFFICE VISIT (OUTPATIENT)
Dept: FAMILY MEDICINE | Facility: CLINIC | Age: 64
End: 2024-10-22
Payer: MEDICARE

## 2024-10-22 VITALS
WEIGHT: 186.5 LBS | OXYGEN SATURATION: 97 % | SYSTOLIC BLOOD PRESSURE: 112 MMHG | HEART RATE: 79 BPM | DIASTOLIC BLOOD PRESSURE: 70 MMHG | BODY MASS INDEX: 26.01 KG/M2

## 2024-10-22 DIAGNOSIS — R73.03 PREDIABETES: ICD-10-CM

## 2024-10-22 DIAGNOSIS — C91.41 HAIRY CELL LEUKEMIA, IN REMISSION: ICD-10-CM

## 2024-10-22 DIAGNOSIS — Z79.01 CURRENT USE OF LONG TERM ANTICOAGULATION: Primary | ICD-10-CM

## 2024-10-22 DIAGNOSIS — I82.5Y1 CHRONIC DEEP VEIN THROMBOSIS (DVT) OF PROXIMAL VEIN OF RIGHT LOWER EXTREMITY: ICD-10-CM

## 2024-10-22 PROCEDURE — 3078F DIAST BP <80 MM HG: CPT | Mod: CPTII,S$GLB,, | Performed by: FAMILY MEDICINE

## 2024-10-22 PROCEDURE — 1111F DSCHRG MED/CURRENT MED MERGE: CPT | Mod: CPTII,S$GLB,, | Performed by: FAMILY MEDICINE

## 2024-10-22 PROCEDURE — G2211 COMPLEX E/M VISIT ADD ON: HCPCS | Mod: S$GLB,,, | Performed by: FAMILY MEDICINE

## 2024-10-22 PROCEDURE — 99999 PR PBB SHADOW E&M-EST. PATIENT-LVL III: CPT | Mod: PBBFAC,,, | Performed by: FAMILY MEDICINE

## 2024-10-22 PROCEDURE — 3074F SYST BP LT 130 MM HG: CPT | Mod: CPTII,S$GLB,, | Performed by: FAMILY MEDICINE

## 2024-10-22 PROCEDURE — 99214 OFFICE O/P EST MOD 30 MIN: CPT | Mod: S$GLB,,, | Performed by: FAMILY MEDICINE

## 2024-10-22 PROCEDURE — 3008F BODY MASS INDEX DOCD: CPT | Mod: CPTII,S$GLB,, | Performed by: FAMILY MEDICINE

## 2024-10-22 PROCEDURE — 1159F MED LIST DOCD IN RCRD: CPT | Mod: CPTII,S$GLB,, | Performed by: FAMILY MEDICINE

## 2024-10-22 PROCEDURE — 3044F HG A1C LEVEL LT 7.0%: CPT | Mod: CPTII,S$GLB,, | Performed by: FAMILY MEDICINE
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
## 2024-10-29 ENCOUNTER — PATIENT MESSAGE (OUTPATIENT)
Dept: FAMILY MEDICINE | Facility: CLINIC | Age: 64
End: 2024-10-29
Payer: MEDICARE

## 2024-10-29 DIAGNOSIS — Z79.01 CURRENT USE OF LONG TERM ANTICOAGULATION: Primary | ICD-10-CM

## 2024-10-29 RX ORDER — RIVAROXABAN 20 MG/1
20 TABLET, FILM COATED ORAL DAILY
Qty: 30 TABLET | Refills: 5 | Status: SHIPPED | OUTPATIENT
Start: 2024-10-29 | End: 2025-04-27

## 2024-11-04 ENCOUNTER — APPOINTMENT (OUTPATIENT)
Dept: RADIOLOGY | Facility: HOSPITAL | Age: 64
End: 2024-11-04
Attending: FAMILY MEDICINE
Payer: MEDICARE

## 2024-11-04 DIAGNOSIS — Z79.01 CURRENT USE OF LONG TERM ANTICOAGULATION: ICD-10-CM

## 2024-11-04 PROCEDURE — 93971 EXTREMITY STUDY: CPT | Mod: 26,DBM,RT, | Performed by: RADIOLOGY

## 2024-11-04 PROCEDURE — 93971 EXTREMITY STUDY: CPT | Mod: DBM,TC,PO,RT

## 2024-11-08 ENCOUNTER — TELEPHONE (OUTPATIENT)
Dept: HEMATOLOGY/ONCOLOGY | Facility: CLINIC | Age: 64
End: 2024-11-08
Payer: MEDICARE

## 2024-11-12 ENCOUNTER — OFFICE VISIT (OUTPATIENT)
Dept: HEMATOLOGY/ONCOLOGY | Facility: CLINIC | Age: 64
End: 2024-11-12
Payer: MEDICARE

## 2024-11-12 ENCOUNTER — LAB VISIT (OUTPATIENT)
Dept: LAB | Facility: HOSPITAL | Age: 64
End: 2024-11-12
Attending: NURSE PRACTITIONER
Payer: MEDICARE

## 2024-11-12 VITALS
OXYGEN SATURATION: 96 % | SYSTOLIC BLOOD PRESSURE: 119 MMHG | HEIGHT: 71 IN | BODY MASS INDEX: 26.45 KG/M2 | TEMPERATURE: 97 F | HEART RATE: 68 BPM | DIASTOLIC BLOOD PRESSURE: 75 MMHG | WEIGHT: 188.94 LBS

## 2024-11-12 DIAGNOSIS — C91.40 HAIRY CELL LEUKEMIA NOT HAVING ACHIEVED REMISSION: ICD-10-CM

## 2024-11-12 DIAGNOSIS — Z79.899 OTHER LONG TERM (CURRENT) DRUG THERAPY: ICD-10-CM

## 2024-11-12 DIAGNOSIS — I82.401 ACUTE DEEP VEIN THROMBOSIS (DVT) OF RIGHT LOWER EXTREMITY, UNSPECIFIED VEIN: ICD-10-CM

## 2024-11-12 DIAGNOSIS — I82.501 CHRONIC DEEP VEIN THROMBOSIS (DVT) OF RIGHT LOWER EXTREMITY, UNSPECIFIED VEIN: ICD-10-CM

## 2024-11-12 DIAGNOSIS — I82.501 CHRONIC DEEP VEIN THROMBOSIS (DVT) OF RIGHT LOWER EXTREMITY, UNSPECIFIED VEIN: Primary | ICD-10-CM

## 2024-11-12 LAB — HCYS SERPL-SCNC: 6.6 UMOL/L (ref 4–16.5)

## 2024-11-12 PROCEDURE — 3288F FALL RISK ASSESSMENT DOCD: CPT | Mod: CPTII,S$GLB,, | Performed by: NURSE PRACTITIONER

## 2024-11-12 PROCEDURE — 3078F DIAST BP <80 MM HG: CPT | Mod: CPTII,S$GLB,, | Performed by: NURSE PRACTITIONER

## 2024-11-12 PROCEDURE — 85613 RUSSELL VIPER VENOM DILUTED: CPT | Mod: 59 | Performed by: NURSE PRACTITIONER

## 2024-11-12 PROCEDURE — 3074F SYST BP LT 130 MM HG: CPT | Mod: CPTII,S$GLB,, | Performed by: NURSE PRACTITIONER

## 2024-11-12 PROCEDURE — 85303 CLOT INHIBIT PROT C ACTIVITY: CPT | Performed by: NURSE PRACTITIONER

## 2024-11-12 PROCEDURE — 86147 CARDIOLIPIN ANTIBODY EA IG: CPT | Mod: 59 | Performed by: NURSE PRACTITIONER

## 2024-11-12 PROCEDURE — 3008F BODY MASS INDEX DOCD: CPT | Mod: CPTII,S$GLB,, | Performed by: NURSE PRACTITIONER

## 2024-11-12 PROCEDURE — 99214 OFFICE O/P EST MOD 30 MIN: CPT | Mod: S$GLB,,, | Performed by: NURSE PRACTITIONER

## 2024-11-12 PROCEDURE — 83090 ASSAY OF HOMOCYSTEINE: CPT | Performed by: NURSE PRACTITIONER

## 2024-11-12 PROCEDURE — 85730 THROMBOPLASTIN TIME PARTIAL: CPT | Mod: 91 | Performed by: NURSE PRACTITIONER

## 2024-11-12 PROCEDURE — 85670 THROMBIN TIME PLASMA: CPT | Performed by: NURSE PRACTITIONER

## 2024-11-12 PROCEDURE — 99999 PR PBB SHADOW E&M-EST. PATIENT-LVL III: CPT | Mod: PBBFAC,,, | Performed by: NURSE PRACTITIONER

## 2024-11-12 PROCEDURE — 86146 BETA-2 GLYCOPROTEIN ANTIBODY: CPT | Mod: 59 | Performed by: NURSE PRACTITIONER

## 2024-11-12 PROCEDURE — 3044F HG A1C LEVEL LT 7.0%: CPT | Mod: CPTII,S$GLB,, | Performed by: NURSE PRACTITIONER

## 2024-11-12 PROCEDURE — 85610 PROTHROMBIN TIME: CPT | Performed by: NURSE PRACTITIONER

## 2024-11-12 PROCEDURE — 85300 ANTITHROMBIN III ACTIVITY: CPT | Performed by: NURSE PRACTITIONER

## 2024-11-12 PROCEDURE — 86148 ANTI-PHOSPHOLIPID ANTIBODY: CPT | Performed by: NURSE PRACTITIONER

## 2024-11-12 PROCEDURE — 81241 F5 GENE: CPT | Performed by: NURSE PRACTITIONER

## 2024-11-12 PROCEDURE — 1160F RVW MEDS BY RX/DR IN RCRD: CPT | Mod: CPTII,S$GLB,, | Performed by: NURSE PRACTITIONER

## 2024-11-12 PROCEDURE — 81240 F2 GENE: CPT | Performed by: NURSE PRACTITIONER

## 2024-11-12 PROCEDURE — 1111F DSCHRG MED/CURRENT MED MERGE: CPT | Mod: CPTII,S$GLB,, | Performed by: NURSE PRACTITIONER

## 2024-11-12 PROCEDURE — 85306 CLOT INHIBIT PROT S FREE: CPT | Performed by: NURSE PRACTITIONER

## 2024-11-12 PROCEDURE — 86147 CARDIOLIPIN ANTIBODY EA IG: CPT | Performed by: NURSE PRACTITIONER

## 2024-11-12 PROCEDURE — 85520 HEPARIN ASSAY: CPT | Performed by: NURSE PRACTITIONER

## 2024-11-12 PROCEDURE — 1159F MED LIST DOCD IN RCRD: CPT | Mod: CPTII,S$GLB,, | Performed by: NURSE PRACTITIONER

## 2024-11-12 PROCEDURE — 1101F PT FALLS ASSESS-DOCD LE1/YR: CPT | Mod: CPTII,S$GLB,, | Performed by: NURSE PRACTITIONER

## 2024-11-12 NOTE — ASSESSMENT & PLAN NOTE
H/o lower extremity DVT several years back. At which time IVC filter was placed and patient completed 6 months AC with Coumadin. Now with acute on chronic RLE DVT s/p thrombectomy per IR 10/15/2024. Initiated Xarelto 10/8/2024    Continue AC indefinitely. Hypercoagulable workup today. Communicate results via patient portal
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
## 2024-11-12 NOTE — ASSESSMENT & PLAN NOTE
Labs remain stable. No clinical concerns noted. No B symptoms.     F/u 3 months with Dr. oDw, labs prior

## 2024-11-13 LAB — AT III ACT/NOR PPP CHRO: 89 % (ref 83–118)

## 2024-11-14 LAB
PROT C ACT/NOR PPP CHRO: 93 % (ref 70–150)
PROT S ACT/NOR PPP: 139 % (ref 65–150)

## 2024-11-15 LAB
B2 GLYCOPROT1 IGA SER QL: 2.4 U/ML
B2 GLYCOPROT1 IGG SER QL: 2.2 U/ML
B2 GLYCOPROT1 IGM SER QL: 2.5 U/ML
CARDIOLIPIN IGA SER IA-ACNC: 3.7 APL
CARDIOLIPIN IGG SER IA-ACNC: <9.4 GPL (ref 0–14.99)
CARDIOLIPIN IGM SER IA-ACNC: <9.4 MPL (ref 0–12.49)
CONFIRM DRVVT STA-STACLOT: ABNORMAL S
DRVVT SCREEN TO CONFIRM RATIO: 1.26 {RATIO}
F2 C.20210G>A GENO BLD/T: NEGATIVE
F5 P.R506Q BLD/T QL: NEGATIVE
HEPARIN NT PPP QL: ABNORMAL
LA 3 SCREEN W REFLEX-IMP: ABNORMAL
LMW HEPARIN IND PLT AB SER QL: PRESENT
MIXING DRVVT/NORMAL: 1.24 %
NEUTRALIZED DRVVT SCREEN RATIO: 1.55
PROTHROMBIN TIME: 17.5 S (ref 12–15.5)
SCREEN APTT/NORMAL: 1.18
SCREEN APTT/NORMAL: 1.32
SCREEN DRVVT/NORMAL: 3.64 %
THROMBIN TIME: 21.9 S

## 2024-11-19 LAB
PS IGA SER-ACNC: 0 APS (ref 0–19)
PS IGG SER-ACNC: 0 GPS (ref 0–15)
PS IGM SER-ACNC: 0 MPS (ref 0–21)

## 2024-11-22 DIAGNOSIS — D69.6 THROMBOCYTOPENIA, UNSPECIFIED: ICD-10-CM

## 2024-11-22 DIAGNOSIS — I82.501 CHRONIC DEEP VEIN THROMBOSIS (DVT) OF RIGHT LOWER EXTREMITY, UNSPECIFIED VEIN: Primary | ICD-10-CM

## 2024-11-22 DIAGNOSIS — I82.401 ACUTE DEEP VEIN THROMBOSIS (DVT) OF RIGHT LOWER EXTREMITY, UNSPECIFIED VEIN: ICD-10-CM

## 2025-02-12 ENCOUNTER — LAB VISIT (OUTPATIENT)
Dept: LAB | Facility: HOSPITAL | Age: 65
End: 2025-02-12
Attending: INTERNAL MEDICINE
Payer: MEDICARE

## 2025-02-12 DIAGNOSIS — H54.40 BLINDNESS OF LEFT EYE WITH NORMAL VISION IN CONTRALATERAL EYE: ICD-10-CM

## 2025-02-12 DIAGNOSIS — I82.501 CHRONIC DEEP VEIN THROMBOSIS (DVT) OF RIGHT LOWER EXTREMITY, UNSPECIFIED VEIN: ICD-10-CM

## 2025-02-12 DIAGNOSIS — C91.40 HAIRY CELL LEUKEMIA NOT HAVING ACHIEVED REMISSION: ICD-10-CM

## 2025-02-12 LAB
ALBUMIN SERPL BCP-MCNC: 3.5 G/DL (ref 3.5–5.2)
ALP SERPL-CCNC: 77 U/L (ref 40–150)
ALT SERPL W/O P-5'-P-CCNC: 10 U/L (ref 10–44)
ANION GAP SERPL CALC-SCNC: 8 MMOL/L (ref 8–16)
AST SERPL-CCNC: 21 U/L (ref 10–40)
BASOPHILS # BLD AUTO: 0.01 K/UL (ref 0–0.2)
BASOPHILS NFR BLD: 0.3 % (ref 0–1.9)
BILIRUB SERPL-MCNC: 0.4 MG/DL (ref 0.1–1)
BUN SERPL-MCNC: 17 MG/DL (ref 8–23)
CALCIUM SERPL-MCNC: 9 MG/DL (ref 8.7–10.5)
CHLORIDE SERPL-SCNC: 106 MMOL/L (ref 95–110)
CO2 SERPL-SCNC: 23 MMOL/L (ref 23–29)
CREAT SERPL-MCNC: 1 MG/DL (ref 0.5–1.4)
DIFFERENTIAL METHOD BLD: ABNORMAL
EOSINOPHIL # BLD AUTO: 0.1 K/UL (ref 0–0.5)
EOSINOPHIL NFR BLD: 1.5 % (ref 0–8)
ERYTHROCYTE [DISTWIDTH] IN BLOOD BY AUTOMATED COUNT: 13.8 % (ref 11.5–14.5)
EST. GFR  (NO RACE VARIABLE): >60 ML/MIN/1.73 M^2
GLUCOSE SERPL-MCNC: 101 MG/DL (ref 70–110)
HCT VFR BLD AUTO: 45.5 % (ref 40–54)
HGB BLD-MCNC: 15 G/DL (ref 14–18)
IMM GRANULOCYTES # BLD AUTO: 0.02 K/UL (ref 0–0.04)
IMM GRANULOCYTES NFR BLD AUTO: 0.6 % (ref 0–0.5)
LDH SERPL L TO P-CCNC: 174 U/L (ref 110–260)
LYMPHOCYTES # BLD AUTO: 1.7 K/UL (ref 1–4.8)
LYMPHOCYTES NFR BLD: 50 % (ref 18–48)
MCH RBC QN AUTO: 31.3 PG (ref 27–31)
MCHC RBC AUTO-ENTMCNC: 33 G/DL (ref 32–36)
MCV RBC AUTO: 95 FL (ref 82–98)
MONOCYTES # BLD AUTO: 0.1 K/UL (ref 0.3–1)
MONOCYTES NFR BLD: 2.1 % (ref 4–15)
NEUTROPHILS # BLD AUTO: 1.5 K/UL (ref 1.8–7.7)
NEUTROPHILS NFR BLD: 45.5 % (ref 38–73)
NRBC BLD-RTO: 0 /100 WBC
PLATELET # BLD AUTO: 110 K/UL (ref 150–450)
PMV BLD AUTO: 10.9 FL (ref 9.2–12.9)
POTASSIUM SERPL-SCNC: 4.7 MMOL/L (ref 3.5–5.1)
PROT SERPL-MCNC: 7.4 G/DL (ref 6–8.4)
RBC # BLD AUTO: 4.8 M/UL (ref 4.6–6.2)
SODIUM SERPL-SCNC: 137 MMOL/L (ref 136–145)
WBC # BLD AUTO: 3.32 K/UL (ref 3.9–12.7)

## 2025-02-12 PROCEDURE — 86148 ANTI-PHOSPHOLIPID ANTIBODY: CPT | Mod: HCNC | Performed by: NURSE PRACTITIONER

## 2025-02-12 PROCEDURE — 86146 BETA-2 GLYCOPROTEIN ANTIBODY: CPT | Mod: HCNC | Performed by: NURSE PRACTITIONER

## 2025-02-12 PROCEDURE — 85520 HEPARIN ASSAY: CPT | Mod: HCNC | Performed by: NURSE PRACTITIONER

## 2025-02-12 PROCEDURE — 85613 RUSSELL VIPER VENOM DILUTED: CPT | Mod: 59,HCNC | Performed by: NURSE PRACTITIONER

## 2025-02-12 PROCEDURE — 86147 CARDIOLIPIN ANTIBODY EA IG: CPT | Mod: 59,HCNC | Performed by: NURSE PRACTITIONER

## 2025-02-12 PROCEDURE — 85730 THROMBOPLASTIN TIME PARTIAL: CPT | Mod: 91,HCNC | Performed by: NURSE PRACTITIONER

## 2025-02-12 PROCEDURE — 82728 ASSAY OF FERRITIN: CPT | Mod: HCNC | Performed by: INTERNAL MEDICINE

## 2025-02-12 PROCEDURE — 85303 CLOT INHIBIT PROT C ACTIVITY: CPT | Mod: HCNC | Performed by: NURSE PRACTITIONER

## 2025-02-12 PROCEDURE — 85025 COMPLETE CBC W/AUTO DIFF WBC: CPT | Mod: HCNC | Performed by: INTERNAL MEDICINE

## 2025-02-12 PROCEDURE — 85598 HEXAGNAL PHOSPH PLTLT NEUTRL: CPT | Mod: HCNC | Performed by: NURSE PRACTITIONER

## 2025-02-12 PROCEDURE — 83615 LACTATE (LD) (LDH) ENZYME: CPT | Mod: HCNC | Performed by: INTERNAL MEDICINE

## 2025-02-12 PROCEDURE — 85300 ANTITHROMBIN III ACTIVITY: CPT | Mod: HCNC | Performed by: NURSE PRACTITIONER

## 2025-02-12 PROCEDURE — 80053 COMPREHEN METABOLIC PANEL: CPT | Mod: HCNC | Performed by: INTERNAL MEDICINE

## 2025-02-12 PROCEDURE — 36415 COLL VENOUS BLD VENIPUNCTURE: CPT | Mod: HCNC,PO | Performed by: INTERNAL MEDICINE

## 2025-02-12 PROCEDURE — 83540 ASSAY OF IRON: CPT | Mod: HCNC | Performed by: INTERNAL MEDICINE

## 2025-02-12 PROCEDURE — 86147 CARDIOLIPIN ANTIBODY EA IG: CPT | Mod: HCNC | Performed by: NURSE PRACTITIONER

## 2025-02-12 PROCEDURE — 85670 THROMBIN TIME PLASMA: CPT | Mod: HCNC | Performed by: NURSE PRACTITIONER

## 2025-02-12 PROCEDURE — 85306 CLOT INHIBIT PROT S FREE: CPT | Mod: HCNC | Performed by: NURSE PRACTITIONER

## 2025-02-12 PROCEDURE — 85610 PROTHROMBIN TIME: CPT | Mod: HCNC | Performed by: NURSE PRACTITIONER

## 2025-02-12 PROCEDURE — 81240 F2 GENE: CPT | Mod: HCNC | Performed by: NURSE PRACTITIONER

## 2025-02-13 LAB
AT III ACT/NOR PPP CHRO: 75 % (ref 83–118)
FERRITIN SERPL-MCNC: 270 NG/ML (ref 20–300)
IRON SERPL-MCNC: 56 UG/DL (ref 45–160)
SATURATED IRON: 17 % (ref 20–50)
TOTAL IRON BINDING CAPACITY: 329 UG/DL (ref 250–450)
TRANSFERRIN SERPL-MCNC: 222 MG/DL (ref 200–375)

## 2025-02-14 LAB
PROT C ACT/NOR PPP CHRO: 106 % (ref 70–150)
PROT S ACT/NOR PPP: >150 % (ref 65–150)

## 2025-02-16 LAB
CONFIRM DRVVT STA-STACLOT: 2.8 S
DRVVT SCREEN TO CONFIRM RATIO: 1.4 {RATIO}
HEPARIN NT PPP QL: ABNORMAL
LA 3 SCREEN W REFLEX-IMP: ABNORMAL
LMW HEPARIN IND PLT AB SER QL: PRESENT
MIXING DRVVT/NORMAL: 1.37 %
NEUTRALIZED DRVVT SCREEN RATIO: 1.8
PROTHROMBIN TIME: 16.9 S (ref 12–15.5)
SCREEN APTT/NORMAL: 1.27
SCREEN APTT/NORMAL: 1.34
SCREEN DRVVT/NORMAL: 2.66 %
THROMBIN TIME: 20 S

## 2025-02-17 ENCOUNTER — OFFICE VISIT (OUTPATIENT)
Dept: HEMATOLOGY/ONCOLOGY | Facility: CLINIC | Age: 65
End: 2025-02-17
Payer: MEDICARE

## 2025-02-17 VITALS
WEIGHT: 192.81 LBS | TEMPERATURE: 99 F | HEIGHT: 71 IN | SYSTOLIC BLOOD PRESSURE: 130 MMHG | OXYGEN SATURATION: 97 % | BODY MASS INDEX: 26.99 KG/M2 | HEART RATE: 69 BPM | DIASTOLIC BLOOD PRESSURE: 81 MMHG

## 2025-02-17 DIAGNOSIS — Z79.01 CURRENT USE OF LONG TERM ANTICOAGULATION: ICD-10-CM

## 2025-02-17 DIAGNOSIS — I82.501 CHRONIC DEEP VEIN THROMBOSIS (DVT) OF RIGHT LOWER EXTREMITY, UNSPECIFIED VEIN: ICD-10-CM

## 2025-02-17 DIAGNOSIS — C91.41 HAIRY CELL LEUKEMIA, IN REMISSION: Primary | ICD-10-CM

## 2025-02-17 DIAGNOSIS — D61.818 PANCYTOPENIA: ICD-10-CM

## 2025-02-17 DIAGNOSIS — I82.4Y1 ACUTE DEEP VEIN THROMBOSIS (DVT) OF PROXIMAL VEIN OF RIGHT LOWER EXTREMITY: ICD-10-CM

## 2025-02-17 LAB
B2 GLYCOPROT1 IGA SER QL: 2.1 U/ML
B2 GLYCOPROT1 IGG SER QL: 2.1 U/ML
B2 GLYCOPROT1 IGM SER QL: <2.4 U/ML
CARDIOLIPIN IGA SER IA-ACNC: 3.1 APL
CARDIOLIPIN IGG SER IA-ACNC: <9.4 GPL (ref 0–14.99)
CARDIOLIPIN IGM SER IA-ACNC: <9.4 MPL (ref 0–12.49)
F2 C.20210G>A GENO BLD/T: NEGATIVE
PS IGA SER-ACNC: 0 APS (ref 0–19)
PS IGG SER-ACNC: 4 GPS (ref 0–15)
PS IGM SER-ACNC: 3 MPS (ref 0–21)

## 2025-02-17 PROCEDURE — 99999 PR PBB SHADOW E&M-EST. PATIENT-LVL III: CPT | Mod: PBBFAC,HCNC,, | Performed by: INTERNAL MEDICINE

## 2025-02-17 RX ORDER — RIVAROXABAN 20 MG/1
20 TABLET, FILM COATED ORAL DAILY
Qty: 90 TABLET | Refills: 3 | Status: SHIPPED | OUTPATIENT
Start: 2025-02-17 | End: 2025-08-16
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
## 2025-02-24 DIAGNOSIS — Z00.00 ENCOUNTER FOR MEDICARE ANNUAL WELLNESS EXAM: ICD-10-CM

## 2025-03-21 ENCOUNTER — TELEPHONE (OUTPATIENT)
Dept: FAMILY MEDICINE | Facility: CLINIC | Age: 65
End: 2025-03-21
Payer: MEDICARE

## 2025-03-21 ENCOUNTER — PATIENT MESSAGE (OUTPATIENT)
Dept: FAMILY MEDICINE | Facility: CLINIC | Age: 65
End: 2025-03-21
Payer: MEDICARE

## 2025-03-21 DIAGNOSIS — Z00.00 WELL ADULT EXAM: ICD-10-CM

## 2025-03-21 DIAGNOSIS — R73.03 PREDIABETES: Primary | ICD-10-CM

## 2025-03-21 DIAGNOSIS — D69.6 LOW PLATELET COUNT: ICD-10-CM

## 2025-03-21 DIAGNOSIS — R93.3 ABNORMAL FINDINGS ON DIAGNOSTIC IMAGING OF OTHER PARTS OF DIGESTIVE TRACT: ICD-10-CM

## 2025-04-15 ENCOUNTER — LAB VISIT (OUTPATIENT)
Dept: LAB | Facility: HOSPITAL | Age: 65
End: 2025-04-15
Attending: FAMILY MEDICINE
Payer: MEDICARE

## 2025-04-15 DIAGNOSIS — Z00.00 WELL ADULT EXAM: ICD-10-CM

## 2025-04-15 DIAGNOSIS — D69.6 LOW PLATELET COUNT: ICD-10-CM

## 2025-04-15 DIAGNOSIS — R73.03 PREDIABETES: ICD-10-CM

## 2025-04-15 DIAGNOSIS — R93.3 ABNORMAL FINDINGS ON DIAGNOSTIC IMAGING OF OTHER PARTS OF DIGESTIVE TRACT: ICD-10-CM

## 2025-04-15 LAB
ABSOLUTE EOSINOPHIL (OHS): 0.05 K/UL
ABSOLUTE MONOCYTE (OHS): 0.18 K/UL (ref 0.3–1)
ABSOLUTE NEUTROPHIL COUNT (OHS): 1.43 K/UL (ref 1.8–7.7)
ALBUMIN SERPL BCP-MCNC: 3.2 G/DL (ref 3.5–5.2)
ALP SERPL-CCNC: 73 UNIT/L (ref 40–150)
ALT SERPL W/O P-5'-P-CCNC: 6 UNIT/L (ref 10–44)
ANION GAP (OHS): 6 MMOL/L (ref 8–16)
AST SERPL-CCNC: 21 UNIT/L (ref 11–45)
BASOPHILS # BLD AUTO: 0.02 K/UL
BASOPHILS NFR BLD AUTO: 0.6 %
BILIRUB SERPL-MCNC: 0.5 MG/DL (ref 0.1–1)
BUN SERPL-MCNC: 19 MG/DL (ref 8–23)
CALCIUM SERPL-MCNC: 8.5 MG/DL (ref 8.7–10.5)
CHLORIDE SERPL-SCNC: 109 MMOL/L (ref 95–110)
CHOLEST SERPL-MCNC: 210 MG/DL (ref 120–199)
CHOLEST/HDLC SERPL: 5.4 {RATIO} (ref 2–5)
CO2 SERPL-SCNC: 23 MMOL/L (ref 23–29)
CREAT SERPL-MCNC: 0.9 MG/DL (ref 0.5–1.4)
EAG (OHS): 117 MG/DL (ref 68–131)
ERYTHROCYTE [DISTWIDTH] IN BLOOD BY AUTOMATED COUNT: 14.6 % (ref 11.5–14.5)
GFR SERPLBLD CREATININE-BSD FMLA CKD-EPI: >60 ML/MIN/1.73/M2
GLUCOSE SERPL-MCNC: 99 MG/DL (ref 70–110)
HBA1C MFR BLD: 5.7 % (ref 4–5.6)
HCT VFR BLD AUTO: 42.4 % (ref 40–54)
HDLC SERPL-MCNC: 39 MG/DL (ref 40–75)
HDLC SERPL: 18.6 % (ref 20–50)
HGB BLD-MCNC: 13.7 GM/DL (ref 14–18)
IMM GRANULOCYTES # BLD AUTO: 0.03 K/UL (ref 0–0.04)
IMM GRANULOCYTES NFR BLD AUTO: 0.9 % (ref 0–0.5)
LDLC SERPL CALC-MCNC: 151 MG/DL (ref 63–159)
LYMPHOCYTES # BLD AUTO: 1.78 K/UL (ref 1–4.8)
MCH RBC QN AUTO: 31.4 PG (ref 27–31)
MCHC RBC AUTO-ENTMCNC: 32.3 G/DL (ref 32–36)
MCV RBC AUTO: 97 FL (ref 82–98)
NONHDLC SERPL-MCNC: 171 MG/DL
NUCLEATED RBC (/100WBC) (OHS): 0 /100 WBC
PLATELET # BLD AUTO: 88 K/UL (ref 150–450)
PMV BLD AUTO: 11.7 FL (ref 9.2–12.9)
POTASSIUM SERPL-SCNC: 4.1 MMOL/L (ref 3.5–5.1)
PROT SERPL-MCNC: 7 GM/DL (ref 6–8.4)
RBC # BLD AUTO: 4.36 M/UL (ref 4.6–6.2)
RELATIVE EOSINOPHIL (OHS): 1.4 %
RELATIVE LYMPHOCYTE (OHS): 51 % (ref 18–48)
RELATIVE MONOCYTE (OHS): 5.2 % (ref 4–15)
RELATIVE NEUTROPHIL (OHS): 40.9 % (ref 38–73)
SODIUM SERPL-SCNC: 138 MMOL/L (ref 136–145)
TRIGL SERPL-MCNC: 100 MG/DL (ref 30–150)
WBC # BLD AUTO: 3.49 K/UL (ref 3.9–12.7)

## 2025-04-15 PROCEDURE — 80053 COMPREHEN METABOLIC PANEL: CPT | Mod: HCNC

## 2025-04-15 PROCEDURE — 83036 HEMOGLOBIN GLYCOSYLATED A1C: CPT | Mod: HCNC

## 2025-04-15 PROCEDURE — 85025 COMPLETE CBC W/AUTO DIFF WBC: CPT | Mod: HCNC

## 2025-04-15 PROCEDURE — 36415 COLL VENOUS BLD VENIPUNCTURE: CPT | Mod: HCNC,PN

## 2025-04-15 PROCEDURE — 80061 LIPID PANEL: CPT | Mod: HCNC

## 2025-04-16 ENCOUNTER — RESULTS FOLLOW-UP (OUTPATIENT)
Dept: FAMILY MEDICINE | Facility: CLINIC | Age: 65
End: 2025-04-16

## 2025-04-17 ENCOUNTER — PATIENT MESSAGE (OUTPATIENT)
Dept: FAMILY MEDICINE | Facility: CLINIC | Age: 65
End: 2025-04-17

## 2025-04-17 ENCOUNTER — OFFICE VISIT (OUTPATIENT)
Dept: FAMILY MEDICINE | Facility: CLINIC | Age: 65
End: 2025-04-17
Payer: MEDICARE

## 2025-04-17 ENCOUNTER — LAB VISIT (OUTPATIENT)
Dept: LAB | Facility: HOSPITAL | Age: 65
End: 2025-04-17
Attending: FAMILY MEDICINE
Payer: MEDICARE

## 2025-04-17 DIAGNOSIS — E88.09 HYPOALBUMINEMIA: ICD-10-CM

## 2025-04-17 DIAGNOSIS — E88.09 HYPOALBUMINEMIA: Primary | ICD-10-CM

## 2025-04-17 DIAGNOSIS — C91.41 HAIRY CELL LEUKEMIA, IN REMISSION: ICD-10-CM

## 2025-04-17 DIAGNOSIS — R73.03 PREDIABETES: ICD-10-CM

## 2025-04-17 LAB
BILIRUB UR QL STRIP.AUTO: NEGATIVE
CLARITY UR: CLEAR
COLOR UR AUTO: YELLOW
CREAT UR-MCNC: 159 MG/DL (ref 23–375)
GLUCOSE UR QL STRIP: NEGATIVE
HGB UR QL STRIP: ABNORMAL
HOLD SPECIMEN: NORMAL
KETONES UR QL STRIP: NEGATIVE
LEUKOCYTE ESTERASE UR QL STRIP: NEGATIVE
MICROSCOPIC COMMENT: NORMAL
NITRITE UR QL STRIP: NEGATIVE
PH UR STRIP: 6 [PH]
PROT UR QL STRIP: NEGATIVE
PROT UR-MCNC: <7 MG/DL
PROT/CREAT UR: NORMAL MG/G{CREAT}
RBC #/AREA URNS AUTO: 3 /HPF (ref 0–4)
SP GR UR STRIP: 1.02
SQUAMOUS #/AREA URNS AUTO: <1 /HPF
TSH SERPL-ACNC: 1.78 UIU/ML (ref 0.4–4)
UROBILINOGEN UR STRIP-ACNC: NEGATIVE EU/DL
WBC #/AREA URNS AUTO: <1 /HPF (ref 0–5)

## 2025-04-17 PROCEDURE — 3044F HG A1C LEVEL LT 7.0%: CPT | Mod: HCNC,CPTII,95, | Performed by: FAMILY MEDICINE

## 2025-04-17 PROCEDURE — 81003 URINALYSIS AUTO W/O SCOPE: CPT | Mod: HCNC | Performed by: FAMILY MEDICINE

## 2025-04-17 PROCEDURE — 36415 COLL VENOUS BLD VENIPUNCTURE: CPT | Mod: HCNC,PN

## 2025-04-17 PROCEDURE — 84443 ASSAY THYROID STIM HORMONE: CPT | Mod: DBM,HCNC

## 2025-04-17 PROCEDURE — 82570 ASSAY OF URINE CREATININE: CPT | Mod: HCNC | Performed by: FAMILY MEDICINE

## 2025-04-17 PROCEDURE — 98006 SYNCH AUDIO-VIDEO EST MOD 30: CPT | Mod: HCNC,95,, | Performed by: FAMILY MEDICINE
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
## 2025-04-19 ENCOUNTER — RESULTS FOLLOW-UP (OUTPATIENT)
Dept: FAMILY MEDICINE | Facility: CLINIC | Age: 65
End: 2025-04-19

## 2025-04-21 ENCOUNTER — PATIENT MESSAGE (OUTPATIENT)
Dept: HEMATOLOGY/ONCOLOGY | Facility: CLINIC | Age: 65
End: 2025-04-21
Payer: MEDICARE

## 2025-04-21 NOTE — ASSESSMENT & PLAN NOTE
Haris Jimenez is a 19 year old male presenting for   Chief Complaint   Patient presents with    Office Visit    Establish Care     Would like Epi Pen       Depression Screening:  Review Flowsheet  More data may exist         4/21/2025   PHQ 2/9 Score   Adult PHQ 2 Score 0   Adult PHQ 2 Interpretation No further screening needed   Little interest or pleasure in activity? Not at all   Feeling down, depressed or hopeless? Not at all        Denies Latex allergy or sensitivity.    Currently is not taking any medications  Refills needed today: No    Patient would like communication of their results via:   Cell Phone:   Telephone Information:   Mobile [phone]     Okay to leave a message containing results? Yes        Health Maintenance       Varicella Vaccine (2 of 2 - 2-dose childhood series)  Overdue since 8/1/2008    DTaP/Tdap/Td Vaccine (5 - Tdap)  Overdue since 8/1/2015    Annual Physical (ages 3 - 21) (Yearly)  Never done    HPV Vaccine (1 - Male 3-dose series)  Never done    Meningococcal Serogroup B Vaccine (1 of 2 - Standard)  Never done    COVID-19 Vaccine (1 - 2024-25 season)  Never done           Following review of the above:  Patient is not proceeding with: COVID-19, Dtap/Tdap/Td, HPV, Meningococcal Serogroup B, and Varicella    Note: Refer to final orders and clinician documentation.           Addressed the Advanced Care Planning (Advance Directives):  No     S/p robotic assisted sigmoidectomy   POD1  Continue PCA  Incentive spirometry  DVT/GI prophylaxis  PT eval and treat  Ok for ice chips

## 2025-04-28 ENCOUNTER — OFFICE VISIT (OUTPATIENT)
Dept: FAMILY MEDICINE | Facility: CLINIC | Age: 65
End: 2025-04-28
Payer: MEDICARE

## 2025-04-28 VITALS
WEIGHT: 193.31 LBS | HEIGHT: 71 IN | BODY MASS INDEX: 27.06 KG/M2 | SYSTOLIC BLOOD PRESSURE: 120 MMHG | DIASTOLIC BLOOD PRESSURE: 70 MMHG | HEART RATE: 80 BPM | OXYGEN SATURATION: 97 %

## 2025-04-28 DIAGNOSIS — Z00.00 ENCOUNTER FOR MEDICARE ANNUAL WELLNESS EXAM: Primary | ICD-10-CM

## 2025-04-28 DIAGNOSIS — I82.501 CHRONIC DEEP VEIN THROMBOSIS (DVT) OF RIGHT LOWER EXTREMITY, UNSPECIFIED VEIN: ICD-10-CM

## 2025-04-28 DIAGNOSIS — D61.818 PANCYTOPENIA: ICD-10-CM

## 2025-04-28 DIAGNOSIS — C91.41 HAIRY CELL LEUKEMIA, IN REMISSION: ICD-10-CM

## 2025-04-28 DIAGNOSIS — Z87.891 HISTORY OF CIGARETTE SMOKING: ICD-10-CM

## 2025-04-28 DIAGNOSIS — H91.90 DECREASED HEARING, UNSPECIFIED LATERALITY: ICD-10-CM

## 2025-04-28 DIAGNOSIS — H54.40 BLINDNESS OF LEFT EYE WITH NORMAL VISION IN CONTRALATERAL EYE: ICD-10-CM

## 2025-04-28 DIAGNOSIS — R73.03 PREDIABETES: ICD-10-CM

## 2025-04-28 DIAGNOSIS — Z12.5 SCREENING FOR PROSTATE CANCER: ICD-10-CM

## 2025-04-28 PROCEDURE — 99999 PR PBB SHADOW E&M-EST. PATIENT-LVL V: CPT | Mod: PBBFAC,HCNC,, | Performed by: NURSE PRACTITIONER

## 2025-04-28 NOTE — PATIENT INSTRUCTIONS
Counseling and Referral of Other Preventative  (Italic type indicates deductible and co-insurance are waived)    Patient Name: Armando Ingram  Today's Date: 4/28/2025    Health Maintenance       Date Due Completion Date    High Dose Statin Never done ---    COVID-19 Vaccine (5 - 2024-25 season) 11/04/2024 9/9/2024    PROSTATE-SPECIFIC ANTIGEN 04/22/2025 4/22/2024    Hemoglobin A1c (Prediabetes) 04/15/2026 4/15/2025    TETANUS VACCINE 08/29/2026 8/29/2016 (Done)    Override on 8/29/2016: Done    Colorectal Cancer Screening 04/22/2027 4/22/2022    Lipid Panel 04/15/2030 4/15/2025        Orders Placed This Encounter   Procedures    PSA, Screening    Ambulatory referral/consult to Audiology       The following information is provided to all patients.  This information is to help you find resources for any of the problems found today that may be affecting your health:                  Living healthy guide: www.[email or web address].[email or web address].[facility]      Understanding Diabetes: www.diabetes.org      Eating healthy: www.cdc.gov/healthyweight      CDC home safety checklist: www.cdc.gov/steadi/patient.html      Agency on Aging: www.goea.[place]      Alcoholics anonymous (AA): www.aa.org      Physical Activity: www.marva.nih.gov/xz3head      Tobacco use: www.quitwithusla.org

## 2025-04-28 NOTE — PROGRESS NOTES
"  Armando Ingram presented for a  Medicare AWV and comprehensive Health Risk Assessment today. The following components were reviewed and updated:    Medical history  Family History  Social history  Allergies and Current Medications  Health Risk Assessment  Health Maintenance  Care Team         ** See Completed Assessments for Annual Wellness Visit within the encounter summary.**         The following assessments were completed:  Living Situation  CAGE  Depression Screening  Timed Get Up and Go  Whisper Test  Cognitive Function Screening    Nutrition Screening  ADL Screening  PAQ Screening  Has urine leakage ever interrupted your daily activites or sleep? No  Do you think you could use some help to better manage urine leakage?No       Opioid documentation:      Patient does not have a current opioid prescription.        Vitals:    04/28/25 0920   BP: 120/70   Pulse: 80   SpO2: 97%   Weight: 87.7 kg (193 lb 5.5 oz)   Height: 5' 11" (1.803 m)     Body mass index is 26.97 kg/m².  Physical Exam  Constitutional:       General: He is not in acute distress.     Appearance: Normal appearance. He is well-developed. He is not ill-appearing, toxic-appearing or diaphoretic.   HENT:      Head: Normocephalic and atraumatic.      Right Ear: External ear normal.      Left Ear: External ear normal.      Mouth/Throat:      Mouth: Mucous membranes are moist.   Eyes:      Conjunctiva/sclera: Conjunctivae normal.   Cardiovascular:      Rate and Rhythm: Normal rate and regular rhythm.      Heart sounds: Normal heart sounds. No murmur heard.     No friction rub. No gallop.   Pulmonary:      Effort: Pulmonary effort is normal. No respiratory distress.      Breath sounds: Normal breath sounds. No wheezing or rales.   Chest:      Chest wall: No tenderness.   Abdominal:      General: Bowel sounds are normal.      Palpations: Abdomen is soft.   Musculoskeletal:         General: No tenderness. Normal range of motion.      Cervical back: Normal " range of motion.   Skin:     General: Skin is warm and dry.   Neurological:      Mental Status: He is alert and oriented to person, place, and time.      Cranial Nerves: No cranial nerve deficit.   Psychiatric:         Mood and Affect: Mood normal.         Behavior: Behavior normal.               Diagnoses and health risks identified today and associated recommendations/orders:    1. Encounter for Medicare annual wellness exam  Screenings performed, as noted above.  Personal preventative testing needs reviewed.    - Referral to Enhanced Annual Wellness Visit (eAWV) W+1    2. Decreased hearing, unspecified laterality  Assessed, unstable, will self schedule  - Ambulatory referral/consult to Audiology; Future    3. Screening for prostate cancer  Due for screening this year, will add to next draw per request  - PSA, Screening; Future    4. 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 Hairy cell leukemia, in remission  Treated by Hem onc, had Bone marrow transplant, follow up as indicated, stable    6. Pancytopenia  Monitored, stable, per hem onc    7. Chronic deep vein thrombosis (DVT) of right lower extremity, unspecified vein  Treated with Xeralto, stable, cont tx    8. Prediabetes  Monitored, stable, follow up with pcp    9. History of cigarette smoking  Assessed, not smoking at this time      Provided Armando with a 5-10 year written screening schedule and personal prevention plan. Recommendations were developed using the USPSTF age appropriate recommendations. Education, counseling, and referrals were provided as needed. After Visit Summary printed and given to patient which includes a list of additional screenings\tests needed.    No follow-ups on file.    Richie Mane NP  I offered to discuss advanced care planning, including how to pick a person who would make decisions for you if you were unable to make them for yourself, called a health care power of  , and what kind of decisions you might make such as use of life sustaining treatments such as ventilators and tube feeding when faced with a life limiting illness recorded on a living will that they will need to know. (How you want to be cared for as you near the end of your natural life)     X Patient is interested in learning more about how to make advanced directives.  I provided them paperwork and offered to discuss this with them.

## 2025-04-29 ENCOUNTER — PATIENT MESSAGE (OUTPATIENT)
Dept: FAMILY MEDICINE | Facility: CLINIC | Age: 65
End: 2025-04-29
Payer: MEDICARE

## 2025-04-29 ENCOUNTER — PATIENT MESSAGE (OUTPATIENT)
Dept: HEMATOLOGY/ONCOLOGY | Facility: CLINIC | Age: 65
End: 2025-04-29
Payer: MEDICARE

## 2025-05-01 ENCOUNTER — OFFICE VISIT (OUTPATIENT)
Dept: FAMILY MEDICINE | Facility: CLINIC | Age: 65
End: 2025-05-01
Payer: MEDICARE

## 2025-05-01 VITALS
WEIGHT: 192.25 LBS | DIASTOLIC BLOOD PRESSURE: 84 MMHG | HEIGHT: 71 IN | BODY MASS INDEX: 26.91 KG/M2 | OXYGEN SATURATION: 97 % | HEART RATE: 75 BPM | SYSTOLIC BLOOD PRESSURE: 122 MMHG

## 2025-05-01 DIAGNOSIS — J01.90 ACUTE BACTERIAL SINUSITIS: Primary | ICD-10-CM

## 2025-05-01 DIAGNOSIS — B96.89 ACUTE BACTERIAL SINUSITIS: Primary | ICD-10-CM

## 2025-05-01 PROCEDURE — 99999 PR PBB SHADOW E&M-EST. PATIENT-LVL III: CPT | Mod: PBBFAC,HCNC,, | Performed by: FAMILY MEDICINE

## 2025-05-01 RX ORDER — AZITHROMYCIN 250 MG/1
TABLET, FILM COATED ORAL
Qty: 6 TABLET | Refills: 0 | Status: SHIPPED | OUTPATIENT
Start: 2025-05-01

## 2025-05-01 RX ORDER — BENZONATATE 200 MG/1
200 CAPSULE ORAL 3 TIMES DAILY PRN
Qty: 30 CAPSULE | Refills: 0 | Status: SHIPPED | OUTPATIENT
Start: 2025-05-01 | End: 2025-05-11
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
## 2025-05-16 ENCOUNTER — HOSPITAL ENCOUNTER (INPATIENT)
Facility: HOSPITAL | Age: 65
LOS: 20 days | Discharge: HOME | DRG: 809 | End: 2025-06-06
Attending: EMERGENCY MEDICINE | Admitting: STUDENT IN AN ORGANIZED HEALTH CARE EDUCATION/TRAINING PROGRAM
Payer: MEDICARE

## 2025-05-16 DIAGNOSIS — R50.9 FUO (FEVER OF UNKNOWN ORIGIN): ICD-10-CM

## 2025-05-16 DIAGNOSIS — B49 FUNGEMIA: ICD-10-CM

## 2025-05-16 DIAGNOSIS — D70.9 FEVER AND NEUTROPENIA: ICD-10-CM

## 2025-05-16 DIAGNOSIS — R07.9 CHEST PAIN: ICD-10-CM

## 2025-05-16 DIAGNOSIS — Z13.6 SCREENING FOR CARDIOVASCULAR CONDITION: ICD-10-CM

## 2025-05-16 DIAGNOSIS — R50.81 FEVER AND NEUTROPENIA: ICD-10-CM

## 2025-05-16 DIAGNOSIS — N39.0 URINARY TRACT INFECTION WITHOUT HEMATURIA, SITE UNSPECIFIED: Primary | ICD-10-CM

## 2025-05-16 DIAGNOSIS — B45.9 CRYPTOCOCCOSIS: ICD-10-CM

## 2025-05-16 DIAGNOSIS — R50.81 NEUTROPENIC FEVER: ICD-10-CM

## 2025-05-16 DIAGNOSIS — D70.9 NEUTROPENIC FEVER: ICD-10-CM

## 2025-05-16 PROCEDURE — 84145 PROCALCITONIN (PCT): CPT | Mod: HCNC

## 2025-05-16 PROCEDURE — 83605 ASSAY OF LACTIC ACID: CPT | Mod: HCNC

## 2025-05-16 PROCEDURE — 99285 EMERGENCY DEPT VISIT HI MDM: CPT | Mod: 25,HCNC

## 2025-05-16 PROCEDURE — 80053 COMPREHEN METABOLIC PANEL: CPT | Mod: HCNC

## 2025-05-16 PROCEDURE — 85025 COMPLETE CBC W/AUTO DIFF WBC: CPT | Mod: HCNC

## 2025-05-16 PROCEDURE — 25000003 PHARM REV CODE 250: Mod: HCNC

## 2025-05-16 PROCEDURE — 85379 FIBRIN DEGRADATION QUANT: CPT | Mod: HCNC

## 2025-05-16 PROCEDURE — 87040 BLOOD CULTURE FOR BACTERIA: CPT | Mod: HCNC

## 2025-05-16 RX ORDER — ACETAMINOPHEN 500 MG
500 TABLET ORAL
Status: COMPLETED | OUTPATIENT
Start: 2025-05-16 | End: 2025-05-16

## 2025-05-16 RX ADMIN — ACETAMINOPHEN 500 MG: 500 TABLET ORAL at 11:05

## 2025-05-17 PROBLEM — Z86.718 HISTORY OF DVT (DEEP VEIN THROMBOSIS): Chronic | Status: ACTIVE | Noted: 2025-05-17

## 2025-05-17 PROBLEM — D70.9 NEUTROPENIC FEVER: Status: ACTIVE | Noted: 2025-05-17

## 2025-05-17 PROBLEM — R50.81 NEUTROPENIC FEVER: Status: ACTIVE | Noted: 2025-05-17

## 2025-05-17 PROBLEM — Z86.718 HISTORY OF DVT (DEEP VEIN THROMBOSIS): Status: ACTIVE | Noted: 2025-05-17

## 2025-05-17 LAB
ABSOLUTE EOSINOPHIL (OHS): 0.03 K/UL
ABSOLUTE EOSINOPHIL (OHS): 0.04 K/UL
ABSOLUTE MONOCYTE (OHS): 0.07 K/UL (ref 0.3–1)
ABSOLUTE MONOCYTE (OHS): 0.13 K/UL (ref 0.3–1)
ABSOLUTE NEUTROPHIL COUNT (OHS): 1.82 K/UL (ref 1.8–7.7)
ABSOLUTE NEUTROPHIL COUNT (OHS): 2.27 K/UL (ref 1.8–7.7)
ALBUMIN SERPL BCP-MCNC: 3 G/DL (ref 3.5–5.2)
ALBUMIN SERPL BCP-MCNC: 3.2 G/DL (ref 3.5–5.2)
ALP SERPL-CCNC: 76 UNIT/L (ref 40–150)
ALP SERPL-CCNC: 79 UNIT/L (ref 40–150)
ALT SERPL W/O P-5'-P-CCNC: 14 UNIT/L (ref 10–44)
ALT SERPL W/O P-5'-P-CCNC: 15 UNIT/L (ref 10–44)
ANION GAP (OHS): 6 MMOL/L (ref 8–16)
ANION GAP (OHS): 7 MMOL/L (ref 8–16)
AST SERPL-CCNC: 27 UNIT/L (ref 11–45)
AST SERPL-CCNC: 33 UNIT/L (ref 11–45)
BACTERIA #/AREA URNS AUTO: ABNORMAL /HPF
BASOPHILS # BLD AUTO: 0.01 K/UL
BASOPHILS # BLD AUTO: 0.01 K/UL
BASOPHILS NFR BLD AUTO: 0.3 %
BASOPHILS NFR BLD AUTO: 0.3 %
BILIRUB SERPL-MCNC: 0.4 MG/DL (ref 0.1–1)
BILIRUB SERPL-MCNC: 0.5 MG/DL (ref 0.1–1)
BILIRUB UR QL STRIP.AUTO: NEGATIVE
BUN SERPL-MCNC: 22 MG/DL (ref 8–23)
BUN SERPL-MCNC: 27 MG/DL (ref 8–23)
CALCIUM SERPL-MCNC: 8.5 MG/DL (ref 8.7–10.5)
CALCIUM SERPL-MCNC: 8.5 MG/DL (ref 8.7–10.5)
CHLORIDE SERPL-SCNC: 107 MMOL/L (ref 95–110)
CHLORIDE SERPL-SCNC: 107 MMOL/L (ref 95–110)
CLARITY UR: CLEAR
CO2 SERPL-SCNC: 19 MMOL/L (ref 23–29)
CO2 SERPL-SCNC: 23 MMOL/L (ref 23–29)
COLOR UR AUTO: YELLOW
CREAT SERPL-MCNC: 1 MG/DL (ref 0.5–1.4)
CREAT SERPL-MCNC: 1.1 MG/DL (ref 0.5–1.4)
D DIMER PPP IA.FEU-MCNC: 0.33 MG/L FEU
ERYTHROCYTE [DISTWIDTH] IN BLOOD BY AUTOMATED COUNT: 14.4 % (ref 11.5–14.5)
ERYTHROCYTE [DISTWIDTH] IN BLOOD BY AUTOMATED COUNT: 14.5 % (ref 11.5–14.5)
GFR SERPLBLD CREATININE-BSD FMLA CKD-EPI: >60 ML/MIN/1.73/M2
GFR SERPLBLD CREATININE-BSD FMLA CKD-EPI: >60 ML/MIN/1.73/M2
GLUCOSE SERPL-MCNC: 113 MG/DL (ref 70–110)
GLUCOSE SERPL-MCNC: 117 MG/DL (ref 70–110)
GLUCOSE UR QL STRIP: NEGATIVE
HCT VFR BLD AUTO: 40.2 % (ref 40–54)
HCT VFR BLD AUTO: 40.5 % (ref 40–54)
HGB BLD-MCNC: 13.3 GM/DL (ref 14–18)
HGB BLD-MCNC: 13.8 GM/DL (ref 14–18)
HGB UR QL STRIP: ABNORMAL
HOLD SPECIMEN: NORMAL
IMM GRANULOCYTES # BLD AUTO: 0.04 K/UL (ref 0–0.04)
IMM GRANULOCYTES # BLD AUTO: 0.05 K/UL (ref 0–0.04)
IMM GRANULOCYTES NFR BLD AUTO: 1.1 % (ref 0–0.5)
IMM GRANULOCYTES NFR BLD AUTO: 1.7 % (ref 0–0.5)
INFLUENZA A MOLECULAR (OHS): NEGATIVE
INFLUENZA B MOLECULAR (OHS): NEGATIVE
KETONES UR QL STRIP: NEGATIVE
LACTATE SERPL-SCNC: 0.8 MMOL/L (ref 0.5–2.2)
LACTATE SERPL-SCNC: 0.9 MMOL/L (ref 0.5–2.2)
LEUKOCYTE ESTERASE UR QL STRIP: ABNORMAL
LYMPHOCYTES # BLD AUTO: 0.91 K/UL (ref 1–4.8)
LYMPHOCYTES # BLD AUTO: 1.17 K/UL (ref 1–4.8)
MCH RBC QN AUTO: 31.6 PG (ref 27–31)
MCH RBC QN AUTO: 31.9 PG (ref 27–31)
MCHC RBC AUTO-ENTMCNC: 33.1 G/DL (ref 32–36)
MCHC RBC AUTO-ENTMCNC: 34.1 G/DL (ref 32–36)
MCV RBC AUTO: 94 FL (ref 82–98)
MCV RBC AUTO: 96 FL (ref 82–98)
MICROSCOPIC COMMENT: ABNORMAL
NITRITE UR QL STRIP: NEGATIVE
NUCLEATED RBC (/100WBC) (OHS): 0 /100 WBC
NUCLEATED RBC (/100WBC) (OHS): 0 /100 WBC
PH UR STRIP: 7 [PH]
PLATELET # BLD AUTO: 74 K/UL (ref 150–450)
PLATELET # BLD AUTO: 88 K/UL (ref 150–450)
PMV BLD AUTO: 10.6 FL (ref 9.2–12.9)
PMV BLD AUTO: 10.8 FL (ref 9.2–12.9)
POTASSIUM SERPL-SCNC: 4 MMOL/L (ref 3.5–5.1)
POTASSIUM SERPL-SCNC: 4.1 MMOL/L (ref 3.5–5.1)
PROCALCITONIN SERPL-MCNC: 0.18 NG/ML
PROT SERPL-MCNC: 6.9 GM/DL (ref 6–8.4)
PROT SERPL-MCNC: 7.3 GM/DL (ref 6–8.4)
PROT UR QL STRIP: NEGATIVE
RBC # BLD AUTO: 4.21 M/UL (ref 4.6–6.2)
RBC # BLD AUTO: 4.32 M/UL (ref 4.6–6.2)
RBC #/AREA URNS AUTO: 11 /HPF (ref 0–4)
RELATIVE EOSINOPHIL (OHS): 1 %
RELATIVE EOSINOPHIL (OHS): 1.1 %
RELATIVE LYMPHOCYTE (OHS): 31.5 % (ref 18–48)
RELATIVE LYMPHOCYTE (OHS): 32 % (ref 18–48)
RELATIVE MONOCYTE (OHS): 2.4 % (ref 4–15)
RELATIVE MONOCYTE (OHS): 3.6 % (ref 4–15)
RELATIVE NEUTROPHIL (OHS): 61.9 % (ref 38–73)
RELATIVE NEUTROPHIL (OHS): 63.1 % (ref 38–73)
SARS-COV-2 RDRP RESP QL NAA+PROBE: NEGATIVE
SODIUM SERPL-SCNC: 133 MMOL/L (ref 136–145)
SODIUM SERPL-SCNC: 136 MMOL/L (ref 136–145)
SP GR UR STRIP: 1.02
UROBILINOGEN UR STRIP-ACNC: NEGATIVE EU/DL
WBC # BLD AUTO: 2.89 K/UL (ref 3.9–12.7)
WBC # BLD AUTO: 3.66 K/UL (ref 3.9–12.7)
WBC #/AREA URNS AUTO: 45 /HPF (ref 0–5)

## 2025-05-17 PROCEDURE — 80053 COMPREHEN METABOLIC PANEL: CPT | Mod: HCNC | Performed by: HOSPITALIST

## 2025-05-17 PROCEDURE — 96374 THER/PROPH/DIAG INJ IV PUSH: CPT | Mod: HCNC

## 2025-05-17 PROCEDURE — 25000003 PHARM REV CODE 250: Mod: HCNC | Performed by: HOSPITALIST

## 2025-05-17 PROCEDURE — 87086 URINE CULTURE/COLONY COUNT: CPT | Mod: HCNC

## 2025-05-17 PROCEDURE — 21400001 HC TELEMETRY ROOM: Mod: HCNC

## 2025-05-17 PROCEDURE — 87502 INFLUENZA DNA AMP PROBE: CPT | Mod: HCNC | Performed by: EMERGENCY MEDICINE

## 2025-05-17 PROCEDURE — 63600175 PHARM REV CODE 636 W HCPCS: Mod: HCNC | Performed by: HOSPITALIST

## 2025-05-17 PROCEDURE — 36415 COLL VENOUS BLD VENIPUNCTURE: CPT | Mod: HCNC | Performed by: HOSPITALIST

## 2025-05-17 PROCEDURE — 11000001 HC ACUTE MED/SURG PRIVATE ROOM: Mod: HCNC

## 2025-05-17 PROCEDURE — 63600175 PHARM REV CODE 636 W HCPCS: Mod: HCNC | Performed by: EMERGENCY MEDICINE

## 2025-05-17 PROCEDURE — U0002 COVID-19 LAB TEST NON-CDC: HCPCS | Mod: HCNC | Performed by: EMERGENCY MEDICINE

## 2025-05-17 PROCEDURE — 83605 ASSAY OF LACTIC ACID: CPT | Mod: HCNC

## 2025-05-17 PROCEDURE — 81001 URINALYSIS AUTO W/SCOPE: CPT | Mod: HCNC

## 2025-05-17 PROCEDURE — 85025 COMPLETE CBC W/AUTO DIFF WBC: CPT | Mod: HCNC | Performed by: HOSPITALIST

## 2025-05-17 PROCEDURE — 87040 BLOOD CULTURE FOR BACTERIA: CPT | Mod: HCNC | Performed by: EMERGENCY MEDICINE

## 2025-05-17 PROCEDURE — 94761 N-INVAS EAR/PLS OXIMETRY MLT: CPT | Mod: HCNC

## 2025-05-17 RX ORDER — GLUCAGON 1 MG
1 KIT INJECTION
Status: DISCONTINUED | OUTPATIENT
Start: 2025-05-17 | End: 2025-06-06 | Stop reason: HOSPADM

## 2025-05-17 RX ORDER — MORPHINE SULFATE 4 MG/ML
2 INJECTION, SOLUTION INTRAMUSCULAR; INTRAVENOUS EVERY 4 HOURS PRN
Refills: 0 | Status: DISCONTINUED | OUTPATIENT
Start: 2025-05-17 | End: 2025-06-06 | Stop reason: HOSPADM

## 2025-05-17 RX ORDER — NALOXONE HCL 0.4 MG/ML
0.02 VIAL (ML) INJECTION
Status: DISCONTINUED | OUTPATIENT
Start: 2025-05-17 | End: 2025-06-06 | Stop reason: HOSPADM

## 2025-05-17 RX ORDER — ACETAMINOPHEN 650 MG/1
650 SUPPOSITORY RECTAL EVERY 6 HOURS PRN
Status: DISCONTINUED | OUTPATIENT
Start: 2025-05-17 | End: 2025-05-18

## 2025-05-17 RX ORDER — CEFEPIME HYDROCHLORIDE 2 G/1
2 INJECTION, POWDER, FOR SOLUTION INTRAVENOUS
Status: COMPLETED | OUTPATIENT
Start: 2025-05-17 | End: 2025-05-17

## 2025-05-17 RX ORDER — IPRATROPIUM BROMIDE AND ALBUTEROL SULFATE 2.5; .5 MG/3ML; MG/3ML
3 SOLUTION RESPIRATORY (INHALATION) EVERY 6 HOURS PRN
Status: DISCONTINUED | OUTPATIENT
Start: 2025-05-17 | End: 2025-06-06 | Stop reason: HOSPADM

## 2025-05-17 RX ORDER — ONDANSETRON HYDROCHLORIDE 2 MG/ML
4 INJECTION, SOLUTION INTRAVENOUS EVERY 8 HOURS PRN
Status: DISCONTINUED | OUTPATIENT
Start: 2025-05-17 | End: 2025-06-06 | Stop reason: HOSPADM

## 2025-05-17 RX ORDER — TALC
6 POWDER (GRAM) TOPICAL NIGHTLY PRN
Status: DISCONTINUED | OUTPATIENT
Start: 2025-05-17 | End: 2025-06-06 | Stop reason: HOSPADM

## 2025-05-17 RX ORDER — HYDROCODONE BITARTRATE AND ACETAMINOPHEN 5; 325 MG/1; MG/1
1 TABLET ORAL EVERY 6 HOURS PRN
Refills: 0 | Status: DISCONTINUED | OUTPATIENT
Start: 2025-05-17 | End: 2025-06-06 | Stop reason: HOSPADM

## 2025-05-17 RX ORDER — AMOXICILLIN 250 MG
1 CAPSULE ORAL 2 TIMES DAILY PRN
Status: DISCONTINUED | OUTPATIENT
Start: 2025-05-17 | End: 2025-06-06 | Stop reason: HOSPADM

## 2025-05-17 RX ORDER — IBUPROFEN 200 MG
24 TABLET ORAL
Status: DISCONTINUED | OUTPATIENT
Start: 2025-05-17 | End: 2025-06-06 | Stop reason: HOSPADM

## 2025-05-17 RX ORDER — SODIUM CHLORIDE 0.9 % (FLUSH) 0.9 %
10 SYRINGE (ML) INJECTION EVERY 12 HOURS PRN
Status: DISCONTINUED | OUTPATIENT
Start: 2025-05-17 | End: 2025-06-06 | Stop reason: HOSPADM

## 2025-05-17 RX ORDER — PROMETHAZINE HYDROCHLORIDE 25 MG/1
25 TABLET ORAL EVERY 6 HOURS PRN
Status: DISCONTINUED | OUTPATIENT
Start: 2025-05-17 | End: 2025-06-06 | Stop reason: HOSPADM

## 2025-05-17 RX ORDER — ALUMINUM HYDROXIDE, MAGNESIUM HYDROXIDE, AND SIMETHICONE 1200; 120; 1200 MG/30ML; MG/30ML; MG/30ML
30 SUSPENSION ORAL 4 TIMES DAILY PRN
Status: DISCONTINUED | OUTPATIENT
Start: 2025-05-17 | End: 2025-06-06 | Stop reason: HOSPADM

## 2025-05-17 RX ORDER — CEFEPIME HYDROCHLORIDE 2 G/1
2 INJECTION, POWDER, FOR SOLUTION INTRAVENOUS
Status: DISCONTINUED | OUTPATIENT
Start: 2025-05-17 | End: 2025-05-21

## 2025-05-17 RX ORDER — IBUPROFEN 200 MG
16 TABLET ORAL
Status: DISCONTINUED | OUTPATIENT
Start: 2025-05-17 | End: 2025-06-06 | Stop reason: HOSPADM

## 2025-05-17 RX ORDER — ACETAMINOPHEN 325 MG/1
650 TABLET ORAL EVERY 8 HOURS PRN
Status: DISCONTINUED | OUTPATIENT
Start: 2025-05-17 | End: 2025-05-18

## 2025-05-17 RX ORDER — PANTOPRAZOLE SODIUM 40 MG/1
40 TABLET, DELAYED RELEASE ORAL DAILY
Status: DISCONTINUED | OUTPATIENT
Start: 2025-05-17 | End: 2025-06-06 | Stop reason: HOSPADM

## 2025-05-17 RX ADMIN — CEFEPIME 2 G: 2 INJECTION, POWDER, FOR SOLUTION INTRAVENOUS at 05:05

## 2025-05-17 RX ADMIN — PANTOPRAZOLE SODIUM 40 MG: 40 TABLET, DELAYED RELEASE ORAL at 08:05

## 2025-05-17 RX ADMIN — CEFEPIME 2 G: 2 INJECTION, POWDER, FOR SOLUTION INTRAVENOUS at 01:05

## 2025-05-17 RX ADMIN — ACETAMINOPHEN 650 MG: 325 TABLET ORAL at 03:05

## 2025-05-17 RX ADMIN — CEFEPIME 2 G: 2 INJECTION, POWDER, FOR SOLUTION INTRAVENOUS at 08:05

## 2025-05-17 RX ADMIN — RIVAROXABAN 20 MG: 20 TABLET, FILM COATED ORAL at 05:05

## 2025-05-17 RX ADMIN — ACETAMINOPHEN 650 MG: 325 TABLET ORAL at 07:05

## 2025-05-17 NOTE — PLAN OF CARE
Remains injury free. Ambulated in hallway. Tolerating diet. Temp 102 this shift. C/o headache, relieved with tylenol. Cardiac monitoring. No s/s acute distress.

## 2025-05-17 NOTE — ASSESSMENT & PLAN NOTE
This patient is found to have pancytopenia, the likely etiology is , will monitor CBC . Will transfuse red blood cells if the hemoglobin is <7g/dL (or <8 in the setting of ACS). Will transfuse platelets if platelet count is . Hold DVT prophylaxis if platelets are <50k. The patient's hemoglobin, white blood cell count, and platelet count results have been reviewed and are listed below.  Recent Labs   Lab 05/17/25  0706   HGB 13.3*   WBC 2.89*   PLT 74*

## 2025-05-17 NOTE — PLAN OF CARE
[facility] - Med Surg 3  Discharge Assessment    Primary Care Provider: Brian Soares MD     Discharge Assessment (most recent)       BRIEF DISCHARGE ASSESSMENT - 05/17/25 7440          Discharge Planning    Assessment Type Discharge Planning Brief Assessment     Resource/Environmental Concerns none     Support Systems Spouse/significant other     Equipment Currently Used at Home none     Current Living Arrangements home     Patient/Family Anticipates Transition to home with family     Patient/Family Anticipated Services at Transition none     DME Needed Upon Discharge  none     Discharge Plan A Home with family     Discharge Plan B Home Health                   No anticipated needs.
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
## 2025-05-17 NOTE — ASSESSMENT & PLAN NOTE
Patient with known history of hairy cell leukemia and continues to follow Dr. Dow and Dr. Flores from Hematology/Oncology. Patient not currently on active treatment and currently undergoing treatment for neutropenic fever with broad-spectrum antibiotics as noted above.  Plan:  -continued treatment of neutropenic fever  -f/u Hematology/Oncology as directed

## 2025-05-17 NOTE — ASSESSMENT & PLAN NOTE
[facility]  Plan:  -continue home medication
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
## 2025-05-17 NOTE — HPI
Armando Ingram Jr. is a 65 y.o. male with a PMH  has a past medical history of Closed right hip fracture, Colon cancer, DVT (deep venous thrombosis) (2002), Hairy cell leukemia (06/06/2024), and Nephrolithiasis. who presented to the ED for further evaluation of acute onset fever with T-max measuring 101.0 F earlier today.  Patient reported significant history of hairy cell leukemia in his followed by Dr. Dow and Dr. Flores from Hematology/Oncology and was recently prescribed a Z-Cordell for 4 days for treatment of a cough.  Patient was instructed to go to the ED if he endorsed fever greater than 103.0 F but presented to the ED due to ulcer experiencing associated chills, throbbing headache, and persistent fever.  He reported no known alleviating or aggravating factors noted with all other review of systems negative except as noted above.  He is not currently on active treatment for his leukemia and reported being in his usual state of health prior to onset of symptoms.  Initial workup in the ED revealed patient to be afebrile with T-max measuring 100.4 F, pancytopenic, lactic acid/procalcitonin within normal limits, flu/COVID negative, chest x-ray negative for acute findings, UA positive for 2+ LE, 11 RBCs, free found WBCs.  Patient initiated on cefepime with cultures obtained and pending and admitted to Hospital Medicine under observation for continued medical management and treatment of neutropenic fever.    PCP: Brian Soares     Advancement-Rotation Flap Text: The defect edges were debeveled with a #15 scalpel blade.  Given the location of the defect, shape of the defect and the proximity to free margins an advancement-rotation flap was deemed most appropriate.  Using a sterile surgical marker, an appropriate flap was drawn incorporating the defect and placing the expected incisions within the relaxed skin tension lines where possible. The area thus outlined was incised deep to adipose tissue with a #15 scalpel blade.  The skin margins were undermined to an appropriate distance in all directions utilizing iris scissors.
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
## 2025-05-17 NOTE — FIRST PROVIDER EVALUATION
"Medical screening examination initiated.  I have conducted a focused provider triage encounter, findings are as follows:    Brief history of present illness:  64 yo male with PMHx of hairy cell leukemia presenting to the ED with complaints of fever. Tmax 101. Patient took 650 mg tylenol PTA; patient is anticoagulated and cannot take NSAIDs. Reports having chills last night and a headache; otherwise no other symptoms. Patient was recently on azithromycin and tessalon for a cough.     Vitals:    05/16/25 2224   BP: 128/67   Pulse: 100   Resp: 20   Temp: (!) 100.4 °F (38 °C)   SpO2: (!) 93%   Weight: 84.8 kg (187 lb)   Height: 5' 11" (1.803 m)       Pertinent physical exam:  Temp 100.4; O2 93%. NAD    Brief workup plan:  Preliminary workup initiated; this workup will be continued and followed by the physician or advanced practice provider that is assigned to the patient when roomed.  "

## 2025-05-17 NOTE — ASSESSMENT & PLAN NOTE
Patient presented with acute onset fever with T-max measuring 101.0 F earlier today.  Patient has significant history of hairy cell leukemia and is followed by Dr. Dow and Dr. Flores from Hematology/Oncology outpatient but not on active therapy. Initial workup in the ED revealed patient to be febrile with T-max measuring 100.4 F, pancytopenic, lactic acid/procalcitonin within normal limits, flu/COVID negative, chest x-ray negative for acute findings, UA positive for 2+ LE, 11 RBCs, free found WBCs.  Patient initiated on cefepime with cultures obtained and pending.  Plan:  -Telemetry  -Bedrest  -IVFs prn  -Antiemetics prn  -Tylenol as needed for fever   -Continue antibiotics   -f/u cultures  -consider hem/onc consult

## 2025-05-18 LAB
ABSOLUTE EOSINOPHIL (OHS): 0.01 K/UL
ABSOLUTE MONOCYTE (OHS): 0.14 K/UL (ref 0.3–1)
ABSOLUTE NEUTROPHIL COUNT (OHS): 1.92 K/UL (ref 1.8–7.7)
ALBUMIN SERPL BCP-MCNC: 3 G/DL (ref 3.5–5.2)
ALP SERPL-CCNC: 86 UNIT/L (ref 40–150)
ALT SERPL W/O P-5'-P-CCNC: 32 UNIT/L (ref 10–44)
ANION GAP (OHS): 5 MMOL/L (ref 8–16)
AST SERPL-CCNC: 60 UNIT/L (ref 11–45)
BASOPHILS # BLD AUTO: 0.01 K/UL
BASOPHILS NFR BLD AUTO: 0.3 %
BILIRUB SERPL-MCNC: 0.5 MG/DL (ref 0.1–1)
BUN SERPL-MCNC: 16 MG/DL (ref 8–23)
CALCIUM SERPL-MCNC: 8.4 MG/DL (ref 8.7–10.5)
CHLORIDE SERPL-SCNC: 107 MMOL/L (ref 95–110)
CO2 SERPL-SCNC: 20 MMOL/L (ref 23–29)
CREAT SERPL-MCNC: 1 MG/DL (ref 0.5–1.4)
ERYTHROCYTE [DISTWIDTH] IN BLOOD BY AUTOMATED COUNT: 14.4 % (ref 11.5–14.5)
GFR SERPLBLD CREATININE-BSD FMLA CKD-EPI: >60 ML/MIN/1.73/M2
GLUCOSE SERPL-MCNC: 123 MG/DL (ref 70–110)
HCT VFR BLD AUTO: 39 % (ref 40–54)
HGB BLD-MCNC: 13.3 GM/DL (ref 14–18)
IMM GRANULOCYTES # BLD AUTO: 0.03 K/UL (ref 0–0.04)
IMM GRANULOCYTES NFR BLD AUTO: 1 % (ref 0–0.5)
LYMPHOCYTES # BLD AUTO: 1 K/UL (ref 1–4.8)
MCH RBC QN AUTO: 32 PG (ref 27–31)
MCHC RBC AUTO-ENTMCNC: 34.1 G/DL (ref 32–36)
MCV RBC AUTO: 94 FL (ref 82–98)
NUCLEATED RBC (/100WBC) (OHS): 0 /100 WBC
PLATELET # BLD AUTO: 76 K/UL (ref 150–450)
PMV BLD AUTO: 11.7 FL (ref 9.2–12.9)
POTASSIUM SERPL-SCNC: 4.2 MMOL/L (ref 3.5–5.1)
PROT SERPL-MCNC: 7 GM/DL (ref 6–8.4)
RBC # BLD AUTO: 4.15 M/UL (ref 4.6–6.2)
RELATIVE EOSINOPHIL (OHS): 0.3 %
RELATIVE LYMPHOCYTE (OHS): 32.2 % (ref 18–48)
RELATIVE MONOCYTE (OHS): 4.5 % (ref 4–15)
RELATIVE NEUTROPHIL (OHS): 61.7 % (ref 38–73)
SODIUM SERPL-SCNC: 132 MMOL/L (ref 136–145)
WBC # BLD AUTO: 3.11 K/UL (ref 3.9–12.7)

## 2025-05-18 PROCEDURE — 11000001 HC ACUTE MED/SURG PRIVATE ROOM: Mod: HCNC

## 2025-05-18 PROCEDURE — 80053 COMPREHEN METABOLIC PANEL: CPT | Mod: HCNC | Performed by: HOSPITALIST

## 2025-05-18 PROCEDURE — 63600175 PHARM REV CODE 636 W HCPCS: Mod: HCNC | Performed by: HOSPITALIST

## 2025-05-18 PROCEDURE — 25000003 PHARM REV CODE 250: Mod: HCNC | Performed by: HOSPITALIST

## 2025-05-18 PROCEDURE — 25000003 PHARM REV CODE 250: Mod: HCNC | Performed by: STUDENT IN AN ORGANIZED HEALTH CARE EDUCATION/TRAINING PROGRAM

## 2025-05-18 PROCEDURE — 51798 US URINE CAPACITY MEASURE: CPT | Mod: HCNC

## 2025-05-18 PROCEDURE — 21400001 HC TELEMETRY ROOM: Mod: HCNC

## 2025-05-18 PROCEDURE — 36415 COLL VENOUS BLD VENIPUNCTURE: CPT | Mod: HCNC | Performed by: HOSPITALIST

## 2025-05-18 PROCEDURE — 63600175 PHARM REV CODE 636 W HCPCS: Mod: HCNC | Performed by: STUDENT IN AN ORGANIZED HEALTH CARE EDUCATION/TRAINING PROGRAM

## 2025-05-18 PROCEDURE — 99222 1ST HOSP IP/OBS MODERATE 55: CPT | Mod: HCNC,,, | Performed by: INTERNAL MEDICINE

## 2025-05-18 PROCEDURE — 85025 COMPLETE CBC W/AUTO DIFF WBC: CPT | Mod: HCNC | Performed by: HOSPITALIST

## 2025-05-18 RX ORDER — ACETAMINOPHEN 325 MG/1
650 TABLET ORAL EVERY 4 HOURS PRN
Status: DISCONTINUED | OUTPATIENT
Start: 2025-05-18 | End: 2025-06-06 | Stop reason: HOSPADM

## 2025-05-18 RX ORDER — LINEZOLID 2 MG/ML
600 INJECTION, SOLUTION INTRAVENOUS
Status: DISCONTINUED | OUTPATIENT
Start: 2025-05-18 | End: 2025-05-22

## 2025-05-18 RX ADMIN — CEFEPIME 2 G: 2 INJECTION, POWDER, FOR SOLUTION INTRAVENOUS at 02:05

## 2025-05-18 RX ADMIN — CEFEPIME 2 G: 2 INJECTION, POWDER, FOR SOLUTION INTRAVENOUS at 08:05

## 2025-05-18 RX ADMIN — ONDANSETRON 4 MG: 2 INJECTION INTRAMUSCULAR; INTRAVENOUS at 08:05

## 2025-05-18 RX ADMIN — ACETAMINOPHEN 650 MG: 325 TABLET ORAL at 08:05

## 2025-05-18 RX ADMIN — ACETAMINOPHEN 650 MG: 325 TABLET ORAL at 05:05

## 2025-05-18 RX ADMIN — CEFEPIME 2 G: 2 INJECTION, POWDER, FOR SOLUTION INTRAVENOUS at 05:05

## 2025-05-18 RX ADMIN — RIVAROXABAN 20 MG: 20 TABLET, FILM COATED ORAL at 05:05

## 2025-05-18 RX ADMIN — LINEZOLID 600 MG: 600 INJECTION, SOLUTION INTRAVENOUS at 09:05

## 2025-05-18 RX ADMIN — LINEZOLID 600 MG: 600 INJECTION, SOLUTION INTRAVENOUS at 11:05

## 2025-05-18 RX ADMIN — PANTOPRAZOLE SODIUM 40 MG: 40 TABLET, DELAYED RELEASE ORAL at 08:05

## 2025-05-18 NOTE — CONSULTS
Hematology Consult Note    Subjective:       Patient ID: Armando Ingram Jr. is a 65 y.o. male.    Chief Complaint: Fever (Pt states he spiked fever of 101 earlier today experiencing chills since last night. Took tylenol around 9 pm. Treated for a cough with abx a few weeks ago. /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 )      HPI 65 y.o. male with a PMH Colon cancer, DVT (deep venous thrombosis) (2002), Hairy cell leukemia (06/06/2024), and Nephrolithiasis, who presented to the ED for further evaluation of acute onset fever with T-max measuring 101.0 F earlier today. He is not on active treatment for Hairy Cell Leukemia and colon cancer history is remote. Patient was recently prescribed a Z-Cordell for 4 days for treatment of a cough. Patient presented to ED with chills, throbbing headache, and persistent fever. Lactic acid/procalcitonin within normal limits, flu/COVID negative, chest x-ray negative for acute findings, UA positive for 2+ LE, 11 RBCs, free found WBCs. Patient initiated on cefepime with cultures obtained.    Review of Systems:  Review of Systems   All other systems reviewed and are negative.       Objective:     Vitals:    05/18/25 0917   BP:    Pulse:    Resp:    Temp: 99 °F (37.2 °C)     Physical Exam  Constitutional:       Appearance: Normal appearance. He is well-developed.   HENT:      Head: Normocephalic and atraumatic.   Eyes:      Conjunctiva/sclera: Conjunctivae normal.      Pupils: Pupils are equal, round, and reactive to light.   Pulmonary:      Effort: Pulmonary effort is normal. No respiratory distress.   Abdominal:      Palpations: Abdomen is soft.      Tenderness: There is no abdominal tenderness.   Musculoskeletal:         General: No swelling. Normal range of motion.      Cervical back: Normal range of motion and neck supple.   Skin:     General: Skin is warm and dry.   Neurological:      General: No focal deficit present.      Mental Status: He is alert and  oriented to person, place, and time.   Psychiatric:         Mood and Affect: Mood normal.         Behavior: Behavior normal.          Lab Review:   Lab Results   Component Value Date    WBC 3.11 (L) 05/18/2025    RBC 4.15 (L) 05/18/2025    HGB 13.3 (L) 05/18/2025    HCT 39.0 (L) 05/18/2025    MCV 94 05/18/2025    MCH 32.0 (H) 05/18/2025    MCHC 34.1 05/18/2025    RDW 14.4 05/18/2025    PLT 76 (L) 05/18/2025    MPV 11.7 05/18/2025    GRAN 1.5 (L) 02/12/2025    GRAN 45.5 02/12/2025    LYMPH 32.2 05/18/2025    LYMPH 1.00 05/18/2025    MONO 4.5 05/18/2025    MONO 0.14 (L) 05/18/2025    EOS 0.3 05/18/2025    EOS 0.01 05/18/2025    BASO 0.01 02/12/2025    EOSINOPHIL 1.5 02/12/2025    BASOPHIL 0.3 05/18/2025    BASOPHIL 0.01 05/18/2025      Urinalysis   Latest Reference Range & Units 05/17/25 00:29   Color, UA Straw, Erika, Yellow, Light-Orange  Yellow   Appearance, UA Clear  Clear   Spec Grav UA 1.005 - 1.030  1.025   pH, UA 5.0 - 8.0  7.0   Protein, UA Negative  Negative   Glucose, UA Negative  Negative   Ketones, UA Negative  Negative   Blood, UA Negative  1+ !   NITRITE UA Negative  Negative   Bilirubin (UA) Negative  Negative   Urobilinogen, UA <2.0 EU/dL Negative   Leukocyte Esterase, UA Negative  2+ !   RBC, UA 0 - 4 /HPF 11 (H)   WBC, UA 0 - 5 /HPF 45 (H)   Bacteria, UA None, Rare, Occasional /HPF Rare   !: Data is abnormal  (H): Data is abnormally high     Assessment:       1. Urinary tract infection without hematuria, site unspecified    2. Screening for cardiovascular condition    3. Neutropenic fever    4. Chest pain         Plan:         Urinary Tract Infection  Defer to primary team for abx. Patient has mild neutropenia, so recommend duration of abx for complicated UTI.    Hairy Cell Leukemia  Not on active treatment, CBC stable.      MDM includes  :    - Acute or chronic illness or injury that poses a threat to life or bodily function  - Independent review and explanation of 2 results from unique tests  -  Discussion of management and ordering 2 unique tests  - Extensive discussion of treatment and management    Flo Horan M.D.  Hematology/Oncology Attending  [facility]

## 2025-05-18 NOTE — ASSESSMENT & PLAN NOTE
This patient is found to have pancytopenia, the likely etiology is , will monitor CBC . Will transfuse red blood cells if the hemoglobin is <7g/dL (or <8 in the setting of ACS). Will transfuse platelets if platelet count is . Hold DVT prophylaxis if platelets are <50k. The patient's hemoglobin, white blood cell count, and platelet count results have been reviewed and are listed below.  Recent Labs   Lab 05/18/25  0235   HGB 13.3*   WBC 3.11*   PLT 76*

## 2025-05-18 NOTE — PROGRESS NOTES
[facility]  [facility] Medicine  Progress Note    Patient Name: Armando Ingram Jr.  MRN: [id number]  Patient Class: IP- Inpatient   Admission Date: 5/16/2025  Length of Stay: 1 days  Attending Physician: Yoseph Baez MD  Primary Care Provider: Brian Soares MD        Subjective     Principal Problem:Neutropenic fever        HPI:  Armando Ingram Jr. is a 65 y.o. male with a PMH  has a past medical history of Closed right hip fracture, Colon cancer, DVT (deep venous thrombosis) (2002), Hairy cell leukemia (06/06/2024), and Nephrolithiasis. who presented to the ED for further evaluation of acute onset fever with T-max measuring 101.0 F earlier today.  Patient reported significant history of hairy cell leukemia in his followed by Dr. Dow and Dr. Flores from Hematology/Oncology and was recently prescribed a Z-Cordell for 4 days for treatment of a cough.  Patient was instructed to go to the ED if he endorsed fever greater than 103.0 F but presented to the ED due to ulcer experiencing associated chills, throbbing headache, and persistent fever.  He reported no known alleviating or aggravating factors noted with all other review of systems negative except as noted above.  He is not currently on active treatment for his leukemia and reported being in his usual state of health prior to onset of symptoms.  Initial workup in the ED revealed patient to be afebrile with T-max measuring 100.4 F, pancytopenic, lactic acid/procalcitonin within normal limits, flu/COVID negative, chest x-ray negative for acute findings, UA positive for 2+ LE, 11 RBCs, free found WBCs.  Patient initiated on cefepime with cultures obtained and pending and admitted to Hospital Medicine under observation for continued medical management and treatment of neutropenic fever.    PCP: Brian Soares      Overview/Hospital Course:  05/18/2025  Spiked a fever this morning, 102.1F, while on IV Cefepime. Will add on IV Zyvox for GP coverage.  Cultures still shows no growth at 24 hours. Continue to trend cultures. Hematology/Oncology team evaluation pending at the time of this encounter.     Interval History: Spiked 102.1F. Wife inquired about scheduling tylenol. I explained that we typically try not to schedule antipyretics because breakthrough fevers inform us if the current therapy is working or not. Wife expressed understanding and agreed to continue PRN tylenol for now.     Review of Systems  Objective:     Vital Signs (Most Recent):  Temp: (!) 102 °F (38.9 °C) (05/18/25 0826)  Pulse: 88 (05/18/25 0751)  Resp: 18 (05/18/25 0751)  BP: 136/70 (05/18/25 0751)  SpO2: 95 % (05/18/25 0751) Vital Signs (24h Range):  Temp:  [97.7 °F (36.5 °C)-102.1 °F (38.9 °C)] 102 °F (38.9 °C)  Pulse:  [] 88  Resp:  [17-18] 18  SpO2:  [91 %-98 %] 95 %  BP: (117-150)/(57-72) 136/70     Weight: 87.3 kg (192 lb 7.4 oz)  Body mass index is 26.84 kg/m².    Intake/Output Summary (Last 24 hours) at 5/18/2025 0907  Last data filed at 5/17/2025 1230  Gross per 24 hour   Intake 240 ml   Output --   Net 240 ml         Physical Exam      Vitals Reviewed  Constitutional:       General: He is not in acute distress.     Appearance: Normal appearance. He is normal weight. He is not ill-appearing, toxic-appearing or diaphoretic.   Cardiovascular:      Rate and Rhythm: Normal rate and regular rhythm.      Pulses: Normal pulses.      Heart sounds: Normal heart sounds. No murmur heard.     No friction rub. No gallop.   Pulmonary:      Effort: Pulmonary effort is normal. No respiratory distress.      Breath sounds: Normal breath sounds. No stridor. No wheezing, rhonchi or rales.   Chest:      Chest wall: No tenderness.   Abdominal:      General: Abdomen is flat. Bowel sounds are normal. There is no distension.      Palpations: Abdomen is soft. There is no mass.      Tenderness: There is no abdominal tenderness. There is no right CVA tenderness, left CVA tenderness, guarding or rebound.       Hernia: No hernia is present.   Musculoskeletal:         General: No swelling, tenderness, deformity or signs of injury. Normal range of motion.      Cervical back: Normal range of motion and neck supple. No rigidity or tenderness.      Right lower leg: No edema.      Left lower leg: No edema.   Lymphadenopathy:      Cervical: No cervical adenopathy.   Skin:     General: Skin is warm and dry.      Capillary Refill: Capillary refill takes less than 2 seconds.      Coloration: Skin is not jaundiced or pale.      Findings: No bruising, erythema, lesion or rash.   Neurological:      General: No focal deficit present.      Mental Status: He is alert and oriented to person, place, and time. Mental status is at baseline.      Cranial Nerves: No cranial nerve deficit.      Sensory: No sensory deficit.      Motor: No weakness.      Coordination: Coordination normal.   Psychiatric:         Mood and Affect: Mood normal.         Behavior: Behavior normal.         Thought Content: Thought content normal.         Judgment: Judgment normal.         Significant Labs: All pertinent labs within the past 24 hours have been reviewed.    Significant Imaging: I have reviewed all pertinent imaging results/findings within the past 24 hours.      Assessment & Plan  Neutropenic fever  Patient presented with acute onset fever with T-max measuring 101.0 F earlier today.  Patient has significant history of hairy cell leukemia and is followed by Dr. Dow and Dr. Flores from Hematology/Oncology outpatient but not on active therapy. Initial workup in the ED revealed patient to be febrile with T-max measuring 100.4 F, pancytopenic, lactic acid/procalcitonin within normal limits, flu/COVID negative, chest x-ray negative for acute findings, UA positive for 2+ LE, 11 RBCs, free found WBCs.  Patient initiated on cefepime with cultures obtained and pending.  -Telemetry  -Bedrest  -IVFs prn  -Antiemetics prn  -Tylenol as needed for fever    -Continue antibiotics   -f/u cultures  -consider hem/onc consult    05/18/2025  -spiked fever, 102.1F  -Zyvox added on, continue cefepime  -cultures no growth to date  -PRN tylenol; continue to monitor fever curve  Hairy cell leukemia  Patient with known history of hairy cell leukemia and continues to follow Dr. Dow and Dr. Flores from Hematology/Oncology. Patient not currently on active treatment and currently undergoing treatment for neutropenic fever with broad-spectrum antibiotics as noted above.  -continued treatment of neutropenic fever  -f/u Hematology/Oncology as directed    05/18/2025  -Condition makes patient immunocompromised  -Broad antimicrobial treatment to cover opportunistic infections  -Hematology/Oncology consulted- evaluation pending    Pancytopenia  This patient is found to have pancytopenia, the likely etiology is , will monitor CBC . Will transfuse red blood cells if the hemoglobin is <7g/dL (or <8 in the setting of ACS). Will transfuse platelets if platelet count is . Hold DVT prophylaxis if platelets are <50k. The patient's hemoglobin, white blood cell count, and platelet count results have been reviewed and are listed below.  Recent Labs   Lab 05/18/25  0235   HGB 13.3*   WBC 3.11*   PLT 76*     History of DVT (deep vein thrombosis)  -continue home medication    VTE Risk Mitigation (From admission, onward)           Ordered     rivaroxaban tablet 20 mg  With dinner         05/17/25 0816     Reason for No Pharmacological VTE Prophylaxis  Once        Question:  Reasons:  Answer:  Already adequately anticoagulated on oral Anticoagulants    05/17/25 0213     IP VTE HIGH RISK PATIENT  Once         05/17/25 0213     Place sequential compression device  Until discontinued         05/17/25 0213                    Discharge Planning   LAURI:      Code Status: Full Code   Medical Readiness for Discharge Date:   Discharge Plan A: Home with family                        Yoseph Baez  MD  Department of Hospital Medicine   [facility]

## 2025-05-18 NOTE — HOSPITAL COURSE
Continued on IV Cefepime. Zyvoz added 05/18 due to persistent fever. Hematology consulted. Cultures remain NGTD   Resp viral panel negative. ID consulted to eval due to fevers  Cefepime changed to IV Merrem per ID recs on 05/21, unclear source of fevers at this time. Zyvox discontinued per ID.   CT Abd/Pelvis showed bladder thickening but otherwise negative.   ID recommend TTE, BLE duplex and possible Indium scan for source of infection and de-escalate abx to Cefepime on 05/23.   TTE with no vegetations. BLE duplex showed chronic RLE DVT. Indium scan pending tentatively planned for 05/26-05/27 as radiology does not have required materials at this time and scan will take 2 days to complete per RT.   Persistent fevers, abx changed back to Merrem 05/25 per ID recs. Karius pending   5/26 fever curve trending down. Respiratory infection panel negative. Asymptomatic. Continue intravenous antibiotic(s)   5/27 neutropenic fever persists despite scheduled tylenol. Awaiting tagged wbc scan per infectious disease recommendations.   5/28 febrile overnight. Infectious disease recommending addition of doxy. Hem/onc following. Awaiting karius and imaging. Refused lumbar puncture. If afebrile x 48h, discharge   5/29 febrile. Positive cryptococcal infection. Infectious disease adjusting antimicrobials. Plans for lumbar puncture, mri brain, and indium scan.  5/30/2025: s/p LP today.  Opening pressure within normal range at 18. clear CSF fluid noted. Cell count, glucose, Christiana Ink, Cryptococcal antigen, MS panel, Fungal Culture, and Bacterial culture obtained. Patient received first dose of induction therapy for cryptococcal infection and ding well. Indium Scan in progress. Given toxic nature of amphotericin and flucytosine will closely monitor chemistries. Oncology consulted with partners and decision made to defer treatment for hair cell leukemia until infection has been treated.   5/31/25: Patient has remained afebrile today. ALP  and AST elevated after 48 hours of treatment. Serum cryptococcus antigen negative. Christiana ink negative. Indium scan also negative today. Discussed with ID who recommends stopping flucytosine. Continue Amphotericin until CSF Crypto Ag is known. If negative and patient remains afebrile, patient will likely discharge on fluconazole treatment for non meningitis/non-pulmonary cryptococcus which can be treated as antigenemia with extended course of fluconazole.   6/1/25: Xarelto resumed yesterday and H/H unchanged from previous. Febrile overnight (Tmax 101.9) in absence of flucytosine. Although both serum and CSF were negative for Cryptotoccus Antigen it was decided proceed with complete induction therapy with Amphotericin and Flucytosine. Will monitor closely liver enzymes and fever curve. ALP today 194, AST 55.     Discussed with Dr. Villanueva.  He continued amphotericin and flucytosine 6/3 after dose and monitor fever curve.    Patient had temp of 99.8°.  P.o. Diflucan begun at 400 mg a day.  We will observe for 24 hours and if he remains afebrile we will plan on discharging  After long discussion with Dr. Villanueva  he felt that patient should be discharged home on Diflucan 400 mg daily.  He should return to the hospital should his temperature be greater than 101.  He will follow up with Infectious Disease with thin 10 days.  Patient seen and examined on day of discharge after discussion with Dr. Lopez of Infectious Disease it was determined he was stable for discharge home.  Discussed discharge plans with patient's wife and they agree

## 2025-05-18 NOTE — PLAN OF CARE
Remains injury free. Denies c/o pain. Independent. Ambulates without difficulty. Tolerating diet. Receiving IV abx. No s/s acute distress.

## 2025-05-18 NOTE — SUBJECTIVE & OBJECTIVE
Interval History: Spiked 102.1F. Wife inquired about scheduling tylenol. I explained that we typically try not to schedule antipyretics because breakthrough fevers inform us if the current therapy is working or not. Wife expressed understanding and agreed to continue PRN tylenol for now.     Review of Systems  Objective:     Vital Signs (Most Recent):  Temp: (!) 102 °F (38.9 °C) (05/18/25 0826)  Pulse: 88 (05/18/25 0751)  Resp: 18 (05/18/25 0751)  BP: 136/70 (05/18/25 0751)  SpO2: 95 % (05/18/25 0751) Vital Signs (24h Range):  Temp:  [97.7 °F (36.5 °C)-102.1 °F (38.9 °C)] 102 °F (38.9 °C)  Pulse:  [] 88  Resp:  [17-18] 18  SpO2:  [91 %-98 %] 95 %  BP: (117-150)/(57-72) 136/70     Weight: 87.3 kg (192 lb 7.4 oz)  Body mass index is 26.84 kg/m².    Intake/Output Summary (Last 24 hours) at 5/18/2025 0907  Last data filed at 5/17/2025 1230  Gross per 24 hour   Intake 240 ml   Output --   Net 240 ml         Physical Exam      Vitals Reviewed  Constitutional:       General: He is not in acute distress.     Appearance: Normal appearance. He is normal weight. He is not ill-appearing, toxic-appearing or diaphoretic.   Cardiovascular:      Rate and Rhythm: Normal rate and regular rhythm.      Pulses: Normal pulses.      Heart sounds: Normal heart sounds. No murmur heard.     No friction rub. No gallop.   Pulmonary:      Effort: Pulmonary effort is normal. No respiratory distress.      Breath sounds: Normal breath sounds. No stridor. No wheezing, rhonchi or rales.   Chest:      Chest wall: No tenderness.   Abdominal:      General: Abdomen is flat. Bowel sounds are normal. There is no distension.      Palpations: Abdomen is soft. There is no mass.      Tenderness: There is no abdominal tenderness. There is no right CVA tenderness, left CVA tenderness, guarding or rebound.      Hernia: No hernia is present.   Musculoskeletal:         General: No swelling, tenderness, deformity or signs of injury. Normal range of motion.       Cervical back: Normal range of motion and neck supple. No rigidity or tenderness.      Right lower leg: No edema.      Left lower leg: No edema.   Lymphadenopathy:      Cervical: No cervical adenopathy.   Skin:     General: Skin is warm and dry.      Capillary Refill: Capillary refill takes less than 2 seconds.      Coloration: Skin is not jaundiced or pale.      Findings: No bruising, erythema, lesion or rash.   Neurological:      General: No focal deficit present.      Mental Status: He is alert and oriented to person, place, and time. Mental status is at baseline.      Cranial Nerves: No cranial nerve deficit.      Sensory: No sensory deficit.      Motor: No weakness.      Coordination: Coordination normal.   Psychiatric:         Mood and Affect: Mood normal.         Behavior: Behavior normal.         Thought Content: Thought content normal.         Judgment: Judgment normal.         Significant Labs: All pertinent labs within the past 24 hours have been reviewed.    Significant Imaging: I have reviewed all pertinent imaging results/findings within the past 24 hours.

## 2025-05-18 NOTE — ASSESSMENT & PLAN NOTE
Patient presented with acute onset fever with T-max measuring 101.0 F earlier today.  Patient has significant history of hairy cell leukemia and is followed by Dr. Dow and Dr. Flores from Hematology/Oncology outpatient but not on active therapy. Initial workup in the ED revealed patient to be febrile with T-max measuring 100.4 F, pancytopenic, lactic acid/procalcitonin within normal limits, flu/COVID negative, chest x-ray negative for acute findings, UA positive for 2+ LE, 11 RBCs, free found WBCs.  Patient initiated on cefepime with cultures obtained and pending.  -Telemetry  -Bedrest  -IVFs prn  -Antiemetics prn  -Tylenol as needed for fever   -Continue antibiotics   -f/u cultures  -consider hem/onc consult    05/18/2025  -spiked fever, 102.1F  -Zyvox added on, continue cefepime  -cultures no growth to date  -PRN tylenol; continue to monitor fever curve

## 2025-05-18 NOTE — ASSESSMENT & PLAN NOTE
Patient with known history of hairy cell leukemia and continues to follow Dr. Dow and Dr. Flores from Hematology/Oncology. Patient not currently on active treatment and currently undergoing treatment for neutropenic fever with broad-spectrum antibiotics as noted above.  -continued treatment of neutropenic fever  -f/u Hematology/Oncology as directed    05/18/2025  -Condition makes patient immunocompromised  -Broad antimicrobial treatment to cover opportunistic infections  -Hematology/Oncology consulted- evaluation pending

## 2025-05-19 ENCOUNTER — PATIENT MESSAGE (OUTPATIENT)
Dept: FAMILY MEDICINE | Facility: CLINIC | Age: 65
End: 2025-05-19
Payer: MEDICARE

## 2025-05-19 LAB
ABSOLUTE EOSINOPHIL (OHS): 0.03 K/UL
ABSOLUTE MONOCYTE (OHS): 0.08 K/UL (ref 0.3–1)
ABSOLUTE NEUTROPHIL COUNT (OHS): 1.75 K/UL (ref 1.8–7.7)
ALBUMIN SERPL BCP-MCNC: 2.7 G/DL (ref 3.5–5.2)
ALP SERPL-CCNC: 89 UNIT/L (ref 40–150)
ALT SERPL W/O P-5'-P-CCNC: 27 UNIT/L (ref 10–44)
ANION GAP (OHS): 6 MMOL/L (ref 8–16)
AST SERPL-CCNC: 45 UNIT/L (ref 11–45)
B PERT DNA NPH QL NAA+PROBE: NOT DETECTED
BACTERIA UR CULT: NO GROWTH
BASOPHILS # BLD AUTO: 0.01 K/UL
BASOPHILS NFR BLD AUTO: 0.4 %
BILIRUB SERPL-MCNC: 0.5 MG/DL (ref 0.1–1)
BUN SERPL-MCNC: 17 MG/DL (ref 8–23)
C PNEUM DNA LOWER RESP QL NAA+NON-PROBE: NOT DETECTED
CALCIUM SERPL-MCNC: 8.3 MG/DL (ref 8.7–10.5)
CHLORIDE SERPL-SCNC: 104 MMOL/L (ref 95–110)
CO2 SERPL-SCNC: 23 MMOL/L (ref 23–29)
CREAT SERPL-MCNC: 1.2 MG/DL (ref 0.5–1.4)
ERYTHROCYTE [DISTWIDTH] IN BLOOD BY AUTOMATED COUNT: 14.3 % (ref 11.5–14.5)
FLUAV RNA NPH QL NAA+NON-PROBE: NOT DETECTED
FLUBV RNA NPH QL NAA+NON-PROBE: NOT DETECTED
GFR SERPLBLD CREATININE-BSD FMLA CKD-EPI: >60 ML/MIN/1.73/M2
GLUCOSE SERPL-MCNC: 124 MG/DL (ref 70–110)
HADV DNA NPH QL NAA+NON-PROBE: NOT DETECTED
HCOV 229E RNA NPH QL NAA+NON-PROBE: NOT DETECTED
HCOV HKU1 RNA NPH QL NAA+NON-PROBE: NOT DETECTED
HCOV NL63 RNA NPH QL NAA+NON-PROBE: NOT DETECTED
HCOV OC43 RNA NPH QL NAA+NON-PROBE: NOT DETECTED
HCT VFR BLD AUTO: 38.4 % (ref 40–54)
HGB BLD-MCNC: 12.7 GM/DL (ref 14–18)
HMPV RNA LOWER RESP QL NAA+NON-PROBE: NOT DETECTED
HMPV RNA NPH QL NAA+NON-PROBE: NOT DETECTED
HPIV1 RNA NPH QL NAA+NON-PROBE: NOT DETECTED
HPIV2 RNA NPH QL NAA+NON-PROBE: NOT DETECTED
HPIV3 RNA NPH QL NAA+NON-PROBE: NOT DETECTED
HPIV4 RNA NPH QL NAA+NON-PROBE: NOT DETECTED
IMM GRANULOCYTES # BLD AUTO: 0.03 K/UL (ref 0–0.04)
IMM GRANULOCYTES NFR BLD AUTO: 1.1 % (ref 0–0.5)
LYMPHOCYTES # BLD AUTO: 0.9 K/UL (ref 1–4.8)
MCH RBC QN AUTO: 31.4 PG (ref 27–31)
MCHC RBC AUTO-ENTMCNC: 33.1 G/DL (ref 32–36)
MCV RBC AUTO: 95 FL (ref 82–98)
NUCLEATED RBC (/100WBC) (OHS): 0 /100 WBC
PLATELET # BLD AUTO: 71 K/UL (ref 150–450)
PMV BLD AUTO: 11.3 FL (ref 9.2–12.9)
POTASSIUM SERPL-SCNC: 4.3 MMOL/L (ref 3.5–5.1)
PROT SERPL-MCNC: 6.7 GM/DL (ref 6–8.4)
RBC # BLD AUTO: 4.05 M/UL (ref 4.6–6.2)
RELATIVE EOSINOPHIL (OHS): 1.1 %
RELATIVE LYMPHOCYTE (OHS): 32.1 % (ref 18–48)
RELATIVE MONOCYTE (OHS): 2.9 % (ref 4–15)
RELATIVE NEUTROPHIL (OHS): 62.4 % (ref 38–73)
RSV RNA NPH QL NAA+NON-PROBE: NOT DETECTED
RSV RNA NPH QL NAA+NON-PROBE: NOT DETECTED
RV+EV RNA NPH QL NAA+NON-PROBE: NOT DETECTED
SARS-COV-2 RNA RESP QL NAA+PROBE: NOT DETECTED
SODIUM SERPL-SCNC: 133 MMOL/L (ref 136–145)
SPECIMEN SOURCE: NORMAL
WBC # BLD AUTO: 2.8 K/UL (ref 3.9–12.7)

## 2025-05-19 PROCEDURE — 36415 COLL VENOUS BLD VENIPUNCTURE: CPT | Mod: HCNC | Performed by: HOSPITALIST

## 2025-05-19 PROCEDURE — 80053 COMPREHEN METABOLIC PANEL: CPT | Mod: HCNC | Performed by: HOSPITALIST

## 2025-05-19 PROCEDURE — 25000242 PHARM REV CODE 250 ALT 637 W/ HCPCS: Mod: HCNC | Performed by: INTERNAL MEDICINE

## 2025-05-19 PROCEDURE — 21400001 HC TELEMETRY ROOM: Mod: HCNC

## 2025-05-19 PROCEDURE — 63600175 PHARM REV CODE 636 W HCPCS: Mod: HCNC | Performed by: STUDENT IN AN ORGANIZED HEALTH CARE EDUCATION/TRAINING PROGRAM

## 2025-05-19 PROCEDURE — 85025 COMPLETE CBC W/AUTO DIFF WBC: CPT | Mod: HCNC | Performed by: HOSPITALIST

## 2025-05-19 PROCEDURE — 25000003 PHARM REV CODE 250: Mod: HCNC | Performed by: HOSPITALIST

## 2025-05-19 PROCEDURE — 63600175 PHARM REV CODE 636 W HCPCS: Mod: HCNC | Performed by: HOSPITALIST

## 2025-05-19 PROCEDURE — 0202U NFCT DS 22 TRGT SARS-COV-2: CPT | Mod: HCNC | Performed by: INTERNAL MEDICINE

## 2025-05-19 PROCEDURE — 25000003 PHARM REV CODE 250: Mod: HCNC | Performed by: STUDENT IN AN ORGANIZED HEALTH CARE EDUCATION/TRAINING PROGRAM

## 2025-05-19 RX ORDER — FLUTICASONE PROPIONATE 50 MCG
2 SPRAY, SUSPENSION (ML) NASAL DAILY
Status: DISCONTINUED | OUTPATIENT
Start: 2025-05-19 | End: 2025-06-01

## 2025-05-19 RX ADMIN — ACETAMINOPHEN 650 MG: 325 TABLET ORAL at 08:05

## 2025-05-19 RX ADMIN — LINEZOLID 600 MG: 600 INJECTION, SOLUTION INTRAVENOUS at 09:05

## 2025-05-19 RX ADMIN — RIVAROXABAN 20 MG: 20 TABLET, FILM COATED ORAL at 05:05

## 2025-05-19 RX ADMIN — CEFEPIME 2 G: 2 INJECTION, POWDER, FOR SOLUTION INTRAVENOUS at 01:05

## 2025-05-19 RX ADMIN — CEFEPIME 2 G: 2 INJECTION, POWDER, FOR SOLUTION INTRAVENOUS at 08:05

## 2025-05-19 RX ADMIN — FLUTICASONE PROPIONATE 100 MCG: 50 SPRAY, METERED NASAL at 01:05

## 2025-05-19 RX ADMIN — ACETAMINOPHEN 650 MG: 325 TABLET ORAL at 01:05

## 2025-05-19 RX ADMIN — PANTOPRAZOLE SODIUM 40 MG: 40 TABLET, DELAYED RELEASE ORAL at 08:05

## 2025-05-19 RX ADMIN — ACETAMINOPHEN 650 MG: 325 TABLET ORAL at 05:05

## 2025-05-19 RX ADMIN — ONDANSETRON 4 MG: 2 INJECTION INTRAMUSCULAR; INTRAVENOUS at 01:05

## 2025-05-19 RX ADMIN — CEFEPIME 2 G: 2 INJECTION, POWDER, FOR SOLUTION INTRAVENOUS at 05:05

## 2025-05-19 NOTE — ASSESSMENT & PLAN NOTE
CXR with NAF, Flu/Covid neg   Possible UTI   Continue IV Cefepime + Zyvox  Will obtain resp viral panel to r/o viral URI   Urine and blood cultures NGTD   Monitor VS   Hematology/Oncology consulted and following

## 2025-05-19 NOTE — ASSESSMENT & PLAN NOTE
Patient with known history of hairy cell leukemia and continues to follow Dr. Dow and Dr. Flores from Hematology/Oncology. Patient not currently on active treatment and currently undergoing treatment for neutropenic fever with broad-spectrum antibiotics as noted above.  Hematology consulted and following

## 2025-05-19 NOTE — ASSESSMENT & PLAN NOTE
This patient is found to have pancytopenia, the likely etiology is , will monitor CBC . Will transfuse red blood cells if the hemoglobin is <7g/dL (or <8 in the setting of ACS). Will transfuse platelets if platelet count is . Hold DVT prophylaxis if platelets are <50k. The patient's hemoglobin, white blood cell count, and platelet count results have been reviewed and are listed below.  Recent Labs   Lab 05/19/25  0311   HGB 12.7*   WBC 2.80*   PLT 71*

## 2025-05-19 NOTE — PROGRESS NOTES
[facility]  [facility] Medicine  Progress Note    Patient Name: Armando Ingram Jr.  MRN: [id number]  Patient Class: IP- Inpatient   Admission Date: 5/16/2025  Length of Stay: 2 days  Attending Physician: Gail Elam MD  Primary Care Provider: Brian Soares MD        Subjective     Principal Problem:Neutropenic fever        HPI:  Armando Ingram Jr. is a 65 y.o. male with a PMH  has a past medical history of Closed right hip fracture, Colon cancer, DVT (deep venous thrombosis) (2002), Hairy cell leukemia (06/06/2024), and Nephrolithiasis. who presented to the ED for further evaluation of acute onset fever with T-max measuring 101.0 F earlier today.  Patient reported significant history of hairy cell leukemia in his followed by Dr. Dow and Dr. Flores from Hematology/Oncology and was recently prescribed a Z-Cordell for 4 days for treatment of a cough.  Patient was instructed to go to the ED if he endorsed fever greater than 103.0 F but presented to the ED due to ulcer experiencing associated chills, throbbing headache, and persistent fever.  He reported no known alleviating or aggravating factors noted with all other review of systems negative except as noted above.  He is not currently on active treatment for his leukemia and reported being in his usual state of health prior to onset of symptoms.  Initial workup in the ED revealed patient to be afebrile with T-max measuring 100.4 F, pancytopenic, lactic acid/procalcitonin within normal limits, flu/COVID negative, chest x-ray negative for acute findings, UA positive for 2+ LE, 11 RBCs, free found WBCs.  Patient initiated on cefepime with cultures obtained and pending and admitted to Hospital Medicine under observation for continued medical management and treatment of neutropenic fever.    PCP: Brian Soares      Overview/Hospital Course:  Continued on IV Cefepime. Zyvoz added 05/18 due to persistent fever. Hematology consulted. Cultures remain NGTD    Resp viral panel pending.     Interval History:  T-max 102°.  Patient is seen and examined with wife at bedside.  He reports postnasal drip and intermittent cough.  Had 1 episode of post-tussive emesis yesterday.  Denies abdominal pain, diarrhea, dysuria.  Reports some difficulty with initiating urine stream that has been ongoing for 4-5 days.    Review of Systems  Objective:     Vital Signs (Most Recent):  Temp: (!) 102.1 °F (38.9 °C) (05/19/25 0800)  Pulse: 86 (05/19/25 0928)  Resp: 18 (05/19/25 0751)  BP: 108/63 (05/19/25 0751)  SpO2: 98 % (05/19/25 0751) Vital Signs (24h Range):  Temp:  [98.7 °F (37.1 °C)-102.1 °F (38.9 °C)] 102.1 °F (38.9 °C)  Pulse:  [] 86  Resp:  [17-20] 18  SpO2:  [93 %-98 %] 98 %  BP: (108-145)/(57-91) 108/63     Weight: 87.3 kg (192 lb 7.4 oz)  Body mass index is 26.84 kg/m².    Intake/Output Summary (Last 24 hours) at 5/19/2025 1122  Last data filed at 5/19/2025 1027  Gross per 24 hour   Intake 1313.95 ml   Output --   Net 1313.95 ml         Physical Exam  Vitals and nursing note reviewed.   Constitutional:       General: He is not in acute distress.     Appearance: He is not ill-appearing.   Cardiovascular:      Rate and Rhythm: Normal rate and regular rhythm.      Heart sounds: No murmur heard.  Pulmonary:      Effort: Pulmonary effort is normal.      Breath sounds: Normal breath sounds. No wheezing or rales.      Comments: Room air  Abdominal:      General: Bowel sounds are normal. There is no distension.      Palpations: Abdomen is soft.      Tenderness: There is no abdominal tenderness.   Musculoskeletal:      Right lower leg: No edema.      Left lower leg: No edema.   Neurological:      Mental Status: He is alert and oriented to person, place, and time.               Significant Labs: All pertinent labs within the past 24 hours have been reviewed.    Significant Imaging: I have reviewed all pertinent imaging results/findings within the past 24 hours.      Assessment &  Plan  Neutropenic fever  CXR with NAF, Flu/Covid neg   Possible UTI   Continue IV Cefepime + Zyvox  Will obtain resp viral panel to r/o viral URI   Urine and blood cultures NGTD   Monitor VS   Hematology/Oncology consulted and following      Hairy cell leukemia  Patient with known history of hairy cell leukemia and continues to follow Dr. Dow and Dr. Flores from Hematology/Oncology. Patient not currently on active treatment and currently undergoing treatment for neutropenic fever with broad-spectrum antibiotics as noted above.  Hematology consulted and following   Pancytopenia  This patient is found to have pancytopenia, the likely etiology is , will monitor CBC . Will transfuse red blood cells if the hemoglobin is <7g/dL (or <8 in the setting of ACS). Will transfuse platelets if platelet count is . Hold DVT prophylaxis if platelets are <50k. The patient's hemoglobin, white blood cell count, and platelet count results have been reviewed and are listed below.  Recent Labs   Lab 05/19/25  0311   HGB 12.7*   WBC 2.80*   PLT 71*     History of DVT (deep vein thrombosis)  -continue home Xarelto   - monitor Plt Counts     VTE Risk Mitigation (From admission, onward)           Ordered     rivaroxaban tablet 20 mg  With dinner         05/17/25 0816     Reason for No Pharmacological VTE Prophylaxis  Once        Question:  Reasons:  Answer:  Already adequately anticoagulated on oral Anticoagulants    05/17/25 0213     IP VTE HIGH RISK PATIENT  Once         05/17/25 0213     Place sequential compression device  Until discontinued         05/17/25 0213                    Discharge Planning   LAURI:      Code Status: Full Code   Medical Readiness for Discharge Date:   Discharge Plan A: Home with family                        Gail Elam MD  Department of Hospital Medicine   [facility]

## 2025-05-19 NOTE — SUBJECTIVE & OBJECTIVE
Interval History:  T-max 102°.  Patient is seen and examined with wife at bedside.  He reports postnasal drip and intermittent cough.  Had 1 episode of post-tussive emesis yesterday.  Denies abdominal pain, diarrhea, dysuria.  Reports some difficulty with initiating urine stream that has been ongoing for 4-5 days.    Review of Systems  Objective:     Vital Signs (Most Recent):  Temp: (!) 102.1 °F (38.9 °C) (05/19/25 0800)  Pulse: 86 (05/19/25 0928)  Resp: 18 (05/19/25 0751)  BP: 108/63 (05/19/25 0751)  SpO2: 98 % (05/19/25 0751) Vital Signs (24h Range):  Temp:  [98.7 °F (37.1 °C)-102.1 °F (38.9 °C)] 102.1 °F (38.9 °C)  Pulse:  [] 86  Resp:  [17-20] 18  SpO2:  [93 %-98 %] 98 %  BP: (108-145)/(57-91) 108/63     Weight: 87.3 kg (192 lb 7.4 oz)  Body mass index is 26.84 kg/m².    Intake/Output Summary (Last 24 hours) at 5/19/2025 1122  Last data filed at 5/19/2025 1027  Gross per 24 hour   Intake 1313.95 ml   Output --   Net 1313.95 ml         Physical Exam  Vitals and nursing note reviewed.   Constitutional:       General: He is not in acute distress.     Appearance: He is not ill-appearing.   Cardiovascular:      Rate and Rhythm: Normal rate and regular rhythm.      Heart sounds: No murmur heard.  Pulmonary:      Effort: Pulmonary effort is normal.      Breath sounds: Normal breath sounds. No wheezing or rales.      Comments: Room air  Abdominal:      General: Bowel sounds are normal. There is no distension.      Palpations: Abdomen is soft.      Tenderness: There is no abdominal tenderness.   Musculoskeletal:      Right lower leg: No edema.      Left lower leg: No edema.   Neurological:      Mental Status: He is alert and oriented to person, place, and time.               Significant Labs: All pertinent labs within the past 24 hours have been reviewed.    Significant Imaging: I have reviewed all pertinent imaging results/findings within the past 24 hours.

## 2025-05-19 NOTE — PLAN OF CARE
Pt remains free from falls/injuries this shift. Safety precautions maintained. Pain managed with pain medication. Pt ambulated in [place]. Fever this morning, none this afternoon.Tele monitoring per orders. No s/s of acute distress noted. Will continue to monitor. Chart check completed.

## 2025-05-20 LAB
ABSOLUTE NEUTROPHIL MANUAL (OHS): 1.5 K/UL
ANION GAP (OHS): 6 MMOL/L (ref 8–16)
BUN SERPL-MCNC: 19 MG/DL (ref 8–23)
CALCIUM SERPL-MCNC: 8.4 MG/DL (ref 8.7–10.5)
CHLORIDE SERPL-SCNC: 102 MMOL/L (ref 95–110)
CO2 SERPL-SCNC: 24 MMOL/L (ref 23–29)
CREAT SERPL-MCNC: 1.2 MG/DL (ref 0.5–1.4)
EOSINOPHIL NFR BLD MANUAL: 1 % (ref 0–8)
ERYTHROCYTE [DISTWIDTH] IN BLOOD BY AUTOMATED COUNT: 14.2 % (ref 11.5–14.5)
GFR SERPLBLD CREATININE-BSD FMLA CKD-EPI: >60 ML/MIN/1.73/M2
GLUCOSE SERPL-MCNC: 124 MG/DL (ref 70–110)
HCT VFR BLD AUTO: 38.9 % (ref 40–54)
HGB BLD-MCNC: 12.8 GM/DL (ref 14–18)
LYMPHOCYTES NFR BLD MANUAL: 39 % (ref 18–48)
MCH RBC QN AUTO: 31.4 PG (ref 27–31)
MCHC RBC AUTO-ENTMCNC: 32.9 G/DL (ref 32–36)
MCV RBC AUTO: 96 FL (ref 82–98)
MONOCYTES NFR BLD MANUAL: 2 % (ref 4–15)
NEUTROPHILS NFR BLD MANUAL: 58 % (ref 38–73)
NUCLEATED RBC (/100WBC) (OHS): 0 /100 WBC
PLATELET # BLD AUTO: 73 K/UL (ref 150–450)
PLATELET BLD QL SMEAR: ABNORMAL
PMV BLD AUTO: 10.4 FL (ref 9.2–12.9)
POTASSIUM SERPL-SCNC: 4.9 MMOL/L (ref 3.5–5.1)
RBC # BLD AUTO: 4.07 M/UL (ref 4.6–6.2)
SODIUM SERPL-SCNC: 132 MMOL/L (ref 136–145)
WBC # BLD AUTO: 2.6 K/UL (ref 3.9–12.7)

## 2025-05-20 PROCEDURE — 85025 COMPLETE CBC W/AUTO DIFF WBC: CPT | Mod: HCNC | Performed by: INTERNAL MEDICINE

## 2025-05-20 PROCEDURE — 36415 COLL VENOUS BLD VENIPUNCTURE: CPT | Mod: HCNC | Performed by: INTERNAL MEDICINE

## 2025-05-20 PROCEDURE — 25000003 PHARM REV CODE 250: Mod: HCNC | Performed by: HOSPITALIST

## 2025-05-20 PROCEDURE — 21400001 HC TELEMETRY ROOM: Mod: HCNC

## 2025-05-20 PROCEDURE — 27000207 HC ISOLATION: Mod: HCNC

## 2025-05-20 PROCEDURE — 25000003 PHARM REV CODE 250: Mod: HCNC | Performed by: INTERNAL MEDICINE

## 2025-05-20 PROCEDURE — 63600175 PHARM REV CODE 636 W HCPCS: Mod: HCNC | Performed by: STUDENT IN AN ORGANIZED HEALTH CARE EDUCATION/TRAINING PROGRAM

## 2025-05-20 PROCEDURE — 94761 N-INVAS EAR/PLS OXIMETRY MLT: CPT | Mod: HCNC

## 2025-05-20 PROCEDURE — 63600175 PHARM REV CODE 636 W HCPCS: Mod: HCNC | Performed by: HOSPITALIST

## 2025-05-20 PROCEDURE — 80048 BASIC METABOLIC PNL TOTAL CA: CPT | Mod: HCNC | Performed by: INTERNAL MEDICINE

## 2025-05-20 RX ORDER — TAMSULOSIN HYDROCHLORIDE 0.4 MG/1
0.4 CAPSULE ORAL DAILY
Status: DISCONTINUED | OUTPATIENT
Start: 2025-05-20 | End: 2025-06-06 | Stop reason: HOSPADM

## 2025-05-20 RX ORDER — BENZONATATE 100 MG/1
100 CAPSULE ORAL 3 TIMES DAILY PRN
Status: DISCONTINUED | OUTPATIENT
Start: 2025-05-20 | End: 2025-06-06 | Stop reason: HOSPADM

## 2025-05-20 RX ORDER — CETIRIZINE HYDROCHLORIDE 10 MG/1
10 TABLET ORAL DAILY
Status: DISCONTINUED | OUTPATIENT
Start: 2025-05-20 | End: 2025-06-06 | Stop reason: HOSPADM

## 2025-05-20 RX ADMIN — PROMETHAZINE HYDROCHLORIDE 25 MG: 25 TABLET ORAL at 01:05

## 2025-05-20 RX ADMIN — CEFEPIME 2 G: 2 INJECTION, POWDER, FOR SOLUTION INTRAVENOUS at 05:05

## 2025-05-20 RX ADMIN — RIVAROXABAN 20 MG: 20 TABLET, FILM COATED ORAL at 05:05

## 2025-05-20 RX ADMIN — LINEZOLID 600 MG: 600 INJECTION, SOLUTION INTRAVENOUS at 09:05

## 2025-05-20 RX ADMIN — LINEZOLID 600 MG: 600 INJECTION, SOLUTION INTRAVENOUS at 11:05

## 2025-05-20 RX ADMIN — TAMSULOSIN HYDROCHLORIDE 0.4 MG: 0.4 CAPSULE ORAL at 10:05

## 2025-05-20 RX ADMIN — PANTOPRAZOLE SODIUM 40 MG: 40 TABLET, DELAYED RELEASE ORAL at 08:05

## 2025-05-20 RX ADMIN — ONDANSETRON 4 MG: 2 INJECTION INTRAMUSCULAR; INTRAVENOUS at 08:05

## 2025-05-20 RX ADMIN — FLUTICASONE PROPIONATE 100 MCG: 50 SPRAY, METERED NASAL at 08:05

## 2025-05-20 RX ADMIN — CEFEPIME 2 G: 2 INJECTION, POWDER, FOR SOLUTION INTRAVENOUS at 12:05

## 2025-05-20 RX ADMIN — CEFEPIME 2 G: 2 INJECTION, POWDER, FOR SOLUTION INTRAVENOUS at 10:05

## 2025-05-20 RX ADMIN — CETIRIZINE HYDROCHLORIDE 10 MG: 10 TABLET, FILM COATED ORAL at 10:05

## 2025-05-20 NOTE — PROGRESS NOTES
[facility]  [facility] Medicine  Progress Note    Patient Name: Armando Ingram Jr.  MRN: [id number]  Patient Class: IP- Inpatient   Admission Date: 5/16/2025  Length of Stay: 3 days  Attending Physician: Gail Elam MD  Primary Care Provider: Brian Soares MD        Subjective     Principal Problem:Neutropenic fever        HPI:  Armando Ingram Jr. is a 65 y.o. male with a PMH  has a past medical history of Closed right hip fracture, Colon cancer, DVT (deep venous thrombosis) (2002), Hairy cell leukemia (06/06/2024), and Nephrolithiasis. who presented to the ED for further evaluation of acute onset fever with T-max measuring 101.0 F earlier today.  Patient reported significant history of hairy cell leukemia in his followed by Dr. Dow and Dr. Flores from Hematology/Oncology and was recently prescribed a Z-Cordell for 4 days for treatment of a cough.  Patient was instructed to go to the ED if he endorsed fever greater than 103.0 F but presented to the ED due to ulcer experiencing associated chills, throbbing headache, and persistent fever.  He reported no known alleviating or aggravating factors noted with all other review of systems negative except as noted above.  He is not currently on active treatment for his leukemia and reported being in his usual state of health prior to onset of symptoms.  Initial workup in the ED revealed patient to be afebrile with T-max measuring 100.4 F, pancytopenic, lactic acid/procalcitonin within normal limits, flu/COVID negative, chest x-ray negative for acute findings, UA positive for 2+ LE, 11 RBCs, free found WBCs.  Patient initiated on cefepime with cultures obtained and pending and admitted to Hospital Medicine under observation for continued medical management and treatment of neutropenic fever.    PCP: Brian Soares      Overview/Hospital Course:  Continued on IV Cefepime. Zyvoz added 05/18 due to persistent fever. Hematology consulted. Cultures remain NGTD    Resp viral panel negative. ID consulted to eval due to fevers.     Interval History: Tmax 101.1. Pt seen and examined, wife at bedside. He reports continued nasal congestion, cough with clear sputum and post tussive emesis x 1. Reports he takes allergy meds as outpatient- zyrtec and tessalon added.       Review of Systems  Objective:     Vital Signs (Most Recent):  Temp: 99.7 °F (37.6 °C) (05/20/25 0723)  Pulse: 84 (05/20/25 0725)  Resp: 18 (05/20/25 0723)  BP: 118/64 (05/20/25 0723)  SpO2: (!) 90 % (05/20/25 0723) Vital Signs (24h Range):  Temp:  [97.9 °F (36.6 °C)-101.1 °F (38.4 °C)] 99.7 °F (37.6 °C)  Pulse:  [75-98] 84  Resp:  [16-18] 18  SpO2:  [90 %-96 %] 90 %  BP: (116-159)/(60-72) 118/64     Weight: 87.3 kg (192 lb 7.4 oz)  Body mass index is 26.84 kg/m².    Intake/Output Summary (Last 24 hours) at 5/20/2025 0930  Last data filed at 5/20/2025 0615  Gross per 24 hour   Intake 1077.47 ml   Output --   Net 1077.47 ml         Physical Exam  Vitals and nursing note reviewed.   Constitutional:       General: He is not in acute distress.     Appearance: He is not ill-appearing.   HENT:      Head: Normocephalic and atraumatic.   Cardiovascular:      Rate and Rhythm: Normal rate and regular rhythm.      Heart sounds: No murmur heard.  Pulmonary:      Effort: Pulmonary effort is normal.      Breath sounds: Normal breath sounds. No wheezing, rhonchi or rales.   Abdominal:      General: Bowel sounds are normal. There is no distension.      Palpations: Abdomen is soft.      Tenderness: There is no abdominal tenderness.   Musculoskeletal:      Right lower leg: No edema.      Left lower leg: No edema.   Neurological:      General: No focal deficit present.      Mental Status: He is alert. Mental status is at baseline.               Significant Labs: All pertinent labs within the past 24 hours have been reviewed.    Significant Imaging: I have reviewed all pertinent imaging results/findings within the past 24  hours.      Assessment & Plan  Neutropenic fever  CXR with NAF, Flu/Covid neg   Possible UTI   Continue IV Cefepime + Zyvox  Resp viral panel negative   Urine and blood cultures NGTD   Monitor VS   Hematology/Oncology consulted and following   ID consult due to persistent fever   Encourage IS and Oob as able      Hairy cell leukemia  Patient with known history of hairy cell leukemia and continues to follow Dr. Dow and Dr. Flores from Hematology/Oncology. Patient not currently on active treatment and currently undergoing treatment for neutropenic fever with broad-spectrum antibiotics as noted above.  Hematology consulted and following   Pancytopenia  This patient is found to have pancytopenia, the likely etiology is , will monitor CBC . Will transfuse red blood cells if the hemoglobin is <7g/dL (or <8 in the setting of ACS). Will transfuse platelets if platelet count is . Hold DVT prophylaxis if platelets are <50k. The patient's hemoglobin, white blood cell count, and platelet count results have been reviewed and are listed below.  Recent Labs   Lab 05/20/25  0532   HGB 12.8*   WBC 2.60*   PLT 73*     History of DVT (deep vein thrombosis)  -continue home Xarelto   - monitor Plt Counts     VTE Risk Mitigation (From admission, onward)           Ordered     rivaroxaban tablet 20 mg  With dinner         05/17/25 0816     Reason for No Pharmacological VTE Prophylaxis  Once        Question:  Reasons:  Answer:  Already adequately anticoagulated on oral Anticoagulants    05/17/25 0213     IP VTE HIGH RISK PATIENT  Once         05/17/25 0213     Place sequential compression device  Until discontinued         05/17/25 0213                    Discharge Planning   LAURI:      Code Status: Full Code   Medical Readiness for Discharge Date:   Discharge Plan A: Home with family                        Gail Elam MD  Department of Hospital Medicine   [facility]

## 2025-05-20 NOTE — PLAN OF CARE
Name from pharmacy: Mucus Relief  mg tablet, extended release         Will file in chart as: Mucus Relief 600 MG 12 hr tablet    Sig: TAKE 2 TABLETS BY MOUTH EVERY DAY    Disp:  180 tablet    Refills:  0    Start: 3/20/2023    Class: Eprescribe    Non-formulary    To pharmacy: 01/11/2023 AJ: to have on file for next fill.    Last ordered: 2 months ago by Nohemi Riggs MD Last refill: 1/9/2023    Rx #: [id number]        Name from pharmacy: cholecalciferol (vitamin D3) 25 mcg (1,000 unit) tablet         Will file in chart as: cholecalciferol 25 mcg (1,000 units) tablet    Sig: TAKE 1 TABLET BY MOUTH EVERY DAY    Disp:  90 tablet    Refills:  0    Start: 3/20/2023    Class: Eprescribe    Non-formulary For: Prostate cancer (CMD)    To pharmacy: 01/11/2023 AJ: to have on file for next fill.    Last ordered: 2 months ago by Nohemi Riggs MD Last refill: 1/9/2023    Rx #: [id number]    Vitamin Supplements Refill Protocol - 12 Month Protocol Passed 03/20/2023 02:54 PM   Protocol Details  Seen by prescribing provider or same department within the last 12 months or has a future appt in 3 months - IF FAILED PLEASE LOOK AT CHART REVIEW FOR LAST VISIT AND PROCEED ACCORDINGLY    Medication (including dose and sig) on current meds list       Name from pharmacy: pravastatin 20 mg tablet         Will file in chart as: pravastatin (PRAVACHOL) 20 MG tablet    Sig: TAKE 1 TABLET BY MOUTH EVERY DAY    Disp:  90 tablet    Refills:  0    Start: 3/20/2023    Class: Eprescribe    To pharmacy: 01/11/2023 AJ: to have on file for next fill.    Last ordered: 2 months ago by Nohemi Riggs MD Last refill: 1/9/2023    Rx #: [id number]    Hmg CoA Reductase Inhibitors (Statin) Refill Protocol - 12 Month Protocol Failed 03/20/2023 02:54 PM   Protocol Details  Lipid Panel or Direct LDL resulted within last 15 months    Patient is NOT on Gemfibrozil    Seen by prescribing provider or same department within the last 12 months or has a  Discussed poc with pt, pt verbalized understanding    Purposeful rounding every 2hours    VS wnl  Cardiac monitoring in use, pt is NSR, tele monitor # [id number]  Fall precautions in place, remains injury free  Pt denies c/o any pain or discomfort at this time and patient will continue to be monitored.  Pain and nausea under control with PRN meds    IVFs  Accurate I&Os  Abx given as prescribed  Bed locked at lowest position  Call light within reach    Chart check complete  Will cont with POC      future appt in 3 months - IF FAILED PLEASE LOOK AT CHART REVIEW FOR LAST VISIT AND PROCEED ACCORDINGLY    Request is NOT for Simvastatin 80 mg or Vytorin 10-80 mg    Medication (including dose and sig) on current meds list       Name from pharmacy: ramipril 10 mg capsule         Will file in chart as: ramipril (ALTACE) 10 MG capsule    Sig: TAKE 1 CAPSULE BY MOUTH EVERY DAY    Disp:  90 capsule    Refills:  0    Start: 3/20/2023    Class: Eprescribe    To pharmacy: 01/11/2023 AJ: to have on file for next fill.    Last ordered: 2 months ago by Nohemi Riggs MD Last refill: 1/9/2023    Rx #: [id number]    ACE Inhibitor Refill Protocol - 12 Month Protocol Failed 03/20/2023 02:54 PM   Protocol Details  Normal Creatinine within last 12 months looking at last value    Seen by prescribing provider or same department within the last 12 months or has a future appt in 3 months - IF FAILED PLEASE LOOK AT CHART REVIEW FOR LAST VISIT AND PROCEED ACCORDINGLY    Last BP was under 140/90 or if patient has diabetes, CAD, or PVD, BP under 130/80 in past year -- IF CRITERIA FAILED REFER TO PROTOCOL DETAILS    Normal Potassium within last 12 months looking at last value    Medication (including dose and sig) on current meds list       Name from pharmacy: aspirin 81 mg tablet,delayed release         Will file in chart as: Aspirin Low Dose 81 MG EC tablet    Sig: TAKE 1 TABLET BY MOUTH EVERY DAY    Disp:  90 tablet    Refills:  0    Start: 3/20/2023    Class: Eprescribe    Non-formulary    To pharmacy: 01/11/2023 AJ: to have on file for next fill.    Last ordered: 2 months ago by Nohemi Riggs MD Last refill: 1/9/2023    Rx #: [id number]    NSAID Refill Protocol - 12 Month Protocol Failed 03/20/2023 02:54 PM   Protocol Details  HGB greater than 10 and HCT greater than 30 in past 9 months looking at last value    Seen by prescribing provider or same department within the last 12 months or has a future appt in 3 months - IF FAILED  PLEASE LOOK AT CHART REVIEW FOR LAST VISIT AND PROCEED ACCORDINGLY    eGFR greater than 29 within last 12 months looking at last value    AST less than 55 in past 9 months looking at last value    Patient is less than 69 years old    Normal Potassium within last 9 months looking at last value    ALT less than 90 in past 9 months looking at last value    Medication (including dose and sig) on current meds list    Request is NOT for Ketorolac       Name from pharmacy: furosemide 20 mg tablet         Will file in chart as: furosemide (LASIX) 20 MG tablet    Sig: TAKE 1 TABLET BY MOUTH EVERY DAY    Disp:  90 tablet    Refills:  0    Start: 3/20/2023    Class: Eprescribe    To pharmacy: 01/11/2023 AJ: to have on file for next fill.    Last ordered: 2 months ago by Nohemi Riggs MD Last refill: 1/9/2023    Rx #: [id number]    Diuretics Refill Protocol - 12 Month Protocol Failed 03/20/2023 02:54 PM   Protocol Details  Normal Creatinine within last 12 months looking at last value    Seen by prescribing provider or same department within the last 12 months or has a future appt in 3 months - IF FAILED PLEASE LOOK AT CHART REVIEW FOR LAST VISIT AND PROCEED ACCORDINGLY    Last BP was under 140/90 or if patient has diabetes, CAD, or PVD, BP under 130/80 in past year -- IF CRITERIA FAILED REFER TO PROTOCOL DETAILS    Normal Potassium within last 12 months looking at last value    Normal Sodium within last 12 months looking at last value    Medication (including dose and sig) on current meds list       Name from pharmacy: metformin  mg tablet,extended release 24 hr         Will file in chart as: metFORMIN (GLUCOPHAGE-XR) 500 MG 24 hr tablet    Sig: Take 2 tablets by mouth 2 times a day with the morning and evening meals for blood sugar    Disp:  360 tablet    Refills:  0    Start: 3/20/2023    Class: Eprescribe    To pharmacy: 01/11/2023 AJ: to have on file for next fill.    Last ordered: 2 months ago by Nohemi Riggs  MD Last refill: 1/9/2023    Rx #: [id number]    Metformin Antihyperglycemics Refill Protocol - 12 Month Protcol Failed 03/20/2023 02:54 PM   Protocol Details  Lipid Panel resulted within last 15 months    Medication (including dose and sig) on current meds list    Seen by prescribing provider or same department within the last 12 months or has a future appt in 3 months - IF FAILED PLEASE LOOK AT CHART REVIEW FOR LAST VISIT AND PROCEED ACCORDINGLY    Hgb A1c less than 8 within last 6 months looking at last value -- IF CRITERIA FAILED REFER TO PROTOCOL DETAILS    eGFR greater than 59 within last 12 months looking at last value OR If eGFR is between 45-59 then has patient had a repeat eGFR resulted in past 6 months -- IF CRITERIA FAILED REFER TO PROTOCOL DETAILS       Name from pharmacy: amlodipine 5 mg tablet         Will file in chart as: amLODIPine (NORVASC) 5 MG tablet    Sig: TAKE 1 TABLET BY MOUTH EVERY DAY    Disp:  90 tablet    Refills:  0    Start: 3/20/2023    Class: Eprescribe    To pharmacy: 01/11/2023 AJ: to have on file for next fill.    Last ordered: 2 months ago by Nohemi Riggs MD Last refill: 1/9/2023    Rx #: [id number]    Calcium Channel Blockers Refill Protocol - 12 Month Protocol Passed 03/20/2023 02:54 PM   Protocol Details  eGFR within last 12 months looking at last value    Seen by prescribing provider or same department within the last 12 months or has a future appt in 3 months - IF FAILED PLEASE LOOK AT CHART REVIEW FOR LAST VISIT AND PROCEED ACCORDINGLY    Last BP was under 140/90 or if patient has diabetes, CAD, or PVD, BP under 130/80 in past year -- IF CRITERIA FAILED REFER TO PROTOCOL DETAILS    Last recorded pulse is greater than 49    Medication (including dose and sig) on current meds list       Name from pharmacy: omeprazole 20 mg capsule,delayed release         Will file in chart as: omeprazole (PrilOSEC) 20 MG capsule    Sig: TAKE 1 CAPSULE BY MOUTH EVERY DAY AT BEDTIME    Disp:   90 capsule    Refills:  0    Start: 3/20/2023    Class: Eprescribe    To pharmacy: 01/11/2023 AJ: to have on file for next fill.    Last ordered: 2 months ago by Nohemi Riggs MD Last refill: 1/9/2023    Rx #: [id number]    Proton Pump Inhibitor (PPI) Refill Protocol - 12 Month Protocol Failed 03/20/2023 02:54 PM   Protocol Details  Medication (including dose and sig) on current meds list    Seen by prescribing provider or same department within the last 12 months or has a future appt in 3 months - IF FAILED PLEASE LOOK AT CHART REVIEW FOR LAST VISIT AND PROCEED ACCORDINGLY    Not on Clopidogrel (Plavix) or if on, refill is for Pantoprazole (Protonix)      To be filled at: [facility] - [place] [id number]      Name from pharmacy: Mucus Relief  mg tablet, extended release         Will file in chart as: Mucus Relief 600 MG 12 hr tablet    Sig: TAKE 2 TABLETS BY MOUTH EVERY DAY    Disp:  180 tablet    Refills:  0    Start: 3/20/2023    Class: Eprescribe    Non-formulary    To pharmacy: 01/11/2023 AJ: to have on file for next fill.    Last ordered: 2 months ago by Nohemi Riggs MD Last refill: 1/9/2023    Rx #: [id number]        Name from pharmacy: cholecalciferol (vitamin D3) 25 mcg (1,000 unit) tablet         Will file in chart as: cholecalciferol 25 mcg (1,000 units) tablet    Sig: TAKE 1 TABLET BY MOUTH EVERY DAY    Disp:  90 tablet    Refills:  0    Start: 3/20/2023    Class: Eprescribe    Non-formulary For: Prostate cancer (CMD)    To pharmacy: 01/11/2023 AJ: to have on file for next fill.    Last ordered: 2 months ago by Nohemi Riggs MD Last refill: 1/9/2023    Rx #: [id number]    Vitamin Supplements Refill Protocol - 12 Month Protocol Passed 03/20/2023 02:54 PM   Protocol Details  Seen by prescribing provider or same department within the last 12 months or has a future appt in 3 months - IF FAILED PLEASE LOOK AT CHART REVIEW FOR LAST VISIT AND PROCEED ACCORDINGLY     Medication (including dose and sig) on current meds list       Name from pharmacy: pravastatin 20 mg tablet         Will file in chart as: pravastatin (PRAVACHOL) 20 MG tablet    Sig: TAKE 1 TABLET BY MOUTH EVERY DAY    Disp:  90 tablet    Refills:  0    Start: 3/20/2023    Class: Eprescribe    To pharmacy: 01/11/2023 AJ: to have on file for next fill.    Last ordered: 2 months ago by Nohemi Riggs MD Last refill: 1/9/2023    Rx #: [id number]    Hmg CoA Reductase Inhibitors (Statin) Refill Protocol - 12 Month Protocol Failed 03/20/2023 02:54 PM   Protocol Details  Lipid Panel or Direct LDL resulted within last 15 months    Patient is NOT on Gemfibrozil    Seen by prescribing provider or same department within the last 12 months or has a future appt in 3 months - IF FAILED PLEASE LOOK AT CHART REVIEW FOR LAST VISIT AND PROCEED ACCORDINGLY    Request is NOT for Simvastatin 80 mg or Vytorin 10-80 mg    Medication (including dose and sig) on current meds list       Name from pharmacy: ramipril 10 mg capsule         Will file in chart as: ramipril (ALTACE) 10 MG capsule    Sig: TAKE 1 CAPSULE BY MOUTH EVERY DAY    Disp:  90 capsule    Refills:  0    Start: 3/20/2023    Class: Eprescribe    To pharmacy: 01/11/2023 AJ: to have on file for next fill.    Last ordered: 2 months ago by Nohemi Riggs MD Last refill: 1/9/2023    Rx #: [id number]    ACE Inhibitor Refill Protocol - 12 Month Protocol Failed 03/20/2023 02:54 PM   Protocol Details  Normal Creatinine within last 12 months looking at last value    Seen by prescribing provider or same department within the last 12 months or has a future appt in 3 months - IF FAILED PLEASE LOOK AT CHART REVIEW FOR LAST VISIT AND PROCEED ACCORDINGLY    Last BP was under 140/90 or if patient has diabetes, CAD, or PVD, BP under 130/80 in past year -- IF CRITERIA FAILED REFER TO PROTOCOL DETAILS    Normal Potassium within last 12 months looking at last value    Medication (including dose  and sig) on current meds list       Name from pharmacy: aspirin 81 mg tablet,delayed release         Will file in chart as: Aspirin Low Dose 81 MG EC tablet    Sig: TAKE 1 TABLET BY MOUTH EVERY DAY    Disp:  90 tablet    Refills:  0    Start: 3/20/2023    Class: Eprescribe    Non-formulary    To pharmacy: 01/11/2023 AJ: to have on file for next fill.    Last ordered: 2 months ago by Nohemi Riggs MD Last refill: 1/9/2023    Rx #: [id number]    NSAID Refill Protocol - 12 Month Protocol Failed 03/20/2023 02:54 PM   Protocol Details  HGB greater than 10 and HCT greater than 30 in past 9 months looking at last value    Seen by prescribing provider or same department within the last 12 months or has a future appt in 3 months - IF FAILED PLEASE LOOK AT CHART REVIEW FOR LAST VISIT AND PROCEED ACCORDINGLY    eGFR greater than 29 within last 12 months looking at last value    AST less than 55 in past 9 months looking at last value    Patient is less than 68 years old    Normal Potassium within last 9 months looking at last value    ALT less than 90 in past 9 months looking at last value    Medication (including dose and sig) on current meds list    Request is NOT for Ketorolac       Name from pharmacy: furosemide 20 mg tablet         Will file in chart as: furosemide (LASIX) 20 MG tablet    Sig: TAKE 1 TABLET BY MOUTH EVERY DAY    Disp:  90 tablet    Refills:  0    Start: 3/20/2023    Class: Eprescribe    To pharmacy: 01/11/2023 AJ: to have on file for next fill.    Last ordered: 2 months ago by Nohemi Riggs MD Last refill: 1/9/2023    Rx #: [id number]    Diuretics Refill Protocol - 12 Month Protocol Failed 03/20/2023 02:54 PM   Protocol Details  Normal Creatinine within last 12 months looking at last value    Seen by prescribing provider or same department within the last 12 months or has a future appt in 3 months - IF FAILED PLEASE LOOK AT CHART REVIEW FOR LAST VISIT AND PROCEED ACCORDINGLY    Last BP was under 140/90  or if patient has diabetes, CAD, or PVD, BP under 130/80 in past year -- IF CRITERIA FAILED REFER TO PROTOCOL DETAILS    Normal Potassium within last 12 months looking at last value    Normal Sodium within last 12 months looking at last value    Medication (including dose and sig) on current meds list       Name from pharmacy: metformin  mg tablet,extended release 24 hr         Will file in chart as: metFORMIN (GLUCOPHAGE-XR) 500 MG 24 hr tablet    Sig: Take 2 tablets by mouth 2 times a day with the morning and evening meals for blood sugar    Disp:  360 tablet    Refills:  0    Start: 3/20/2023    Class: Eprescribe    To pharmacy: 01/11/2023 AJ: to have on file for next fill.    Last ordered: 2 months ago by Nohemi Riggs MD Last refill: 1/9/2023    Rx #: [id number]    Metformin Antihyperglycemics Refill Protocol - 12 Month Protcol Failed 03/20/2023 02:54 PM   Protocol Details  Lipid Panel resulted within last 15 months    Medication (including dose and sig) on current meds list    Seen by prescribing provider or same department within the last 12 months or has a future appt in 3 months - IF FAILED PLEASE LOOK AT CHART REVIEW FOR LAST VISIT AND PROCEED ACCORDINGLY    Hgb A1c less than 8 within last 6 months looking at last value -- IF CRITERIA FAILED REFER TO PROTOCOL DETAILS    eGFR greater than 59 within last 12 months looking at last value OR If eGFR is between 45-59 then has patient had a repeat eGFR resulted in past 6 months -- IF CRITERIA FAILED REFER TO PROTOCOL DETAILS       Name from pharmacy: amlodipine 5 mg tablet         Will file in chart as: amLODIPine (NORVASC) 5 MG tablet    Sig: TAKE 1 TABLET BY MOUTH EVERY DAY    Disp:  90 tablet    Refills:  0    Start: 3/20/2023    Class: Eprescribe    To pharmacy: 01/11/2023 AJ: to have on file for next fill.    Last ordered: 2 months ago by Nohemi Riggs MD Last refill: 1/9/2023    Rx #: [id number]    Calcium Channel Blockers Refill Protocol - 12  Month Protocol Passed 03/20/2023 02:54 PM   Protocol Details  eGFR within last 12 months looking at last value    Seen by prescribing provider or same department within the last 12 months or has a future appt in 3 months - IF FAILED PLEASE LOOK AT CHART REVIEW FOR LAST VISIT AND PROCEED ACCORDINGLY    Last BP was under 140/90 or if patient has diabetes, CAD, or PVD, BP under 130/80 in past year -- IF CRITERIA FAILED REFER TO PROTOCOL DETAILS    Last recorded pulse is greater than 49    Medication (including dose and sig) on current meds list       Name from pharmacy: omeprazole 20 mg capsule,delayed release         Will file in chart as: omeprazole (PrilOSEC) 20 MG capsule    Sig: TAKE 1 CAPSULE BY MOUTH EVERY DAY AT BEDTIME    Disp:  90 capsule    Refills:  0    Start: 3/20/2023    Class: Eprescribe    To pharmacy: 01/11/2023 AJ: to have on file for next fill.    Last ordered: 2 months ago by Nohemi Riggs MD Last refill: 1/9/2023    Rx #: [id number]    Proton Pump Inhibitor (PPI) Refill Protocol - 12 Month Protocol Failed 03/20/2023 02:54 PM   Protocol Details  Medication (including dose and sig) on current meds list    Seen by prescribing provider or same department within the last 12 months or has a future appt in 3 months - IF FAILED PLEASE LOOK AT CHART REVIEW FOR LAST VISIT AND PROCEED ACCORDINGLY    Not on Clopidogrel (Plavix) or if on, refill is for Pantoprazole (Protonix)      To be filled at: [facility] - [place], [place] -  [address]     Please advise if okay to refill all medications.

## 2025-05-20 NOTE — ASSESSMENT & PLAN NOTE
CXR with NAF, Flu/Covid neg   Possible UTI   Continue IV Cefepime + Zyvox  Resp viral panel negative   Urine and blood cultures NGTD   Monitor VS   Hematology/Oncology consulted and following   ID consult due to persistent fever   Encourage IS and Oob as able

## 2025-05-20 NOTE — ASSESSMENT & PLAN NOTE
This patient is found to have pancytopenia, the likely etiology is , will monitor CBC . Will transfuse red blood cells if the hemoglobin is <7g/dL (or <8 in the setting of ACS). Will transfuse platelets if platelet count is . Hold DVT prophylaxis if platelets are <50k. The patient's hemoglobin, white blood cell count, and platelet count results have been reviewed and are listed below.  Recent Labs   Lab 05/20/25  0532   HGB 12.8*   WBC 2.60*   PLT 73*

## 2025-05-20 NOTE — SUBJECTIVE & OBJECTIVE
Interval History: Tmax 101.1. Pt seen and examined, wife at bedside. He reports continued nasal congestion, cough with clear sputum and post tussive emesis x 1. Reports he takes allergy meds as outpatient- zyrtec and tessalon added.       Review of Systems  Objective:     Vital Signs (Most Recent):  Temp: 99.7 °F (37.6 °C) (05/20/25 0723)  Pulse: 84 (05/20/25 0725)  Resp: 18 (05/20/25 0723)  BP: 118/64 (05/20/25 0723)  SpO2: (!) 90 % (05/20/25 0723) Vital Signs (24h Range):  Temp:  [97.9 °F (36.6 °C)-101.1 °F (38.4 °C)] 99.7 °F (37.6 °C)  Pulse:  [75-98] 84  Resp:  [16-18] 18  SpO2:  [90 %-96 %] 90 %  BP: (116-159)/(60-72) 118/64     Weight: 87.3 kg (192 lb 7.4 oz)  Body mass index is 26.84 kg/m².    Intake/Output Summary (Last 24 hours) at 5/20/2025 0930  Last data filed at 5/20/2025 0615  Gross per 24 hour   Intake 1077.47 ml   Output --   Net 1077.47 ml         Physical Exam  Vitals and nursing note reviewed.   Constitutional:       General: He is not in acute distress.     Appearance: He is not ill-appearing.   HENT:      Head: Normocephalic and atraumatic.   Cardiovascular:      Rate and Rhythm: Normal rate and regular rhythm.      Heart sounds: No murmur heard.  Pulmonary:      Effort: Pulmonary effort is normal.      Breath sounds: Normal breath sounds. No wheezing, rhonchi or rales.   Abdominal:      General: Bowel sounds are normal. There is no distension.      Palpations: Abdomen is soft.      Tenderness: There is no abdominal tenderness.   Musculoskeletal:      Right lower leg: No edema.      Left lower leg: No edema.   Neurological:      General: No focal deficit present.      Mental Status: He is alert. Mental status is at baseline.               Significant Labs: All pertinent labs within the past 24 hours have been reviewed.    Significant Imaging: I have reviewed all pertinent imaging results/findings within the past 24 hours.

## 2025-05-21 LAB
ABSOLUTE NEUTROPHIL MANUAL (OHS): 1.6 K/UL
ANION GAP (OHS): 9 MMOL/L (ref 8–16)
BUN SERPL-MCNC: 16 MG/DL (ref 8–23)
CALCIUM SERPL-MCNC: 8.3 MG/DL (ref 8.7–10.5)
CHLORIDE SERPL-SCNC: 104 MMOL/L (ref 95–110)
CO2 SERPL-SCNC: 20 MMOL/L (ref 23–29)
CREAT SERPL-MCNC: 1.1 MG/DL (ref 0.5–1.4)
EOSINOPHIL NFR BLD MANUAL: 4 % (ref 0–8)
ERYTHROCYTE [DISTWIDTH] IN BLOOD BY AUTOMATED COUNT: 14.2 % (ref 11.5–14.5)
GFR SERPLBLD CREATININE-BSD FMLA CKD-EPI: >60 ML/MIN/1.73/M2
GLUCOSE SERPL-MCNC: 112 MG/DL (ref 70–110)
HCT VFR BLD AUTO: 37.2 % (ref 40–54)
HGB BLD-MCNC: 12.7 GM/DL (ref 14–18)
LYMPHOCYTES NFR BLD MANUAL: 38 % (ref 18–48)
MCH RBC QN AUTO: 31.3 PG (ref 27–31)
MCHC RBC AUTO-ENTMCNC: 34.1 G/DL (ref 32–36)
MCV RBC AUTO: 92 FL (ref 82–98)
MONOCYTES NFR BLD MANUAL: 1 % (ref 4–15)
NEUTROPHILS NFR BLD MANUAL: 57 % (ref 38–73)
NUCLEATED RBC (/100WBC) (OHS): 0 /100 WBC
PLATELET # BLD AUTO: 73 K/UL (ref 150–450)
PLATELET BLD QL SMEAR: ABNORMAL
PMV BLD AUTO: 10.8 FL (ref 9.2–12.9)
POTASSIUM SERPL-SCNC: 4.3 MMOL/L (ref 3.5–5.1)
RBC # BLD AUTO: 4.06 M/UL (ref 4.6–6.2)
SODIUM SERPL-SCNC: 133 MMOL/L (ref 136–145)
WBC # BLD AUTO: 2.77 K/UL (ref 3.9–12.7)

## 2025-05-21 PROCEDURE — 63600175 PHARM REV CODE 636 W HCPCS: Mod: HCNC | Performed by: STUDENT IN AN ORGANIZED HEALTH CARE EDUCATION/TRAINING PROGRAM

## 2025-05-21 PROCEDURE — 94799 UNLISTED PULMONARY SVC/PX: CPT | Mod: HCNC

## 2025-05-21 PROCEDURE — 27000207 HC ISOLATION: Mod: HCNC

## 2025-05-21 PROCEDURE — 63600175 PHARM REV CODE 636 W HCPCS: Mod: HCNC | Performed by: INTERNAL MEDICINE

## 2025-05-21 PROCEDURE — 21400001 HC TELEMETRY ROOM: Mod: HCNC

## 2025-05-21 PROCEDURE — 25000003 PHARM REV CODE 250: Mod: HCNC | Performed by: STUDENT IN AN ORGANIZED HEALTH CARE EDUCATION/TRAINING PROGRAM

## 2025-05-21 PROCEDURE — 80048 BASIC METABOLIC PNL TOTAL CA: CPT | Mod: HCNC | Performed by: INTERNAL MEDICINE

## 2025-05-21 PROCEDURE — 99223 1ST HOSP IP/OBS HIGH 75: CPT | Mod: NSCH,HCNC,, | Performed by: INTERNAL MEDICINE

## 2025-05-21 PROCEDURE — 63600175 PHARM REV CODE 636 W HCPCS: Mod: HCNC | Performed by: HOSPITALIST

## 2025-05-21 PROCEDURE — 85025 COMPLETE CBC W/AUTO DIFF WBC: CPT | Mod: HCNC | Performed by: INTERNAL MEDICINE

## 2025-05-21 PROCEDURE — 94760 N-INVAS EAR/PLS OXIMETRY 1: CPT | Mod: HCNC

## 2025-05-21 PROCEDURE — 25000003 PHARM REV CODE 250: Mod: HCNC | Performed by: HOSPITALIST

## 2025-05-21 PROCEDURE — 25000003 PHARM REV CODE 250: Mod: HCNC | Performed by: INTERNAL MEDICINE

## 2025-05-21 PROCEDURE — 36415 COLL VENOUS BLD VENIPUNCTURE: CPT | Mod: HCNC | Performed by: INTERNAL MEDICINE

## 2025-05-21 RX ADMIN — LINEZOLID 600 MG: 600 INJECTION, SOLUTION INTRAVENOUS at 10:05

## 2025-05-21 RX ADMIN — PANTOPRAZOLE SODIUM 40 MG: 40 TABLET, DELAYED RELEASE ORAL at 10:05

## 2025-05-21 RX ADMIN — FLUTICASONE PROPIONATE 100 MCG: 50 SPRAY, METERED NASAL at 10:05

## 2025-05-21 RX ADMIN — CETIRIZINE HYDROCHLORIDE 10 MG: 10 TABLET, FILM COATED ORAL at 10:05

## 2025-05-21 RX ADMIN — MEROPENEM 2 G: 2 INJECTION, POWDER, FOR SOLUTION INTRAVENOUS at 11:05

## 2025-05-21 RX ADMIN — CEFEPIME 2 G: 2 INJECTION, POWDER, FOR SOLUTION INTRAVENOUS at 02:05

## 2025-05-21 RX ADMIN — ACETAMINOPHEN 650 MG: 325 TABLET ORAL at 10:05

## 2025-05-21 RX ADMIN — RIVAROXABAN 20 MG: 20 TABLET, FILM COATED ORAL at 06:05

## 2025-05-21 RX ADMIN — TAMSULOSIN HYDROCHLORIDE 0.4 MG: 0.4 CAPSULE ORAL at 10:05

## 2025-05-21 RX ADMIN — MEROPENEM 2 G: 2 INJECTION, POWDER, FOR SOLUTION INTRAVENOUS at 06:05

## 2025-05-21 NOTE — ASSESSMENT & PLAN NOTE
This patient is found to have pancytopenia, the likely etiology is , will monitor CBC . Will transfuse red blood cells if the hemoglobin is <7g/dL (or <8 in the setting of ACS). Hold DVT prophylaxis if platelets are <50k. The patient's hemoglobin, white blood cell count, and platelet count results have been reviewed and are listed below.  Recent Labs   Lab 05/21/25  0707   HGB 12.7*   WBC 2.77*   PLT 73*

## 2025-05-21 NOTE — ASSESSMENT & PLAN NOTE
CXR with NAF, Flu/Covid neg   Possible UTI   Resp viral panel negative   Urine and blood cultures NGTD   Monitor VS   Hematology/Oncology consulted and following   ID consult due to persistent fever - discussed with Dr. Villanueva who recommends changing IV Cefepime to Merrem for anaerobic coverage, continue Zyvox   Encourage IS and Oob as able

## 2025-05-21 NOTE — SUBJECTIVE & OBJECTIVE
Interval History: Tmax 100.5.  Pt seen and examined with wife at bedside. Reports URI symptoms improved with addition of Zyrtec and tessalon. Has no other complaints today.     Review of Systems  Objective:     Vital Signs (Most Recent):  Temp: 99.4 °F (37.4 °C) (05/21/25 0908)  Pulse: 84 (05/21/25 0915)  Resp: 18 (05/21/25 0908)  BP: 128/65 (05/21/25 0908)  SpO2: 95 % (05/21/25 0908) Vital Signs (24h Range):  Temp:  [98.4 °F (36.9 °C)-100.5 °F (38.1 °C)] 99.4 °F (37.4 °C)  Pulse:  [78-96] 84  Resp:  [16-18] 18  SpO2:  [95 %-97 %] 95 %  BP: (119-137)/(62-68) 128/65     Weight: 87.3 kg (192 lb 7.4 oz)  Body mass index is 26.84 kg/m².    Intake/Output Summary (Last 24 hours) at 5/21/2025 1123  Last data filed at 5/21/2025 0631  Gross per 24 hour   Intake 593.47 ml   Output 2 ml   Net 591.47 ml         Physical Exam  Vitals and nursing note reviewed.   Constitutional:       General: He is not in acute distress.     Appearance: He is not ill-appearing.   Cardiovascular:      Rate and Rhythm: Normal rate and regular rhythm.      Heart sounds: No murmur heard.  Pulmonary:      Effort: Pulmonary effort is normal.      Breath sounds: Normal breath sounds. No wheezing or rales.   Abdominal:      General: Bowel sounds are normal. There is no distension.      Palpations: Abdomen is soft.      Tenderness: There is no abdominal tenderness.   Musculoskeletal:      Right lower leg: No edema.      Left lower leg: No edema.   Neurological:      Mental Status: He is alert and oriented to person, place, and time. Mental status is at baseline.               Significant Labs: All pertinent labs within the past 24 hours have been reviewed.    Significant Imaging: I have reviewed all pertinent imaging results/findings within the past 24 hours.

## 2025-05-21 NOTE — PLAN OF CARE
05/20/25 2135   Rounds   Attendance Provider;Nurse ;Charge nurse   Discharge Plan A Home with family   Why the patient remains in the hospital Requires continued medical care   Transition of Care Barriers None     Continues to spike temp.  ID consulted

## 2025-05-21 NOTE — PLAN OF CARE
Problem: Adult Inpatient Plan of Care  Goal: Plan of Care Review  Outcome: Progressing  Goal: Patient-Specific Goal (Individualized)  Outcome: Progressing  Goal: Absence of Hospital-Acquired Illness or Injury  Outcome: Progressing  Goal: Optimal Comfort and Wellbeing  Outcome: Progressing  Goal: Readiness for Transition of Care  Outcome: Progressing     Problem: Fall Injury Risk  Goal: Absence of Fall and Fall-Related Injury  Outcome: Progressing     Problem: Infection  Goal: Absence of Infection Signs and Symptoms  Outcome: Progressing     Problem: Neutropenia  Goal: Absence of Infection  Outcome: Progressing

## 2025-05-21 NOTE — PROGRESS NOTES
[facility]  [facility]  Progress Note    Patient Name: Armando Ingram Jr.  MRN: [id number]  Patient Class: IP- Inpatient   Admission Date: 5/16/2025  Length of Stay: 4 days  Attending Physician: Gail Elam MD  Primary Care Provider: Brian Soares MD        Subjective     Principal Problem:Neutropenic fever        HPI:  Armando Ingram Jr. is a 65 y.o. male with a PMH  has a past medical history of Closed right hip fracture, Colon cancer, DVT (deep venous thrombosis) (2002), Hairy cell leukemia (06/06/2024), and Nephrolithiasis. who presented to the ED for further evaluation of acute onset fever with T-max measuring 101.0 F earlier today.  Patient reported significant history of hairy cell leukemia in his followed by Dr. Dow and Dr. Flores from Hematology/Oncology and was recently prescribed a Z-Cordell for 4 days for treatment of a cough.  Patient was instructed to go to the ED if he endorsed fever greater than 103.0 F but presented to the ED due to ulcer experiencing associated chills, throbbing headache, and persistent fever.  He reported no known alleviating or aggravating factors noted with all other review of systems negative except as noted above.  He is not currently on active treatment for his leukemia and reported being in his usual state of health prior to onset of symptoms.  Initial workup in the ED revealed patient to be afebrile with T-max measuring 100.4 F, pancytopenic, lactic acid/procalcitonin within normal limits, flu/COVID negative, chest x-ray negative for acute findings, UA positive for 2+ LE, 11 RBCs, free found WBCs.  Patient initiated on cefepime with cultures obtained and pending and admitted to Hospital Medicine under observation for continued medical management and treatment of neutropenic fever.    PCP: Brian Soares      Overview/Hospital Course:  Continued on IV Cefepime. Zyvoz added 05/18 due to persistent fever. Hematology consulted. Cultures remain NGTD    Resp viral panel negative. ID consulted to eval due to fevers  Cefepime changed to IV Merrem per ID recs on 05/21, unclear source of fevers at this time.     Interval History: Tmax 100.5.  Pt seen and examined with wife at bedside. Reports URI symptoms improved with addition of Zyrtec and tessalon. Has no other complaints today.     Review of Systems  Objective:     Vital Signs (Most Recent):  Temp: 99.4 °F (37.4 °C) (05/21/25 0908)  Pulse: 84 (05/21/25 0915)  Resp: 18 (05/21/25 0908)  BP: 128/65 (05/21/25 0908)  SpO2: 95 % (05/21/25 0908) Vital Signs (24h Range):  Temp:  [98.4 °F (36.9 °C)-100.5 °F (38.1 °C)] 99.4 °F (37.4 °C)  Pulse:  [78-96] 84  Resp:  [16-18] 18  SpO2:  [95 %-97 %] 95 %  BP: (119-137)/(62-68) 128/65     Weight: 87.3 kg (192 lb 7.4 oz)  Body mass index is 26.84 kg/m².    Intake/Output Summary (Last 24 hours) at 5/21/2025 1123  Last data filed at 5/21/2025 0631  Gross per 24 hour   Intake 593.47 ml   Output 2 ml   Net 591.47 ml         Physical Exam  Vitals and nursing note reviewed.   Constitutional:       General: He is not in acute distress.     Appearance: He is not ill-appearing.   Cardiovascular:      Rate and Rhythm: Normal rate and regular rhythm.      Heart sounds: No murmur heard.  Pulmonary:      Effort: Pulmonary effort is normal.      Breath sounds: Normal breath sounds. No wheezing or rales.   Abdominal:      General: Bowel sounds are normal. There is no distension.      Palpations: Abdomen is soft.      Tenderness: There is no abdominal tenderness.   Musculoskeletal:      Right lower leg: No edema.      Left lower leg: No edema.   Neurological:      Mental Status: He is alert and oriented to person, place, and time. Mental status is at baseline.               Significant Labs: All pertinent labs within the past 24 hours have been reviewed.    Significant Imaging: I have reviewed all pertinent imaging results/findings within the past 24 hours.      Assessment & Plan  Neutropenic  fever  CXR with NAF, Flu/Covid neg   Possible UTI   Resp viral panel negative   Urine and blood cultures NGTD   Monitor VS   Hematology/Oncology consulted and following   ID consult due to persistent fever - discussed with Dr. Villanueva who recommends changing IV Cefepime to Merrem for anaerobic coverage, continue Zyvox   Encourage IS and Oob as able      Hairy cell leukemia  Patient with known history of hairy cell leukemia and continues to follow Dr. Dow and Dr. Flores from Hematology/Oncology. Patient not currently on active treatment and currently undergoing treatment for neutropenic fever with broad-spectrum antibiotics as noted above.  Hematology consulted and following   Pancytopenia  This patient is found to have pancytopenia, the likely etiology is , will monitor CBC . Will transfuse red blood cells if the hemoglobin is <7g/dL (or <8 in the setting of ACS). Hold DVT prophylaxis if platelets are <50k. The patient's hemoglobin, white blood cell count, and platelet count results have been reviewed and are listed below.  Recent Labs   Lab 05/21/25  0707   HGB 12.7*   WBC 2.77*   PLT 73*     History of DVT (deep vein thrombosis)  -continue home Xarelto   - monitor Plt Counts     VTE Risk Mitigation (From admission, onward)           Ordered     rivaroxaban tablet 20 mg  With dinner         05/17/25 0816     Reason for No Pharmacological VTE Prophylaxis  Once        Question:  Reasons:  Answer:  Already adequately anticoagulated on oral Anticoagulants    05/17/25 0213     IP VTE HIGH RISK PATIENT  Once         05/17/25 0213     Place sequential compression device  Until discontinued         05/17/25 0213                    Discharge Planning   LAURI:      Code Status: Full Code   Medical Readiness for Discharge Date:   Discharge Plan A: Home with family                        Gail Elam MD  Department of Hospital Medicine   [facility]

## 2025-05-21 NOTE — PLAN OF CARE
Discussed poc with pt, pt verbalized understanding    Purposeful rounding every 2hours    VS wnl  Cardiac monitoring in use, pt is NSR, tele monitor # [id number]  Fall precautions in place, remains injury free  Pt denies c/o any pain or discomfort at this time and patient will continue to be monitored.  Pain and nausea under control with PRN meds    IVFs  Accurate I&Os  Abx given as prescribed  Bed locked at lowest position  Call light within reach    Chart check complete  Will cont with POC

## 2025-05-21 NOTE — PHYSICIAN QUERY
Please document your best medical opinion regarding the etiology of Neutropenic fever:  Neutropenic fever due to UTI

## 2025-05-22 LAB
ABSOLUTE NEUTROPHIL MANUAL (OHS): 1.4 K/UL
ANION GAP (OHS): 9 MMOL/L (ref 8–16)
BACTERIA BLD CULT: NORMAL
BACTERIA BLD CULT: NORMAL
BUN SERPL-MCNC: 16 MG/DL (ref 8–23)
CALCIUM SERPL-MCNC: 8.3 MG/DL (ref 8.7–10.5)
CHLORIDE SERPL-SCNC: 103 MMOL/L (ref 95–110)
CO2 SERPL-SCNC: 23 MMOL/L (ref 23–29)
CREAT SERPL-MCNC: 1 MG/DL (ref 0.5–1.4)
EOSINOPHIL NFR BLD MANUAL: 2 % (ref 0–8)
ERYTHROCYTE [DISTWIDTH] IN BLOOD BY AUTOMATED COUNT: 14.1 % (ref 11.5–14.5)
GFR SERPLBLD CREATININE-BSD FMLA CKD-EPI: >60 ML/MIN/1.73/M2
GLUCOSE SERPL-MCNC: 118 MG/DL (ref 70–110)
HCT VFR BLD AUTO: 37.4 % (ref 40–54)
HGB BLD-MCNC: 12.4 GM/DL (ref 14–18)
LYMPHOCYTES NFR BLD MANUAL: 33 % (ref 18–48)
MCH RBC QN AUTO: 31.3 PG (ref 27–31)
MCHC RBC AUTO-ENTMCNC: 33.2 G/DL (ref 32–36)
MCV RBC AUTO: 94 FL (ref 82–98)
MONOCYTES NFR BLD MANUAL: 9 % (ref 4–15)
NEUTROPHILS NFR BLD MANUAL: 56 % (ref 38–73)
NUCLEATED RBC (/100WBC) (OHS): 0 /100 WBC
PLATELET # BLD AUTO: 73 K/UL (ref 150–450)
PMV BLD AUTO: 10.3 FL (ref 9.2–12.9)
POTASSIUM SERPL-SCNC: 4.4 MMOL/L (ref 3.5–5.1)
PROCALCITONIN SERPL-MCNC: 0.26 NG/ML
RBC # BLD AUTO: 3.96 M/UL (ref 4.6–6.2)
SODIUM SERPL-SCNC: 135 MMOL/L (ref 136–145)
WBC # BLD AUTO: 2.57 K/UL (ref 3.9–12.7)

## 2025-05-22 PROCEDURE — 25500020 PHARM REV CODE 255: Mod: HCNC | Performed by: INTERNAL MEDICINE

## 2025-05-22 PROCEDURE — 84145 PROCALCITONIN (PCT): CPT | Mod: HCNC | Performed by: INTERNAL MEDICINE

## 2025-05-22 PROCEDURE — 63600175 PHARM REV CODE 636 W HCPCS: Mod: HCNC | Performed by: INTERNAL MEDICINE

## 2025-05-22 PROCEDURE — 94799 UNLISTED PULMONARY SVC/PX: CPT | Mod: HCNC

## 2025-05-22 PROCEDURE — 25000003 PHARM REV CODE 250: Mod: HCNC | Performed by: STUDENT IN AN ORGANIZED HEALTH CARE EDUCATION/TRAINING PROGRAM

## 2025-05-22 PROCEDURE — 25000003 PHARM REV CODE 250: Mod: HCNC | Performed by: INTERNAL MEDICINE

## 2025-05-22 PROCEDURE — 85025 COMPLETE CBC W/AUTO DIFF WBC: CPT | Mod: HCNC | Performed by: INTERNAL MEDICINE

## 2025-05-22 PROCEDURE — 36415 COLL VENOUS BLD VENIPUNCTURE: CPT | Mod: HCNC | Performed by: INTERNAL MEDICINE

## 2025-05-22 PROCEDURE — 27000207 HC ISOLATION: Mod: HCNC

## 2025-05-22 PROCEDURE — 21400001 HC TELEMETRY ROOM: Mod: HCNC

## 2025-05-22 PROCEDURE — 80048 BASIC METABOLIC PNL TOTAL CA: CPT | Mod: HCNC | Performed by: INTERNAL MEDICINE

## 2025-05-22 PROCEDURE — 25000003 PHARM REV CODE 250: Mod: HCNC | Performed by: HOSPITALIST

## 2025-05-22 PROCEDURE — 99232 SBSQ HOSP IP/OBS MODERATE 35: CPT | Mod: NSCH,HCNC,, | Performed by: INTERNAL MEDICINE

## 2025-05-22 RX ADMIN — IOHEXOL 100 ML: 350 INJECTION, SOLUTION INTRAVENOUS at 11:05

## 2025-05-22 RX ADMIN — MEROPENEM 2 G: 2 INJECTION, POWDER, FOR SOLUTION INTRAVENOUS at 03:05

## 2025-05-22 RX ADMIN — FLUTICASONE PROPIONATE 100 MCG: 50 SPRAY, METERED NASAL at 09:05

## 2025-05-22 RX ADMIN — ACETAMINOPHEN 650 MG: 325 TABLET ORAL at 12:05

## 2025-05-22 RX ADMIN — BENZONATATE 100 MG: 100 CAPSULE ORAL at 09:05

## 2025-05-22 RX ADMIN — TAMSULOSIN HYDROCHLORIDE 0.4 MG: 0.4 CAPSULE ORAL at 09:05

## 2025-05-22 RX ADMIN — RIVAROXABAN 20 MG: 20 TABLET, FILM COATED ORAL at 05:05

## 2025-05-22 RX ADMIN — CETIRIZINE HYDROCHLORIDE 10 MG: 10 TABLET, FILM COATED ORAL at 09:05

## 2025-05-22 RX ADMIN — MEROPENEM 2 G: 2 INJECTION, POWDER, FOR SOLUTION INTRAVENOUS at 10:05

## 2025-05-22 RX ADMIN — MEROPENEM 2 G: 2 INJECTION, POWDER, FOR SOLUTION INTRAVENOUS at 05:05

## 2025-05-22 RX ADMIN — PANTOPRAZOLE SODIUM 40 MG: 40 TABLET, DELAYED RELEASE ORAL at 09:05

## 2025-05-22 NOTE — ASSESSMENT & PLAN NOTE
This patient is found to have pancytopenia, the likely etiology is , will monitor CBC . Will transfuse red blood cells if the hemoglobin is <7g/dL (or <8 in the setting of ACS). Hold DVT prophylaxis if platelets are <50k. The patient's hemoglobin, white blood cell count, and platelet count results have been reviewed and are listed below.  Recent Labs   Lab 05/22/25  0500   HGB 12.4*   WBC 2.57*   PLT 73*

## 2025-05-22 NOTE — SUBJECTIVE & OBJECTIVE
Interval History: Tmax 100.1 overnight. Pt has no new complaints today. Denies cough, congestion improving. Denies abd pain, n/v, diarrhea, dysuria     Review of Systems  Objective:     Vital Signs (Most Recent):  Temp: 98.4 °F (36.9 °C) (05/22/25 0740)  Pulse: 80 (05/22/25 0740)  Resp: 17 (05/22/25 0740)  BP: 126/71 (05/22/25 0740)  SpO2: (!) 92 % (05/22/25 0740) Vital Signs (24h Range):  Temp:  [97.7 °F (36.5 °C)-100.1 °F (37.8 °C)] 98.4 °F (36.9 °C)  Pulse:  [] 80  Resp:  [16-18] 17  SpO2:  [92 %-95 %] 92 %  BP: (102-126)/(56-71) 126/71     Weight: 84.1 kg (185 lb 6.5 oz)  Body mass index is 25.86 kg/m².    Intake/Output Summary (Last 24 hours) at 5/22/2025 0943  Last data filed at 5/22/2025 0641  Gross per 24 hour   Intake 893.82 ml   Output --   Net 893.82 ml         Physical Exam  Vitals and nursing note reviewed.   Constitutional:       General: He is not in acute distress.     Appearance: He is not ill-appearing.   Cardiovascular:      Rate and Rhythm: Normal rate and regular rhythm.      Heart sounds: No murmur heard.  Pulmonary:      Effort: Pulmonary effort is normal.      Breath sounds: Normal breath sounds. No wheezing or rales.   Abdominal:      General: Bowel sounds are normal. There is no distension.      Palpations: Abdomen is soft.      Tenderness: There is no abdominal tenderness.   Musculoskeletal:      Right lower leg: No edema.      Left lower leg: No edema.   Neurological:      Mental Status: He is alert and oriented to person, place, and time. Mental status is at baseline.               Significant Labs: All pertinent labs within the past 24 hours have been reviewed.    Significant Imaging: I have reviewed all pertinent imaging results/findings within the past 24 hours.
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
## 2025-05-22 NOTE — ASSESSMENT & PLAN NOTE
with empiric meropenem.  As fever has persisted.  Will hold Zyvox due to associated thrombocytopenia,   will follow blood cultures.  Will monitor fever curve.  Will do pan CT of the chest abdomen pelvis if fever persist

## 2025-05-22 NOTE — PROGRESS NOTES
[facility]  [facility]  Progress Note    Patient Name: Armando Ingram Jr.  MRN: [id number]  Patient Class: IP- Inpatient   Admission Date: 5/16/2025  Length of Stay: 5 days  Attending Physician: Gail Elam MD  Primary Care Provider: Brian Soares MD        Subjective     Principal Problem:Neutropenic fever        HPI:  Armando Ingram Jr. is a 65 y.o. male with a PMH  has a past medical history of Closed right hip fracture, Colon cancer, DVT (deep venous thrombosis) (2002), Hairy cell leukemia (06/06/2024), and Nephrolithiasis. who presented to the ED for further evaluation of acute onset fever with T-max measuring 101.0 F earlier today.  Patient reported significant history of hairy cell leukemia in his followed by Dr. Dow and Dr. Flores from Hematology/Oncology and was recently prescribed a Z-Cordell for 4 days for treatment of a cough.  Patient was instructed to go to the ED if he endorsed fever greater than 103.0 F but presented to the ED due to ulcer experiencing associated chills, throbbing headache, and persistent fever.  He reported no known alleviating or aggravating factors noted with all other review of systems negative except as noted above.  He is not currently on active treatment for his leukemia and reported being in his usual state of health prior to onset of symptoms.  Initial workup in the ED revealed patient to be afebrile with T-max measuring 100.4 F, pancytopenic, lactic acid/procalcitonin within normal limits, flu/COVID negative, chest x-ray negative for acute findings, UA positive for 2+ LE, 11 RBCs, free found WBCs.  Patient initiated on cefepime with cultures obtained and pending and admitted to Hospital Medicine under observation for continued medical management and treatment of neutropenic fever.    PCP: Brian Soares      Overview/Hospital Course:  Continued on IV Cefepime. Zyvoz added 05/18 due to persistent fever. Hematology consulted. Cultures remain NGTD    Resp viral panel negative. ID consulted to eval due to fevers  Cefepime changed to IV Merrem per ID recs on 05/21, unclear source of fevers at this time. Zyvox discontinued per ID.     Interval History: Tmax 100.1 overnight. Pt has no new complaints today. Denies cough, congestion improving. Denies abd pain, n/v, diarrhea, dysuria     Review of Systems  Objective:     Vital Signs (Most Recent):  Temp: 98.4 °F (36.9 °C) (05/22/25 0740)  Pulse: 80 (05/22/25 0740)  Resp: 17 (05/22/25 0740)  BP: 126/71 (05/22/25 0740)  SpO2: (!) 92 % (05/22/25 0740) Vital Signs (24h Range):  Temp:  [97.7 °F (36.5 °C)-100.1 °F (37.8 °C)] 98.4 °F (36.9 °C)  Pulse:  [] 80  Resp:  [16-18] 17  SpO2:  [92 %-95 %] 92 %  BP: (102-126)/(56-71) 126/71     Weight: 84.1 kg (185 lb 6.5 oz)  Body mass index is 25.86 kg/m².    Intake/Output Summary (Last 24 hours) at 5/22/2025 0943  Last data filed at 5/22/2025 0641  Gross per 24 hour   Intake 893.82 ml   Output --   Net 893.82 ml         Physical Exam  Vitals and nursing note reviewed.   Constitutional:       General: He is not in acute distress.     Appearance: He is not ill-appearing.   Cardiovascular:      Rate and Rhythm: Normal rate and regular rhythm.      Heart sounds: No murmur heard.  Pulmonary:      Effort: Pulmonary effort is normal.      Breath sounds: Normal breath sounds. No wheezing or rales.   Abdominal:      General: Bowel sounds are normal. There is no distension.      Palpations: Abdomen is soft.      Tenderness: There is no abdominal tenderness.   Musculoskeletal:      Right lower leg: No edema.      Left lower leg: No edema.   Neurological:      Mental Status: He is alert and oriented to person, place, and time. Mental status is at baseline.               Significant Labs: All pertinent labs within the past 24 hours have been reviewed.    Significant Imaging: I have reviewed all pertinent imaging results/findings within the past 24 hours.      Assessment &  Plan  Neutropenic fever  CXR with NAF, Flu/Covid neg   Possible UTI   Resp viral panel negative   Urine and blood cultures NGTD   Monitor VS   Hematology/Oncology consulted and following   ID consult due to persistent fever   Cefepime changed to IV Merrem, Zyvox discontinued   Per ID, if fevers persist, will obtain CT Chest/Abd/Pelvis to eval for source of infection   Encourage IS and Oob as able      Hairy cell leukemia  Patient with known history of hairy cell leukemia and continues to follow Dr. Dow and Dr. Flores from Hematology/Oncology. Patient not currently on active treatment and currently undergoing treatment for neutropenic fever with broad-spectrum antibiotics as noted above.  Hematology consulted and following   Pancytopenia  This patient is found to have pancytopenia, the likely etiology is , will monitor CBC . Will transfuse red blood cells if the hemoglobin is <7g/dL (or <8 in the setting of ACS). Hold DVT prophylaxis if platelets are <50k. The patient's hemoglobin, white blood cell count, and platelet count results have been reviewed and are listed below.  Recent Labs   Lab 05/22/25  0500   HGB 12.4*   WBC 2.57*   PLT 73*     History of DVT (deep vein thrombosis)  -continue home Xarelto   - monitor Plt Counts     VTE Risk Mitigation (From admission, onward)           Ordered     rivaroxaban tablet 20 mg  With dinner         05/17/25 0816     Reason for No Pharmacological VTE Prophylaxis  Once        Question:  Reasons:  Answer:  Already adequately anticoagulated on oral Anticoagulants    05/17/25 0213     IP VTE HIGH RISK PATIENT  Once         05/17/25 0213     Place sequential compression device  Until discontinued         05/17/25 0213                    Discharge Planning   LAURI:      Code Status: Full Code   Medical Readiness for Discharge Date:   Discharge Plan A: Home with family                        Gail Elam MD  Department of Hospital Medicine   [facility]

## 2025-05-22 NOTE — ASSESSMENT & PLAN NOTE
CXR with NAF, Flu/Covid neg   Possible UTI   Resp viral panel negative   Urine and blood cultures NGTD   Monitor VS   Hematology/Oncology consulted and following   ID consult due to persistent fever   Cefepime changed to IV Merrem, Zyvox discontinued   Per ID, if fevers persist, will obtain CT Chest/Abd/Pelvis to eval for source of infection   Encourage IS and Oob as able
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
## 2025-05-23 LAB
ABSOLUTE EOSINOPHIL (OHS): 0.04 K/UL
ABSOLUTE MONOCYTE (OHS): 0.04 K/UL (ref 0.3–1)
ABSOLUTE NEUTROPHIL COUNT (OHS): 1.24 K/UL (ref 1.8–7.7)
AORTIC ROOT ANNULUS: 3 CM
AORTIC SIZE INDEX: 1.3 CM/M2
ASCENDING AORTA: 2.7 CM
AV INDEX (PROSTH): 0.94
AV MEAN GRADIENT: 4 MMHG
AV PEAK GRADIENT: 7 MMHG
AV VALVE AREA BY VELOCITY RATIO: 2.2 CM²
AV VALVE AREA: 2.7 CM²
AV VELOCITY RATIO: 0.77
BASOPHILS # BLD AUTO: 0 K/UL
BASOPHILS NFR BLD AUTO: 0 %
BSA FOR ECHO PROCEDURE: 2.05 M2
CV ECHO LV RWT: 0.51 CM
DOP CALC AO PEAK VEL: 1.3 M/S
DOP CALC AO VTI: 22.2 CM
DOP CALC LVOT AREA: 2.8 CM2
DOP CALC LVOT DIAMETER: 1.9 CM
DOP CALC LVOT PEAK VEL: 1 M/S
DOP CALC LVOT STROKE VOLUME: 59.2 CM3
DOP CALC RVOT PEAK VEL: 0.91 M/S
DOP CALC RVOT VTI: 17.4 CM
DOP CALCLVOT PEAK VEL VTI: 20.9 CM
E WAVE DECELERATION TIME: 177 MSEC
E/A RATIO: 0.98
E/E' RATIO: 6 M/S
ECHO LV POSTERIOR WALL: 1 CM (ref 0.6–1.1)
EJECTION FRACTION: 60 %
ERYTHROCYTE [DISTWIDTH] IN BLOOD BY AUTOMATED COUNT: 14.5 % (ref 11.5–14.5)
FRACTIONAL SHORTENING: 33.3 % (ref 28–44)
HCT VFR BLD AUTO: 34.3 % (ref 40–54)
HGB BLD-MCNC: 11.7 GM/DL (ref 14–18)
IMM GRANULOCYTES # BLD AUTO: 0.04 K/UL (ref 0–0.04)
IMM GRANULOCYTES NFR BLD AUTO: 1.6 % (ref 0–0.5)
INTERVENTRICULAR SEPTUM: 1 CM (ref 0.6–1.1)
IVC DIAMETER: 1.79 CM
IVRT: 61 MSEC
LA MAJOR: 4.7 CM
LA MINOR: 4.9 CM
LA WIDTH: 2.9 CM
LEFT ATRIUM SIZE: 3.1 CM
LEFT ATRIUM VOLUME INDEX: 18 ML/M2
LEFT ATRIUM VOLUME: 37 CM3
LEFT INTERNAL DIMENSION IN SYSTOLE: 2.6 CM (ref 2.1–4)
LEFT VENTRICLE DIASTOLIC VOLUME INDEX: 32.84 ML/M2
LEFT VENTRICLE DIASTOLIC VOLUME: 67 ML
LEFT VENTRICLE MASS INDEX: 59.9 G/M2
LEFT VENTRICLE SYSTOLIC VOLUME INDEX: 12.7 ML/M2
LEFT VENTRICLE SYSTOLIC VOLUME: 26 ML
LEFT VENTRICULAR INTERNAL DIMENSION IN DIASTOLE: 3.9 CM (ref 3.5–6)
LEFT VENTRICULAR MASS: 122.1 G
LV LATERAL E/E' RATIO: 5.7 M/S
LV SEPTAL E/E' RATIO: 5.7 M/S
LVED V (TEICH): 67.46 ML
LVES V (TEICH): 25.55 ML
LVOT MG: 3.27 MMHG
LVOT MV: 0.9 CM/S
LYMPHOCYTES # BLD AUTO: 1.13 K/UL (ref 1–4.8)
MCH RBC QN AUTO: 31.8 PG (ref 27–31)
MCHC RBC AUTO-ENTMCNC: 34.1 G/DL (ref 32–36)
MCV RBC AUTO: 93 FL (ref 82–98)
MV PEAK A VEL: 0.64 M/S
MV PEAK E VEL: 0.63 M/S
MV STENOSIS PRESSURE HALF TIME: 51.4 MS
MV VALVE AREA P 1/2 METHOD: 4.28 CM2
NUCLEATED RBC (/100WBC) (OHS): 0 /100 WBC
PISA MRMAX VEL: 3.57 M/S
PISA TR MAX VEL: 2.3 M/S
PLATELET # BLD AUTO: 84 K/UL (ref 150–450)
PMV BLD AUTO: 10.5 FL (ref 9.2–12.9)
PV MEAN GRADIENT: 2 MMHG
RA MAJOR: 3.89 CM
RA PRESSURE ESTIMATED: 3 MMHG
RA WIDTH: 2.2 CM
RBC # BLD AUTO: 3.68 M/UL (ref 4.6–6.2)
RELATIVE EOSINOPHIL (OHS): 1.6 %
RELATIVE LYMPHOCYTE (OHS): 45.4 % (ref 18–48)
RELATIVE MONOCYTE (OHS): 1.6 % (ref 4–15)
RELATIVE NEUTROPHIL (OHS): 49.8 % (ref 38–73)
RV TB RVSP: 5 MMHG
STJ: 2.9 CM
TDI LATERAL: 0.11 M/S
TDI SEPTAL: 0.11 M/S
TDI: 0.11 M/S
TR MAX PG: 21 MMHG
TR MEAN GRADIENT: 20 MMHG
TRICUSPID ANNULAR PLANE SYSTOLIC EXCURSION: 2.2 CM
TV REST PULMONARY ARTERY PRESSURE: 24 MMHG
WBC # BLD AUTO: 2.49 K/UL (ref 3.9–12.7)
Z-SCORE OF LEFT VENTRICULAR DIMENSION IN END DIASTOLE: -4.44
Z-SCORE OF LEFT VENTRICULAR DIMENSION IN END SYSTOLE: -2.85

## 2025-05-23 PROCEDURE — 94799 UNLISTED PULMONARY SVC/PX: CPT | Mod: HCNC

## 2025-05-23 PROCEDURE — 36415 COLL VENOUS BLD VENIPUNCTURE: CPT | Mod: HCNC | Performed by: INTERNAL MEDICINE

## 2025-05-23 PROCEDURE — 25000003 PHARM REV CODE 250: Mod: HCNC | Performed by: INTERNAL MEDICINE

## 2025-05-23 PROCEDURE — 99232 SBSQ HOSP IP/OBS MODERATE 35: CPT | Mod: NSCH,HCNC,, | Performed by: INTERNAL MEDICINE

## 2025-05-23 PROCEDURE — 85025 COMPLETE CBC W/AUTO DIFF WBC: CPT | Mod: HCNC | Performed by: INTERNAL MEDICINE

## 2025-05-23 PROCEDURE — 25000003 PHARM REV CODE 250: Mod: HCNC | Performed by: HOSPITALIST

## 2025-05-23 PROCEDURE — 27000207 HC ISOLATION: Mod: HCNC

## 2025-05-23 PROCEDURE — 99900035 HC TECH TIME PER 15 MIN (STAT): Mod: HCNC

## 2025-05-23 PROCEDURE — 63600175 PHARM REV CODE 636 W HCPCS: Mod: HCNC | Performed by: INTERNAL MEDICINE

## 2025-05-23 PROCEDURE — 94761 N-INVAS EAR/PLS OXIMETRY MLT: CPT | Mod: HCNC

## 2025-05-23 PROCEDURE — 21400001 HC TELEMETRY ROOM: Mod: HCNC

## 2025-05-23 RX ORDER — CEFEPIME HYDROCHLORIDE 2 G/1
2 INJECTION, POWDER, FOR SOLUTION INTRAVENOUS
Status: DISCONTINUED | OUTPATIENT
Start: 2025-05-23 | End: 2025-05-23

## 2025-05-23 RX ORDER — CEFEPIME HYDROCHLORIDE 2 G/1
2 INJECTION, POWDER, FOR SOLUTION INTRAVENOUS
Status: DISCONTINUED | OUTPATIENT
Start: 2025-05-23 | End: 2025-05-25

## 2025-05-23 RX ADMIN — TAMSULOSIN HYDROCHLORIDE 0.4 MG: 0.4 CAPSULE ORAL at 09:05

## 2025-05-23 RX ADMIN — MEROPENEM 2 G: 2 INJECTION, POWDER, FOR SOLUTION INTRAVENOUS at 02:05

## 2025-05-23 RX ADMIN — CETIRIZINE HYDROCHLORIDE 10 MG: 10 TABLET, FILM COATED ORAL at 09:05

## 2025-05-23 RX ADMIN — CEFEPIME 2 G: 2 INJECTION, POWDER, FOR SOLUTION INTRAVENOUS at 09:05

## 2025-05-23 RX ADMIN — PANTOPRAZOLE SODIUM 40 MG: 40 TABLET, DELAYED RELEASE ORAL at 09:05

## 2025-05-23 RX ADMIN — CEFEPIME 2 G: 2 INJECTION, POWDER, FOR SOLUTION INTRAVENOUS at 05:05

## 2025-05-23 RX ADMIN — RIVAROXABAN 20 MG: 20 TABLET, FILM COATED ORAL at 05:05

## 2025-05-23 RX ADMIN — FLUTICASONE PROPIONATE 100 MCG: 50 SPRAY, METERED NASAL at 09:05

## 2025-05-23 NOTE — PROGRESS NOTES
[facility]  [facility] Medicine  Progress Note    Patient Name: Armando Ingram Jr.  MRN: [id number]  Patient Class: IP- Inpatient   Admission Date: 5/16/2025  Length of Stay: 6 days  Attending Physician: Gail Elam MD  Primary Care Provider: Brian Soares MD        Subjective     Principal Problem:Neutropenic fever        HPI:  Armando Ingram Jr. is a 65 y.o. male with a PMH  has a past medical history of Closed right hip fracture, Colon cancer, DVT (deep venous thrombosis) (2002), Hairy cell leukemia (06/06/2024), and Nephrolithiasis. who presented to the ED for further evaluation of acute onset fever with T-max measuring 101.0 F earlier today.  Patient reported significant history of hairy cell leukemia in his followed by Dr. Dow and Dr. Flores from Hematology/Oncology and was recently prescribed a Z-Cordell for 4 days for treatment of a cough.  Patient was instructed to go to the ED if he endorsed fever greater than 103.0 F but presented to the ED due to ulcer experiencing associated chills, throbbing headache, and persistent fever.  He reported no known alleviating or aggravating factors noted with all other review of systems negative except as noted above.  He is not currently on active treatment for his leukemia and reported being in his usual state of health prior to onset of symptoms.  Initial workup in the ED revealed patient to be afebrile with T-max measuring 100.4 F, pancytopenic, lactic acid/procalcitonin within normal limits, flu/COVID negative, chest x-ray negative for acute findings, UA positive for 2+ LE, 11 RBCs, free found WBCs.  Patient initiated on cefepime with cultures obtained and pending and admitted to Hospital Medicine under observation for continued medical management and treatment of neutropenic fever.    PCP: Brian Soares      Overview/Hospital Course:  Continued on IV Cefepime. Zyvoz added 05/18 due to persistent fever. Hematology consulted. Cultures remain NGTD    Resp viral panel negative. ID consulted to eval due to fevers  Cefepime changed to IV Merrem per ID recs on 05/21, unclear source of fevers at this time. Zyvox discontinued per ID.   CT Abd/Pelvis showed bladder thickening but otherwise negative.   ID recommend TTE, BLE duplex and possible Indium scan for source of infection and de-escalate abx to Cefepime on 05/23    Interval History: Tmax 101.2. Pt has no complaints today. Denies CP, SOB, cough, abd pain, n/v, rashes/wounds, back pain.     Review of Systems  Objective:     Vital Signs (Most Recent):  Temp: 98.2 °F (36.8 °C) (05/23/25 0754)  Pulse: 86 (05/23/25 0754)  Resp: 17 (05/23/25 0754)  BP: 132/68 (05/23/25 0754)  SpO2: (!) 94 % (05/23/25 0800) Vital Signs (24h Range):  Temp:  [98.1 °F (36.7 °C)-101.2 °F (38.4 °C)] 98.2 °F (36.8 °C)  Pulse:  [77-90] 86  Resp:  [16-18] 17  SpO2:  [94 %-96 %] 94 %  BP: (109-132)/(63-70) 132/68     Weight: 83.9 kg (185 lb)  Body mass index is 25.8 kg/m².    Intake/Output Summary (Last 24 hours) at 5/23/2025 0959  Last data filed at 5/23/2025 0715  Gross per 24 hour   Intake 293.26 ml   Output --   Net 293.26 ml         Physical Exam  Vitals and nursing note reviewed.   Constitutional:       General: He is not in acute distress.     Appearance: He is not ill-appearing.   Cardiovascular:      Rate and Rhythm: Normal rate and regular rhythm.      Heart sounds: No murmur heard.  Pulmonary:      Effort: Pulmonary effort is normal.      Breath sounds: Normal breath sounds. No wheezing, rhonchi or rales.   Abdominal:      General: Bowel sounds are normal. There is no distension.      Palpations: Abdomen is soft.      Tenderness: There is no abdominal tenderness. There is no right CVA tenderness, left CVA tenderness, guarding or rebound.   Musculoskeletal:      Right lower leg: No edema.      Left lower leg: No edema.   Neurological:      Mental Status: He is alert. Mental status is at baseline.               Significant Labs: All  pertinent labs within the past 24 hours have been reviewed.    Significant Imaging: I have reviewed all pertinent imaging results/findings within the past 24 hours.      Assessment & Plan  Neutropenic fever  CXR with NAF, Flu/Covid neg   Possible UTI   Resp viral panel negative   Urine and blood cultures NGTD   Monitor VS   Hematology/Oncology consulted and following   ID consult due to persistent fever   Cefepime changed to IV Merrem, Zyvox discontinued   CT abd pelvis with bladder thickening and IVC filter but no other source of infection   Discussed with Dr. Villanueva- TTE and BLE duplex final read pending, he recommends change IV Merrem to Cefepime.   Indium scan ordered by ID but radiology unable to obtain needed material for scan until 05/26 and scan cannot be completed until 5/27 so scan may be done outpatient per ID pending clinical course   Encourage IS and Oob as able      Hairy cell leukemia  Patient with known history of hairy cell leukemia and continues to follow Dr. Dow and Dr. Flores from Hematology/Oncology. Patient not currently on active treatment and currently undergoing treatment for neutropenic fever with broad-spectrum antibiotics as noted above.  Hematology consulted and following   Pancytopenia  This patient is found to have pancytopenia, the likely etiology is , will monitor CBC . Will transfuse red blood cells if the hemoglobin is <7g/dL (or <8 in the setting of ACS). Hold DVT prophylaxis if platelets are <50k. The patient's hemoglobin, white blood cell count, and platelet count results have been reviewed and are listed below.  Recent Labs   Lab 05/23/25  0308   HGB 11.7*   WBC 2.49*   PLT 84*     History of DVT (deep vein thrombosis)  - BLE duplex final read pending   -prior hx of IVC filter in 2002   -continue home Xarelto   - monitor Plt Counts     VTE Risk Mitigation (From admission, onward)           Ordered     rivaroxaban tablet 20 mg  With dinner         05/17/25 0816     Reason  for No Pharmacological VTE Prophylaxis  Once        Question:  Reasons:  Answer:  Already adequately anticoagulated on oral Anticoagulants    05/17/25 0213     IP VTE HIGH RISK PATIENT  Once         05/17/25 0213     Place sequential compression device  Until discontinued         05/17/25 0213                    Discharge Planning   LAURI:      Code Status: Full Code   Medical Readiness for Discharge Date:   Discharge Plan A: Home with family                        Gail Elam MD  Department of Hospital Medicine   [facility]

## 2025-05-23 NOTE — ASSESSMENT & PLAN NOTE
- BLE duplex final read pending   -prior hx of IVC filter in 2002   -continue home Xarelto   - monitor Plt Counts

## 2025-05-23 NOTE — ASSESSMENT & PLAN NOTE
with empiric meropenem.  As fever has persisted.  Will hold Zyvox due to associated thrombocytopenia,   will follow blood cultures.  Will monitor fever curve.  Will do pan CT of the chest abdomen pelvis if fever persist    05/22-   he continues to have persistent fever.  CT scan of the chest abdomen and pelvis did not show any obvious abscess.  Will do indium scan.  Will also do lower extremity Doppler.  Will send serum procalcitonin   on empiric meropenem.  Will plan to de-escalate  if no cultures are positive

## 2025-05-23 NOTE — PROGRESS NOTES
[facility]  Infectious Disease  Progress Note    Patient Name: Armando Ingram Jr.  MRN: [id number]  Admission Date: 5/16/2025  Length of Stay: 6 days  Attending Physician: Gail Elam MD  Primary Care Provider: Brian Soares MD    Isolation Status: Enhanced Respiratory  Assessment/Plan:      Hematology  History of DVT (deep vein thrombosis)   anticoagulation as per primary team   had IVC in place    Oncology  * Neutropenic fever   with empiric meropenem.  As fever has persisted.  Will hold Zyvox due to associated thrombocytopenia,   will follow blood cultures.  Will monitor fever curve.  Will do pan CT of the chest abdomen pelvis if fever persist    05/22-   he continues to have persistent fever.  CT scan of the chest abdomen and pelvis did not show any obvious abscess.  Will do indium scan.  Will also do lower extremity Doppler.  Will send serum procalcitonin   on empiric meropenem.  Will plan to de-escalate  if no cultures are positive    Pancytopenia   related to underlying malignancy.  Will follow oncology        Anticipated Disposition:     Thank you for your consult. I will follow-up with patient. Please contact us if you have any additional questions.    Armando Villanueva MD, [facility]  Infectious Disease  [facility]    Subjective:     Principal Problem:Neutropenic fever    HPI: 65 y.o. male with a PMH  has a past medical history of Closed right hip fracture, Colon cancer, DVT (deep venous thrombosis) (2002), Hairy cell leukemia (06/06/2024), and Nephrolithiasis.     He was admitted with neutropenic fever.  Tmax was 101.  Labs and   Imaging test reviewed - flu/COVID negative, chest x-ray negative for acute findings, UA positive for 2+ LE, 11 RBCs,    Interval History:    65-year-old man with hairy cell leukemia.  He is having persistent fever   all cultures are negative till date.    CT scan of chest abdomen pelvis did not show any acute abscess .      Review of Systems   Constitutional:   "Negative for activity change, appetite change, chills and diaphoresis.   Respiratory:  Negative for apnea and chest tightness.    Gastrointestinal:  Negative for abdominal distention.   Musculoskeletal:  Negative for arthralgias.     Objective:     Vital Signs (Most Recent):  Temp: 98.5 °F (36.9 °C) (05/23/25 0449)  Pulse: 80 (05/23/25 0449)  Resp: 18 (05/23/25 0449)  BP: 113/70 (05/23/25 0449)  SpO2: 95 % (05/23/25 0449) Vital Signs (24h Range):  Temp:  [98.1 °F (36.7 °C)-101.2 °F (38.4 °C)] 98.5 °F (36.9 °C)  Pulse:  [74-90] 80  Resp:  [16-18] 18  SpO2:  [92 %-96 %] 95 %  BP: (109-131)/(63-71) 113/70     Weight: 83.9 kg (185 lb)  Body mass index is 25.8 kg/m².    Estimated Creatinine Clearance: 77.4 mL/min (based on SCr of 1 mg/dL).     Physical Exam  Vitals and nursing note reviewed.   HENT:      Mouth/Throat:      Mouth: Mucous membranes are moist.   Cardiovascular:      Rate and Rhythm: Normal rate.   Pulmonary:      Effort: Pulmonary effort is normal.   Abdominal:      General: Abdomen is flat.   Musculoskeletal:         General: Normal range of motion.      Cervical back: Normal range of motion.   Skin:     General: Skin is warm.   Neurological:      General: No focal deficit present.      Mental Status: He is alert.          Significant Labs: Blood Culture: No results for input(s): "LABBLOO" in the last 4320 hours.  BMP:   Recent Labs   Lab 05/22/25  0500   *   *   K 4.4      CO2 23   BUN 16   CREATININE 1.0   CALCIUM 8.3*     CBC:   Recent Labs   Lab 05/22/25  0500 05/23/25  0308   WBC 2.57* 2.49*   HGB 12.4* 11.7*   HCT 37.4* 34.3*   PLT 73* 84*     CMP:   Recent Labs   Lab 05/22/25  0500   *   K 4.4      CO2 23   *   BUN 16   CREATININE 1.0   CALCIUM 8.3*   ANIONGAP 9     All pertinent labs within the past 24 hours have been reviewed.    Significant Imaging: I have reviewed all pertinent imaging results/findings within the past 24 hours.  "

## 2025-05-23 NOTE — SUBJECTIVE & OBJECTIVE
"Interval History:    65-year-old man with hairy cell leukemia.  He is having persistent fever   all cultures are negative till date.    CT scan of chest abdomen pelvis did not show any acute abscess .      Review of Systems   Constitutional:  Negative for activity change, appetite change, chills and diaphoresis.   Respiratory:  Negative for apnea and chest tightness.    Gastrointestinal:  Negative for abdominal distention.   Musculoskeletal:  Negative for arthralgias.     Objective:     Vital Signs (Most Recent):  Temp: 98.5 °F (36.9 °C) (05/23/25 0449)  Pulse: 80 (05/23/25 0449)  Resp: 18 (05/23/25 0449)  BP: 113/70 (05/23/25 0449)  SpO2: 95 % (05/23/25 0449) Vital Signs (24h Range):  Temp:  [98.1 °F (36.7 °C)-101.2 °F (38.4 °C)] 98.5 °F (36.9 °C)  Pulse:  [74-90] 80  Resp:  [16-18] 18  SpO2:  [92 %-96 %] 95 %  BP: (109-131)/(63-71) 113/70     Weight: 83.9 kg (185 lb)  Body mass index is 25.8 kg/m².    Estimated Creatinine Clearance: 77.4 mL/min (based on SCr of 1 mg/dL).     Physical Exam  Vitals and nursing note reviewed.   HENT:      Mouth/Throat:      Mouth: Mucous membranes are moist.   Cardiovascular:      Rate and Rhythm: Normal rate.   Pulmonary:      Effort: Pulmonary effort is normal.   Abdominal:      General: Abdomen is flat.   Musculoskeletal:         General: Normal range of motion.      Cervical back: Normal range of motion.   Skin:     General: Skin is warm.   Neurological:      General: No focal deficit present.      Mental Status: He is alert.          Significant Labs: Blood Culture: No results for input(s): "LABBLOO" in the last 4320 hours.  BMP:   Recent Labs   Lab 05/22/25  0500   *   *   K 4.4      CO2 23   BUN 16   CREATININE 1.0   CALCIUM 8.3*     CBC:   Recent Labs   Lab 05/22/25  0500 05/23/25  0308   WBC 2.57* 2.49*   HGB 12.4* 11.7*   HCT 37.4* 34.3*   PLT 73* 84*     CMP:   Recent Labs   Lab 05/22/25  0500   *   K 4.4      CO2 23   *   BUN 16 "   CREATININE 1.0   CALCIUM 8.3*   ANIONGAP 9     All pertinent labs within the past 24 hours have been reviewed.    Significant Imaging: I have reviewed all pertinent imaging results/findings within the past 24 hours.

## 2025-05-23 NOTE — SUBJECTIVE & OBJECTIVE
Interval History: Tmax 101.2. Pt has no complaints today. Denies CP, SOB, cough, abd pain, n/v, rashes/wounds, back pain.     Review of Systems  Objective:     Vital Signs (Most Recent):  Temp: 98.2 °F (36.8 °C) (05/23/25 0754)  Pulse: 86 (05/23/25 0754)  Resp: 17 (05/23/25 0754)  BP: 132/68 (05/23/25 0754)  SpO2: (!) 94 % (05/23/25 0800) Vital Signs (24h Range):  Temp:  [98.1 °F (36.7 °C)-101.2 °F (38.4 °C)] 98.2 °F (36.8 °C)  Pulse:  [77-90] 86  Resp:  [16-18] 17  SpO2:  [94 %-96 %] 94 %  BP: (109-132)/(63-70) 132/68     Weight: 83.9 kg (185 lb)  Body mass index is 25.8 kg/m².    Intake/Output Summary (Last 24 hours) at 5/23/2025 0959  Last data filed at 5/23/2025 0715  Gross per 24 hour   Intake 293.26 ml   Output --   Net 293.26 ml         Physical Exam  Vitals and nursing note reviewed.   Constitutional:       General: He is not in acute distress.     Appearance: He is not ill-appearing.   Cardiovascular:      Rate and Rhythm: Normal rate and regular rhythm.      Heart sounds: No murmur heard.  Pulmonary:      Effort: Pulmonary effort is normal.      Breath sounds: Normal breath sounds. No wheezing, rhonchi or rales.   Abdominal:      General: Bowel sounds are normal. There is no distension.      Palpations: Abdomen is soft.      Tenderness: There is no abdominal tenderness. There is no right CVA tenderness, left CVA tenderness, guarding or rebound.   Musculoskeletal:      Right lower leg: No edema.      Left lower leg: No edema.   Neurological:      Mental Status: He is alert. Mental status is at baseline.               Significant Labs: All pertinent labs within the past 24 hours have been reviewed.    Significant Imaging: I have reviewed all pertinent imaging results/findings within the past 24 hours.

## 2025-05-23 NOTE — ASSESSMENT & PLAN NOTE
This patient is found to have pancytopenia, the likely etiology is , will monitor CBC . Will transfuse red blood cells if the hemoglobin is <7g/dL (or <8 in the setting of ACS). Hold DVT prophylaxis if platelets are <50k. The patient's hemoglobin, white blood cell count, and platelet count results have been reviewed and are listed below.  Recent Labs   Lab 05/23/25  0308   HGB 11.7*   WBC 2.49*   PLT 84*

## 2025-05-23 NOTE — PROGRESS NOTES
Pharmacist Renal Dose Adjustment Note    Armando Ingram Jr. is a 65 y.o. male being treated with the medication cefepime.    Patient Data:    Vital Signs (Most Recent):  Temp: 98.2 °F (36.8 °C) (05/23/25 0754)  Pulse: 86 (05/23/25 0754)  Resp: 17 (05/23/25 0754)  BP: 132/68 (05/23/25 0754)  SpO2: (!) 94 % (05/23/25 0800) Vital Signs (72h Range):  Temp:  [97.7 °F (36.5 °C)-101.2 °F (38.4 °C)]   Pulse:  []   Resp:  [16-18]   BP: (102-137)/(56-71)   SpO2:  [92 %-97 %]      Recent Labs   Lab 05/20/25  0532 05/21/25  0707 05/22/25  0500   CREATININE 1.2 1.1 1.0     Serum creatinine: 1 mg/dL 05/22/25 0500  Estimated creatinine clearance: 77.4 mL/min    Cefepime 2 g IV q12h will be changed to cefepime 2 g IV q8h per pharmacy renal dosing protocol for CrCl > 60 mL/min.    Pharmacist's Name: Zora Mullins

## 2025-05-23 NOTE — PLAN OF CARE
Remains injury free. Denies c/o pain. Afebrile this shift. Tolerating diet. Independent./ no s/s acute distress.   - Keep the Trach site clean and dry.   - Remove Sutures on POD #7.   - Keep the Omniflex for a week to avoid the manipulation of the stoma.   - Elevate HOB.   - Gentle suction prn.   - Vent per RT.

## 2025-05-23 NOTE — ASSESSMENT & PLAN NOTE
CXR with NAF, Flu/Covid neg   Possible UTI   Resp viral panel negative   Urine and blood cultures NGTD   Monitor VS   Hematology/Oncology consulted and following   ID consult due to persistent fever   Cefepime changed to IV Merrem, Zyvox discontinued   CT abd pelvis with bladder thickening and IVC filter but no other source of infection   Discussed with Dr. Villanueva- TTE and BLE duplex final read pending, he recommends change IV Merrem to Cefepime.   Indium scan ordered by ID but radiology unable to obtain needed material for scan until 05/26 and scan cannot be completed until 5/27 so scan may be done outpatient per ID pending clinical course   Encourage IS and Oob as able

## 2025-05-24 PROBLEM — R50.9 FUO (FEVER OF UNKNOWN ORIGIN): Status: ACTIVE | Noted: 2025-05-24

## 2025-05-24 LAB
ABSOLUTE EOSINOPHIL (OHS): 0.04 K/UL
ABSOLUTE MONOCYTE (OHS): 0.04 K/UL (ref 0.3–1)
ABSOLUTE NEUTROPHIL COUNT (OHS): 1.38 K/UL (ref 1.8–7.7)
ANION GAP (OHS): 6 MMOL/L (ref 8–16)
BASOPHILS # BLD AUTO: 0 K/UL
BASOPHILS NFR BLD AUTO: 0 %
BUN SERPL-MCNC: 18 MG/DL (ref 8–23)
CALCIUM SERPL-MCNC: 8.3 MG/DL (ref 8.7–10.5)
CHLORIDE SERPL-SCNC: 103 MMOL/L (ref 95–110)
CO2 SERPL-SCNC: 23 MMOL/L (ref 23–29)
CREAT SERPL-MCNC: 1.1 MG/DL (ref 0.5–1.4)
ERYTHROCYTE [DISTWIDTH] IN BLOOD BY AUTOMATED COUNT: 14.4 % (ref 11.5–14.5)
GFR SERPLBLD CREATININE-BSD FMLA CKD-EPI: >60 ML/MIN/1.73/M2
GLUCOSE SERPL-MCNC: 137 MG/DL (ref 70–110)
HCT VFR BLD AUTO: 36.3 % (ref 40–54)
HGB BLD-MCNC: 12 GM/DL (ref 14–18)
IMM GRANULOCYTES # BLD AUTO: 0.03 K/UL (ref 0–0.04)
IMM GRANULOCYTES NFR BLD AUTO: 1.1 % (ref 0–0.5)
LYMPHOCYTES # BLD AUTO: 1.12 K/UL (ref 1–4.8)
MCH RBC QN AUTO: 31.3 PG (ref 27–31)
MCHC RBC AUTO-ENTMCNC: 33.1 G/DL (ref 32–36)
MCV RBC AUTO: 95 FL (ref 82–98)
NUCLEATED RBC (/100WBC) (OHS): 0 /100 WBC
PLATELET # BLD AUTO: 70 K/UL (ref 150–450)
PMV BLD AUTO: 9.8 FL (ref 9.2–12.9)
POTASSIUM SERPL-SCNC: 4.5 MMOL/L (ref 3.5–5.1)
RBC # BLD AUTO: 3.83 M/UL (ref 4.6–6.2)
RELATIVE EOSINOPHIL (OHS): 1.5 %
RELATIVE LYMPHOCYTE (OHS): 42.9 % (ref 18–48)
RELATIVE MONOCYTE (OHS): 1.5 % (ref 4–15)
RELATIVE NEUTROPHIL (OHS): 53 % (ref 38–73)
SODIUM SERPL-SCNC: 132 MMOL/L (ref 136–145)
WBC # BLD AUTO: 2.61 K/UL (ref 3.9–12.7)

## 2025-05-24 PROCEDURE — 27000207 HC ISOLATION: Mod: HCNC

## 2025-05-24 PROCEDURE — 36415 COLL VENOUS BLD VENIPUNCTURE: CPT | Mod: HCNC | Performed by: STUDENT IN AN ORGANIZED HEALTH CARE EDUCATION/TRAINING PROGRAM

## 2025-05-24 PROCEDURE — 85025 COMPLETE CBC W/AUTO DIFF WBC: CPT | Mod: HCNC | Performed by: INTERNAL MEDICINE

## 2025-05-24 PROCEDURE — 36415 COLL VENOUS BLD VENIPUNCTURE: CPT | Mod: HCNC | Performed by: INTERNAL MEDICINE

## 2025-05-24 PROCEDURE — 25000003 PHARM REV CODE 250: Mod: HCNC | Performed by: INTERNAL MEDICINE

## 2025-05-24 PROCEDURE — 25000003 PHARM REV CODE 250: Mod: HCNC | Performed by: STUDENT IN AN ORGANIZED HEALTH CARE EDUCATION/TRAINING PROGRAM

## 2025-05-24 PROCEDURE — 63600175 PHARM REV CODE 636 W HCPCS: Mod: HCNC | Performed by: INTERNAL MEDICINE

## 2025-05-24 PROCEDURE — 25000003 PHARM REV CODE 250: Mod: HCNC | Performed by: HOSPITALIST

## 2025-05-24 PROCEDURE — 80048 BASIC METABOLIC PNL TOTAL CA: CPT | Mod: HCNC | Performed by: INTERNAL MEDICINE

## 2025-05-24 PROCEDURE — 0152U NFCT DS DNA UNTRGT NGNRJ SEQ: CPT | Performed by: STUDENT IN AN ORGANIZED HEALTH CARE EDUCATION/TRAINING PROGRAM

## 2025-05-24 PROCEDURE — 21400001 HC TELEMETRY ROOM: Mod: HCNC

## 2025-05-24 RX ADMIN — PANTOPRAZOLE SODIUM 40 MG: 40 TABLET, DELAYED RELEASE ORAL at 08:05

## 2025-05-24 RX ADMIN — RIVAROXABAN 20 MG: 20 TABLET, FILM COATED ORAL at 05:05

## 2025-05-24 RX ADMIN — TAMSULOSIN HYDROCHLORIDE 0.4 MG: 0.4 CAPSULE ORAL at 08:05

## 2025-05-24 RX ADMIN — CEFEPIME 2 G: 2 INJECTION, POWDER, FOR SOLUTION INTRAVENOUS at 10:05

## 2025-05-24 RX ADMIN — CEFEPIME 2 G: 2 INJECTION, POWDER, FOR SOLUTION INTRAVENOUS at 01:05

## 2025-05-24 RX ADMIN — CETIRIZINE HYDROCHLORIDE 10 MG: 10 TABLET, FILM COATED ORAL at 08:05

## 2025-05-24 RX ADMIN — CEFEPIME 2 G: 2 INJECTION, POWDER, FOR SOLUTION INTRAVENOUS at 05:05

## 2025-05-24 RX ADMIN — FLUTICASONE PROPIONATE 100 MCG: 50 SPRAY, METERED NASAL at 08:05

## 2025-05-24 RX ADMIN — ACETAMINOPHEN 650 MG: 325 TABLET ORAL at 05:05

## 2025-05-24 NOTE — SUBJECTIVE & OBJECTIVE
Interval History: tmax 99.2. Pt has no complaints today. Denies abd pain, n/v, CP, SOB. Cough intermittent, nonproductive     Review of Systems  Objective:     Vital Signs (Most Recent):  Temp: 97.9 °F (36.6 °C) (05/24/25 0817)  Pulse: 83 (05/24/25 0817)  Resp: 18 (05/24/25 0817)  BP: 123/67 (05/24/25 0817)  SpO2: 96 % (05/24/25 0817) Vital Signs (24h Range):  Temp:  [97.9 °F (36.6 °C)-99.2 °F (37.3 °C)] 97.9 °F (36.6 °C)  Pulse:  [72-87] 83  Resp:  [16-18] 18  SpO2:  [91 %-96 %] 96 %  BP: (110-123)/(55-67) 123/67     Weight: 83.9 kg (185 lb)  Body mass index is 25.8 kg/m².    Intake/Output Summary (Last 24 hours) at 5/24/2025 1040  Last data filed at 5/23/2025 1230  Gross per 24 hour   Intake 240 ml   Output --   Net 240 ml         Physical Exam  Vitals and nursing note reviewed.   Constitutional:       General: He is not in acute distress.     Appearance: He is not ill-appearing.   Cardiovascular:      Rate and Rhythm: Normal rate and regular rhythm.      Heart sounds: No murmur heard.  Pulmonary:      Effort: Pulmonary effort is normal.      Breath sounds: Normal breath sounds. No wheezing, rhonchi or rales.   Abdominal:      General: Bowel sounds are normal. There is no distension.      Palpations: Abdomen is soft.      Tenderness: There is no abdominal tenderness. There is no guarding or rebound.   Musculoskeletal:      Right lower leg: No edema.      Left lower leg: No edema.   Neurological:      Mental Status: He is alert. Mental status is at baseline.               Significant Labs: All pertinent labs within the past 24 hours have been reviewed.    Significant Imaging: I have reviewed all pertinent imaging results/findings within the past 24 hours.

## 2025-05-24 NOTE — PLAN OF CARE
Remains injury free. Denies c/o pain. Afebrile this shift. Cardiac monitoring. No s/s acute distress.

## 2025-05-24 NOTE — PROGRESS NOTES
[facility]  Infectious Disease  Progress Note    Patient Name: Armando Ingram Jr.  MRN: [id number]  Admission Date: 5/16/2025  Length of Stay: 7 days  Attending Physician: Gail Elam MD  Primary Care Provider: Brian Soares MD    Isolation Status: Enhanced Respiratory  Assessment/Plan:      Hematology  History of DVT (deep vein thrombosis)   anticoagulation as per primary team   had IVC in place    Oncology  * Neutropenic fever   with empiric meropenem.  As fever has persisted.  Will hold Zyvox due to associated thrombocytopenia,   will follow blood cultures.  Will monitor fever curve.  Will do pan CT of the chest abdomen pelvis if fever persist    05/22-   he continues to have persistent fever.  CT scan of the chest abdomen and pelvis did not show any obvious abscess.  Will do indium scan.  Will also do lower extremity Doppler.  Will send serum procalcitonin   on empiric meropenem.  Will plan to de-escalate  if no cultures are positive  05/23-   neutropenia has improved.  Cultures are negative till date.  Lower extremity Doppler shows chronic DVT.  Will switch to cefepime.  And will plan to stop antibiotics soon if fever improves.   indium scan was ordered but this may not be done until next week    Pancytopenia   related to underlying malignancy.  Will follow oncology    Hairy cell leukemia   will follow oncology        Anticipated Disposition:     Thank you for your consult. I will follow-up with patient. Please contact us if you have any additional questions.    Armando Villanueva MD, [facility]  Infectious Disease  [facility]    Subjective:     Principal Problem:Neutropenic fever    HPI: 65 y.o. male with a PMH  has a past medical history of Closed right hip fracture, Colon cancer, DVT (deep venous thrombosis) (2002), Hairy cell leukemia (06/06/2024), and Nephrolithiasis.     He was admitted with neutropenic fever.  Tmax was 101.  Labs and   Imaging test reviewed - flu/COVID negative, chest  "x-ray negative for acute findings, UA positive for 2+ LE, 11 RBCs,    Interval History:    65-year-old man with hairy cell leukemia.  He is having persistent fever   all cultures are negative till date.    CT scan of chest abdomen pelvis did not show any acute abscess .  05/23-   he feels better.   still having intermittent fevers  .    Lower extremity Doppler showed-  Thrombosis of the right distal femoral vein popliteal vein and peroneal veins. This could be chronic thrombus.   Review of Systems   Constitutional:  Negative for activity change, appetite change, chills and diaphoresis.   Respiratory:  Negative for apnea and chest tightness.    Gastrointestinal:  Negative for abdominal distention.   Musculoskeletal:  Negative for arthralgias.     Objective:     Vital Signs (Most Recent):  Temp: 98.4 °F (36.9 °C) (05/24/25 0456)  Pulse: 74 (05/24/25 0500)  Resp: 18 (05/24/25 0456)  BP: 114/64 (05/24/25 0456)  SpO2: (!) 94 % (05/24/25 0456) Vital Signs (24h Range):  Temp:  [98.2 °F (36.8 °C)-99.2 °F (37.3 °C)] 98.4 °F (36.9 °C)  Pulse:  [72-87] 74  Resp:  [16-18] 18  SpO2:  [91 %-94 %] 94 %  BP: (110-132)/(55-68) 114/64     Weight: 83.9 kg (185 lb)  Body mass index is 25.8 kg/m².    Estimated Creatinine Clearance: 77.4 mL/min (based on SCr of 1 mg/dL).     Physical Exam  Vitals and nursing note reviewed.   HENT:      Mouth/Throat:      Mouth: Mucous membranes are moist.   Cardiovascular:      Rate and Rhythm: Normal rate.   Pulmonary:      Effort: Pulmonary effort is normal.   Abdominal:      General: Abdomen is flat.   Musculoskeletal:         General: Normal range of motion.      Cervical back: Normal range of motion.   Skin:     General: Skin is warm.   Neurological:      General: No focal deficit present.      Mental Status: He is alert.          Significant Labs: Blood Culture: No results for input(s): "LABBLOO" in the last 4320 hours.  BMP:   No results for input(s): "GLU", "NA", "K", "CL", "CO2", "BUN", " ""CREATININE", "CALCIUM", "MG" in the last 48 hours.    CBC:   Recent Labs   Lab 05/23/25  0308 05/24/25  0507   WBC 2.49* 2.61*   HGB 11.7* 12.0*   HCT 34.3* 36.3*   PLT 84* 70*     CMP:   No results for input(s): "NA", "K", "CL", "CO2", "GLU", "BUN", "CREATININE", "CALCIUM", "PROT", "ALBUMIN", "BILITOT", "ALKPHOS", "AST", "ALT", "ANIONGAP", "EGFRNONAA" in the last 48 hours.    Invalid input(s): "ESTGFAFRICA"    All pertinent labs within the past 24 hours have been reviewed.    Significant Imaging: I have reviewed all pertinent imaging results/findings within the past 24 hours.  "

## 2025-05-24 NOTE — CARE UPDATE
Patient now afebrile going on 24 hrs. No clear source of fever as of yet. Will order Karius to check for more uncommon pathogen in setting of neutropenia. Agree with indium scan to try and pinpoint a location of infection. Continue cefepime for now. Will continue to follow.

## 2025-05-24 NOTE — ASSESSMENT & PLAN NOTE
This patient is found to have pancytopenia, the likely etiology is , will monitor CBC . Will transfuse red blood cells if the hemoglobin is <7g/dL (or <8 in the setting of ACS). Hold DVT prophylaxis if platelets are <50k. The patient's hemoglobin, white blood cell count, and platelet count results have been reviewed and are listed below.  Recent Labs   Lab 05/24/25  0507   HGB 12.0*   WBC 2.61*   PLT 70*

## 2025-05-24 NOTE — PROGRESS NOTES
[facility]  [facility]  Progress Note    Patient Name: Armando Ingram Jr.  MRN: [id number]  Patient Class: IP- Inpatient   Admission Date: 5/16/2025  Length of Stay: 7 days  Attending Physician: Gail Elam MD  Primary Care Provider: Brian Soares MD        Subjective     Principal Problem:Neutropenic fever        HPI:  Armando Ingram Jr. is a 65 y.o. male with a PMH  has a past medical history of Closed right hip fracture, Colon cancer, DVT (deep venous thrombosis) (2002), Hairy cell leukemia (06/06/2024), and Nephrolithiasis. who presented to the ED for further evaluation of acute onset fever with T-max measuring 101.0 F earlier today.  Patient reported significant history of hairy cell leukemia in his followed by Dr. Dow and Dr. Flores from Hematology/Oncology and was recently prescribed a Z-Cordell for 4 days for treatment of a cough.  Patient was instructed to go to the ED if he endorsed fever greater than 103.0 F but presented to the ED due to ulcer experiencing associated chills, throbbing headache, and persistent fever.  He reported no known alleviating or aggravating factors noted with all other review of systems negative except as noted above.  He is not currently on active treatment for his leukemia and reported being in his usual state of health prior to onset of symptoms.  Initial workup in the ED revealed patient to be afebrile with T-max measuring 100.4 F, pancytopenic, lactic acid/procalcitonin within normal limits, flu/COVID negative, chest x-ray negative for acute findings, UA positive for 2+ LE, 11 RBCs, free found WBCs.  Patient initiated on cefepime with cultures obtained and pending and admitted to Hospital Medicine under observation for continued medical management and treatment of neutropenic fever.    PCP: Brian Soares      Overview/Hospital Course:  Continued on IV Cefepime. Zyvoz added 05/18 due to persistent fever. Hematology consulted. Cultures remain NGTD    Resp viral panel negative. ID consulted to eval due to fevers  Cefepime changed to IV Merrem per ID recs on 05/21, unclear source of fevers at this time. Zyvox discontinued per ID.   CT Abd/Pelvis showed bladder thickening but otherwise negative.   ID recommend TTE, BLE duplex and possible Indium scan for source of infection and de-escalate abx to Cefepime on 05/23.   TTE with no vegetations. BLE duplex showed chronic RLE DVT. Indium scan pending tentatively planned for 05/26-05/27 as radiology does not have required materials at this time and scan will take 2 days to complete per RT.     Interval History: tmax 99.2. Pt has no complaints today. Denies abd pain, n/v, CP, SOB. Cough intermittent, nonproductive     Review of Systems  Objective:     Vital Signs (Most Recent):  Temp: 97.9 °F (36.6 °C) (05/24/25 0817)  Pulse: 83 (05/24/25 0817)  Resp: 18 (05/24/25 0817)  BP: 123/67 (05/24/25 0817)  SpO2: 96 % (05/24/25 0817) Vital Signs (24h Range):  Temp:  [97.9 °F (36.6 °C)-99.2 °F (37.3 °C)] 97.9 °F (36.6 °C)  Pulse:  [72-87] 83  Resp:  [16-18] 18  SpO2:  [91 %-96 %] 96 %  BP: (110-123)/(55-67) 123/67     Weight: 83.9 kg (185 lb)  Body mass index is 25.8 kg/m².    Intake/Output Summary (Last 24 hours) at 5/24/2025 1040  Last data filed at 5/23/2025 1230  Gross per 24 hour   Intake 240 ml   Output --   Net 240 ml         Physical Exam  Vitals and nursing note reviewed.   Constitutional:       General: He is not in acute distress.     Appearance: He is not ill-appearing.   Cardiovascular:      Rate and Rhythm: Normal rate and regular rhythm.      Heart sounds: No murmur heard.  Pulmonary:      Effort: Pulmonary effort is normal.      Breath sounds: Normal breath sounds. No wheezing, rhonchi or rales.   Abdominal:      General: Bowel sounds are normal. There is no distension.      Palpations: Abdomen is soft.      Tenderness: There is no abdominal tenderness. There is no guarding or rebound.   Musculoskeletal:       Right lower leg: No edema.      Left lower leg: No edema.   Neurological:      Mental Status: He is alert. Mental status is at baseline.               Significant Labs: All pertinent labs within the past 24 hours have been reviewed.    Significant Imaging: I have reviewed all pertinent imaging results/findings within the past 24 hours.      Assessment & Plan  Neutropenic fever  CXR with NAF, Flu/Covid neg   Possible UTI   Resp viral panel negative   Urine and blood cultures NGTD   Monitor VS   Hematology/Oncology consulted and following   ID consult due to persistent fever   Cefepime changed to IV Merrem, Zyvox discontinued   CT abd pelvis with bladder thickening and IVC filter but no other source of infection. TTE with no valvular abnormalities. BLE duplex showed chronic RLE DVT   changed IV Merrem to Cefepime 05/23 per ID recs.   Indium scan ordered by ID but radiology unable to obtain needed material for scan until 05/26 and scan cannot be completed until 5/27 so scan may be done outpatient per Dr. Villanueva pending clinical course   Discussed with Dr. Laguna- he plans to obtain Karius today   Encourage IS and Oob as able      Hairy cell leukemia  Patient with known history of hairy cell leukemia and continues to follow Dr. Dow and Dr. Flores from Hematology/Oncology. Patient not currently on active treatment and currently undergoing treatment for neutropenic fever with broad-spectrum antibiotics as noted above.  Hematology consulted and following   Pancytopenia  This patient is found to have pancytopenia, the likely etiology is , will monitor CBC . Will transfuse red blood cells if the hemoglobin is <7g/dL (or <8 in the setting of ACS). Hold DVT prophylaxis if platelets are <50k. The patient's hemoglobin, white blood cell count, and platelet count results have been reviewed and are listed below.  Recent Labs   Lab 05/24/25  0507   HGB 12.0*   WBC 2.61*   PLT 70*     History of DVT (deep vein thrombosis)  - BLE  duplex with chronic RLE DVT    -prior hx of IVC filter in 2002   -continue home Xarelto   - monitor Plt Counts     FUO (fever of unknown origin)      VTE Risk Mitigation (From admission, onward)           Ordered     rivaroxaban tablet 20 mg  With dinner         05/17/25 0816     Reason for No Pharmacological VTE Prophylaxis  Once        Question:  Reasons:  Answer:  Already adequately anticoagulated on oral Anticoagulants    05/17/25 0213     IP VTE HIGH RISK PATIENT  Once         05/17/25 0213     Place sequential compression device  Until discontinued         05/17/25 0213                    Discharge Planning   LAURI:      Code Status: Full Code   Medical Readiness for Discharge Date:   Discharge Plan A: Home with family                        Gail Elam MD  Department of Hospital Medicine   [facility]

## 2025-05-24 NOTE — PLAN OF CARE
Problem: Adult Inpatient Plan of Care  Goal: Plan of Care Review  Outcome: Ongoing  Goal: Patient-Specific Goal (Individualized)  Outcome: Ongoing  Goal: Absence of Hospital-Acquired Illness or Injury  Outcome: Ongoing  Goal: Optimal Comfort and Wellbeing  Outcome: Ongoing  Goal: Readiness for Transition of Care  Outcome: Ongoing     Problem: Fall Injury Risk  Goal: Absence of Fall and Fall-Related Injury  Outcome: Ongoing     Problem: Infection  Goal: Absence of Infection Signs and Symptoms  Outcome: Ongoing     Problem: Neutropenia  Goal: Absence of Infection  Outcome: Ongoing

## 2025-05-24 NOTE — SUBJECTIVE & OBJECTIVE
"Interval History:    65-year-old man with hairy cell leukemia.  He is having persistent fever   all cultures are negative till date.    CT scan of chest abdomen pelvis did not show any acute abscess .  05/23-   he feels better.   still having intermittent fevers  .    Lower extremity Doppler showed-  Thrombosis of the right distal femoral vein popliteal vein and peroneal veins. This could be chronic thrombus.   Review of Systems   Constitutional:  Negative for activity change, appetite change, chills and diaphoresis.   Respiratory:  Negative for apnea and chest tightness.    Gastrointestinal:  Negative for abdominal distention.   Musculoskeletal:  Negative for arthralgias.     Objective:     Vital Signs (Most Recent):  Temp: 98.4 °F (36.9 °C) (05/24/25 0456)  Pulse: 74 (05/24/25 0500)  Resp: 18 (05/24/25 0456)  BP: 114/64 (05/24/25 0456)  SpO2: (!) 94 % (05/24/25 0456) Vital Signs (24h Range):  Temp:  [98.2 °F (36.8 °C)-99.2 °F (37.3 °C)] 98.4 °F (36.9 °C)  Pulse:  [72-87] 74  Resp:  [16-18] 18  SpO2:  [91 %-94 %] 94 %  BP: (110-132)/(55-68) 114/64     Weight: 83.9 kg (185 lb)  Body mass index is 25.8 kg/m².    Estimated Creatinine Clearance: 77.4 mL/min (based on SCr of 1 mg/dL).     Physical Exam  Vitals and nursing note reviewed.   HENT:      Mouth/Throat:      Mouth: Mucous membranes are moist.   Cardiovascular:      Rate and Rhythm: Normal rate.   Pulmonary:      Effort: Pulmonary effort is normal.   Abdominal:      General: Abdomen is flat.   Musculoskeletal:         General: Normal range of motion.      Cervical back: Normal range of motion.   Skin:     General: Skin is warm.   Neurological:      General: No focal deficit present.      Mental Status: He is alert.          Significant Labs: Blood Culture: No results for input(s): "LABBLOO" in the last 4320 hours.  BMP:   No results for input(s): "GLU", "NA", "K", "CL", "CO2", "BUN", "CREATININE", "CALCIUM", "MG" in the last 48 hours.    CBC:   Recent Labs " "  Lab 05/23/25  0308 05/24/25  0507   WBC 2.49* 2.61*   HGB 11.7* 12.0*   HCT 34.3* 36.3*   PLT 84* 70*     CMP:   No results for input(s): "NA", "K", "CL", "CO2", "GLU", "BUN", "CREATININE", "CALCIUM", "PROT", "ALBUMIN", "BILITOT", "ALKPHOS", "AST", "ALT", "ANIONGAP", "EGFRNONAA" in the last 48 hours.    Invalid input(s): "ESTGFAFRICA"    All pertinent labs within the past 24 hours have been reviewed.    Significant Imaging: I have reviewed all pertinent imaging results/findings within the past 24 hours.  "

## 2025-05-24 NOTE — ASSESSMENT & PLAN NOTE
with empiric meropenem.  As fever has persisted.  Will hold Zyvox due to associated thrombocytopenia,   will follow blood cultures.  Will monitor fever curve.  Will do pan CT of the chest abdomen pelvis if fever persist    05/22-   he continues to have persistent fever.  CT scan of the chest abdomen and pelvis did not show any obvious abscess.  Will do indium scan.  Will also do lower extremity Doppler.  Will send serum procalcitonin   on empiric meropenem.  Will plan to de-escalate  if no cultures are positive  05/23-   neutropenia has improved.  Cultures are negative till date.  Lower extremity Doppler shows chronic DVT.  Will switch to cefepime.  And will plan to stop antibiotics soon if fever improves.   indium scan was ordered but this may not be done until next week

## 2025-05-24 NOTE — ASSESSMENT & PLAN NOTE
CXR with NAF, Flu/Covid neg   Possible UTI   Resp viral panel negative   Urine and blood cultures NGTD   Monitor VS   Hematology/Oncology consulted and following   ID consult due to persistent fever   Cefepime changed to IV Merrem, Zyvox discontinued   CT abd pelvis with bladder thickening and IVC filter but no other source of infection. TTE with no valvular abnormalities. BLE duplex showed chronic RLE DVT   changed IV Merrem to Cefepime 05/23 per ID recs.   Indium scan ordered by ID but radiology unable to obtain needed material for scan until 05/26 and scan cannot be completed until 5/27 so scan may be done outpatient per Dr. Villanueva pending clinical course   Discussed with Dr. Laguna- he plans to obtain Karius today   Encourage IS and Oob as able

## 2025-05-24 NOTE — ASSESSMENT & PLAN NOTE
- BLE duplex with chronic RLE DVT    -prior hx of IVC filter in 2002   -continue home Xarelto   - monitor Plt Counts

## 2025-05-25 LAB
ABSOLUTE EOSINOPHIL (OHS): 0.05 K/UL
ABSOLUTE MONOCYTE (OHS): 0.03 K/UL (ref 0.3–1)
ABSOLUTE NEUTROPHIL COUNT (OHS): 1.48 K/UL (ref 1.8–7.7)
BASOPHILS # BLD AUTO: 0.01 K/UL
BASOPHILS NFR BLD AUTO: 0.4 %
ERYTHROCYTE [DISTWIDTH] IN BLOOD BY AUTOMATED COUNT: 14.3 % (ref 11.5–14.5)
HCT VFR BLD AUTO: 36.7 % (ref 40–54)
HGB BLD-MCNC: 12.1 GM/DL (ref 14–18)
HOLD SPECIMEN: NORMAL
IMM GRANULOCYTES # BLD AUTO: 0.04 K/UL (ref 0–0.04)
IMM GRANULOCYTES NFR BLD AUTO: 1.4 % (ref 0–0.5)
LYMPHOCYTES # BLD AUTO: 1.18 K/UL (ref 1–4.8)
MCH RBC QN AUTO: 31.2 PG (ref 27–31)
MCHC RBC AUTO-ENTMCNC: 33 G/DL (ref 32–36)
MCV RBC AUTO: 95 FL (ref 82–98)
NUCLEATED RBC (/100WBC) (OHS): 0 /100 WBC
PLATELET # BLD AUTO: 75 K/UL (ref 150–450)
PMV BLD AUTO: 10.1 FL (ref 9.2–12.9)
RBC # BLD AUTO: 3.88 M/UL (ref 4.6–6.2)
RELATIVE EOSINOPHIL (OHS): 1.8 %
RELATIVE LYMPHOCYTE (OHS): 42.3 % (ref 18–48)
RELATIVE MONOCYTE (OHS): 1.1 % (ref 4–15)
RELATIVE NEUTROPHIL (OHS): 53 % (ref 38–73)
WBC # BLD AUTO: 2.79 K/UL (ref 3.9–12.7)

## 2025-05-25 PROCEDURE — 27000207 HC ISOLATION: Mod: HCNC

## 2025-05-25 PROCEDURE — 36415 COLL VENOUS BLD VENIPUNCTURE: CPT | Mod: HCNC | Performed by: INTERNAL MEDICINE

## 2025-05-25 PROCEDURE — 25000003 PHARM REV CODE 250: Mod: HCNC | Performed by: HOSPITALIST

## 2025-05-25 PROCEDURE — 85025 COMPLETE CBC W/AUTO DIFF WBC: CPT | Mod: HCNC | Performed by: INTERNAL MEDICINE

## 2025-05-25 PROCEDURE — 25000003 PHARM REV CODE 250: Mod: HCNC | Performed by: INTERNAL MEDICINE

## 2025-05-25 PROCEDURE — 25000003 PHARM REV CODE 250: Mod: HCNC | Performed by: STUDENT IN AN ORGANIZED HEALTH CARE EDUCATION/TRAINING PROGRAM

## 2025-05-25 PROCEDURE — 21400001 HC TELEMETRY ROOM: Mod: HCNC

## 2025-05-25 PROCEDURE — 63600175 PHARM REV CODE 636 W HCPCS: Mod: HCNC | Performed by: INTERNAL MEDICINE

## 2025-05-25 PROCEDURE — 87040 BLOOD CULTURE FOR BACTERIA: CPT | Mod: HCNC | Performed by: INTERNAL MEDICINE

## 2025-05-25 PROCEDURE — 25000242 PHARM REV CODE 250 ALT 637 W/ HCPCS: Mod: HCNC | Performed by: INTERNAL MEDICINE

## 2025-05-25 RX ORDER — MEROPENEM 1 G/1
1 INJECTION, POWDER, FOR SOLUTION INTRAVENOUS
Status: DISCONTINUED | OUTPATIENT
Start: 2025-05-25 | End: 2025-05-25

## 2025-05-25 RX ADMIN — MEROPENEM 2 G: 2 INJECTION, POWDER, FOR SOLUTION INTRAVENOUS at 05:05

## 2025-05-25 RX ADMIN — MEROPENEM 2 G: 2 INJECTION, POWDER, FOR SOLUTION INTRAVENOUS at 10:05

## 2025-05-25 RX ADMIN — PANTOPRAZOLE SODIUM 40 MG: 40 TABLET, DELAYED RELEASE ORAL at 08:05

## 2025-05-25 RX ADMIN — CEFEPIME 2 G: 2 INJECTION, POWDER, FOR SOLUTION INTRAVENOUS at 04:05

## 2025-05-25 RX ADMIN — CETIRIZINE HYDROCHLORIDE 10 MG: 10 TABLET, FILM COATED ORAL at 08:05

## 2025-05-25 RX ADMIN — CEFEPIME 2 G: 2 INJECTION, POWDER, FOR SOLUTION INTRAVENOUS at 08:05

## 2025-05-25 RX ADMIN — FLUTICASONE PROPIONATE 100 MCG: 50 SPRAY, METERED NASAL at 08:05

## 2025-05-25 RX ADMIN — RIVAROXABAN 20 MG: 20 TABLET, FILM COATED ORAL at 04:05

## 2025-05-25 RX ADMIN — TAMSULOSIN HYDROCHLORIDE 0.4 MG: 0.4 CAPSULE ORAL at 08:05

## 2025-05-25 RX ADMIN — ACETAMINOPHEN 650 MG: 325 TABLET ORAL at 06:05

## 2025-05-25 NOTE — PLAN OF CARE
Discussed poc with pt, pt verbalized understanding  Purposeful rounding every 2hours  VS wnl  Cardiac monitoring in use, pt is MEHREEN, tele monitor # [id number]  Fall precautions in place, remains injury free  Pain and nausea under control with PRN meds  IVFs  Accurate I&Os  Abx given as prescribed  Bed locked at lowest position  Call light within reach  Chart check complete  Will cont with POC    Problem: Adult Inpatient Plan of Care  Goal: Plan of Care Review  Outcome: Progressing  Goal: Patient-Specific Goal (Individualized)  Outcome: Progressing  Goal: Absence of Hospital-Acquired Illness or Injury  Outcome: Progressing  Goal: Optimal Comfort and Wellbeing  Outcome: Progressing  Goal: Readiness for Transition of Care  Outcome: Progressing     Problem: Fall Injury Risk  Goal: Absence of Fall and Fall-Related Injury  Outcome: Progressing     Problem: Infection  Goal: Absence of Infection Signs and Symptoms  Outcome: Progressing     Problem: Neutropenia  Goal: Absence of Infection  Outcome: Progressing

## 2025-05-25 NOTE — PROGRESS NOTES
[facility]  [facility] Medicine  Progress Note    Patient Name: Armando Ingram Jr.  MRN: [id number]  Patient Class: IP- Inpatient   Admission Date: 5/16/2025  Length of Stay: 8 days  Attending Physician: Gail Elam MD  Primary Care Provider: Brian Soares MD        Subjective     Principal Problem:Neutropenic fever        HPI:  Armando Ingram Jr. is a 65 y.o. male with a PMH  has a past medical history of Closed right hip fracture, Colon cancer, DVT (deep venous thrombosis) (2002), Hairy cell leukemia (06/06/2024), and Nephrolithiasis. who presented to the ED for further evaluation of acute onset fever with T-max measuring 101.0 F earlier today.  Patient reported significant history of hairy cell leukemia in his followed by Dr. Dow and Dr. Flores from Hematology/Oncology and was recently prescribed a Z-Cordell for 4 days for treatment of a cough.  Patient was instructed to go to the ED if he endorsed fever greater than 103.0 F but presented to the ED due to ulcer experiencing associated chills, throbbing headache, and persistent fever.  He reported no known alleviating or aggravating factors noted with all other review of systems negative except as noted above.  He is not currently on active treatment for his leukemia and reported being in his usual state of health prior to onset of symptoms.  Initial workup in the ED revealed patient to be afebrile with T-max measuring 100.4 F, pancytopenic, lactic acid/procalcitonin within normal limits, flu/COVID negative, chest x-ray negative for acute findings, UA positive for 2+ LE, 11 RBCs, free found WBCs.  Patient initiated on cefepime with cultures obtained and pending and admitted to Hospital Medicine under observation for continued medical management and treatment of neutropenic fever.    PCP: Brian Soares      Overview/Hospital Course:  Continued on IV Cefepime. Zyvoz added 05/18 due to persistent fever. Hematology consulted. Cultures remain NGTD    Resp viral panel negative. ID consulted to eval due to fevers  Cefepime changed to IV Merrem per ID recs on 05/21, unclear source of fevers at this time. Zyvox discontinued per ID.   CT Abd/Pelvis showed bladder thickening but otherwise negative.   ID recommend TTE, BLE duplex and possible Indium scan for source of infection and de-escalate abx to Cefepime on 05/23.   TTE with no vegetations. BLE duplex showed chronic RLE DVT. Indium scan pending tentatively planned for 05/26-05/27 as radiology does not have required materials at this time and scan will take 2 days to complete per RT.   Persistent fevers, abx changed back to Merrem 05/25 per ID recs. Karius pending     Interval History: Tmax 101.1. Pt reports cough after eating but no HA, back pain, nausea, vomiting, abd pain, diarrhea. Reports he handled an otter over easter break. Wife has a turtle and corn snake at home which pt does not handle. Also have a dog at home that is UTD with all shots and meds.     Review of Systems  Objective:     Vital Signs (Most Recent):  Temp: (!) 100.6 °F (38.1 °C) (05/25/25 1307)  Pulse: 80 (05/25/25 1307)  Resp: 18 (05/25/25 0906)  BP: 112/61 (05/25/25 1307)  SpO2: 96 % (05/25/25 1307) Vital Signs (24h Range):  Temp:  [98.6 °F (37 °C)-101.1 °F (38.4 °C)] 100.6 °F (38.1 °C)  Pulse:  [72-89] 80  Resp:  [17-18] 18  SpO2:  [90 %-98 %] 96 %  BP: (108-133)/(53-74) 112/61     Weight: 84.2 kg (185 lb 10 oz)  Body mass index is 25.89 kg/m².    Intake/Output Summary (Last 24 hours) at 5/25/2025 1345  Last data filed at 5/24/2025 1745  Gross per 24 hour   Intake 240 ml   Output --   Net 240 ml         Physical Exam  Vitals and nursing note reviewed.   Constitutional:       General: He is not in acute distress.     Appearance: He is not ill-appearing.      Comments: Appears flushed    Cardiovascular:      Rate and Rhythm: Normal rate and regular rhythm.      Heart sounds: No murmur heard.  Abdominal:      General: Bowel sounds are  normal. There is no distension.      Palpations: Abdomen is soft.      Tenderness: There is no abdominal tenderness.   Musculoskeletal:      Right lower leg: No edema.      Left lower leg: No edema.      Comments: No bony TTP over back    Skin:     Findings: No rash.   Neurological:      Mental Status: He is alert and oriented to person, place, and time. Mental status is at baseline.               Significant Labs: All pertinent labs within the past 24 hours have been reviewed.    Significant Imaging: I have reviewed all pertinent imaging results/findings within the past 24 hours.      Assessment & Plan  Neutropenic fever  FUO (fever of unknown origin)  CXR with NAF, Flu/Covid neg   Possible UTI   Resp viral panel negative   Urine and blood cultures NGTD   Monitor VS   Hematology/Oncology consulted and following   ID consult due to persistent fever   Cefepime changed to IV Merrem, Zyvox discontinued   CT abd pelvis with bladder thickening and IVC filter but no other source of infection. TTE with no valvular abnormalities. BLE duplex showed chronic RLE DVT   Indium scan ordered by ID but radiology unable to obtain needed material for scan until 05/26 and scan cannot be completed until 5/27    Karius testing pending per Dr. Laguna  Discussed with Dr. Laguna 05/25- he recommends repeat blood cultures, change IV Cefepime to Merrem   Encourage IS and Oob as able        Hairy cell leukemia  Patient with known history of hairy cell leukemia and continues to follow Dr. Dow and Dr. Flores from Hematology/Oncology. Patient not currently on active treatment and currently undergoing treatment for neutropenic fever with broad-spectrum antibiotics as noted above.  Hematology consulted and following   Pancytopenia  This patient is found to have pancytopenia, the likely etiology is , will monitor CBC . Will transfuse red blood cells if the hemoglobin is <7g/dL (or <8 in the setting of ACS). Hold DVT prophylaxis if platelets  are <50k. The patient's hemoglobin, white blood cell count, and platelet count results have been reviewed and are listed below.  Recent Labs   Lab 05/25/25  0414   HGB 12.1*   WBC 2.79*   PLT 75*     History of DVT (deep vein thrombosis)  - BLE duplex with chronic RLE DVT    -prior hx of IVC filter in 2002   -continue home Xarelto   - monitor Plt Counts     VTE Risk Mitigation (From admission, onward)           Ordered     rivaroxaban tablet 20 mg  With dinner         05/17/25 0816     Reason for No Pharmacological VTE Prophylaxis  Once        Question:  Reasons:  Answer:  Already adequately anticoagulated on oral Anticoagulants    05/17/25 0213     IP VTE HIGH RISK PATIENT  Once         05/17/25 0213     Place sequential compression device  Until discontinued         05/17/25 0213                    Discharge Planning   LAURI:      Code Status: Full Code   Medical Readiness for Discharge Date:   Discharge Plan A: Home with family                        Gail Elam MD  Department of Hospital Medicine   [facility]

## 2025-05-25 NOTE — SUBJECTIVE & OBJECTIVE
Interval History: Tmax 101.1. Pt reports cough after eating but no HA, back pain, nausea, vomiting, abd pain, diarrhea. Reports he handled an otter over easter break. Wife has a turtle and corn snake at home which pt does not handle. Also have a dog at home that is UTD with all shots and meds.     Review of Systems  Objective:     Vital Signs (Most Recent):  Temp: (!) 100.6 °F (38.1 °C) (05/25/25 1307)  Pulse: 80 (05/25/25 1307)  Resp: 18 (05/25/25 0906)  BP: 112/61 (05/25/25 1307)  SpO2: 96 % (05/25/25 1307) Vital Signs (24h Range):  Temp:  [98.6 °F (37 °C)-101.1 °F (38.4 °C)] 100.6 °F (38.1 °C)  Pulse:  [72-89] 80  Resp:  [17-18] 18  SpO2:  [90 %-98 %] 96 %  BP: (108-133)/(53-74) 112/61     Weight: 84.2 kg (185 lb 10 oz)  Body mass index is 25.89 kg/m².    Intake/Output Summary (Last 24 hours) at 5/25/2025 1345  Last data filed at 5/24/2025 1745  Gross per 24 hour   Intake 240 ml   Output --   Net 240 ml         Physical Exam  Vitals and nursing note reviewed.   Constitutional:       General: He is not in acute distress.     Appearance: He is not ill-appearing.      Comments: Appears flushed    Cardiovascular:      Rate and Rhythm: Normal rate and regular rhythm.      Heart sounds: No murmur heard.  Abdominal:      General: Bowel sounds are normal. There is no distension.      Palpations: Abdomen is soft.      Tenderness: There is no abdominal tenderness.   Musculoskeletal:      Right lower leg: No edema.      Left lower leg: No edema.      Comments: No bony TTP over back    Skin:     Findings: No rash.   Neurological:      Mental Status: He is alert and oriented to person, place, and time. Mental status is at baseline.               Significant Labs: All pertinent labs within the past 24 hours have been reviewed.    Significant Imaging: I have reviewed all pertinent imaging results/findings within the past 24 hours.

## 2025-05-25 NOTE — ASSESSMENT & PLAN NOTE
This patient is found to have pancytopenia, the likely etiology is , will monitor CBC . Will transfuse red blood cells if the hemoglobin is <7g/dL (or <8 in the setting of ACS). Hold DVT prophylaxis if platelets are <50k. The patient's hemoglobin, white blood cell count, and platelet count results have been reviewed and are listed below.  Recent Labs   Lab 05/25/25  0414   HGB 12.1*   WBC 2.79*   PLT 75*

## 2025-05-25 NOTE — PROGRESS NOTES
Pharmacist Renal Dose Adjustment Note    Armando Ingram Jr. is a 65 y.o. male being treated with the medication meropenem.    Patient Data:    Vital Signs (Most Recent):  Temp: 99.2 °F (37.3 °C) (05/25/25 0906)  Pulse: 77 (05/25/25 0906)  Resp: 18 (05/25/25 0906)  BP: (!) 110/59 (05/25/25 0906)  SpO2: 95 % (05/25/25 0906) Vital Signs (72h Range):  Temp:  [97.9 °F (36.6 °C)-101.2 °F (38.4 °C)]   Pulse:  [72-90]   Resp:  [16-18]   BP: (108-133)/(53-74)   SpO2:  [90 %-98 %]      Recent Labs   Lab 05/21/25  0707 05/22/25  0500 05/24/25  0507   CREATININE 1.1 1.0 1.1     Serum creatinine: 1.1 mg/dL 05/24/25 0507  Estimated creatinine clearance: 70.4 mL/min    Meropenem 1 g IV q8h will be changed to meropenem 2 g IV q8h per pharmacy renal dosing protocol for severe infections and CrCl > 50 mL/min.    Pharmacist's Name: Zora Mullins

## 2025-05-25 NOTE — CARE UPDATE
Patient again spiking fever. Unclear source. Indium should be available Tuesday.  ANC almost 1500.  Will broaden antibiotic coverage from cefepime to IV meropenem.  If still spiking fever through the night will add back MRSA/broader GP coverage.  Awaiting karius.  Recommend repeating 2 sets of blood cultures today. Will continue to follow.

## 2025-05-25 NOTE — ASSESSMENT & PLAN NOTE
CXR with NAF, Flu/Covid neg   Possible UTI   Resp viral panel negative   Urine and blood cultures NGTD   Monitor VS   Hematology/Oncology consulted and following   ID consult due to persistent fever   Cefepime changed to IV Merrem, Zyvox discontinued   CT abd pelvis with bladder thickening and IVC filter but no other source of infection. TTE with no valvular abnormalities. BLE duplex showed chronic RLE DVT   Indium scan ordered by ID but radiology unable to obtain needed material for scan until 05/26 and scan cannot be completed until 5/27    Karius testing pending per Dr. Laguna  Discussed with Dr. Laguna 05/25- he recommends repeat blood cultures, change IV Cefepime to Merrem   Encourage IS and Oob as able

## 2025-05-26 LAB
ABSOLUTE EOSINOPHIL (OHS): 0.03 K/UL
ABSOLUTE MONOCYTE (OHS): 0.03 K/UL (ref 0.3–1)
ABSOLUTE NEUTROPHIL COUNT (OHS): 1.54 K/UL (ref 1.8–7.7)
ANION GAP (OHS): 12 MMOL/L (ref 8–16)
BASOPHILS # BLD AUTO: 0.01 K/UL
BASOPHILS NFR BLD AUTO: 0.4 %
BUN SERPL-MCNC: 17 MG/DL (ref 8–23)
CALCIUM SERPL-MCNC: 8.5 MG/DL (ref 8.7–10.5)
CHLORIDE SERPL-SCNC: 104 MMOL/L (ref 95–110)
CK SERPL-CCNC: 46 U/L (ref 20–200)
CO2 SERPL-SCNC: 19 MMOL/L (ref 23–29)
CREAT SERPL-MCNC: 1 MG/DL (ref 0.5–1.4)
ERYTHROCYTE [DISTWIDTH] IN BLOOD BY AUTOMATED COUNT: 14.6 % (ref 11.5–14.5)
GFR SERPLBLD CREATININE-BSD FMLA CKD-EPI: >60 ML/MIN/1.73/M2
GLUCOSE SERPL-MCNC: 118 MG/DL (ref 70–110)
HCT VFR BLD AUTO: 42.3 % (ref 40–54)
HGB BLD-MCNC: 13.7 GM/DL (ref 14–18)
IMM GRANULOCYTES # BLD AUTO: 0.04 K/UL (ref 0–0.04)
IMM GRANULOCYTES NFR BLD AUTO: 1.5 % (ref 0–0.5)
LYMPHOCYTES # BLD AUTO: 1.05 K/UL (ref 1–4.8)
MCH RBC QN AUTO: 31.2 PG (ref 27–31)
MCHC RBC AUTO-ENTMCNC: 32.4 G/DL (ref 32–36)
MCV RBC AUTO: 96 FL (ref 82–98)
NUCLEATED RBC (/100WBC) (OHS): 0 /100 WBC
PLATELET # BLD AUTO: 108 K/UL (ref 150–450)
PLATELET BLD QL SMEAR: ABNORMAL
PMV BLD AUTO: 10.5 FL (ref 9.2–12.9)
POTASSIUM SERPL-SCNC: 4.5 MMOL/L (ref 3.5–5.1)
RBC # BLD AUTO: 4.39 M/UL (ref 4.6–6.2)
RELATIVE EOSINOPHIL (OHS): 1.1 %
RELATIVE LYMPHOCYTE (OHS): 38.9 % (ref 18–48)
RELATIVE MONOCYTE (OHS): 1.1 % (ref 4–15)
RELATIVE NEUTROPHIL (OHS): 57 % (ref 38–73)
SODIUM SERPL-SCNC: 135 MMOL/L (ref 136–145)
WBC # BLD AUTO: 2.7 K/UL (ref 3.9–12.7)

## 2025-05-26 PROCEDURE — 27000207 HC ISOLATION: Mod: HCNC

## 2025-05-26 PROCEDURE — 63600175 PHARM REV CODE 636 W HCPCS: Mod: HCNC | Performed by: HOSPITALIST

## 2025-05-26 PROCEDURE — 25000003 PHARM REV CODE 250: Mod: HCNC | Performed by: HOSPITALIST

## 2025-05-26 PROCEDURE — 25000242 PHARM REV CODE 250 ALT 637 W/ HCPCS: Mod: HCNC | Performed by: INTERNAL MEDICINE

## 2025-05-26 PROCEDURE — 63600175 PHARM REV CODE 636 W HCPCS: Mod: HCNC | Performed by: STUDENT IN AN ORGANIZED HEALTH CARE EDUCATION/TRAINING PROGRAM

## 2025-05-26 PROCEDURE — 25000003 PHARM REV CODE 250: Mod: HCNC | Performed by: FAMILY MEDICINE

## 2025-05-26 PROCEDURE — 97535 SELF CARE MNGMENT TRAINING: CPT | Mod: HCNC

## 2025-05-26 PROCEDURE — 25000003 PHARM REV CODE 250: Mod: HCNC | Performed by: STUDENT IN AN ORGANIZED HEALTH CARE EDUCATION/TRAINING PROGRAM

## 2025-05-26 PROCEDURE — 21400001 HC TELEMETRY ROOM: Mod: HCNC

## 2025-05-26 PROCEDURE — 99232 SBSQ HOSP IP/OBS MODERATE 35: CPT | Mod: HCNC,95,, | Performed by: STUDENT IN AN ORGANIZED HEALTH CARE EDUCATION/TRAINING PROGRAM

## 2025-05-26 PROCEDURE — 63600175 PHARM REV CODE 636 W HCPCS: Mod: HCNC | Performed by: INTERNAL MEDICINE

## 2025-05-26 PROCEDURE — 82550 ASSAY OF CK (CPK): CPT | Mod: HCNC | Performed by: STUDENT IN AN ORGANIZED HEALTH CARE EDUCATION/TRAINING PROGRAM

## 2025-05-26 PROCEDURE — 25000003 PHARM REV CODE 250: Mod: HCNC | Performed by: INTERNAL MEDICINE

## 2025-05-26 PROCEDURE — 80048 BASIC METABOLIC PNL TOTAL CA: CPT | Mod: HCNC | Performed by: INTERNAL MEDICINE

## 2025-05-26 PROCEDURE — 92610 EVALUATE SWALLOWING FUNCTION: CPT | Mod: HCNC

## 2025-05-26 PROCEDURE — 85025 COMPLETE CBC W/AUTO DIFF WBC: CPT | Mod: HCNC | Performed by: INTERNAL MEDICINE

## 2025-05-26 PROCEDURE — 36415 COLL VENOUS BLD VENIPUNCTURE: CPT | Mod: HCNC | Performed by: INTERNAL MEDICINE

## 2025-05-26 RX ORDER — ACETAMINOPHEN 325 MG/1
650 TABLET ORAL 3 TIMES DAILY
Status: DISCONTINUED | OUTPATIENT
Start: 2025-05-26 | End: 2025-05-28

## 2025-05-26 RX ADMIN — ACETAMINOPHEN 650 MG: 325 TABLET ORAL at 08:05

## 2025-05-26 RX ADMIN — CETIRIZINE HYDROCHLORIDE 10 MG: 10 TABLET, FILM COATED ORAL at 08:05

## 2025-05-26 RX ADMIN — ACETAMINOPHEN 650 MG: 325 TABLET ORAL at 12:05

## 2025-05-26 RX ADMIN — ACETAMINOPHEN 650 MG: 325 TABLET ORAL at 07:05

## 2025-05-26 RX ADMIN — BENZONATATE 100 MG: 100 CAPSULE ORAL at 01:05

## 2025-05-26 RX ADMIN — DAPTOMYCIN 700 MG: 350 INJECTION, POWDER, LYOPHILIZED, FOR SOLUTION INTRAVENOUS at 03:05

## 2025-05-26 RX ADMIN — MEROPENEM 2 G: 2 INJECTION, POWDER, FOR SOLUTION INTRAVENOUS at 10:05

## 2025-05-26 RX ADMIN — MEROPENEM 2 G: 2 INJECTION, POWDER, FOR SOLUTION INTRAVENOUS at 05:05

## 2025-05-26 RX ADMIN — MEROPENEM 2 G: 2 INJECTION, POWDER, FOR SOLUTION INTRAVENOUS at 03:05

## 2025-05-26 RX ADMIN — PANTOPRAZOLE SODIUM 40 MG: 40 TABLET, DELAYED RELEASE ORAL at 08:05

## 2025-05-26 RX ADMIN — HYDROCODONE BITARTRATE AND ACETAMINOPHEN 1 TABLET: 5; 325 TABLET ORAL at 12:05

## 2025-05-26 RX ADMIN — ACETAMINOPHEN 650 MG: 325 TABLET ORAL at 03:05

## 2025-05-26 RX ADMIN — FLUTICASONE PROPIONATE 100 MCG: 50 SPRAY, METERED NASAL at 08:05

## 2025-05-26 RX ADMIN — RIVAROXABAN 20 MG: 20 TABLET, FILM COATED ORAL at 05:05

## 2025-05-26 RX ADMIN — TAMSULOSIN HYDROCHLORIDE 0.4 MG: 0.4 CAPSULE ORAL at 08:05

## 2025-05-26 NOTE — ASSESSMENT & PLAN NOTE
This patient is found to have pancytopenia, the likely etiology is , will monitor CBC . Will transfuse red blood cells if the hemoglobin is <7g/dL (or <8 in the setting of ACS). Hold DVT prophylaxis if platelets are <50k. The patient's hemoglobin, white blood cell count, and platelet count results have been reviewed and are listed below.  Recent Labs   Lab 05/26/25  0538   HGB 13.7*   WBC 2.70*   *   Anc improving but still febrile  Fever curve trending down  On intravenous antibiotic(s) per infectious disease

## 2025-05-26 NOTE — PLAN OF CARE
05/26/25 1140   Rounds   Attendance Provider;Nurse ;Charge nurse;Physical therapist   Discharge Plan A Home with family   Why the patient remains in the hospital Requires continued medical care   Transition of Care Barriers None     Remains febrile.

## 2025-05-26 NOTE — ASSESSMENT & PLAN NOTE
- BLE duplex with chronic RLE DVT    -prior hx of IVC filter in 2002   -continue home Xarelto   - monitor Plt Counts     Thrombocytopenia improving on xarelto
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
## 2025-05-26 NOTE — PLAN OF CARE
Nutrition Recommendation/Intervention 05/26/2025:   1. Recommend continuing a Regular diet.   2. Encourage PO intake with feeding assistance as warranted.   3. Weigh x1 weekly.  4. Collaboration by nutrition professional with other providers.    Goals:   1. Pt will tolerate and consume >75% EEN and EPN by RD follow up.    Olga Mejias RDN, LDN

## 2025-05-26 NOTE — PROGRESS NOTES
[facility]  [facility]  Progress Note    Patient Name: Armando Ingram Jr.  MRN: [id number]  Patient Class: IP- Inpatient   Admission Date: 5/16/2025  Length of Stay: 9 days  Attending Physician: Lonine Schaefer MD  Primary Care Provider: Brian Soares MD        Subjective     Principal Problem:Neutropenic fever        HPI:  Armando Ingram Jr. is a 65 y.o. male with a PMH  has a past medical history of Closed right hip fracture, Colon cancer, DVT (deep venous thrombosis) (2002), Hairy cell leukemia (06/06/2024), and Nephrolithiasis. who presented to the ED for further evaluation of acute onset fever with T-max measuring 101.0 F earlier today.  Patient reported significant history of hairy cell leukemia in his followed by Dr. Dow and Dr. Flores from Hematology/Oncology and was recently prescribed a Z-Cordell for 4 days for treatment of a cough.  Patient was instructed to go to the ED if he endorsed fever greater than 103.0 F but presented to the ED due to ulcer experiencing associated chills, throbbing headache, and persistent fever.  He reported no known alleviating or aggravating factors noted with all other review of systems negative except as noted above.  He is not currently on active treatment for his leukemia and reported being in his usual state of health prior to onset of symptoms.  Initial workup in the ED revealed patient to be afebrile with T-max measuring 100.4 F, pancytopenic, lactic acid/procalcitonin within normal limits, flu/COVID negative, chest x-ray negative for acute findings, UA positive for 2+ LE, 11 RBCs, free found WBCs.  Patient initiated on cefepime with cultures obtained and pending and admitted to Hospital Medicine under observation for continued medical management and treatment of neutropenic fever.    PCP: Brian Soares      Overview/Hospital Course:  Continued on IV Cefepime. Zyvoz added 05/18 due to persistent fever. Hematology consulted. Cultures remain NGTD   Resp  viral panel negative. ID consulted to eval due to fevers  Cefepime changed to IV Merrem per ID recs on 05/21, unclear source of fevers at this time. Zyvox discontinued per ID.   CT Abd/Pelvis showed bladder thickening but otherwise negative.   ID recommend TTE, BLE duplex and possible Indium scan for source of infection and de-escalate abx to Cefepime on 05/23.   TTE with no vegetations. BLE duplex showed chronic RLE DVT. Indium scan pending tentatively planned for 05/26-05/27 as radiology does not have required materials at this time and scan will take 2 days to complete per RT.   Persistent fevers, abx changed back to Merrem 05/25 per ID recs. Karius pending   5/26 fever curve trending down. Respiratory infection panel negative. Asymptomatic. Continue intravenous antibiotic(s)     Interval History: See hospital course for today      Review of Systems   Constitutional:  Positive for chills and fever. Negative for activity change, appetite change and fatigue.   Respiratory:  Negative for shortness of breath.    Gastrointestinal:  Negative for abdominal pain, diarrhea, nausea and vomiting.   Allergic/Immunologic: Positive for immunocompromised state.   Neurological:  Negative for weakness.   Psychiatric/Behavioral:  Negative for agitation, behavioral problems, confusion, decreased concentration and dysphoric mood. The patient is not nervous/anxious.      Objective:     Vital Signs (Most Recent):  Temp: 98.4 °F (36.9 °C) (05/26/25 1251)  Pulse: 104 (05/26/25 1251)  Resp: 18 (05/26/25 1251)  BP: (!) 130/56 (05/26/25 1251)  SpO2: 96 % (05/26/25 1251) Vital Signs (24h Range):  Temp:  [97.7 °F (36.5 °C)-102.5 °F (39.2 °C)] 98.4 °F (36.9 °C)  Pulse:  [] 104  Resp:  [18] 18  SpO2:  [90 %-97 %] 96 %  BP: (102-130)/(55-63) 130/56     Weight: 84.2 kg (185 lb 10 oz)  Body mass index is 25.89 kg/m².    Intake/Output Summary (Last 24 hours) at 5/26/2025 1258  Last data filed at 5/26/2025 0600  Gross per 24 hour   Intake 100  ml   Output --   Net 100 ml         Physical Exam  Vitals and nursing note reviewed. Exam conducted with a chaperone present (family, nursing).   Constitutional:       General: He is not in acute distress.     Appearance: He is ill-appearing. He is not toxic-appearing.   HENT:      Head: Normocephalic and atraumatic.   Eyes:      Comments: Strabismus, left    Cardiovascular:      Rate and Rhythm: Normal rate.   Pulmonary:      Effort: Pulmonary effort is normal. No respiratory distress.   Abdominal:      Palpations: Abdomen is soft.      Tenderness: There is no abdominal tenderness.   Musculoskeletal:      Right lower leg: No edema.      Left lower leg: No edema.   Skin:     General: Skin is warm.   Neurological:      Mental Status: He is alert and oriented to person, place, and time.   Psychiatric:         Mood and Affect: Mood normal.         Behavior: Behavior normal.               Significant Labs: All pertinent labs within the past 24 hours have been reviewed.  Blood Culture: negative growth to date   CBC:   Recent Labs   Lab 05/25/25  0414 05/26/25  0538   WBC 2.79* 2.70*   HGB 12.1* 13.7*   HCT 36.7* 42.3   PLT 75* 108*     CMP:   Recent Labs   Lab 05/26/25  0538   *   K 4.5      CO2 19*   *   BUN 17   CREATININE 1.0   CALCIUM 8.5*   ANIONGAP 12     Respiratory infection panel negative   Significant Imaging: I have reviewed all pertinent imaging results/findings within the past 24 hours.      Assessment & Plan  Neutropenic fever  FUO (fever of unknown origin)  CXR with NAF, Flu/Covid neg   Possible UTI   Resp viral panel negative   Urine and blood cultures NGTD   Monitor VS   Hematology/Oncology consulted and following   ID consult due to persistent fever   Cefepime changed to IV Merrem, Zyvox discontinued   CT abd pelvis with bladder thickening and IVC filter but no other source of infection. TTE with no valvular abnormalities. BLE duplex showed chronic RLE DVT   Indium scan ordered by ID  but radiology unable to obtain needed material for scan until 05/26 and scan cannot be completed until 5/27    Karius testing pending per Dr. Laguna  Discussed with Dr. Laguna 05/25- he recommends repeat blood cultures, change IV Cefepime to Merrem   Encourage IS and Oob as able   Schedule tylenol for fever    Blood cultures negative growth to date     Hairy cell leukemia  Patient with known history of hairy cell leukemia and continues to follow Dr. Dow and Dr. Flores from Hematology/Oncology. Patient not currently on active treatment and currently undergoing treatment for neutropenic fever with broad-spectrum antibiotics as noted above.  Hematology consulted and following   Pancytopenia  This patient is found to have pancytopenia, the likely etiology is , will monitor CBC . Will transfuse red blood cells if the hemoglobin is <7g/dL (or <8 in the setting of ACS). Hold DVT prophylaxis if platelets are <50k. The patient's hemoglobin, white blood cell count, and platelet count results have been reviewed and are listed below.  Recent Labs   Lab 05/26/25  0538   HGB 13.7*   WBC 2.70*   *   Anc improving but still febrile  Fever curve trending down  On intravenous antibiotic(s) per infectious disease   History of DVT (deep vein thrombosis)  - BLE duplex with chronic RLE DVT    -prior hx of IVC filter in 2002   -continue home Xarelto   - monitor Plt Counts     Thrombocytopenia improving on xarelto    VTE Risk Mitigation (From admission, onward)           Ordered     rivaroxaban tablet 20 mg  With dinner         05/17/25 0816     Reason for No Pharmacological VTE Prophylaxis  Once        Question:  Reasons:  Answer:  Already adequately anticoagulated on oral Anticoagulants    05/17/25 0213     IP VTE HIGH RISK PATIENT  Once         05/17/25 0213     Place sequential compression device  Until discontinued         05/17/25 0213                    Discharge Planning   LARUI:      Code Status: Full Code   Medical  Readiness for Discharge Date:   Discharge Plan A: Home with family                        Lonnie Schaefer MD  Department of Hospital Medicine   [facility]

## 2025-05-26 NOTE — SUBJECTIVE & OBJECTIVE
"Interval History:  Febrile to 102 over the past 24 hours.  Repeat blood cultures in process with no growth to date.  Karius in process.  Awaiting indium scan.    Review of Systems   Constitutional:  Positive for fever.   Musculoskeletal:  Negative for back pain.   Neurological:  Negative for headaches.   All other systems reviewed and are negative.    Objective:     Vital Signs (Most Recent):  Temp: 97.7 °F (36.5 °C) (05/26/25 0504)  Pulse: 86 (05/26/25 0504)  Resp: 18 (05/26/25 0504)  BP: 124/63 (05/26/25 0504)  SpO2: 97 % (05/26/25 0504) Vital Signs (24h Range):  Temp:  [97.7 °F (36.5 °C)-102.5 °F (39.2 °C)] 97.7 °F (36.5 °C)  Pulse:  [71-95] 86  Resp:  [18] 18  SpO2:  [90 %-97 %] 97 %  BP: (110-124)/(55-63) 124/63     Weight: 84.2 kg (185 lb 10 oz)  Body mass index is 25.89 kg/m².    Estimated Creatinine Clearance: 77.4 mL/min (based on SCr of 1 mg/dL).     Physical Exam  Constitutional:       General: He is not in acute distress.     Appearance: Normal appearance. He is not ill-appearing.   Cardiovascular:      Rate and Rhythm: Normal rate and regular rhythm.      Pulses: Normal pulses.      Heart sounds: Normal heart sounds. No murmur heard.     No friction rub. No gallop.   Pulmonary:      Effort: Pulmonary effort is normal. No respiratory distress.      Breath sounds: Normal breath sounds.   Abdominal:      General: Abdomen is flat. Bowel sounds are normal. There is no distension.      Palpations: Abdomen is soft.      Tenderness: There is no abdominal tenderness.   Skin:     General: Skin is warm and dry.   Neurological:      Mental Status: He is alert.          Significant Labs: Blood Culture: No results for input(s): "LABBLOO" in the last 4320 hours.  CBC:   Recent Labs   Lab 05/25/25  0414 05/26/25  0538   WBC 2.79* 2.70*   HGB 12.1* 13.7*   HCT 36.7* 42.3   PLT 75* 108*     CMP:   Recent Labs   Lab 05/26/25  0538   *   K 4.5      CO2 19*   *   BUN 17   CREATININE 1.0   CALCIUM 8.5* "   ANIONGAP 12     Microbiology Results (last 7 days)       Procedure Component Value Units Date/Time    Blood culture [[id number]]  (Normal) Collected: 05/25/25 1026    Order Status: Completed Specimen: Blood from Peripheral, Antecubital, Right Updated: 05/26/25 0002     Blood Culture No Growth After 6 Hours    Blood culture [[id number]]  (Normal) Collected: 05/25/25 1025    Order Status: Completed Specimen: Blood from Peripheral, Antecubital, Left Updated: 05/26/25 0002     Blood Culture No Growth After 6 Hours    Blood culture [[id number]]  (Normal) Collected: 05/17/25 0029    Order Status: Completed Specimen: Blood from Peripheral, Antecubital, Right Updated: 05/22/25 2201     Blood Culture No Growth After 5 Days    Blood culture x two cultures. Draw prior to antibiotics. [[id number]]  (Normal) Collected: 05/16/25 2340    Order Status: Completed Specimen: Blood from Peripheral, Forearm, Right Updated: 05/22/25 2201     Blood Culture No Growth After 5 Days    Respiratory Infection Panel (PCR), Nasopharyngeal [[id number]] Collected: 05/19/25 1309    Order Status: Completed Specimen: Nasopharyngeal Swab Updated: 05/19/25 1522     Respiratory Infection Panel Source Nasopharyngeal Swab     Adenovirus Not Detected     Coronavirus 229E, Common Cold Virus Not Detected     Coronavirus HKU1, Common Cold Virus Not Detected     Coronavirus NL63, Common Cold Virus Not Detected     Coronavirus OC43, Common Cold Virus Not Detected     SARS-CoV2 (COVID-19) Qualitative PCR Not Detected     Human Metapneumovirus Not Detected     Human Rhinovirus/Enterovirus Not Detected     Influenza A Not Detected     Influenza B Not Detected     Parainfluenza Virus 1 Not Detected     Parainfluenza Virus 2 Not Detected     Parainfluenza Virus 3 Not Detected     Parainfluenza Virus 4 Not Detected     Respiratory Syncytial Virus Not Detected     Bordetella Parapertussis ([id number]) Not Detected     Bordetella pertussis (ptxP) Not Detected      Chlamydia pneumoniae Not Detected     Mycoplasma pneumoniae Not Detected          All pertinent labs within the past 24 hours have been reviewed.    Significant Imaging: None

## 2025-05-26 NOTE — SUBJECTIVE & OBJECTIVE
Interval History: See hospital course for today      Review of Systems   Constitutional:  Positive for chills and fever. Negative for activity change, appetite change and fatigue.   Respiratory:  Negative for shortness of breath.    Gastrointestinal:  Negative for abdominal pain, diarrhea, nausea and vomiting.   Allergic/Immunologic: Positive for immunocompromised state.   Neurological:  Negative for weakness.   Psychiatric/Behavioral:  Negative for agitation, behavioral problems, confusion, decreased concentration and dysphoric mood. The patient is not nervous/anxious.      Objective:     Vital Signs (Most Recent):  Temp: 98.4 °F (36.9 °C) (05/26/25 1251)  Pulse: 104 (05/26/25 1251)  Resp: 18 (05/26/25 1251)  BP: (!) 130/56 (05/26/25 1251)  SpO2: 96 % (05/26/25 1251) Vital Signs (24h Range):  Temp:  [97.7 °F (36.5 °C)-102.5 °F (39.2 °C)] 98.4 °F (36.9 °C)  Pulse:  [] 104  Resp:  [18] 18  SpO2:  [90 %-97 %] 96 %  BP: (102-130)/(55-63) 130/56     Weight: 84.2 kg (185 lb 10 oz)  Body mass index is 25.89 kg/m².    Intake/Output Summary (Last 24 hours) at 5/26/2025 1258  Last data filed at 5/26/2025 0600  Gross per 24 hour   Intake 100 ml   Output --   Net 100 ml         Physical Exam  Vitals and nursing note reviewed. Exam conducted with a chaperone present (family, nursing).   Constitutional:       General: He is not in acute distress.     Appearance: He is ill-appearing. He is not toxic-appearing.   HENT:      Head: Normocephalic and atraumatic.   Eyes:      Comments: Strabismus, left    Cardiovascular:      Rate and Rhythm: Normal rate.   Pulmonary:      Effort: Pulmonary effort is normal. No respiratory distress.   Abdominal:      Palpations: Abdomen is soft.      Tenderness: There is no abdominal tenderness.   Musculoskeletal:      Right lower leg: No edema.      Left lower leg: No edema.   Skin:     General: Skin is warm.   Neurological:      Mental Status: He is alert and oriented to person, place, and  time.   Psychiatric:         Mood and Affect: Mood normal.         Behavior: Behavior normal.               Significant Labs: All pertinent labs within the past 24 hours have been reviewed.  Blood Culture: negative growth to date   CBC:   Recent Labs   Lab 05/25/25  0414 05/26/25  0538   WBC 2.79* 2.70*   HGB 12.1* 13.7*   HCT 36.7* 42.3   PLT 75* 108*     CMP:   Recent Labs   Lab 05/26/25  0538   *   K 4.5      CO2 19*   *   BUN 17   CREATININE 1.0   CALCIUM 8.5*   ANIONGAP 12     Respiratory infection panel negative   Significant Imaging: I have reviewed all pertinent imaging results/findings within the past 24 hours.

## 2025-05-26 NOTE — PROGRESS NOTES
[facility]  Infectious Disease  Progress Note    Patient Name: Armando Ingram Jr.  MRN: [id number]  Admission Date: 5/16/2025  Length of Stay: 9 days  Attending Physician: Lonnie Schaefer MD  Primary Care Provider: Brian Soarse MD    Isolation Status: Enhanced Respiratory  Assessment/Plan:      Hematology  History of DVT (deep vein thrombosis)  Anticoagulation per primary team    Oncology  Pancytopenia  Associated with underlying malignancy.  Follow oncology.    Hairy cell leukemia  Management per oncology    Other  FUO (fever of unknown origin)  --Neutropenia has resolved  --Febrile to 102 overnight   --Source remains unclear  --Notably patient handled an otter over Easter (otter was at a rehab facility and released in the area around where patient's camper was located; all he did was pet the animal but no bites or scratches occurred); wife has turtle and corn snake but patient does not have contact with either   --Denies international travel and though he was located near a pond he did not swim in it  --Awaiting results of Karius test  --Follow new set of blood cultures  --Continue empiric IV meropenem + will add daptomycin (checking CK)   --Indium scan planned to begin tomorrow   --Above d/w primary team.          Thank you for your consult. I will follow-up with patient. Please contact us if you have any additional questions.    Flo Laguna, DO  Infectious Disease  [facility]    Subjective:     Principal Problem:Neutropenic fever    HPI: 65 y.o. male with a PMH  has a past medical history of Closed right hip fracture, Colon cancer, DVT (deep venous thrombosis) (2002), Hairy cell leukemia (06/06/2024), and Nephrolithiasis.     He was admitted with neutropenic fever.  Tmax was 101.  Labs and   Imaging test reviewed - flu/COVID negative, chest x-ray negative for acute findings, UA positive for 2+ LE, 11 RBCs,    Interval History:  Febrile to 102 over the past 24 hours.  Repeat blood cultures in  "process with no growth to date.  Karius in process.  Awaiting indium scan.    Review of Systems   Constitutional:  Positive for fever.   Musculoskeletal:  Negative for back pain.   Neurological:  Negative for headaches.   All other systems reviewed and are negative.    Objective:     Vital Signs (Most Recent):  Temp: 97.7 °F (36.5 °C) (05/26/25 0504)  Pulse: 86 (05/26/25 0504)  Resp: 18 (05/26/25 0504)  BP: 124/63 (05/26/25 0504)  SpO2: 97 % (05/26/25 0504) Vital Signs (24h Range):  Temp:  [97.7 °F (36.5 °C)-102.5 °F (39.2 °C)] 97.7 °F (36.5 °C)  Pulse:  [71-95] 86  Resp:  [18] 18  SpO2:  [90 %-97 %] 97 %  BP: (110-124)/(55-63) 124/63     Weight: 84.2 kg (185 lb 10 oz)  Body mass index is 25.89 kg/m².    Estimated Creatinine Clearance: 77.4 mL/min (based on SCr of 1 mg/dL).     Physical Exam  Constitutional:       General: He is not in acute distress.     Appearance: Normal appearance. He is not ill-appearing.   Cardiovascular:      Rate and Rhythm: Normal rate and regular rhythm.      Pulses: Normal pulses.      Heart sounds: Normal heart sounds. No murmur heard.     No friction rub. No gallop.   Pulmonary:      Effort: Pulmonary effort is normal. No respiratory distress.      Breath sounds: Normal breath sounds.   Abdominal:      General: Abdomen is flat. Bowel sounds are normal. There is no distension.      Palpations: Abdomen is soft.      Tenderness: There is no abdominal tenderness.   Skin:     General: Skin is warm and dry.   Neurological:      Mental Status: He is alert.          Significant Labs: Blood Culture: No results for input(s): "LABBLOO" in the last 4320 hours.  CBC:   Recent Labs   Lab 05/25/25  0414 05/26/25  0538   WBC 2.79* 2.70*   HGB 12.1* 13.7*   HCT 36.7* 42.3   PLT 75* 108*     CMP:   Recent Labs   Lab 05/26/25  0538   *   K 4.5      CO2 19*   *   BUN 17   CREATININE 1.0   CALCIUM 8.5*   ANIONGAP 12     Microbiology Results (last 7 days)       Procedure Component Value " Units Date/Time    Blood culture [[id number]]  (Normal) Collected: 05/25/25 1026    Order Status: Completed Specimen: Blood from Peripheral, Antecubital, Right Updated: 05/26/25 0002     Blood Culture No Growth After 6 Hours    Blood culture [[id number]]  (Normal) Collected: 05/25/25 1025    Order Status: Completed Specimen: Blood from Peripheral, Antecubital, Left Updated: 05/26/25 0002     Blood Culture No Growth After 6 Hours    Blood culture [[id number]]  (Normal) Collected: 05/17/25 0029    Order Status: Completed Specimen: Blood from Peripheral, Antecubital, Right Updated: 05/22/25 2201     Blood Culture No Growth After 5 Days    Blood culture x two cultures. Draw prior to antibiotics. [[id number]]  (Normal) Collected: 05/16/25 2340    Order Status: Completed Specimen: Blood from Peripheral, Forearm, Right Updated: 05/22/25 2201     Blood Culture No Growth After 5 Days    Respiratory Infection Panel (PCR), Nasopharyngeal [[id number]] Collected: 05/19/25 1309    Order Status: Completed Specimen: Nasopharyngeal Swab Updated: 05/19/25 1522     Respiratory Infection Panel Source Nasopharyngeal Swab     Adenovirus Not Detected     Coronavirus 229E, Common Cold Virus Not Detected     Coronavirus HKU1, Common Cold Virus Not Detected     Coronavirus NL63, Common Cold Virus Not Detected     Coronavirus OC43, Common Cold Virus Not Detected     SARS-CoV2 (COVID-19) Qualitative PCR Not Detected     Human Metapneumovirus Not Detected     Human Rhinovirus/Enterovirus Not Detected     Influenza A Not Detected     Influenza B Not Detected     Parainfluenza Virus 1 Not Detected     Parainfluenza Virus 2 Not Detected     Parainfluenza Virus 3 Not Detected     Parainfluenza Virus 4 Not Detected     Respiratory Syncytial Virus Not Detected     Bordetella Parapertussis ([id number]) Not Detected     Bordetella pertussis (ptxP) Not Detected     Chlamydia pneumoniae Not Detected     Mycoplasma pneumoniae Not Detected          All  pertinent labs within the past 24 hours have been reviewed.    Significant Imaging: None

## 2025-05-26 NOTE — PROGRESS NOTES
"[facility]  Adult Nutrition  Progress Note    SUMMARY       Recommendations    Recommendation/Intervention:   1. Recommend continuing a Regular diet.   2. Encourage PO intake with feeding assistance as warranted.   3. Weigh x1 weekly.    Goals:   1. Pt will tolerate and consume >75% EEN and EPN by RD follow up.    Nutrition Goal Status: new  Communication of RD Recs: other (comment) (RD progress note, POC, sticky note)    Nutrition Discharge Planning    Nutrition Discharge Planning: General healthy diet    Assessment and Plan    Interventions:  1. General healthful diet.  2. Collaboration by nutrition professional with other providers.    Malnutrition Assessment 05/26/2025     Skin (Micronutrient): bruised (Nacho score 21)                                 Reason for Assessment    Reason For Assessment: length of stay  Diagnosis: cancer diagnosis/related complications (Neutropenic fever)    General Information Comments:   5/26/25: 65 y.o. male admitted for neutropenic fever. PMH: CA, hx of DVT, nephrolithiasis. Pt seen for LOS screening. Pt is currently getting a Regular diet without any ONS. RD unable to speak w/ pt dt isolation precautions. Per EMR pt has had % of meal intakes with an average of 75% or more since admit, reported good appetite, BMI WNL and GI status WNL. Per EMR pt has lost 3 kg in 1 mo which is a 3% weight loss which is not deemed significant. RD to reach out to dining associates to ensure meal orders are being taken. RD unable to perform NFPE at this time. Labs and meds reviewed.    Nutrition/Diet History    Spiritual, Cultural Beliefs, Methodist Practices, Values that Affect Care: no  Food Allergies: NKFA  Factors Affecting Nutritional Intake: None identified at this time  Nutrition Related Social Determinants of Health: SDOH: Unable to assess at this time.       Anthropometrics    Height: 5' 11" (180.3 cm)  Height (inches): 71 in  Height Method: Stated  Weight: 84.2 kg (185 lb 10 " "oz)  Weight (lb): 185.63 lb  Weight Method: Standard Scale  Ideal Body Weight (IBW), Male: 172 lb  % Ideal Body Weight, Male (lb): 107.92 %  BMI (Calculated): 25.9  BMI Grade: 25 - 29.9 - overweight  Usual Body Weight (UBW), k.2 kg  % Usual Body Weight: 96.76  % Weight Change From Usual Weight: -3.44 %       Wt Readings from Last 15 Encounters:   25 84.2 kg (185 lb 10 oz)   25 87.2 kg (192 lb 3.9 oz)   25 87.7 kg (193 lb 5.5 oz)   25 87.5 kg (192 lb 12.7 oz)   24 85.7 kg (188 lb 15 oz)   10/22/24 84.6 kg (186 lb 8.2 oz)   10/15/24 81.2 kg (179 lb)   10/15/24 81.2 kg (179 lb)   10/09/24 84.6 kg (186 lb 9.9 oz)   24 84.8 kg (187 lb)   07/15/24 87.3 kg (192 lb 7.4 oz)   24 87.1 kg (192 lb 0.3 oz)   24 83.9 kg (185 lb)   24 88.3 kg (194 lb 12.4 oz)   23 88.3 kg (194 lb 12.4 oz)     Lab/Procedures/Meds    Pertinent Labs Reviewed: reviewed  Pertinent Medications Reviewed: reviewed    BMP  Lab Results   Component Value Date     (L) 2025    K 4.5 2025     2025    CO2 19 (L) 2025    BUN 17 2025    CREATININE 1.0 2025    CALCIUM 8.5 (L) 2025    ANIONGAP 12 2025    EGFRNORACEVR >60 2025     Lab Results   Component Value Date    CALCIUM 8.5 (L) 2025    PHOS 3.5 10/16/2024     Lab Results   Component Value Date    ALBUMIN 2.7 (L) 2025     Lab Results   Component Value Date    ALT 27 2025    AST 45 2025    ALKPHOS 89 2025    BILITOT 0.5 2025     No results for input(s): "POCTGLUCOSE" in the last 24 hours.    Lab Results   Component Value Date    HGBA1C 5.7 (H) 04/15/2025     Lab Results   Component Value Date    WBC 2.70 (L) 2025    HGB 13.7 (L) 2025    HCT 42.3 2025    MCV 96 2025     (L) 2025       Scheduled Meds:   acetaminophen  650 mg Oral TID    cetirizine  10 mg Oral Daily    DAPTOmycin (CUBICIN) IV (PEDS and ADULTS)  700 " mg Intravenous Q24H    fluticasone propionate  2 spray Each Nostril Daily    meropenem IV (PEDS and ADULTS)  2 g Intravenous Q8H    pantoprazole  40 mg Oral Daily    rivaroxaban  20 mg Oral Daily with dinner    tamsulosin  0.4 mg Oral Daily     Continuous Infusions:  PRN Meds:.  Current Facility-Administered Medications:     acetaminophen, 650 mg, Oral, Q4H PRN    albuterol-ipratropium, 3 mL, Nebulization, Q6H PRN    aluminum-magnesium hydroxide-simethicone, 30 mL, Oral, QID PRN    benzonatate, 100 mg, Oral, TID PRN    dextrose 50%, 12.5 g, Intravenous, PRN    dextrose 50%, 25 g, Intravenous, PRN    glucagon (human recombinant), 1 mg, Intramuscular, PRN    glucose, 16 g, Oral, PRN    glucose, 24 g, Oral, PRN    HYDROcodone-acetaminophen, 1 tablet, Oral, Q6H PRN    melatonin, 6 mg, Oral, Nightly PRN    morphine, 2 mg, Intravenous, Q4H PRN    naloxone, 0.02 mg, Intravenous, PRN    ondansetron, 4 mg, Intravenous, Q8H PRN    promethazine, 25 mg, Oral, Q6H PRN    senna-docusate, 1 tablet, Oral, BID PRN    sodium chloride 0.9%, 10 mL, Intravenous, Q12H PRN      Estimated/Assessed Needs    Weight Used For Calorie Calculations: 84.2 kg (185 lb 10 oz)  Energy Calorie Requirements (kcal): 6825-4380 kcals (25-30 kcal/kg (CA))  Energy Need Method: Kcal/kg  Protein Requirements:  g (1-1.2 g/kg (CA))  Weight Used For Protein Calculations: 84.2 kg (185 lb 10 oz)  Fluid Requirements (mL): 0836-3364 ml  Estimated Fluid Requirement Method: RDA Method  RDA Method (mL): 2105  CHO Requirement: 263-316 g cho (9863-0498 kcals/8)      Nutrition Prescription Ordered    Current Diet Order: Regular    Evaluation of Received Nutrient/Fluid Intake  I/O: (Net since admit):   5/26/25: +5210 ml    Energy Calories Required: meeting needs  Protein Required: meeting needs  Fluid Required: meeting needs  Total Fluid Intake (mL): 100  Comments: LBM: 5/22  Tolerance: tolerating  % Intake of Estimated Energy Needs: 75 - 100 %  % Meal Intake: 75 -  100 %    PES Statement  No nutrition diagnosis at this time    Nutrition Risk    Level of Risk/Frequency of Follow-up: low (Follow up x1 weekly)     Monitor and Evaluation    Monitor and Evaluation: Energy intake, Food and beverage intake, Protein intake, Carbohydrate intake, Diet order, Weight, Electrolyte and renal panel, Gastrointestinal profile, Glucose/endocrine profile, Nutrition focused physical findings, Skin     Nutrition Follow-Up    RD Follow-up?: Yes (Follow up x1 weekly)    Olga Mejias RDN, LDN

## 2025-05-26 NOTE — PLAN OF CARE
Discussed poc with pt, pt verbalized understanding  Purposeful rounding every 2hours  VS wnl  Cardiac monitoring in use, pt is MEHREEN, tele monitor # [id number]  Blood glucose monitoring   Fall precautions in place, remains injury free  Pain and nausea under control with PRN meds  IVFs  Accurate I&Os  Abx given as prescribed  Bed locked at lowest position  Call light within reach  Chart check complete  Will cont with POC    Problem: Adult Inpatient Plan of Care  Goal: Plan of Care Review  Outcome: Progressing  Goal: Patient-Specific Goal (Individualized)  Outcome: Progressing  Goal: Absence of Hospital-Acquired Illness or Injury  Outcome: Progressing  Goal: Optimal Comfort and Wellbeing  Outcome: Progressing  Goal: Readiness for Transition of Care  Outcome: Progressing     Problem: Fall Injury Risk  Goal: Absence of Fall and Fall-Related Injury  Outcome: Progressing     Problem: Infection  Goal: Absence of Infection Signs and Symptoms  Outcome: Progressing     Problem: Neutropenia  Goal: Absence of Infection  Outcome: Progressing

## 2025-05-26 NOTE — ASSESSMENT & PLAN NOTE
--Neutropenia has resolved  --Febrile to 102 overnight   --Source remains unclear  --Notably patient handled an otter over Easter (otter was at a rehab facility and released in the area around where patient's camper was located; all he did was pet the animal but no bites or scratches occurred); wife has turtle and corn snake but patient does not have contact with either   --Denies international travel and though he was located near a pond he did not swim in it  --Awaiting results of Karius test  --Follow new set of blood cultures  --Continue empiric IV meropenem + will add daptomycin (checking CK)   --Indium scan planned to begin tomorrow   --Above d/w primary team.

## 2025-05-26 NOTE — ASSESSMENT & PLAN NOTE
CXR with NAF, Flu/Covid neg   Possible UTI   Resp viral panel negative   Urine and blood cultures NGTD   Monitor VS   Hematology/Oncology consulted and following   ID consult due to persistent fever   Cefepime changed to IV Merrem, Zyvox discontinued   CT abd pelvis with bladder thickening and IVC filter but no other source of infection. TTE with no valvular abnormalities. BLE duplex showed chronic RLE DVT   Indium scan ordered by ID but radiology unable to obtain needed material for scan until 05/26 and scan cannot be completed until 5/27    Karius testing pending per Dr. Laguna  Discussed with Dr. Laguna 05/25- he recommends repeat blood cultures, change IV Cefepime to Merrem   Encourage IS and Oob as able   Schedule tylenol for fever    Blood cultures negative growth to date

## 2025-05-27 LAB
ABSOLUTE NEUTROPHIL MANUAL (OHS): 1.1 K/UL
BASOPHILS NFR BLD MANUAL: 1 %
EOSINOPHIL NFR BLD MANUAL: 1 % (ref 0–8)
ERYTHROCYTE [DISTWIDTH] IN BLOOD BY AUTOMATED COUNT: 14.3 % (ref 11.5–14.5)
HCT VFR BLD AUTO: 34.3 % (ref 40–54)
HGB BLD-MCNC: 11.3 GM/DL (ref 14–18)
INR PPP: 1.5 (ref 0.8–1.2)
LYMPHOCYTES NFR BLD MANUAL: 37 % (ref 18–48)
MCH RBC QN AUTO: 31.2 PG (ref 27–31)
MCHC RBC AUTO-ENTMCNC: 32.9 G/DL (ref 32–36)
MCV RBC AUTO: 95 FL (ref 82–98)
MONOCYTES NFR BLD MANUAL: 3 % (ref 4–15)
NEUTROPHILS NFR BLD MANUAL: 58 % (ref 38–73)
NUCLEATED RBC (/100WBC) (OHS): 0 /100 WBC
PLATELET # BLD AUTO: 94 K/UL (ref 150–450)
PLATELET BLD QL SMEAR: ABNORMAL
PMV BLD AUTO: 9.9 FL (ref 9.2–12.9)
PROTHROMBIN TIME: 15.9 SECONDS (ref 9–12.5)
RBC # BLD AUTO: 3.62 M/UL (ref 4.6–6.2)
WBC # BLD AUTO: 1.91 K/UL (ref 3.9–12.7)

## 2025-05-27 PROCEDURE — 36415 COLL VENOUS BLD VENIPUNCTURE: CPT | Mod: HCNC | Performed by: INTERNAL MEDICINE

## 2025-05-27 PROCEDURE — 25000003 PHARM REV CODE 250: Mod: HCNC | Performed by: STUDENT IN AN ORGANIZED HEALTH CARE EDUCATION/TRAINING PROGRAM

## 2025-05-27 PROCEDURE — 85027 COMPLETE CBC AUTOMATED: CPT | Mod: HCNC | Performed by: INTERNAL MEDICINE

## 2025-05-27 PROCEDURE — 63600175 PHARM REV CODE 636 W HCPCS: Mod: HCNC | Performed by: STUDENT IN AN ORGANIZED HEALTH CARE EDUCATION/TRAINING PROGRAM

## 2025-05-27 PROCEDURE — 36415 COLL VENOUS BLD VENIPUNCTURE: CPT | Mod: HCNC | Performed by: FAMILY MEDICINE

## 2025-05-27 PROCEDURE — 25000003 PHARM REV CODE 250: Mod: HCNC | Performed by: FAMILY MEDICINE

## 2025-05-27 PROCEDURE — 21400001 HC TELEMETRY ROOM: Mod: HCNC

## 2025-05-27 PROCEDURE — 27000207 HC ISOLATION: Mod: HCNC

## 2025-05-27 PROCEDURE — 99232 SBSQ HOSP IP/OBS MODERATE 35: CPT | Mod: HCNC,95,, | Performed by: STUDENT IN AN ORGANIZED HEALTH CARE EDUCATION/TRAINING PROGRAM

## 2025-05-27 PROCEDURE — 25000003 PHARM REV CODE 250: Mod: HCNC | Performed by: INTERNAL MEDICINE

## 2025-05-27 PROCEDURE — 85610 PROTHROMBIN TIME: CPT | Mod: HCNC | Performed by: FAMILY MEDICINE

## 2025-05-27 PROCEDURE — 63600175 PHARM REV CODE 636 W HCPCS: Mod: HCNC | Performed by: INTERNAL MEDICINE

## 2025-05-27 PROCEDURE — 25000003 PHARM REV CODE 250: Mod: HCNC | Performed by: HOSPITALIST

## 2025-05-27 RX ORDER — IBUPROFEN 400 MG/1
400 TABLET, FILM COATED ORAL 2 TIMES DAILY
Status: DISCONTINUED | OUTPATIENT
Start: 2025-05-27 | End: 2025-05-27

## 2025-05-27 RX ADMIN — MEROPENEM 2 G: 2 INJECTION, POWDER, FOR SOLUTION INTRAVENOUS at 10:05

## 2025-05-27 RX ADMIN — CETIRIZINE HYDROCHLORIDE 10 MG: 10 TABLET, FILM COATED ORAL at 10:05

## 2025-05-27 RX ADMIN — ACETAMINOPHEN 650 MG: 325 TABLET ORAL at 10:05

## 2025-05-27 RX ADMIN — MEROPENEM 2 G: 2 INJECTION, POWDER, FOR SOLUTION INTRAVENOUS at 05:05

## 2025-05-27 RX ADMIN — FLUTICASONE PROPIONATE 100 MCG: 50 SPRAY, METERED NASAL at 10:05

## 2025-05-27 RX ADMIN — MEROPENEM 2 G: 2 INJECTION, POWDER, FOR SOLUTION INTRAVENOUS at 02:05

## 2025-05-27 RX ADMIN — DAPTOMYCIN 700 MG: 350 INJECTION, POWDER, LYOPHILIZED, FOR SOLUTION INTRAVENOUS at 03:05

## 2025-05-27 RX ADMIN — BENZONATATE 100 MG: 100 CAPSULE ORAL at 09:05

## 2025-05-27 RX ADMIN — RIVAROXABAN 20 MG: 20 TABLET, FILM COATED ORAL at 06:05

## 2025-05-27 RX ADMIN — ACETAMINOPHEN 650 MG: 325 TABLET ORAL at 09:05

## 2025-05-27 RX ADMIN — TAMSULOSIN HYDROCHLORIDE 0.4 MG: 0.4 CAPSULE ORAL at 10:05

## 2025-05-27 RX ADMIN — ACETAMINOPHEN 650 MG: 325 TABLET ORAL at 03:05

## 2025-05-27 RX ADMIN — PANTOPRAZOLE SODIUM 40 MG: 40 TABLET, DELAYED RELEASE ORAL at 10:05

## 2025-05-27 NOTE — PROGRESS NOTES
[facility]  Infectious Disease  Progress Note    Patient Name: Armando Ingram Jr.  MRN: [id number]  Admission Date: 5/16/2025  Length of Stay: 10 days  Attending Physician: Lonnie Schaefer MD  Primary Care Provider: Brian Soares MD    Isolation Status: Enhanced Respiratory  Assessment/Plan:      Hematology  History of DVT (deep vein thrombosis)  Anticoagulation per primary team    Oncology  Pancytopenia  Associated with underlying malignancy.  Follow oncology.    Hairy cell leukemia  Management per oncology    Other  FUO (fever of unknown origin)  --Neutropenia has resolved  --Febrile to 100.7 over past 24 hrs   --Source remains unclear  --Notably patient handled an otter over Easter (otter was at a rehab facility and released in the area around where patient's camper was located; all he did was pet the animal but no bites or scratches occurred); wife has turtle and corn snake but patient does not have contact with either   --Denies international travel and though he was located near a pond he did not swim in it  --Awaiting results of Karius test  --Follow new set of blood cultures; NGTD   --Continue empiric IV meropenem + daptomycin (baseline CK WNL)   --Indium scan planned for Friday   --Recommend patient go at least 48 hrs without fever prior to discharge   --Above d/w primary team.          Thank you for your consult. I will follow-up with patient. Please contact us if you have any additional questions.    Flo Laguna, DO  Infectious Disease  [facility]    Subjective:     Principal Problem:Neutropenic fever    HPI: 65 y.o. male with a PMH  has a past medical history of Closed right hip fracture, Colon cancer, DVT (deep venous thrombosis) (2002), Hairy cell leukemia (06/06/2024), and Nephrolithiasis.     He was admitted with neutropenic fever.  Tmax was 101.  Labs and   Imaging test reviewed - flu/COVID negative, chest x-ray negative for acute findings, UA positive for 2+ LE, 11  "RBCs,    Interval History: Tmax 100.7 over past 24 hrs. On meropenem and daptomycin for empiric coverage. Awaiting Karius and Indium scan. Possibly diluted CBC today as all cell lines down.     Review of Systems   Constitutional:  Positive for fever.   Musculoskeletal:  Negative for back pain.   Neurological:  Negative for headaches.   All other systems reviewed and are negative.    Objective:     Vital Signs (Most Recent):  Temp: 99.5 °F (37.5 °C) (05/27/25 0530)  Pulse: 95 (05/27/25 0530)  Resp: 18 (05/27/25 0530)  BP: (!) 106/59 (05/27/25 0530)  SpO2: (!) 93 % (05/27/25 0530) Vital Signs (24h Range):  Temp:  [98.1 °F (36.7 °C)-100.9 °F (38.3 °C)] 99.5 °F (37.5 °C)  Pulse:  [] 95  Resp:  [17-18] 18  SpO2:  [90 %-97 %] 93 %  BP: (102-130)/(56-62) 106/59     Weight: 84.2 kg (185 lb 10 oz)  Body mass index is 25.89 kg/m².    Estimated Creatinine Clearance: 77.4 mL/min (based on SCr of 1 mg/dL).     Physical Exam  Constitutional:       General: He is not in acute distress.     Appearance: Normal appearance. He is not ill-appearing.   Cardiovascular:      Rate and Rhythm: Normal rate and regular rhythm.      Pulses: Normal pulses.      Heart sounds: Normal heart sounds. No murmur heard.     No friction rub. No gallop.   Pulmonary:      Effort: Pulmonary effort is normal. No respiratory distress.      Breath sounds: Normal breath sounds.   Abdominal:      General: Abdomen is flat. Bowel sounds are normal. There is no distension.      Palpations: Abdomen is soft.      Tenderness: There is no abdominal tenderness.   Skin:     General: Skin is warm and dry.   Neurological:      Mental Status: He is alert.          Significant Labs: Blood Culture: No results for input(s): "LABBLOO" in the last 4320 hours.  CBC:   Recent Labs   Lab 05/26/25  0538 05/27/25  0335   WBC 2.70* 1.91*   HGB 13.7* 11.3*   HCT 42.3 34.3*   * 94*     CMP:   Recent Labs   Lab 05/26/25  0538   *   K 4.5      CO2 19*   * "   BUN 17   CREATININE 1.0   CALCIUM 8.5*   ANIONGAP 12     Microbiology Results (last 7 days)       Procedure Component Value Units Date/Time    Blood culture [[id number]]  (Normal) Collected: 05/25/25 1026    Order Status: Completed Specimen: Blood from Peripheral, Antecubital, Right Updated: 05/27/25 0604     Blood Culture No Growth After 36 Hours    Blood culture [[id number]]  (Normal) Collected: 05/25/25 1025    Order Status: Completed Specimen: Blood from Peripheral, Antecubital, Left Updated: 05/27/25 0604     Blood Culture No Growth After 36 Hours    Blood culture [[id number]]  (Normal) Collected: 05/17/25 0029    Order Status: Completed Specimen: Blood from Peripheral, Antecubital, Right Updated: 05/22/25 2201     Blood Culture No Growth After 5 Days    Blood culture x two cultures. Draw prior to antibiotics. [[id number]]  (Normal) Collected: 05/16/25 2340    Order Status: Completed Specimen: Blood from Peripheral, Forearm, Right Updated: 05/22/25 2201     Blood Culture No Growth After 5 Days          All pertinent labs within the past 24 hours have been reviewed.    Significant Imaging: None

## 2025-05-27 NOTE — PLAN OF CARE
Discussed poc with pt, pt verbalized understanding    Purposeful rounding every 2hours    VS wnl  Cardiac monitoring in use, pt is NSR, tele monitor # [id number]  Fall precautions in place, remains injury free  Pt denies c/o any pain or discomfort at this time and patient will continue to be monitored.  Pain and nausea under control with PRN meds    IVFs  Accurate I&Os  Abx given as prescribed  Bed locked at lowest position  Call light within reach    Chart check complete  Will cont with POC

## 2025-05-27 NOTE — ASSESSMENT & PLAN NOTE
CXR with NAF, Flu/Covid neg   Possible UTI   Resp viral panel negative   Urine and blood cultures NGTD   Monitor VS   Hematology/Oncology consulted and following   ID consult due to persistent fever   Cefepime changed to IV Merrem, Zyvox discontinued   CT abd pelvis with bladder thickening and IVC filter but no other source of infection. TTE with no valvular abnormalities. BLE duplex showed chronic RLE DVT   Indium scan ordered by ID but radiology unable to obtain needed material for scan until 05/26 and scan cannot be completed until 5/27    Karius testing pending per Dr. Laguna  Discussed with Dr. Laguna 05/25- he recommends repeat blood cultures, change IV Cefepime to Merrem   Encourage IS and Oob as able     Blood cultures negative growth to date   Awaiting tagged wbc scan per infectious disease recommendations   Remains on intravenous antibiotic(s)  Scheduled tylenol but neutropenic fever persists

## 2025-05-27 NOTE — PROGRESS NOTES
[facility]  [facility] Medicine  Progress Note    Patient Name: Armando Ingram Jr.  MRN: [id number]  Patient Class: IP- Inpatient   Admission Date: 5/16/2025  Length of Stay: 10 days  Attending Physician: Lonnie Schaefer MD  Primary Care Provider: Brian Soares MD        Subjective     Principal Problem:Neutropenic fever        HPI:  Armando Ingram Jr. is a 65 y.o. male with a PMH  has a past medical history of Closed right hip fracture, Colon cancer, DVT (deep venous thrombosis) (2002), Hairy cell leukemia (06/06/2024), and Nephrolithiasis. who presented to the ED for further evaluation of acute onset fever with T-max measuring 101.0 F earlier today.  Patient reported significant history of hairy cell leukemia in his followed by Dr. Dow and Dr. Flores from Hematology/Oncology and was recently prescribed a Z-Cordell for 4 days for treatment of a cough.  Patient was instructed to go to the ED if he endorsed fever greater than 103.0 F but presented to the ED due to ulcer experiencing associated chills, throbbing headache, and persistent fever.  He reported no known alleviating or aggravating factors noted with all other review of systems negative except as noted above.  He is not currently on active treatment for his leukemia and reported being in his usual state of health prior to onset of symptoms.  Initial workup in the ED revealed patient to be afebrile with T-max measuring 100.4 F, pancytopenic, lactic acid/procalcitonin within normal limits, flu/COVID negative, chest x-ray negative for acute findings, UA positive for 2+ LE, 11 RBCs, free found WBCs.  Patient initiated on cefepime with cultures obtained and pending and admitted to Hospital Medicine under observation for continued medical management and treatment of neutropenic fever.    PCP: Brian Soares      Overview/Hospital Course:  Continued on IV Cefepime. Zyvoz added 05/18 due to persistent fever. Hematology consulted. Cultures remain NGTD   Resp  viral panel negative. ID consulted to eval due to fevers  Cefepime changed to IV Merrem per ID recs on 05/21, unclear source of fevers at this time. Zyvox discontinued per ID.   CT Abd/Pelvis showed bladder thickening but otherwise negative.   ID recommend TTE, BLE duplex and possible Indium scan for source of infection and de-escalate abx to Cefepime on 05/23.   TTE with no vegetations. BLE duplex showed chronic RLE DVT. Indium scan pending tentatively planned for 05/26-05/27 as radiology does not have required materials at this time and scan will take 2 days to complete per RT.   Persistent fevers, abx changed back to Merrem 05/25 per ID recs. Karius pending   5/26 fever curve trending down. Respiratory infection panel negative. Asymptomatic. Continue intravenous antibiotic(s)   5/27 neutropenic fever persists despite scheduled tylenol. Awaiting tagged wbc scan per infectious disease recommendations.     Interval History: See hospital course for today     Review of Systems   Unable to perform ROS: Acuity of condition     Objective:     Vital Signs (Most Recent):  Temp: (!) 102.8 °F (39.3 °C) (05/27/25 1532)  Pulse: 92 (05/27/25 1504)  Resp: 19 (05/27/25 1349)  BP: 110/61 (05/27/25 1349)  SpO2: 96 % (05/27/25 1349) Vital Signs (24h Range):  Temp:  [98.1 °F (36.7 °C)-102.8 °F (39.3 °C)] 102.8 °F (39.3 °C)  Pulse:  [] 92  Resp:  [16-19] 19  SpO2:  [91 %-97 %] 96 %  BP: (106-129)/(59-62) 110/61     Weight: 84.2 kg (185 lb 10 oz)  Body mass index is 25.89 kg/m².  No intake or output data in the 24 hours ending 05/27/25 1534      Physical Exam  Vitals and nursing note reviewed.   Constitutional:       General: He is not in acute distress.     Appearance: He is ill-appearing. He is not toxic-appearing.   HENT:      Head: Normocephalic and atraumatic.   Cardiovascular:      Rate and Rhythm: Normal rate.   Pulmonary:      Effort: Pulmonary effort is normal. No respiratory distress.   Abdominal:      Palpations:  Abdomen is soft.      Tenderness: There is no abdominal tenderness.   Skin:     General: Skin is warm.   Neurological:      Mental Status: He is lethargic.      Motor: Weakness present.               Significant Labs: All pertinent labs within the past 24 hours have been reviewed.  Blood Culture: negative growth to date   CBC:   Recent Labs   Lab 05/26/25  0538 05/27/25  0335   WBC 2.70* 1.91*   HGB 13.7* 11.3*   HCT 42.3 34.3*   * 94*     CMP:   Recent Labs   Lab 05/26/25  0538   *   K 4.5      CO2 19*   *   BUN 17   CREATININE 1.0   CALCIUM 8.5*   ANIONGAP 12     Respiratory infection panel negative     Significant Imaging: I have reviewed all pertinent imaging results/findings within the past 24 hours.      Assessment & Plan  Neutropenic fever  FUO (fever of unknown origin)  CXR with NAF, Flu/Covid neg   Possible UTI   Resp viral panel negative   Urine and blood cultures NGTD   Monitor VS   Hematology/Oncology consulted and following   ID consult due to persistent fever   Cefepime changed to IV Merrem, Zyvox discontinued   CT abd pelvis with bladder thickening and IVC filter but no other source of infection. TTE with no valvular abnormalities. BLE duplex showed chronic RLE DVT   Indium scan ordered by ID but radiology unable to obtain needed material for scan until 05/26 and scan cannot be completed until 5/27    Karius testing pending per Dr. Laguna  Discussed with Dr. Laguna 05/25- he recommends repeat blood cultures, change IV Cefepime to Merrem   Encourage IS and Oob as able     Blood cultures negative growth to date   Awaiting tagged wbc scan per infectious disease recommendations   Remains on intravenous antibiotic(s)  Scheduled tylenol but neutropenic fever persists    Hairy cell leukemia  Patient with known history of hairy cell leukemia and continues to follow Dr. Dow and Dr. Flores from Hematology/Oncology. Patient not currently on active treatment and currently  undergoing treatment for neutropenic fever with broad-spectrum antibiotics as noted above.  Hematology consulted and following   Pancytopenia  This patient is found to have pancytopenia, the likely etiology is , will monitor CBC . Will transfuse red blood cells if the hemoglobin is <7g/dL (or <8 in the setting of ACS). Hold DVT prophylaxis if platelets are <50k. The patient's hemoglobin, white blood cell count, and platelet count results have been reviewed and are listed below.  Recent Labs   Lab 05/27/25  0335   HGB 11.3*   WBC 1.91*   PLT 94*     Neutropenic fever persists  Awaiting imaging per infectious disease   On intravenous antibiotic(s) per infectious disease   History of DVT (deep vein thrombosis)  - BLE duplex with chronic RLE DVT    -prior hx of IVC filter in 2002   -continue home Xarelto   - monitor Plt Counts     Thrombocytopenia improving on xarelto  VTE Risk Mitigation (From admission, onward)           Ordered     rivaroxaban tablet 20 mg  With dinner         05/17/25 0816     Reason for No Pharmacological VTE Prophylaxis  Once        Question:  Reasons:  Answer:  Already adequately anticoagulated on oral Anticoagulants    05/17/25 0213     IP VTE HIGH RISK PATIENT  Once         05/17/25 0213     Place sequential compression device  Until discontinued         05/17/25 0213                    Discharge Planning   LAURI:      Code Status: Full Code   Medical Readiness for Discharge Date:   Discharge Plan A: Home with family                        Lonnie Schaefer MD  Department of Hospital Medicine   [facility]

## 2025-05-27 NOTE — CARE UPDATE
Patient and wife have refused lumbar puncture     Lumbar puncture order canceled  Xarelto resumed  Nsaid discontinued

## 2025-05-27 NOTE — SUBJECTIVE & OBJECTIVE
"Interval History: Tmax 100.7 over past 24 hrs. On meropenem and daptomycin for empiric coverage. Awaiting Karius and Indium scan. Possibly diluted CBC today as all cell lines down.     Review of Systems   Constitutional:  Positive for fever.   Musculoskeletal:  Negative for back pain.   Neurological:  Negative for headaches.   All other systems reviewed and are negative.    Objective:     Vital Signs (Most Recent):  Temp: 99.5 °F (37.5 °C) (05/27/25 0530)  Pulse: 95 (05/27/25 0530)  Resp: 18 (05/27/25 0530)  BP: (!) 106/59 (05/27/25 0530)  SpO2: (!) 93 % (05/27/25 0530) Vital Signs (24h Range):  Temp:  [98.1 °F (36.7 °C)-100.9 °F (38.3 °C)] 99.5 °F (37.5 °C)  Pulse:  [] 95  Resp:  [17-18] 18  SpO2:  [90 %-97 %] 93 %  BP: (102-130)/(56-62) 106/59     Weight: 84.2 kg (185 lb 10 oz)  Body mass index is 25.89 kg/m².    Estimated Creatinine Clearance: 77.4 mL/min (based on SCr of 1 mg/dL).     Physical Exam  Constitutional:       General: He is not in acute distress.     Appearance: Normal appearance. He is not ill-appearing.   Cardiovascular:      Rate and Rhythm: Normal rate and regular rhythm.      Pulses: Normal pulses.      Heart sounds: Normal heart sounds. No murmur heard.     No friction rub. No gallop.   Pulmonary:      Effort: Pulmonary effort is normal. No respiratory distress.      Breath sounds: Normal breath sounds.   Abdominal:      General: Abdomen is flat. Bowel sounds are normal. There is no distension.      Palpations: Abdomen is soft.      Tenderness: There is no abdominal tenderness.   Skin:     General: Skin is warm and dry.   Neurological:      Mental Status: He is alert.          Significant Labs: Blood Culture: No results for input(s): "LABBLOO" in the last 4320 hours.  CBC:   Recent Labs   Lab 05/26/25  0538 05/27/25  0335   WBC 2.70* 1.91*   HGB 13.7* 11.3*   HCT 42.3 34.3*   * 94*     CMP:   Recent Labs   Lab 05/26/25  0538   *   K 4.5      CO2 19*   *   BUN 17 "   CREATININE 1.0   CALCIUM 8.5*   ANIONGAP 12     Microbiology Results (last 7 days)       Procedure Component Value Units Date/Time    Blood culture [[id number]]  (Normal) Collected: 05/25/25 1026    Order Status: Completed Specimen: Blood from Peripheral, Antecubital, Right Updated: 05/27/25 0604     Blood Culture No Growth After 36 Hours    Blood culture [[id number]]  (Normal) Collected: 05/25/25 1025    Order Status: Completed Specimen: Blood from Peripheral, Antecubital, Left Updated: 05/27/25 0604     Blood Culture No Growth After 36 Hours    Blood culture [[id number]]  (Normal) Collected: 05/17/25 0029    Order Status: Completed Specimen: Blood from Peripheral, Antecubital, Right Updated: 05/22/25 2201     Blood Culture No Growth After 5 Days    Blood culture x two cultures. Draw prior to antibiotics. [[id number]]  (Normal) Collected: 05/16/25 2340    Order Status: Completed Specimen: Blood from Peripheral, Forearm, Right Updated: 05/22/25 2201     Blood Culture No Growth After 5 Days          All pertinent labs within the past 24 hours have been reviewed.    Significant Imaging: None

## 2025-05-27 NOTE — SUBJECTIVE & OBJECTIVE
Interval History: See hospital course for today     Review of Systems   Unable to perform ROS: Acuity of condition     Objective:     Vital Signs (Most Recent):  Temp: (!) 102.8 °F (39.3 °C) (05/27/25 1532)  Pulse: 92 (05/27/25 1504)  Resp: 19 (05/27/25 1349)  BP: 110/61 (05/27/25 1349)  SpO2: 96 % (05/27/25 1349) Vital Signs (24h Range):  Temp:  [98.1 °F (36.7 °C)-102.8 °F (39.3 °C)] 102.8 °F (39.3 °C)  Pulse:  [] 92  Resp:  [16-19] 19  SpO2:  [91 %-97 %] 96 %  BP: (106-129)/(59-62) 110/61     Weight: 84.2 kg (185 lb 10 oz)  Body mass index is 25.89 kg/m².  No intake or output data in the 24 hours ending 05/27/25 1534      Physical Exam  Vitals and nursing note reviewed.   Constitutional:       General: He is not in acute distress.     Appearance: He is ill-appearing. He is not toxic-appearing.   HENT:      Head: Normocephalic and atraumatic.   Cardiovascular:      Rate and Rhythm: Normal rate.   Pulmonary:      Effort: Pulmonary effort is normal. No respiratory distress.   Abdominal:      Palpations: Abdomen is soft.      Tenderness: There is no abdominal tenderness.   Skin:     General: Skin is warm.   Neurological:      Mental Status: He is lethargic.      Motor: Weakness present.               Significant Labs: All pertinent labs within the past 24 hours have been reviewed.  Blood Culture: negative growth to date   CBC:   Recent Labs   Lab 05/26/25  0538 05/27/25  0335   WBC 2.70* 1.91*   HGB 13.7* 11.3*   HCT 42.3 34.3*   * 94*     CMP:   Recent Labs   Lab 05/26/25  0538   *   K 4.5      CO2 19*   *   BUN 17   CREATININE 1.0   CALCIUM 8.5*   ANIONGAP 12     Respiratory infection panel negative     Significant Imaging: I have reviewed all pertinent imaging results/findings within the past 24 hours.

## 2025-05-27 NOTE — ASSESSMENT & PLAN NOTE
This patient is found to have pancytopenia, the likely etiology is , will monitor CBC . Will transfuse red blood cells if the hemoglobin is <7g/dL (or <8 in the setting of ACS). Hold DVT prophylaxis if platelets are <50k. The patient's hemoglobin, white blood cell count, and platelet count results have been reviewed and are listed below.  Recent Labs   Lab 05/27/25  0335   HGB 11.3*   WBC 1.91*   PLT 94*     Neutropenic fever persists  Awaiting imaging per infectious disease   On intravenous antibiotic(s) per infectious disease

## 2025-05-27 NOTE — ASSESSMENT & PLAN NOTE
--Neutropenia has resolved  --Febrile to 100.7 over past 24 hrs   --Source remains unclear  --Notably patient handled an otter over [place] (otter was at a rehab facility and released in the area around where patient's camper was located; all he did was pet the animal but no bites or scratches occurred); wife has turtle and corn snake but patient does not have contact with either   --Denies international travel and though he was located near a pond he did not swim in it  --Awaiting results of Karius test  --Follow new set of blood cultures; NGTD   --Continue empiric IV meropenem + daptomycin (baseline CK WNL)   --Indium scan planned for Friday   --Recommend patient go at least 48 hrs without fever prior to discharge   --Above d/w primary team.

## 2025-05-28 LAB
ABSOLUTE EOSINOPHIL (OHS): 0.02 K/UL
ABSOLUTE MONOCYTE (OHS): 0.03 K/UL (ref 0.3–1)
ABSOLUTE NEUTROPHIL COUNT (OHS): 1.31 K/UL (ref 1.8–7.7)
BASOPHILS # BLD AUTO: 0.01 K/UL
BASOPHILS NFR BLD AUTO: 0.4 %
ERYTHROCYTE [DISTWIDTH] IN BLOOD BY AUTOMATED COUNT: 14.5 % (ref 11.5–14.5)
HCT VFR BLD AUTO: 35.7 % (ref 40–54)
HGB BLD-MCNC: 11.9 GM/DL (ref 14–18)
HOLD SPECIMEN: NORMAL
IMM GRANULOCYTES # BLD AUTO: 0.02 K/UL (ref 0–0.04)
IMM GRANULOCYTES NFR BLD AUTO: 0.9 % (ref 0–0.5)
LYMPHOCYTES # BLD AUTO: 0.84 K/UL (ref 1–4.8)
MCH RBC QN AUTO: 31.5 PG (ref 27–31)
MCHC RBC AUTO-ENTMCNC: 33.3 G/DL (ref 32–36)
MCV RBC AUTO: 94 FL (ref 82–98)
NUCLEATED RBC (/100WBC) (OHS): 0 /100 WBC
PLATELET # BLD AUTO: 135 K/UL (ref 150–450)
PLATELET BLD QL SMEAR: ABNORMAL
PMV BLD AUTO: 10 FL (ref 9.2–12.9)
RBC # BLD AUTO: 3.78 M/UL (ref 4.6–6.2)
RELATIVE EOSINOPHIL (OHS): 0.9 %
RELATIVE LYMPHOCYTE (OHS): 37.7 % (ref 18–48)
RELATIVE MONOCYTE (OHS): 1.3 % (ref 4–15)
RELATIVE NEUTROPHIL (OHS): 58.8 % (ref 38–73)
WBC # BLD AUTO: 2.23 K/UL (ref 3.9–12.7)

## 2025-05-28 PROCEDURE — 25000003 PHARM REV CODE 250: Mod: HCNC | Performed by: STUDENT IN AN ORGANIZED HEALTH CARE EDUCATION/TRAINING PROGRAM

## 2025-05-28 PROCEDURE — 99223 1ST HOSP IP/OBS HIGH 75: CPT | Mod: HCNC,,, | Performed by: INTERNAL MEDICINE

## 2025-05-28 PROCEDURE — 25000003 PHARM REV CODE 250: Mod: HCNC | Performed by: INTERNAL MEDICINE

## 2025-05-28 PROCEDURE — 36415 COLL VENOUS BLD VENIPUNCTURE: CPT | Mod: HCNC | Performed by: INTERNAL MEDICINE

## 2025-05-28 PROCEDURE — 25000003 PHARM REV CODE 250: Mod: HCNC | Performed by: HOSPITALIST

## 2025-05-28 PROCEDURE — 63600175 PHARM REV CODE 636 W HCPCS: Mod: HCNC | Performed by: INTERNAL MEDICINE

## 2025-05-28 PROCEDURE — 99232 SBSQ HOSP IP/OBS MODERATE 35: CPT | Mod: HCNC,95,, | Performed by: STUDENT IN AN ORGANIZED HEALTH CARE EDUCATION/TRAINING PROGRAM

## 2025-05-28 PROCEDURE — 21400001 HC TELEMETRY ROOM: Mod: HCNC

## 2025-05-28 PROCEDURE — 63600175 PHARM REV CODE 636 W HCPCS: Mod: HCNC | Performed by: STUDENT IN AN ORGANIZED HEALTH CARE EDUCATION/TRAINING PROGRAM

## 2025-05-28 PROCEDURE — 27000207 HC ISOLATION: Mod: HCNC

## 2025-05-28 PROCEDURE — 85025 COMPLETE CBC W/AUTO DIFF WBC: CPT | Mod: HCNC | Performed by: INTERNAL MEDICINE

## 2025-05-28 PROCEDURE — 25000003 PHARM REV CODE 250: Mod: HCNC | Performed by: FAMILY MEDICINE

## 2025-05-28 RX ORDER — DOXYCYCLINE HYCLATE 100 MG
100 TABLET ORAL EVERY 12 HOURS
Status: DISCONTINUED | OUTPATIENT
Start: 2025-05-28 | End: 2025-05-29

## 2025-05-28 RX ADMIN — PANTOPRAZOLE SODIUM 40 MG: 40 TABLET, DELAYED RELEASE ORAL at 09:05

## 2025-05-28 RX ADMIN — ACETAMINOPHEN 650 MG: 325 TABLET ORAL at 03:05

## 2025-05-28 RX ADMIN — MEROPENEM 2 G: 2 INJECTION, POWDER, FOR SOLUTION INTRAVENOUS at 06:05

## 2025-05-28 RX ADMIN — CETIRIZINE HYDROCHLORIDE 10 MG: 10 TABLET, FILM COATED ORAL at 09:05

## 2025-05-28 RX ADMIN — MEROPENEM 2 G: 2 INJECTION, POWDER, FOR SOLUTION INTRAVENOUS at 10:05

## 2025-05-28 RX ADMIN — DAPTOMYCIN 700 MG: 350 INJECTION, POWDER, LYOPHILIZED, FOR SOLUTION INTRAVENOUS at 03:05

## 2025-05-28 RX ADMIN — FLUTICASONE PROPIONATE 100 MCG: 50 SPRAY, METERED NASAL at 09:05

## 2025-05-28 RX ADMIN — ACETAMINOPHEN 650 MG: 325 TABLET ORAL at 09:05

## 2025-05-28 RX ADMIN — DOXYCYCLINE HYCLATE 100 MG: 100 TABLET, COATED ORAL at 02:05

## 2025-05-28 RX ADMIN — TAMSULOSIN HYDROCHLORIDE 0.4 MG: 0.4 CAPSULE ORAL at 09:05

## 2025-05-28 RX ADMIN — MEROPENEM 2 G: 2 INJECTION, POWDER, FOR SOLUTION INTRAVENOUS at 02:05

## 2025-05-28 RX ADMIN — DOXYCYCLINE HYCLATE 100 MG: 100 TABLET, COATED ORAL at 08:05

## 2025-05-28 RX ADMIN — RIVAROXABAN 20 MG: 20 TABLET, FILM COATED ORAL at 06:05

## 2025-05-28 NOTE — ASSESSMENT & PLAN NOTE
- BLE duplex with chronic RLE DVT    -prior hx of IVC filter in 2002   -continue home Xarelto   Thrombocytopenia improving on xarelto     Thrombocytopenia improving on xarelto
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
## 2025-05-28 NOTE — PROGRESS NOTES
[facility]  Infectious Disease  Progress Note    Patient Name: Armando Ingram Jr.  MRN: [id number]  Admission Date: 5/16/2025  Length of Stay: 11 days  Attending Physician: Lonnie Schaefer MD  Primary Care Provider: Brian Soares MD    Isolation Status: Enhanced Respiratory  Assessment/Plan:      Hematology  History of DVT (deep vein thrombosis)  Anticoagulation per primary team    Oncology  Pancytopenia  Associated with underlying malignancy.  Follow oncology.    Hairy cell leukemia  Management per oncology    Other  FUO (fever of unknown origin)  --Neutropenia improving   --Febrile to 102.8 over past 24 hrs   --Source remains unclear  --Notably patient handled an otter over Easter (otter was at a rehab facility and released in the area around where patient's camper was located; all he did was pet the animal but no bites or scratches occurred); wife has turtle and corn snake but patient does not have contact with either   --Denies international travel and though he was located near a pond he did not swim in it  --Awaiting results of Karius test (result should be available 5/30)   --Follow new set of blood cultures; NGTD   --Continue empiric IV meropenem + daptomycin (baseline CK WNL) + will add PO doxycycline 100 mg BID today for antiinflammatory effects and coverage of atypical infection   --Indium scan planned for Friday   --Recommend patient go at least 48 hrs without fever prior to discharge   --Appreciate heme/onc input   --Above d/w primary team.        Thank you for your consult. I will follow-up with patient. Please contact us if you have any additional questions.    Flo Laguna, DO  Infectious Disease  [facility]    Subjective:     Principal Problem:Neutropenic fever    HPI: 65 y.o. male with a PMH  has a past medical history of Closed right hip fracture, Colon cancer, DVT (deep venous thrombosis) (2002), Hairy cell leukemia (06/06/2024), and Nephrolithiasis.     He was admitted with  neutropenic fever.  Tmax was 101.  Labs and   Imaging test reviewed - flu/COVID negative, chest x-ray negative for acute findings, UA positive for 2+ LE, 11 RBCs,    Interval History: Febrile to 102.8F over past 24 hrs. Karius still pending (anticipated result Friday) and Indium scan postponed due to shortage of the tracer (should also be Friday). Due to how high fever remains, discussed adding doxycycline for antiinflammatory effects and the off chance he was exposed to tick/insect while at his camper. Have also reached out to oncology for further evaluation. Patient reports feeling the best he has since admission during encounter today. Hospitalist, patient advocate, and patient's nurse in room during discussion.     Review of Systems   Constitutional:  Positive for fever.   Musculoskeletal:  Negative for back pain.   Neurological:  Negative for headaches.   All other systems reviewed and are negative.    Objective:     Vital Signs (Most Recent):  Temp: 98.4 °F (36.9 °C) (05/28/25 1205)  Pulse: 80 (05/28/25 1205)  Resp: 18 (05/28/25 1205)  BP: 108/65 (05/28/25 1205)  SpO2: 95 % (05/28/25 1205) Vital Signs (24h Range):  Temp:  [98.4 °F (36.9 °C)-102.8 °F (39.3 °C)] 98.4 °F (36.9 °C)  Pulse:  [] 80  Resp:  [17-19] 18  SpO2:  [89 %-95 %] 95 %  BP: (102-119)/(55-68) 108/65     Weight: 86.3 kg (190 lb 4.1 oz)  Body mass index is 26.54 kg/m².    Estimated Creatinine Clearance: 77.4 mL/min (based on SCr of 1 mg/dL).     Physical Exam  Constitutional:       General: He is not in acute distress.     Appearance: Normal appearance. He is not ill-appearing.   Cardiovascular:      Rate and Rhythm: Normal rate and regular rhythm.      Pulses: Normal pulses.      Heart sounds: Normal heart sounds. No murmur heard.     No friction rub. No gallop.   Pulmonary:      Effort: Pulmonary effort is normal. No respiratory distress.      Breath sounds: Normal breath sounds.   Abdominal:      General: Abdomen is flat. Bowel sounds  "are normal. There is no distension.      Palpations: Abdomen is soft.      Tenderness: There is no abdominal tenderness.   Skin:     General: Skin is warm and dry.   Neurological:      Mental Status: He is alert.          Significant Labs: Blood Culture: No results for input(s): "LABBLOO" in the last 4320 hours.  CBC:   Recent Labs   Lab 05/27/25  0335 05/28/25  0335   WBC 1.91* 2.23*   HGB 11.3* 11.9*   HCT 34.3* 35.7*   PLT 94* 135*     CMP: No results for input(s): "NA", "K", "CL", "CO2", "GLU", "BUN", "CREATININE", "CALCIUM", "PROT", "ALBUMIN", "BILITOT", "ALKPHOS", "AST", "ALT", "ANIONGAP", "EGFRNONAA" in the last 48 hours.    Invalid input(s): "ESTGFAFRICA"  Microbiology Results (last 7 days)       Procedure Component Value Units Date/Time    Blood culture [[id number]]  (Normal) Collected: 05/25/25 1026    Order Status: Completed Specimen: Blood from Peripheral, Antecubital, Right Updated: 05/27/25 1801     Blood Culture No Growth After 48 Hours    Blood culture [[id number]]  (Normal) Collected: 05/25/25 1025    Order Status: Completed Specimen: Blood from Peripheral, Antecubital, Left Updated: 05/27/25 1801     Blood Culture No Growth After 48 Hours    Fungus culture [[id number]]     Order Status: Canceled Specimen: CSF (Spinal Fluid) from Cerebrospinal Fluid     CSF culture [[id number]]     Order Status: Canceled Specimen: CSF (Spinal Fluid) from CSF Tap, Tube 1     Blood culture [[id number]]  (Normal) Collected: 05/17/25 0029    Order Status: Completed Specimen: Blood from Peripheral, Antecubital, Right Updated: 05/22/25 2201     Blood Culture No Growth After 5 Days    Blood culture x two cultures. Draw prior to antibiotics. [[id number]]  (Normal) Collected: 05/16/25 2340    Order Status: Completed Specimen: Blood from Peripheral, Forearm, Right Updated: 05/22/25 2201     Blood Culture No Growth After 5 Days          All pertinent labs within the past 24 hours have been reviewed.    Significant Imaging: " None

## 2025-05-28 NOTE — SUBJECTIVE & OBJECTIVE
Interval History: See hospital course for today      Review of Systems   Constitutional:  Positive for fever. Negative for activity change, appetite change and fatigue.        States feeling the best he has felt   Respiratory:  Negative for shortness of breath.    Gastrointestinal:  Negative for nausea and vomiting.   Allergic/Immunologic: Positive for immunocompromised state.   Neurological:  Negative for weakness.   Psychiatric/Behavioral:  Negative for agitation, behavioral problems, confusion, decreased concentration and dysphoric mood. The patient is not nervous/anxious.      Objective:     Vital Signs (Most Recent):  Temp: 98.4 °F (36.9 °C) (05/28/25 1205)  Pulse: 80 (05/28/25 1205)  Resp: 18 (05/28/25 1205)  BP: 108/65 (05/28/25 1205)  SpO2: 95 % (05/28/25 1205) Vital Signs (24h Range):  Temp:  [98.4 °F (36.9 °C)-102.8 °F (39.3 °C)] 98.4 °F (36.9 °C)  Pulse:  [] 80  Resp:  [17-19] 18  SpO2:  [89 %-96 %] 95 %  BP: (102-119)/(55-68) 108/65     Weight: 86.3 kg (190 lb 4.1 oz)  Body mass index is 26.54 kg/m².    Intake/Output Summary (Last 24 hours) at 5/28/2025 1303  Last data filed at 5/28/2025 0406  Gross per 24 hour   Intake 592.95 ml   Output --   Net 592.95 ml         Physical Exam  Vitals and nursing note reviewed. Exam conducted with a chaperone present (team).   Constitutional:       General: He is not in acute distress.     Appearance: He is ill-appearing. He is not toxic-appearing.   HENT:      Head: Normocephalic and atraumatic.   Eyes:      Comments: Strabismus    Cardiovascular:      Rate and Rhythm: Normal rate.   Pulmonary:      Effort: Pulmonary effort is normal. No respiratory distress.   Abdominal:      Palpations: Abdomen is soft.   Skin:     General: Skin is warm.   Neurological:      Mental Status: He is alert and oriented to person, place, and time.   Psychiatric:         Mood and Affect: Mood normal.         Behavior: Behavior normal.               Significant Labs: All pertinent  labs within the past 24 hours have been reviewed.  Blood Culture: negative growth to date   CBC:   Recent Labs   Lab 05/27/25  0335 05/28/25  0335   WBC 1.91* 2.23*   HGB 11.3* 11.9*   HCT 34.3* 35.7*   PLT 94* 135*         Significant Imaging: I have reviewed all pertinent imaging results/findings within the past 24 hours.
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
## 2025-05-28 NOTE — ASSESSMENT & PLAN NOTE
Agree with the broad-spectrum antibiotic at this time but failed to elicit cause ANC greater than a 1000

## 2025-05-28 NOTE — ASSESSMENT & PLAN NOTE
CXR with [facility], Flu/Covid neg   Possible UTI   Resp viral panel negative   Urine and blood cultures NGTD   Monitor VS   Hematology/Oncology consulted and following   ID consult due to persistent fever   Cefepime changed to IV Merrem, Zyvox discontinued   CT abd pelvis with bladder thickening and IVC filter but no other source of infection. TTE with no valvular abnormalities. BLE duplex showed chronic RLE DVT   Indium scan ordered by ID but radiology unable to obtain needed material for scan until 05/26 and scan cannot be completed until 5/27    Karius testing pending per Dr. Laguna  Discussed with Dr. Laguna 05/25- he recommends repeat blood cultures, change IV Cefepime to Merrem   Encourage IS and Oob as able     Blood cultures negative growth to date   Awaiting tagged wbc scan per infectious disease recommendations   continue intravenous antibiotic(s)  Doxy added  If remains afebrile x 48h, ok to discharge

## 2025-05-28 NOTE — ASSESSMENT & PLAN NOTE
This patient is found to have pancytopenia, the likely etiology is , will monitor CBC . Will transfuse red blood cells if the hemoglobin is <7g/dL (or <8 in the setting of ACS). Hold DVT prophylaxis if platelets are <50k. The patient's hemoglobin, white blood cell count, and platelet count results have been reviewed and are listed below.  Recent Labs   Lab 05/28/25  0335   HGB 11.9*   WBC 2.23*   *     Blood counts improving  Awaiting imaging per infectious disease   On intravenous antibiotic(s) per infectious disease   Added po doxy

## 2025-05-28 NOTE — SUBJECTIVE & OBJECTIVE
Interval History: Febrile to 102.8F over past 24 hrs. Karius still pending (anticipated result Friday) and Indium scan postponed due to shortage of the tracer (should also be Friday). Due to how high fever remains, discussed adding doxycycline for antiinflammatory effects and the off chance he was exposed to tick/insect while at his camper. Have also reached out to oncology for further evaluation. Patient reports feeling the best he has since admission during encounter today. Hospitalist, patient advocate, and patient's nurse in room during discussion.     Review of Systems   Constitutional:  Positive for fever.   Musculoskeletal:  Negative for back pain.   Neurological:  Negative for headaches.   All other systems reviewed and are negative.    Objective:     Vital Signs (Most Recent):  Temp: 98.4 °F (36.9 °C) (05/28/25 1205)  Pulse: 80 (05/28/25 1205)  Resp: 18 (05/28/25 1205)  BP: 108/65 (05/28/25 1205)  SpO2: 95 % (05/28/25 1205) Vital Signs (24h Range):  Temp:  [98.4 °F (36.9 °C)-102.8 °F (39.3 °C)] 98.4 °F (36.9 °C)  Pulse:  [] 80  Resp:  [17-19] 18  SpO2:  [89 %-95 %] 95 %  BP: (102-119)/(55-68) 108/65     Weight: 86.3 kg (190 lb 4.1 oz)  Body mass index is 26.54 kg/m².    Estimated Creatinine Clearance: 77.4 mL/min (based on SCr of 1 mg/dL).     Physical Exam  Constitutional:       General: He is not in acute distress.     Appearance: Normal appearance. He is not ill-appearing.   Cardiovascular:      Rate and Rhythm: Normal rate and regular rhythm.      Pulses: Normal pulses.      Heart sounds: Normal heart sounds. No murmur heard.     No friction rub. No gallop.   Pulmonary:      Effort: Pulmonary effort is normal. No respiratory distress.      Breath sounds: Normal breath sounds.   Abdominal:      General: Abdomen is flat. Bowel sounds are normal. There is no distension.      Palpations: Abdomen is soft.      Tenderness: There is no abdominal tenderness.   Skin:     General: Skin is warm and dry.  "  Neurological:      Mental Status: He is alert.          Significant Labs: Blood Culture: No results for input(s): "LABBLOO" in the last 4320 hours.  CBC:   Recent Labs   Lab 05/27/25  0335 05/28/25  0335   WBC 1.91* 2.23*   HGB 11.3* 11.9*   HCT 34.3* 35.7*   PLT 94* 135*     CMP: No results for input(s): "NA", "K", "CL", "CO2", "GLU", "BUN", "CREATININE", "CALCIUM", "PROT", "ALBUMIN", "BILITOT", "ALKPHOS", "AST", "ALT", "ANIONGAP", "EGFRNONAA" in the last 48 hours.    Invalid input(s): "ESTGFAFRICA"  Microbiology Results (last 7 days)       Procedure Component Value Units Date/Time    Blood culture [[id number]]  (Normal) Collected: 05/25/25 1026    Order Status: Completed Specimen: Blood from Peripheral, Antecubital, Right Updated: 05/27/25 1801     Blood Culture No Growth After 48 Hours    Blood culture [[id number]]  (Normal) Collected: 05/25/25 1025    Order Status: Completed Specimen: Blood from Peripheral, Antecubital, Left Updated: 05/27/25 1801     Blood Culture No Growth After 48 Hours    Fungus culture [[id number]]     Order Status: Canceled Specimen: CSF (Spinal Fluid) from Cerebrospinal Fluid     CSF culture [[id number]]     Order Status: Canceled Specimen: CSF (Spinal Fluid) from CSF Tap, Tube 1     Blood culture [[id number]]  (Normal) Collected: 05/17/25 0029    Order Status: Completed Specimen: Blood from Peripheral, Antecubital, Right Updated: 05/22/25 2201     Blood Culture No Growth After 5 Days    Blood culture x two cultures. Draw prior to antibiotics. [[id number]]  (Normal) Collected: 05/16/25 2340    Order Status: Completed Specimen: Blood from Peripheral, Forearm, Right Updated: 05/22/25 2201     Blood Culture No Growth After 5 Days          All pertinent labs within the past 24 hours have been reviewed.    Significant Imaging: None  "

## 2025-05-28 NOTE — PLAN OF CARE
05/28/25 1608   Rounds   Attendance Nurse ;Provider;Charge nurse;Physical therapist   Discharge Plan A Home with family   Why the patient remains in the hospital Requires continued medical care     Anticipated DC Dispo: home with family  Progress towards plan: pending WBC tag results, ID following.

## 2025-05-28 NOTE — HPI
65-year-old male diagnosed with hairy cell leukemia in 2024.  Patient has a previous colon cancer stage I patient has been seen and evaluated at that time by Dr. Delgado Guzman patient was recommended for observation since ANC is always remained above a 1000 patient has been admitted now to the hospital for evaluation fever workup to date is failed to reveal source current patient is having an indium scan done we are asked to see the patient for further evaluation ECOG status 2

## 2025-05-28 NOTE — PROGRESS NOTES
[facility]  [facility]  Progress Note    Patient Name: Armando Ingram Jr.  MRN: [id number]  Patient Class: IP- Inpatient   Admission Date: 5/16/2025  Length of Stay: 11 days  Attending Physician: Lonnie Schaefer MD  Primary Care Provider: Brian Soares MD        Subjective     Principal Problem:Neutropenic fever        HPI:  Armando Ingram Jr. is a 65 y.o. male with a PMH  has a past medical history of Closed right hip fracture, Colon cancer, DVT (deep venous thrombosis) (2002), Hairy cell leukemia (06/06/2024), and Nephrolithiasis. who presented to the ED for further evaluation of acute onset fever with T-max measuring 101.0 F earlier today.  Patient reported significant history of hairy cell leukemia in his followed by Dr. Dow and Dr. Flores from Hematology/Oncology and was recently prescribed a Z-Cordell for 4 days for treatment of a cough.  Patient was instructed to go to the ED if he endorsed fever greater than 103.0 F but presented to the ED due to ulcer experiencing associated chills, throbbing headache, and persistent fever.  He reported no known alleviating or aggravating factors noted with all other review of systems negative except as noted above.  He is not currently on active treatment for his leukemia and reported being in his usual state of health prior to onset of symptoms.  Initial workup in the ED revealed patient to be afebrile with T-max measuring 100.4 F, pancytopenic, lactic acid/procalcitonin within normal limits, flu/COVID negative, chest x-ray negative for acute findings, UA positive for 2+ LE, 11 RBCs, free found WBCs.  Patient initiated on cefepime with cultures obtained and pending and admitted to Hospital Medicine under observation for continued medical management and treatment of neutropenic fever.    PCP: Brian Soares      Overview/Hospital Course:  Continued on IV Cefepime. Zyvoz added 05/18 due to persistent fever. Hematology consulted. Cultures remain NGTD   Resp  viral panel negative. ID consulted to eval due to fevers  Cefepime changed to IV Merrem per ID recs on 05/21, unclear source of fevers at this time. Zyvox discontinued per ID.   CT Abd/Pelvis showed bladder thickening but otherwise negative.   ID recommend TTE, BLE duplex and possible Indium scan for source of infection and de-escalate abx to Cefepime on 05/23.   TTE with no vegetations. BLE duplex showed chronic RLE DVT. Indium scan pending tentatively planned for 05/26-05/27 as radiology does not have required materials at this time and scan will take 2 days to complete per RT.   Persistent fevers, abx changed back to Merrem 05/25 per ID recs. Karius pending   5/26 fever curve trending down. Respiratory infection panel negative. Asymptomatic. Continue intravenous antibiotic(s)   5/27 neutropenic fever persists despite scheduled tylenol. Awaiting tagged wbc scan per infectious disease recommendations.   5/28 febrile overnight. Infectious disease recommending addition of doxy. Hem/onc following. Awaiting karius and imaging. Refused lumbar puncture. If afebrile x 48h, discharge     Interval History: See hospital course for today      Review of Systems   Constitutional:  Positive for fever. Negative for activity change, appetite change and fatigue.        States feeling the best he has felt   Respiratory:  Negative for shortness of breath.    Gastrointestinal:  Negative for nausea and vomiting.   Allergic/Immunologic: Positive for immunocompromised state.   Neurological:  Negative for weakness.   Psychiatric/Behavioral:  Negative for agitation, behavioral problems, confusion, decreased concentration and dysphoric mood. The patient is not nervous/anxious.      Objective:     Vital Signs (Most Recent):  Temp: 98.4 °F (36.9 °C) (05/28/25 1205)  Pulse: 80 (05/28/25 1205)  Resp: 18 (05/28/25 1205)  BP: 108/65 (05/28/25 1205)  SpO2: 95 % (05/28/25 1205) Vital Signs (24h Range):  Temp:  [98.4 °F (36.9 °C)-102.8 °F (39.3 °C)]  98.4 °F (36.9 °C)  Pulse:  [] 80  Resp:  [17-19] 18  SpO2:  [89 %-96 %] 95 %  BP: (102-119)/(55-68) 108/65     Weight: 86.3 kg (190 lb 4.1 oz)  Body mass index is 26.54 kg/m².    Intake/Output Summary (Last 24 hours) at 5/28/2025 1303  Last data filed at 5/28/2025 0406  Gross per 24 hour   Intake 592.95 ml   Output --   Net 592.95 ml         Physical Exam  Vitals and nursing note reviewed. Exam conducted with a chaperone present (team).   Constitutional:       General: He is not in acute distress.     Appearance: He is ill-appearing. He is not toxic-appearing.   HENT:      Head: Normocephalic and atraumatic.   Eyes:      Comments: Strabismus    Cardiovascular:      Rate and Rhythm: Normal rate.   Pulmonary:      Effort: Pulmonary effort is normal. No respiratory distress.   Abdominal:      Palpations: Abdomen is soft.   Skin:     General: Skin is warm.   Neurological:      Mental Status: He is alert and oriented to person, place, and time.   Psychiatric:         Mood and Affect: Mood normal.         Behavior: Behavior normal.               Significant Labs: All pertinent labs within the past 24 hours have been reviewed.  Blood Culture: negative growth to date   CBC:   Recent Labs   Lab 05/27/25  0335 05/28/25  0335   WBC 1.91* 2.23*   HGB 11.3* 11.9*   HCT 34.3* 35.7*   PLT 94* 135*         Significant Imaging: I have reviewed all pertinent imaging results/findings within the past 24 hours.      Assessment & Plan  Neutropenic fever  FUO (fever of unknown origin)  CXR with NAF, Flu/Covid neg   Possible UTI   Resp viral panel negative   Urine and blood cultures NGTD   Monitor VS   Hematology/Oncology consulted and following   ID consult due to persistent fever   Cefepime changed to IV Merrem, Zyvox discontinued   CT abd pelvis with bladder thickening and IVC filter but no other source of infection. TTE with no valvular abnormalities. BLE duplex showed chronic RLE DVT   Indium scan ordered by ID but radiology  unable to obtain needed material for scan until 05/26 and scan cannot be completed until 5/27    Karius testing pending per Dr. Laguna  Discussed with Dr. Laguna 05/25- he recommends repeat blood cultures, change IV Cefepime to Merrem   Encourage IS and Oob as able     Blood cultures negative growth to date   Awaiting tagged wbc scan per infectious disease recommendations   continue intravenous antibiotic(s)  Doxy added  If remains afebrile x 48h, ok to discharge     Hairy cell leukemia  Patient with known history of hairy cell leukemia and continues to follow Dr. Dow and Dr. Flores from Hematology/Oncology. Patient not currently on active treatment and currently undergoing treatment for neutropenic fever with broad-spectrum antibiotics as noted above.  Hematology consulted and following   Pancytopenia  This patient is found to have pancytopenia, the likely etiology is , will monitor CBC . Will transfuse red blood cells if the hemoglobin is <7g/dL (or <8 in the setting of ACS). Hold DVT prophylaxis if platelets are <50k. The patient's hemoglobin, white blood cell count, and platelet count results have been reviewed and are listed below.  Recent Labs   Lab 05/28/25  0335   HGB 11.9*   WBC 2.23*   *     Blood counts improving  Awaiting imaging per infectious disease   On intravenous antibiotic(s) per infectious disease   Added po doxy  History of DVT (deep vein thrombosis)  - BLE duplex with chronic RLE DVT    -prior hx of IVC filter in 2002   -continue home Xarelto   Thrombocytopenia improving on xarelto     Thrombocytopenia improving on xarelto  VTE Risk Mitigation (From admission, onward)           Ordered     rivaroxaban tablet 20 mg  With dinner         05/27/25 2941     Reason for No Pharmacological VTE Prophylaxis  Once        Question:  Reasons:  Answer:  Already adequately anticoagulated on oral Anticoagulants    05/17/25 0213     IP VTE HIGH RISK PATIENT  Once         05/17/25 0213     Place  sequential compression device  Until discontinued         05/17/25 0213                    Discharge Planning   LAURI:      Code Status: Full Code   Medical Readiness for Discharge Date:   Discharge Plan A: Home with family                        Lonnie Schaefer MD  Department of Hospital Medicine   [facility]

## 2025-05-28 NOTE — PLAN OF CARE
Discussed poc with pt, pt verbalized understanding    Purposeful rounding every 2hours    VS wnl  Cardiac monitoring in use, pt is NSR, tele monitor #[id number]  Fall precautions in place, remains injury free  Pt denies c/o any pain or discomfort at this time and patient will continue to be monitored.  Pain and nausea under control with PRN meds    IVFs  Accurate I&Os  Abx given as prescribed  Bed locked at lowest position  Call light within reach    Chart check complete  Will cont with POC

## 2025-05-28 NOTE — ASSESSMENT & PLAN NOTE
Patient diagnosed with hairy cell leukemia.  At this time no treatment has been started because of parameters with laboratory studies if an ANC greater than a 1000 variant patient is admitted now for several days with a history of fever workup to date being seen by infectious disease has failed to reveal a source could hairy cell be the source of fever absolutely at this particular point I agree with proceeding with the NM scan variant will discuss with leukemia service [facility] he has been seen by Dr. Delgado Guzman as for indium scan is negative consideration of primary treatment for hairy cell leukemia variant will discuss further as more information evolves

## 2025-05-28 NOTE — ASSESSMENT & PLAN NOTE
--Neutropenia improving   --Febrile to 102.8 over past 24 hrs   --Source remains unclear  --Notably patient handled an otter over [place] (otter was at a rehab facility and released in the area around where patient's camper was located; all he did was pet the animal but no bites or scratches occurred); wife has turtle and corn snake but patient does not have contact with either   --Denies international travel and though he was located near a pond he did not swim in it  --Awaiting results of Karius test (result should be available 5/30)   --Follow new set of blood cultures; NGTD   --Continue empiric IV meropenem + daptomycin (baseline CK WNL) + will add PO doxycycline 100 mg BID today for antiinflammatory effects and coverage of atypical infection   --Indium scan planned for Friday   --Recommend patient go at least 48 hrs without fever prior to discharge   --Appreciate heme/onc input   --Above d/w primary team.

## 2025-05-29 PROBLEM — B45.9 CRYPTOCOCCOSIS: Status: ACTIVE | Noted: 2025-05-29

## 2025-05-29 LAB
BEAKER SEE SCANNED REPORT: NORMAL
CREAT SERPL-MCNC: 0.9 MG/DL (ref 0.5–1.4)
GFR SERPLBLD CREATININE-BSD FMLA CKD-EPI: >60 ML/MIN/1.73/M2

## 2025-05-29 PROCEDURE — 99233 SBSQ HOSP IP/OBS HIGH 50: CPT | Mod: HCNC,95,, | Performed by: STUDENT IN AN ORGANIZED HEALTH CARE EDUCATION/TRAINING PROGRAM

## 2025-05-29 PROCEDURE — 25000003 PHARM REV CODE 250: Mod: HCNC | Performed by: STUDENT IN AN ORGANIZED HEALTH CARE EDUCATION/TRAINING PROGRAM

## 2025-05-29 PROCEDURE — 27000207 HC ISOLATION: Mod: HCNC

## 2025-05-29 PROCEDURE — 99233 SBSQ HOSP IP/OBS HIGH 50: CPT | Mod: HCNC,,, | Performed by: INTERNAL MEDICINE

## 2025-05-29 PROCEDURE — 25500020 PHARM REV CODE 255: Mod: HCNC | Performed by: FAMILY MEDICINE

## 2025-05-29 PROCEDURE — 21400001 HC TELEMETRY ROOM: Mod: HCNC

## 2025-05-29 PROCEDURE — 25000003 PHARM REV CODE 250: Mod: HCNC | Performed by: INTERNAL MEDICINE

## 2025-05-29 PROCEDURE — 82565 ASSAY OF CREATININE: CPT | Mod: HCNC | Performed by: STUDENT IN AN ORGANIZED HEALTH CARE EDUCATION/TRAINING PROGRAM

## 2025-05-29 PROCEDURE — 86403 PARTICLE AGGLUT ANTBDY SCRN: CPT | Mod: HCNC | Performed by: STUDENT IN AN ORGANIZED HEALTH CARE EDUCATION/TRAINING PROGRAM

## 2025-05-29 PROCEDURE — 36415 COLL VENOUS BLD VENIPUNCTURE: CPT | Mod: HCNC | Performed by: STUDENT IN AN ORGANIZED HEALTH CARE EDUCATION/TRAINING PROGRAM

## 2025-05-29 PROCEDURE — 25000003 PHARM REV CODE 250: Mod: HCNC | Performed by: HOSPITALIST

## 2025-05-29 PROCEDURE — 63600175 PHARM REV CODE 636 W HCPCS: Mod: JZ,TB,HCNC | Performed by: STUDENT IN AN ORGANIZED HEALTH CARE EDUCATION/TRAINING PROGRAM

## 2025-05-29 PROCEDURE — 63600175 PHARM REV CODE 636 W HCPCS: Mod: HCNC | Performed by: INTERNAL MEDICINE

## 2025-05-29 PROCEDURE — A9585 GADOBUTROL INJECTION: HCPCS | Mod: HCNC | Performed by: FAMILY MEDICINE

## 2025-05-29 RX ORDER — GADOBUTROL 604.72 MG/ML
10 INJECTION INTRAVENOUS
Status: COMPLETED | OUTPATIENT
Start: 2025-05-29 | End: 2025-05-29

## 2025-05-29 RX ORDER — ACETAMINOPHEN 500 MG
500 TABLET ORAL
Status: DISCONTINUED | OUTPATIENT
Start: 2025-05-29 | End: 2025-05-30

## 2025-05-29 RX ORDER — FLUCYTOSINE 250 MG/1
25 CAPSULE ORAL EVERY 6 HOURS
Status: DISCONTINUED | OUTPATIENT
Start: 2025-05-29 | End: 2025-05-29

## 2025-05-29 RX ORDER — DIPHENHYDRAMINE HCL 25 MG
25 CAPSULE ORAL
Status: COMPLETED | OUTPATIENT
Start: 2025-05-29 | End: 2025-06-03

## 2025-05-29 RX ADMIN — GADOBUTROL 8 ML: 604.72 INJECTION INTRAVENOUS at 09:05

## 2025-05-29 RX ADMIN — ACETAMINOPHEN 650 MG: 325 TABLET ORAL at 12:05

## 2025-05-29 RX ADMIN — SODIUM CHLORIDE 500 ML: 9 INJECTION, SOLUTION INTRAVENOUS at 04:05

## 2025-05-29 RX ADMIN — MEROPENEM 2 G: 2 INJECTION, POWDER, FOR SOLUTION INTRAVENOUS at 02:05

## 2025-05-29 RX ADMIN — DEXTROSE MONOHYDRATE: 5 INJECTION INTRAVENOUS at 07:05

## 2025-05-29 RX ADMIN — FLUCYTOSINE 2250 MG: 500 CAPSULE ORAL at 08:05

## 2025-05-29 RX ADMIN — CETIRIZINE HYDROCHLORIDE 10 MG: 10 TABLET, FILM COATED ORAL at 09:05

## 2025-05-29 RX ADMIN — DEXTROSE MONOHYDRATE: 5 INJECTION INTRAVENOUS at 04:05

## 2025-05-29 RX ADMIN — ACETAMINOPHEN 500 MG: 500 TABLET ORAL at 03:05

## 2025-05-29 RX ADMIN — DIPHENHYDRAMINE HYDROCHLORIDE 25 MG: 25 CAPSULE ORAL at 04:05

## 2025-05-29 RX ADMIN — PANTOPRAZOLE SODIUM 40 MG: 40 TABLET, DELAYED RELEASE ORAL at 09:05

## 2025-05-29 RX ADMIN — AMPHOTERICIN B 350 MG: 50 INJECTABLE, LIPOSOMAL INTRAVENOUS at 04:05

## 2025-05-29 RX ADMIN — TAMSULOSIN HYDROCHLORIDE 0.4 MG: 0.4 CAPSULE ORAL at 09:05

## 2025-05-29 RX ADMIN — DOXYCYCLINE HYCLATE 100 MG: 100 TABLET, COATED ORAL at 09:05

## 2025-05-29 RX ADMIN — ACETAMINOPHEN 650 MG: 325 TABLET ORAL at 10:05

## 2025-05-29 RX ADMIN — SODIUM CHLORIDE 500 ML: 9 INJECTION, SOLUTION INTRAVENOUS at 07:05

## 2025-05-29 RX ADMIN — MEROPENEM 2 G: 2 INJECTION, POWDER, FOR SOLUTION INTRAVENOUS at 10:05

## 2025-05-29 NOTE — PROGRESS NOTES
[facility]  [facility]  Progress Note    Patient Name: Armando Ingram Jr.  MRN: [id number]  Patient Class: IP- Inpatient   Admission Date: 5/16/2025  Length of Stay: 12 days  Attending Physician: Lonnie Schaefer MD  Primary Care Provider: Brian Soares MD        Subjective     Principal Problem:Neutropenic fever        HPI:  Armando Ingram Jr. is a 65 y.o. male with a PMH  has a past medical history of Closed right hip fracture, Colon cancer, DVT (deep venous thrombosis) (2002), Hairy cell leukemia (06/06/2024), and Nephrolithiasis. who presented to the ED for further evaluation of acute onset fever with T-max measuring 101.0 F earlier today.  Patient reported significant history of hairy cell leukemia in his followed by Dr. Dow and Dr. Flores from Hematology/Oncology and was recently prescribed a Z-Cordell for 4 days for treatment of a cough.  Patient was instructed to go to the ED if he endorsed fever greater than 103.0 F but presented to the ED due to ulcer experiencing associated chills, throbbing headache, and persistent fever.  He reported no known alleviating or aggravating factors noted with all other review of systems negative except as noted above.  He is not currently on active treatment for his leukemia and reported being in his usual state of health prior to onset of symptoms.  Initial workup in the ED revealed patient to be afebrile with T-max measuring 100.4 F, pancytopenic, lactic acid/procalcitonin within normal limits, flu/COVID negative, chest x-ray negative for acute findings, UA positive for 2+ LE, 11 RBCs, free found WBCs.  Patient initiated on cefepime with cultures obtained and pending and admitted to Hospital Medicine under observation for continued medical management and treatment of neutropenic fever.    PCP: Brian Soares      Overview/Hospital Course:  Continued on IV Cefepime. Zyvoz added 05/18 due to persistent fever. Hematology consulted. Cultures remain NGTD   Resp  viral panel negative. ID consulted to eval due to fevers  Cefepime changed to IV Merrem per ID recs on 05/21, unclear source of fevers at this time. Zyvox discontinued per ID.   CT Abd/Pelvis showed bladder thickening but otherwise negative.   ID recommend TTE, BLE duplex and possible Indium scan for source of infection and de-escalate abx to Cefepime on 05/23.   TTE with no vegetations. BLE duplex showed chronic RLE DVT. Indium scan pending tentatively planned for 05/26-05/27 as radiology does not have required materials at this time and scan will take 2 days to complete per RT.   Persistent fevers, abx changed back to Merrem 05/25 per ID recs. Karius pending   5/26 fever curve trending down. Respiratory infection panel negative. Asymptomatic. Continue intravenous antibiotic(s)   5/27 neutropenic fever persists despite scheduled tylenol. Awaiting tagged wbc scan per infectious disease recommendations.   5/28 febrile overnight. Infectious disease recommending addition of doxy. Hem/onc following. Awaiting karius and imaging. Refused lumbar puncture. If afebrile x 48h, discharge   5/29 febrile. Positive cryptococcal infection. Infectious disease adjusting antimicrobials. Plans for lumbar puncture, mri brain, and indium scan.    Interval History: See hospital course for today      Review of Systems   Constitutional:  Positive for fever. Negative for activity change and appetite change.   Respiratory:  Negative for shortness of breath.    Gastrointestinal:  Negative for nausea and vomiting.   Allergic/Immunologic: Positive for immunocompromised state.   Psychiatric/Behavioral:  Positive for dysphoric mood.      Objective:     Vital Signs (Most Recent):  Temp: 97.3 °F (36.3 °C) (05/29/25 1148)  Pulse: 77 (05/29/25 1148)  Resp: 18 (05/29/25 1148)  BP: (!) 109/59 (05/29/25 1148)  SpO2: (!) 91 % (05/29/25 1148) Vital Signs (24h Range):  Temp:  [97.3 °F (36.3 °C)-102.1 °F (38.9 °C)] 97.3 °F (36.3 °C)  Pulse:  []  77  Resp:  [17-18] 18  SpO2:  [91 %-94 %] 91 %  BP: (102-130)/(57-75) 109/59     Weight: 86.3 kg (190 lb 4.1 oz)  Body mass index is 26.54 kg/m².    Intake/Output Summary (Last 24 hours) at 5/29/2025 1456  Last data filed at 5/28/2025 1829  Gross per 24 hour   Intake 184.48 ml   Output --   Net 184.48 ml         Physical Exam  Vitals and nursing note reviewed. Exam conducted with a chaperone present (team, wife).   Constitutional:       General: He is not in acute distress.     Appearance: He is ill-appearing. He is not toxic-appearing.   HENT:      Head: Normocephalic and atraumatic.   Cardiovascular:      Rate and Rhythm: Normal rate.   Pulmonary:      Effort: Pulmonary effort is normal. No respiratory distress.   Abdominal:      Palpations: Abdomen is soft.      Tenderness: There is no abdominal tenderness.   Musculoskeletal:      Right lower leg: No edema.      Left lower leg: No edema.   Skin:     General: Skin is warm.   Neurological:      Mental Status: He is alert and oriented to person, place, and time.   Psychiatric:         Mood and Affect: Mood is depressed.               Significant Labs: All pertinent labs within the past 24 hours have been reviewed.  CBC:   Recent Labs   Lab 05/28/25  0335   WBC 2.23*   HGB 11.9*   HCT 35.7*   *         Significant Imaging: I have reviewed all pertinent imaging results/findings within the past 24 hours.      Assessment & Plan  Neutropenic fever  FUO (fever of unknown origin)  Cryptococcosis  CXR with NAF, Flu/Covid neg   Possible UTI   Resp viral panel negative   Urine and blood cultures NGTD   Monitor VS   Hematology/Oncology consulted and following   ID consult due to persistent fever   Cefepime changed to IV Merrem, Zyvox discontinued   CT abd pelvis with bladder thickening and IVC filter but no other source of infection. TTE with no valvular abnormalities. BLE duplex showed chronic RLE DVT   Indium scan ordered by ID but radiology unable to obtain needed  "material for scan until 05/26 and scan cannot be completed until 5/27    Karius testing pending per Dr. Laguna  Discussed with Dr. Laguna 05/25- he recommends repeat blood cultures, change IV Cefepime to Merrem   Encourage IS and Oob as able     Blood cultures negative growth to date   Positive for cryptococcal infection  Mri brain pending  Lumbar puncture pending  Hold xarelto  Indium scan pending  Antimicrobials per infectious disease         Hairy cell leukemia  Patient with known history of hairy cell leukemia and continues to follow Dr. Dow and Dr. Flores from Hematology/Oncology. Patient not currently on active treatment and currently undergoing treatment for neutropenic fever with broad-spectrum antibiotics as noted above.  Hematology consulted and following   Pancytopenia  This patient is found to have pancytopenia, the likely etiology is , will monitor CBC . Will transfuse red blood cells if the hemoglobin is <7g/dL (or <8 in the setting of ACS). Hold DVT prophylaxis if platelets are <50k. The patient's hemoglobin, white blood cell count, and platelet count results have been reviewed and are listed below.  No results for input(s): "HGB", "WBC", "PLT" in the last 24 hours.      History of DVT (deep vein thrombosis)  - BLE duplex with chronic RLE DVT    -prior hx of IVC filter in 2002   Hold xarelto for lumbar puncture   VTE Risk Mitigation (From admission, onward)           Ordered     rivaroxaban tablet 20 mg  With dinner         05/29/25 1333     Reason for No Pharmacological VTE Prophylaxis  Once        Question:  Reasons:  Answer:  Already adequately anticoagulated on oral Anticoagulants    05/17/25 0213     IP VTE HIGH RISK PATIENT  Once         05/17/25 0213     Place sequential compression device  Until discontinued         05/17/25 0213                    Discharge Planning   LAURI: 6/2/2025     Code Status: Full Code   Medical Readiness for Discharge Date:   Discharge Plan A: Home with family "                        Lonnie Schaefer MD  Department of Hospital Medicine   [facility] - [facility] 3

## 2025-05-29 NOTE — ASSESSMENT & PLAN NOTE
--Neutropenia improving   --Febrile to 102.1 over past 24 hrs   --Source now determined to be Cryptococcus per [place]  --Notably patient handled an otter over Easter (otter was at a rehab facility and released in the area around where patient's camper was located; all he did was pet the animal but no bites or scratches occurred); wife has turtle and corn snake but patient does not have contact with either   --Denies international travel and though he was located near a pond he did not swim in it  --Follow blood cultures; NGTD   --Will stop daptomycin, meropenem, and doxycycline  --Begin Cryptococcal therapy (see other assessment)   --Indium scan planned for Friday

## 2025-05-29 NOTE — ASSESSMENT & PLAN NOTE
"This patient is found to have pancytopenia, the likely etiology is , will monitor CBC . Will transfuse red blood cells if the hemoglobin is <7g/dL (or <8 in the setting of ACS). Hold DVT prophylaxis if platelets are <50k. The patient's hemoglobin, white blood cell count, and platelet count results have been reviewed and are listed below.  No results for input(s): "HGB", "WBC", "PLT" in the last 24 hours.      "

## 2025-05-29 NOTE — SUBJECTIVE & OBJECTIVE
Interval History: See hospital course for today      Review of Systems   Constitutional:  Positive for fever. Negative for activity change and appetite change.   Respiratory:  Negative for shortness of breath.    Gastrointestinal:  Negative for nausea and vomiting.   Allergic/Immunologic: Positive for immunocompromised state.   Psychiatric/Behavioral:  Positive for dysphoric mood.      Objective:     Vital Signs (Most Recent):  Temp: 97.3 °F (36.3 °C) (05/29/25 1148)  Pulse: 77 (05/29/25 1148)  Resp: 18 (05/29/25 1148)  BP: (!) 109/59 (05/29/25 1148)  SpO2: (!) 91 % (05/29/25 1148) Vital Signs (24h Range):  Temp:  [97.3 °F (36.3 °C)-102.1 °F (38.9 °C)] 97.3 °F (36.3 °C)  Pulse:  [] 77  Resp:  [17-18] 18  SpO2:  [91 %-94 %] 91 %  BP: (102-130)/(57-75) 109/59     Weight: 86.3 kg (190 lb 4.1 oz)  Body mass index is 26.54 kg/m².    Intake/Output Summary (Last 24 hours) at 5/29/2025 1456  Last data filed at 5/28/2025 1829  Gross per 24 hour   Intake 184.48 ml   Output --   Net 184.48 ml         Physical Exam  Vitals and nursing note reviewed. Exam conducted with a chaperone present (team, wife).   Constitutional:       General: He is not in acute distress.     Appearance: He is ill-appearing. He is not toxic-appearing.   HENT:      Head: Normocephalic and atraumatic.   Cardiovascular:      Rate and Rhythm: Normal rate.   Pulmonary:      Effort: Pulmonary effort is normal. No respiratory distress.   Abdominal:      Palpations: Abdomen is soft.      Tenderness: There is no abdominal tenderness.   Musculoskeletal:      Right lower leg: No edema.      Left lower leg: No edema.   Skin:     General: Skin is warm.   Neurological:      Mental Status: He is alert and oriented to person, place, and time.   Psychiatric:         Mood and Affect: Mood is depressed.               Significant Labs: All pertinent labs within the past 24 hours have been reviewed.  CBC:   Recent Labs   Lab 05/28/25  0335   WBC 2.23*   HGB 11.9*    HCT 35.7*   *         Significant Imaging: I have reviewed all pertinent imaging results/findings within the past 24 hours.

## 2025-05-29 NOTE — PROGRESS NOTES
[facility]  Infectious Disease  Progress Note    Patient Name: Armando Ingram Jr.  MRN: [id number]  Admission Date: 5/16/2025  Length of Stay: 12 days  Attending Physician: Lonnie Schaefer MD  Primary Care Provider: Brian Soares MD    Isolation Status: Enhanced Respiratory  Assessment/Plan:      ID  Cryptococcosis  --organism has been detected by Karius test  --would certainly explain the ongoing fevers  --will need to determine location of infection  --recommend lumbar puncture and brain MRI with and without contrast as soon as possible  --will send CSF for cell count, glucose, protein, kandice ink, cryptococcal antigen, me panel, fungal culture, and bacterial culture  --please measure opening pressure as this is very important in the treatment of cryptococcus  --for now will begin induction therapy using IV amphotericin and oral flucytosine  --require drug toxicity monitoring; need daily cbc and cmp for electrolytes/renal function/blood counts  --induction therapy will be for 14 days if CNS infection confirmed  --would then be followed by 8 weeks of consolidation therapy with high-dose fluconazole followed by 12 months of maintenance therapy with lower dose fluconazole  --ultimately treatment course will be determined by location of infection  --induction therapy would need to be completed inpatient due to highly toxic nature of both drugs  --Above d/w primary team and oncology as well as patient/wife    Hematology  History of DVT (deep vein thrombosis)  Anticoagulation per primary team    Oncology  Pancytopenia  Associated with underlying malignancy but could also be due to infection. Appreciate oncology.    Hairy cell leukemia  Management per oncology    Other  FUO (fever of unknown origin)  --Neutropenia improving   --Febrile to 102.1 over past 24 hrs   --Source now determined to be Cryptococcus per Karius  --Notably patient handled an otter over Easter (otter was at a rehab facility and released in the  area around where patient's camper was located; all he did was pet the animal but no bites or scratches occurred); wife has turtle and corn snake but patient does not have contact with either   --Denies international travel and though he was located near a pond he did not swim in it  --Follow blood cultures; NGTD   --Will stop daptomycin, meropenem, and doxycycline  --Begin Cryptococcal therapy (see other assessment)   --Indium scan planned for Friday       Thank you for your consult. I will follow-up with patient. Please contact us if you have any additional questions.    Flo Laguna, DO  Infectious Disease  [facility]    Subjective:     Principal Problem:Neutropenic fever    HPI: 65 y.o. male with a PMH  has a past medical history of Closed right hip fracture, Colon cancer, DVT (deep venous thrombosis) (2002), Hairy cell leukemia (06/06/2024), and Nephrolithiasis.     He was admitted with neutropenic fever.  Tmax was 101.  Labs and   Imaging test reviewed - flu/COVID negative, chest x-ray negative for acute findings, UA positive for 2+ LE, 11 RBCs,    Interval History:  Curlyius has finalized with Cryptococcus.  Location of infection remains unclear as patient has not been fungemic to this point.  Will begin induction therapy with IV amphotericin and oral flucytosine.  Discussed possible side effects with patient and his wife. They are in agreement with treatment.  Will be conducting close lab monitoring inpatient.  Not a safe regimen to do outpatient.  Strongly recommend inpatient treatment.  If CNS infection confirmed will need 14 days of induction therapy followed by transition to oral therapy.  We will need lumbar puncture and brain MRI.  Will stop other antibiotics today.  Oncology updated.  Patient advocate, hospitalist, and nurse in the room at the time of discussion.     Review of Systems   Constitutional:  Positive for activity change, appetite change, chills and fever.   Musculoskeletal:   "Negative for back pain.   Neurological:  Negative for headaches.   All other systems reviewed and are negative.    Objective:     Vital Signs (Most Recent):  Temp: 97.3 °F (36.3 °C) (05/29/25 1148)  Pulse: 77 (05/29/25 1148)  Resp: 18 (05/29/25 1148)  BP: (!) 109/59 (05/29/25 1148)  SpO2: (!) 91 % (05/29/25 1148) Vital Signs (24h Range):  Temp:  [97.3 °F (36.3 °C)-102.1 °F (38.9 °C)] 97.3 °F (36.3 °C)  Pulse:  [] 77  Resp:  [17-18] 18  SpO2:  [91 %-94 %] 91 %  BP: (102-130)/(57-75) 109/59     Weight: 86.3 kg (190 lb 4.1 oz)  Body mass index is 26.54 kg/m².    Estimated Creatinine Clearance: 77.4 mL/min (based on SCr of 1 mg/dL).     Physical Exam  Constitutional:       General: He is not in acute distress.     Appearance: Normal appearance. He is not ill-appearing.   Cardiovascular:      Rate and Rhythm: Normal rate and regular rhythm.      Pulses: Normal pulses.      Heart sounds: Normal heart sounds. No murmur heard.     No friction rub. No gallop.   Pulmonary:      Effort: Pulmonary effort is normal. No respiratory distress.      Breath sounds: Normal breath sounds.   Abdominal:      General: Abdomen is flat. Bowel sounds are normal. There is no distension.      Palpations: Abdomen is soft.      Tenderness: There is no abdominal tenderness.   Skin:     General: Skin is warm and dry.   Neurological:      Mental Status: He is alert.          Significant Labs: Blood Culture: No results for input(s): "LABBLOO" in the last 4320 hours.  CBC:   Recent Labs   Lab 05/28/25  0335   WBC 2.23*   HGB 11.9*   HCT 35.7*   *     CMP: No results for input(s): "NA", "K", "CL", "CO2", "GLU", "BUN", "CREATININE", "CALCIUM", "PROT", "ALBUMIN", "BILITOT", "ALKPHOS", "AST", "ALT", "ANIONGAP", "EGFRNONAA" in the last 48 hours.    Invalid input(s): "ESTGFAFRICA"  Microbiology Results (last 7 days)       Procedure Component Value Units Date/Time    CSF culture [[id number]]     Order Status: Sent Specimen: CSF (Spinal Fluid) " from CSF Tap, Tube 1     Fungus culture [[id number]]     Order Status: Sent Specimen: CSF (Spinal Fluid) from Cerebrospinal Fluid     Christiana Ink (CSF) [[id number]]     Order Status: Sent Specimen: CSF (Spinal Fluid) from CSF Tap, Tube 1     Cryptococcal antigen [[id number]]     Order Status: Sent Specimen: Blood, Venous     Blood culture [[id number]]  (Normal) Collected: 05/25/25 1026    Order Status: Completed Specimen: Blood from Peripheral, Antecubital, Right Updated: 05/28/25 1801     Blood Culture No Growth After 72 Hours    Blood culture [[id number]]  (Normal) Collected: 05/25/25 1025    Order Status: Completed Specimen: Blood from Peripheral, Antecubital, Left Updated: 05/28/25 1801     Blood Culture No Growth After 72 Hours    Fungus culture [[id number]]     Order Status: Canceled Specimen: CSF (Spinal Fluid) from Cerebrospinal Fluid     CSF culture [[id number]]     Order Status: Canceled Specimen: CSF (Spinal Fluid) from CSF Tap, Tube 1     Blood culture [[id number]]  (Normal) Collected: 05/17/25 0029    Order Status: Completed Specimen: Blood from Peripheral, Antecubital, Right Updated: 05/22/25 2201     Blood Culture No Growth After 5 Days    Blood culture x two cultures. Draw prior to antibiotics. [[id number]]  (Normal) Collected: 05/16/25 2340    Order Status: Completed Specimen: Blood from Peripheral, Forearm, Right Updated: 05/22/25 2201     Blood Culture No Growth After 5 Days          All pertinent labs within the past 24 hours have been reviewed.    Significant Imaging: None

## 2025-05-29 NOTE — ASSESSMENT & PLAN NOTE
Patient diagnosed with hairy cell leukemia.  At this time no treatment has been started because of parameters with laboratory studies if an ANC greater than a 1000 variant patient is admitted now for several days with a history of fever workup to date being seen by infectious disease has failed to reveal a source could hairy cell be the source of fever absolutely at this particular point I agree with proceeding with the NM scan variant will discuss with leukemia service [facility] he has been seen by Dr. Delgado Guzman as for indium scan is negative consideration of primary treatment for hairy cell leukemia variant will discuss further as more information evolves  05/29/2025 variant patient states that he is feeling much better this morning unless fatigue no fever at this time would recommend I will be talking to Dr. Guzman leukemia Service to see in terms of recommendations for treatment in talked to the patient briefly about treatment would drugs such as 2D CA Rituxan.  Discussed implications of answered questions with him awaiting results of indium scan clearance by Infectious Disease to make sure all infectious etiologies resolved at this point before considering fever related to progression of hairy cell leukemia

## 2025-05-29 NOTE — PLAN OF CARE
Call button within reach. Patient Temp max 100.4. PRN medication given per MD order. Patient ambulated to the bathroom throughout shift.  Goal: Plan of Care Review  Outcome: Progressing  Goal: Patient-Specific Goal (Individualized)  Outcome: Progressing  Goal: Absence of Hospital-Acquired Illness or Injury  Outcome: Progressing  Goal: Optimal Comfort and Wellbeing  Outcome: Progressing  Goal: Readiness for Transition of Care  Outcome: Progressing     Problem: Fall Injury Risk  Goal: Absence of Fall and Fall-Related Injury  Outcome: Progressing     Problem: Infection  Goal: Absence of Infection Signs and Symptoms  Outcome: Progressing     Problem: Neutropenia  Goal: Absence of Infection  Outcome: Progressing

## 2025-05-29 NOTE — ASSESSMENT & PLAN NOTE
--organism has been detected by Karius test  --would certainly explain the ongoing fevers  --will need to determine location of infection  --recommend lumbar puncture and brain MRI with and without contrast as soon as possible  --will send CSF for cell count, glucose, protein, kandice ink, cryptococcal antigen, me panel, fungal culture, and bacterial culture  --please measure opening pressure as this is very important in the treatment of cryptococcus  --for now will begin induction therapy using IV amphotericin and oral flucytosine  --require drug toxicity monitoring; need daily cbc and cmp for electrolytes/renal function/blood counts  --induction therapy will be for 14 days if CNS infection confirmed  --would then be followed by 8 weeks of consolidation therapy with high-dose fluconazole followed by 12 months of maintenance therapy with lower dose fluconazole  --ultimately treatment course will be determined by location of infection  --induction therapy would need to be completed inpatient due to highly toxic nature of both drugs  --Above d/w primary team and oncology as well as patient/wife   Patient is calling in to confirm the address to his upcoming appt     I have confirmed     [address] [place] [address], [phone]

## 2025-05-29 NOTE — SUBJECTIVE & OBJECTIVE
Interval History:  Patient states that he is feeling better resting more comfortably    Oncology Treatment Plan:   [No matching plan found]    Medications:  Continuous Infusions:  Scheduled Meds:   cetirizine  10 mg Oral Daily    DAPTOmycin (CUBICIN) IV (PEDS and ADULTS)  700 mg Intravenous Q24H    doxycycline  100 mg Oral Q12H    fluticasone propionate  2 spray Each Nostril Daily    meropenem IV (PEDS and ADULTS)  2 g Intravenous Q8H    pantoprazole  40 mg Oral Daily    rivaroxaban  20 mg Oral Daily with dinner    tamsulosin  0.4 mg Oral Daily     PRN Meds:  Current Facility-Administered Medications:     acetaminophen, 650 mg, Oral, Q4H PRN    albuterol-ipratropium, 3 mL, Nebulization, Q6H PRN    aluminum-magnesium hydroxide-simethicone, 30 mL, Oral, QID PRN    benzonatate, 100 mg, Oral, TID PRN    dextrose 50%, 12.5 g, Intravenous, PRN    dextrose 50%, 25 g, Intravenous, PRN    glucagon (human recombinant), 1 mg, Intramuscular, PRN    glucose, 16 g, Oral, PRN    glucose, 24 g, Oral, PRN    HYDROcodone-acetaminophen, 1 tablet, Oral, Q6H PRN    melatonin, 6 mg, Oral, Nightly PRN    morphine, 2 mg, Intravenous, Q4H PRN    naloxone, 0.02 mg, Intravenous, PRN    ondansetron, 4 mg, Intravenous, Q8H PRN    promethazine, 25 mg, Oral, Q6H PRN    senna-docusate, 1 tablet, Oral, BID PRN    sodium chloride 0.9%, 10 mL, Intravenous, Q12H PRN     Review of Systems   Constitutional:  Positive for fatigue. Negative for activity change, appetite change, chills, diaphoresis, fever and unexpected weight change.   HENT:  Negative for congestion, dental problem, drooling, ear discharge, ear pain, facial swelling, hearing loss, mouth sores, nosebleeds, postnasal drip, rhinorrhea, sinus pressure, sneezing, sore throat, tinnitus, trouble swallowing and voice change.    Eyes:  Negative for photophobia, pain, discharge, redness, itching and visual disturbance.   Respiratory:  Negative for apnea, cough, choking, chest tightness, shortness  of breath, wheezing and stridor.    Cardiovascular:  Negative for chest pain, palpitations and leg swelling.   Gastrointestinal:  Negative for abdominal distention, abdominal pain, anal bleeding, blood in stool, constipation, diarrhea, nausea, rectal pain and vomiting.   Endocrine: Negative for cold intolerance, heat intolerance, polydipsia, polyphagia and polyuria.   Genitourinary:  Negative for decreased urine volume, difficulty urinating, dysuria, enuresis, flank pain, frequency, genital sores, hematuria, penile discharge, penile pain, penile swelling, scrotal swelling, testicular pain and urgency.   Musculoskeletal:  Negative for arthralgias, back pain, gait problem, joint swelling, myalgias, neck pain and neck stiffness.   Skin:  Negative for color change, pallor, rash and wound.   Allergic/Immunologic: Negative for environmental allergies, food allergies and immunocompromised state.   Neurological:  Positive for weakness. Negative for dizziness, tremors, seizures, syncope, facial asymmetry, speech difficulty, light-headedness, numbness and headaches.   Hematological:  Negative for adenopathy. Does not bruise/bleed easily.   Psychiatric/Behavioral:  Negative for agitation, behavioral problems, confusion, decreased concentration, dysphoric mood, hallucinations, self-injury, sleep disturbance and suicidal ideas. The patient is not nervous/anxious and is not hyperactive.      Objective:     Vital Signs (Most Recent):  Temp: 99.3 °F (37.4 °C) (05/29/25 0411)  Pulse: 80 (05/29/25 0419)  Resp: 17 (05/29/25 0411)  BP: 117/68 (05/29/25 0411)  SpO2: (!) 93 % (05/29/25 0411) Vital Signs (24h Range):  Temp:  [98.4 °F (36.9 °C)-102.1 °F (38.9 °C)] 99.3 °F (37.4 °C)  Pulse:  [] 80  Resp:  [17-18] 17  SpO2:  [91 %-95 %] 93 %  BP: (102-130)/(55-75) 117/68     Weight: 86.3 kg (190 lb 4.1 oz)  Body mass index is 26.54 kg/m².  Body surface area is 2.08 meters squared.      Intake/Output Summary (Last 24 hours) at 5/29/2025  0706  Last data filed at 5/28/2025 1829  Gross per 24 hour   Intake 184.48 ml   Output --   Net 184.48 ml        Physical Exam  Vitals reviewed.   Constitutional:       General: He is not in acute distress.     Appearance: He is well-developed. He is ill-appearing. He is not diaphoretic.   HENT:      Head: Normocephalic.      Right Ear: External ear normal.      Left Ear: External ear normal.      Nose: Nose normal.      Right Sinus: No maxillary sinus tenderness or frontal sinus tenderness.      Left Sinus: No maxillary sinus tenderness or frontal sinus tenderness.      Mouth/Throat:      Pharynx: No oropharyngeal exudate.   Eyes:      General: Lids are normal. No scleral icterus.        Right eye: No discharge.         Left eye: No discharge.      Extraocular Movements:      Right eye: Normal extraocular motion.      Left eye: Normal extraocular motion.      Conjunctiva/sclera:      Right eye: Right conjunctiva is not injected. No hemorrhage.     Left eye: Left conjunctiva is not injected. No hemorrhage.     Pupils: Pupils are equal, round, and reactive to light.   Neck:      Thyroid: No thyromegaly.      Vascular: No JVD.      Trachea: No tracheal deviation.   Cardiovascular:      Rate and Rhythm: Normal rate.   Pulmonary:      Effort: Pulmonary effort is normal. No respiratory distress.      Breath sounds: No stridor.   Abdominal:      General: Bowel sounds are normal.      Palpations: Abdomen is soft. There is no hepatomegaly, splenomegaly or mass.      Tenderness: There is no abdominal tenderness.   Musculoskeletal:         General: No tenderness. Normal range of motion.      Cervical back: Normal range of motion and neck supple.   Lymphadenopathy:      Head:      Right side of head: No posterior auricular or occipital adenopathy.      Left side of head: No posterior auricular or occipital adenopathy.      Cervical: No cervical adenopathy.      Right cervical: No superficial, deep or posterior cervical  "adenopathy.     Left cervical: No superficial, deep or posterior cervical adenopathy.      Upper Body:      Right upper body: No supraclavicular adenopathy.      Left upper body: No supraclavicular adenopathy.   Skin:     General: Skin is dry.      Findings: No erythema or rash.      Nails: There is no clubbing.   Neurological:      Mental Status: He is alert and oriented to person, place, and time.      Cranial Nerves: No cranial nerve deficit.      Coordination: Coordination normal.   Psychiatric:         Behavior: Behavior normal.         Thought Content: Thought content normal.         Judgment: Judgment normal.          Significant Labs:   BMP: No results for input(s): "GLU", "NA", "K", "CL", "CO2", "BUN", "CREATININE", "CALCIUM", "MG" in the last 48 hours., CBC:   Recent Labs   Lab 05/28/25  0335   WBC 2.23*   HGB 11.9*   HCT 35.7*   *   , CMP: No results for input(s): "NA", "K", "CL", "CO2", "GLU", "BUN", "CREATININE", "CALCIUM", "PROT", "ALBUMIN", "BILITOT", "ALKPHOS", "AST", "ALT", "ANIONGAP", "EGFRNONAA" in the last 48 hours.    Invalid input(s): "ESTGFAFRICA", Coagulation:   Recent Labs   Lab 05/27/25  1721   INR 1.5*   , Haptoglobin: No results for input(s): "HAPTOGLOBIN" in the last 48 hours., Immunology: No results for input(s): "SPEP", "JOEL", "SELVIN", "FREELAMBDALI" in the last 48 hours., LDH: No results for input(s): "LDHCSF", "BFSOURCE" in the last 48 hours., LFTs: No results for input(s): "ALT", "AST", "ALKPHOS", "BILITOT", "PROT", "ALBUMIN" in the last 48 hours., Reticulocytes: No results for input(s): "RETIC" in the last 48 hours., Tumor Markers: No results for input(s): "PSA", "CEA", "", "AFPTM", "QA6760", "" in the last 48 hours.    Invalid input(s): "ALGTM", Uric Acid No results for input(s): "URICACID" in the last 48 hours., and Urine Studies: No results for input(s): "COLORU", "APPEARANCEUA", "PHUR", "SPECGRAV", "PROTEINUA", "GLUCUA", "KETONESU", "BILIRUBINUA", "OCCULTUA", " ""NITRITE", "UROBILINOGEN", "LEUKOCYTESUR", "RBCUA", "WBCUA", "BACTERIA", "SQUAMEPITHEL", "HYALINECASTS" in the last 48 hours.    Invalid input(s): "SEANSUR"    Diagnostic Results:  I have reviewed all pertinent imaging results/findings within the past 24 hours.  "

## 2025-05-29 NOTE — PLAN OF CARE
Problem: Adult Inpatient Plan of Care  Goal: Plan of Care Review  Outcome: Progressing  Goal: Patient-Specific Goal (Individualized)  Outcome: Progressing  Goal: Absence of Hospital-Acquired Illness or Injury  Outcome: Progressing  Goal: Optimal Comfort and Wellbeing  Outcome: Progressing  Goal: Readiness for Transition of Care  Outcome: Progressing     Problem: Fall Injury Risk  Goal: Absence of Fall and Fall-Related Injury  Outcome: Progressing     Problem: Infection  Goal: Absence of Infection Signs and Symptoms  Outcome: Progressing     Problem: Neutropenia  Goal: Absence of Infection  Outcome: Progressing      Anticipated Discharge Disposition: Home with continuous O2    Action: Order for continuous O2 received. Spoke with the patient at the bedside about DME companies for O2. The patient picked [employer] and signed the choice form.   Choice form faxed to Rita PRINCE at [phone].    Barriers to Discharge: O2 delivery    Plan: as above

## 2025-05-29 NOTE — SUBJECTIVE & OBJECTIVE
Interval History:  Curlyius has finalized with Cryptococcus.  Location of infection remains unclear as patient has not been fungemic to this point.  Will begin induction therapy with IV amphotericin and oral flucytosine.  Discussed possible side effects with patient and his wife. They are in agreement with treatment.  Will be conducting close lab monitoring inpatient.  Not a safe regimen to do outpatient.  Strongly recommend inpatient treatment.  If CNS infection confirmed will need 14 days of induction therapy followed by transition to oral therapy.  We will need lumbar puncture and brain MRI.  Will stop other antibiotics today.  Oncology updated.  Patient advocate, hospitalist, and nurse in the room at the time of discussion.     Review of Systems   Constitutional:  Positive for activity change, appetite change, chills and fever.   Musculoskeletal:  Negative for back pain.   Neurological:  Negative for headaches.   All other systems reviewed and are negative.    Objective:     Vital Signs (Most Recent):  Temp: 97.3 °F (36.3 °C) (05/29/25 1148)  Pulse: 77 (05/29/25 1148)  Resp: 18 (05/29/25 1148)  BP: (!) 109/59 (05/29/25 1148)  SpO2: (!) 91 % (05/29/25 1148) Vital Signs (24h Range):  Temp:  [97.3 °F (36.3 °C)-102.1 °F (38.9 °C)] 97.3 °F (36.3 °C)  Pulse:  [] 77  Resp:  [17-18] 18  SpO2:  [91 %-94 %] 91 %  BP: (102-130)/(57-75) 109/59     Weight: 86.3 kg (190 lb 4.1 oz)  Body mass index is 26.54 kg/m².    Estimated Creatinine Clearance: 77.4 mL/min (based on SCr of 1 mg/dL).     Physical Exam  Constitutional:       General: He is not in acute distress.     Appearance: Normal appearance. He is not ill-appearing.   Cardiovascular:      Rate and Rhythm: Normal rate and regular rhythm.      Pulses: Normal pulses.      Heart sounds: Normal heart sounds. No murmur heard.     No friction rub. No gallop.   Pulmonary:      Effort: Pulmonary effort is normal. No respiratory distress.      Breath sounds: Normal breath  "sounds.   Abdominal:      General: Abdomen is flat. Bowel sounds are normal. There is no distension.      Palpations: Abdomen is soft.      Tenderness: There is no abdominal tenderness.   Skin:     General: Skin is warm and dry.   Neurological:      Mental Status: He is alert.          Significant Labs: Blood Culture: No results for input(s): "LABBLOO" in the last 4320 hours.  CBC:   Recent Labs   Lab 05/28/25  0335   WBC 2.23*   HGB 11.9*   HCT 35.7*   *     CMP: No results for input(s): "NA", "K", "CL", "CO2", "GLU", "BUN", "CREATININE", "CALCIUM", "PROT", "ALBUMIN", "BILITOT", "ALKPHOS", "AST", "ALT", "ANIONGAP", "EGFRNONAA" in the last 48 hours.    Invalid input(s): "ESTGFAFRICA"  Microbiology Results (last 7 days)       Procedure Component Value Units Date/Time    CSF culture [[id number]]     Order Status: Sent Specimen: CSF (Spinal Fluid) from CSF Tap, Tube 1     Fungus culture [[id number]]     Order Status: Sent Specimen: CSF (Spinal Fluid) from Cerebrospinal Fluid     Christiana Ink (CSF) [[id number]]     Order Status: Sent Specimen: CSF (Spinal Fluid) from CSF Tap, Tube 1     Cryptococcal antigen [[id number]]     Order Status: Sent Specimen: Blood, Venous     Blood culture [[id number]]  (Normal) Collected: 05/25/25 1026    Order Status: Completed Specimen: Blood from Peripheral, Antecubital, Right Updated: 05/28/25 1801     Blood Culture No Growth After 72 Hours    Blood culture [[id number]]  (Normal) Collected: 05/25/25 1025    Order Status: Completed Specimen: Blood from Peripheral, Antecubital, Left Updated: 05/28/25 1801     Blood Culture No Growth After 72 Hours    Fungus culture [[id number]]     Order Status: Canceled Specimen: CSF (Spinal Fluid) from Cerebrospinal Fluid     CSF culture [[id number]]     Order Status: Canceled Specimen: CSF (Spinal Fluid) from CSF Tap, Tube 1     Blood culture [[id number]]  (Normal) Collected: 05/17/25 0029    Order Status: Completed Specimen: Blood from Peripheral, " Antecubital, Right Updated: 05/22/25 2201     Blood Culture No Growth After 5 Days    Blood culture x two cultures. Draw prior to antibiotics. [[id number]]  (Normal) Collected: 05/16/25 2340    Order Status: Completed Specimen: Blood from Peripheral, Forearm, Right Updated: 05/22/25 2201     Blood Culture No Growth After 5 Days          All pertinent labs within the past 24 hours have been reviewed.    Significant Imaging: None

## 2025-05-29 NOTE — PROGRESS NOTES
[facility] - Med S[facility]  Hematology/Oncology  Progress Note    Patient Name: Armando Ingram Jr.  Admission Date: 5/16/2025  Hospital Length of Stay: 12 days  Code Status: Full Code     Subjective:     HPI:  65-year-old male diagnosed with hairy cell leukemia in 2024.  Patient has a previous colon cancer stage I patient has been seen and evaluated at that time by Dr. Delgado Guzman patient was recommended for observation since ANC is always remained above a 1000 patient has been admitted now to the hospital for evaluation fever workup to date is failed to reveal source current patient is having an indium scan done we are asked to see the patient for further evaluation ECOG status 2    Interval History:  Patient states that he is feeling better resting more comfortably    Oncology Treatment Plan:   [No matching plan found]    Medications:  Continuous Infusions:  Scheduled Meds:   cetirizine  10 mg Oral Daily    DAPTOmycin (CUBICIN) IV (PEDS and ADULTS)  700 mg Intravenous Q24H    doxycycline  100 mg Oral Q12H    fluticasone propionate  2 spray Each Nostril Daily    meropenem IV (PEDS and ADULTS)  2 g Intravenous Q8H    pantoprazole  40 mg Oral Daily    rivaroxaban  20 mg Oral Daily with dinner    tamsulosin  0.4 mg Oral Daily     PRN Meds:  Current Facility-Administered Medications:     acetaminophen, 650 mg, Oral, Q4H PRN    albuterol-ipratropium, 3 mL, Nebulization, Q6H PRN    aluminum-magnesium hydroxide-simethicone, 30 mL, Oral, QID PRN    benzonatate, 100 mg, Oral, TID PRN    dextrose 50%, 12.5 g, Intravenous, PRN    dextrose 50%, 25 g, Intravenous, PRN    glucagon (human recombinant), 1 mg, Intramuscular, PRN    glucose, 16 g, Oral, PRN    glucose, 24 g, Oral, PRN    HYDROcodone-acetaminophen, 1 tablet, Oral, Q6H PRN    melatonin, 6 mg, Oral, Nightly PRN    morphine, 2 mg, Intravenous, Q4H PRN    naloxone, 0.02 mg, Intravenous, PRN    ondansetron, 4 mg, Intravenous, Q8H PRN    promethazine, 25 mg, Oral, Q6H PRN     senna-docusate, 1 tablet, Oral, BID PRN    sodium chloride 0.9%, 10 mL, Intravenous, Q12H PRN     Review of Systems   Constitutional:  Positive for fatigue. Negative for activity change, appetite change, chills, diaphoresis, fever and unexpected weight change.   HENT:  Negative for congestion, dental problem, drooling, ear discharge, ear pain, facial swelling, hearing loss, mouth sores, nosebleeds, postnasal drip, rhinorrhea, sinus pressure, sneezing, sore throat, tinnitus, trouble swallowing and voice change.    Eyes:  Negative for photophobia, pain, discharge, redness, itching and visual disturbance.   Respiratory:  Negative for apnea, cough, choking, chest tightness, shortness of breath, wheezing and stridor.    Cardiovascular:  Negative for chest pain, palpitations and leg swelling.   Gastrointestinal:  Negative for abdominal distention, abdominal pain, anal bleeding, blood in stool, constipation, diarrhea, nausea, rectal pain and vomiting.   Endocrine: Negative for cold intolerance, heat intolerance, polydipsia, polyphagia and polyuria.   Genitourinary:  Negative for decreased urine volume, difficulty urinating, dysuria, enuresis, flank pain, frequency, genital sores, hematuria, penile discharge, penile pain, penile swelling, scrotal swelling, testicular pain and urgency.   Musculoskeletal:  Negative for arthralgias, back pain, gait problem, joint swelling, myalgias, neck pain and neck stiffness.   Skin:  Negative for color change, pallor, rash and wound.   Allergic/Immunologic: Negative for environmental allergies, food allergies and immunocompromised state.   Neurological:  Positive for weakness. Negative for dizziness, tremors, seizures, syncope, facial asymmetry, speech difficulty, light-headedness, numbness and headaches.   Hematological:  Negative for adenopathy. Does not bruise/bleed easily.   Psychiatric/Behavioral:  Negative for agitation, behavioral problems, confusion, decreased concentration,  dysphoric mood, hallucinations, self-injury, sleep disturbance and suicidal ideas. The patient is not nervous/anxious and is not hyperactive.      Objective:     Vital Signs (Most Recent):  Temp: 99.3 °F (37.4 °C) (05/29/25 0411)  Pulse: 80 (05/29/25 0419)  Resp: 17 (05/29/25 0411)  BP: 117/68 (05/29/25 0411)  SpO2: (!) 93 % (05/29/25 0411) Vital Signs (24h Range):  Temp:  [98.4 °F (36.9 °C)-102.1 °F (38.9 °C)] 99.3 °F (37.4 °C)  Pulse:  [] 80  Resp:  [17-18] 17  SpO2:  [91 %-95 %] 93 %  BP: (102-130)/(55-75) 117/68     Weight: 86.3 kg (190 lb 4.1 oz)  Body mass index is 26.54 kg/m².  Body surface area is 2.08 meters squared.      Intake/Output Summary (Last 24 hours) at 5/29/2025 0706  Last data filed at 5/28/2025 1829  Gross per 24 hour   Intake 184.48 ml   Output --   Net 184.48 ml        Physical Exam  Vitals reviewed.   Constitutional:       General: He is not in acute distress.     Appearance: He is well-developed. He is ill-appearing. He is not diaphoretic.   HENT:      Head: Normocephalic.      Right Ear: External ear normal.      Left Ear: External ear normal.      Nose: Nose normal.      Right Sinus: No maxillary sinus tenderness or frontal sinus tenderness.      Left Sinus: No maxillary sinus tenderness or frontal sinus tenderness.      Mouth/Throat:      Pharynx: No oropharyngeal exudate.   Eyes:      General: Lids are normal. No scleral icterus.        Right eye: No discharge.         Left eye: No discharge.      Extraocular Movements:      Right eye: Normal extraocular motion.      Left eye: Normal extraocular motion.      Conjunctiva/sclera:      Right eye: Right conjunctiva is not injected. No hemorrhage.     Left eye: Left conjunctiva is not injected. No hemorrhage.     Pupils: Pupils are equal, round, and reactive to light.   Neck:      Thyroid: No thyromegaly.      Vascular: No JVD.      Trachea: No tracheal deviation.   Cardiovascular:      Rate and Rhythm: Normal rate.   Pulmonary:       "Effort: Pulmonary effort is normal. No respiratory distress.      Breath sounds: No stridor.   Abdominal:      General: Bowel sounds are normal.      Palpations: Abdomen is soft. There is no hepatomegaly, splenomegaly or mass.      Tenderness: There is no abdominal tenderness.   Musculoskeletal:         General: No tenderness. Normal range of motion.      Cervical back: Normal range of motion and neck supple.   Lymphadenopathy:      Head:      Right side of head: No posterior auricular or occipital adenopathy.      Left side of head: No posterior auricular or occipital adenopathy.      Cervical: No cervical adenopathy.      Right cervical: No superficial, deep or posterior cervical adenopathy.     Left cervical: No superficial, deep or posterior cervical adenopathy.      Upper Body:      Right upper body: No supraclavicular adenopathy.      Left upper body: No supraclavicular adenopathy.   Skin:     General: Skin is dry.      Findings: No erythema or rash.      Nails: There is no clubbing.   Neurological:      Mental Status: He is alert and oriented to person, place, and time.      Cranial Nerves: No cranial nerve deficit.      Coordination: Coordination normal.   Psychiatric:         Behavior: Behavior normal.         Thought Content: Thought content normal.         Judgment: Judgment normal.          Significant Labs:   BMP: No results for input(s): "GLU", "NA", "K", "CL", "CO2", "BUN", "CREATININE", "CALCIUM", "MG" in the last 48 hours., CBC:   Recent Labs   Lab 05/28/25  0335   WBC 2.23*   HGB 11.9*   HCT 35.7*   *   , CMP: No results for input(s): "NA", "K", "CL", "CO2", "GLU", "BUN", "CREATININE", "CALCIUM", "PROT", "ALBUMIN", "BILITOT", "ALKPHOS", "AST", "ALT", "ANIONGAP", "EGFRNONAA" in the last 48 hours.    Invalid input(s): "ESTGFAFRICA", Coagulation:   Recent Labs   Lab 05/27/25  1721   INR 1.5*   , Haptoglobin: No results for input(s): "HAPTOGLOBIN" in the last 48 hours., Immunology: No results " "for input(s): "SPEP", "JOEL", "SELVIN", "FREELAMBDALI" in the last 48 hours., LDH: No results for input(s): "LDHCSF", "BFSOURCE" in the last 48 hours., LFTs: No results for input(s): "ALT", "AST", "ALKPHOS", "BILITOT", "PROT", "ALBUMIN" in the last 48 hours., Reticulocytes: No results for input(s): "RETIC" in the last 48 hours., Tumor Markers: No results for input(s): "PSA", "CEA", "", "AFPTM", "UB2089", "" in the last 48 hours.    Invalid input(s): "ALGTM", Uric Acid No results for input(s): "URICACID" in the last 48 hours., and Urine Studies: No results for input(s): "COLORU", "APPEARANCEUA", "PHUR", "SPECGRAV", "PROTEINUA", "GLUCUA", "KETONESU", "BILIRUBINUA", "OCCULTUA", "NITRITE", "UROBILINOGEN", "LEUKOCYTESUR", "RBCUA", "WBCUA", "BACTERIA", "SQUAMEPITHEL", "HYALINECASTS" in the last 48 hours.    Invalid input(s): "WRIGHTSUR"    Diagnostic Results:  I have reviewed all pertinent imaging results/findings within the past 24 hours.  Assessment/Plan:     FUO (fever of unknown origin)  Patient has FU 0 with underlying hairy cell leukemia    History of DVT (deep vein thrombosis)  No evidence of DVT on recent ultrasound    Pancytopenia  Agree with the broad-spectrum antibiotic at this time but failed to elicit cause ANC greater than a 1000    Hairy cell leukemia  Patient diagnosed with hairy cell leukemia.  At this time no treatment has been started because of parameters with laboratory studies if an ANC greater than a 1000 variant patient is admitted now for several days with a history of fever workup to date being seen by infectious disease has failed to reveal a source could hairy cell be the source of fever absolutely at this particular point I agree with proceeding with the NM scan variant will discuss with leukemia service [facility] he has been seen by Dr. Delgado Guzman as for indium scan is negative consideration of primary treatment for hairy cell leukemia variant will discuss " further as more information evolves  05/29/2025 variant patient states that he is feeling much better this morning unless fatigue no fever at this time would recommend I will be talking to Dr. Guzman leukemia Service to see in terms of recommendations for treatment in talked to the patient briefly about treatment would drugs such as 2D CA Rituxan.  Discussed implications of answered questions with him awaiting results of indium scan clearance by Infectious Disease to make sure all infectious etiologies resolved at this point before considering fever related to progression of hairy cell leukemia; 4591 SPOKE WITH DR. GUZMAN TODAY WHO AGREES WITH TREATMENT WITH 2D CEA ONCE ALL INFECTIOUS DISEASE HAS BEEN CLEARED IN THE OUTPATIENT SETTING        Thank you for your consult. I will follow-up with patient. Please contact us if you have any additional questions.     Samuel Dow MD  Hematology/Oncology  [facility]

## 2025-05-29 NOTE — ASSESSMENT & PLAN NOTE
CXR with NAF, Flu/Covid neg   Possible UTI   Resp viral panel negative   Urine and blood cultures NGTD   Monitor VS   Hematology/Oncology consulted and following   ID consult due to persistent fever   Cefepime changed to IV Merrem, Zyvox discontinued   CT abd pelvis with bladder thickening and IVC filter but no other source of infection. TTE with no valvular abnormalities. BLE duplex showed chronic RLE DVT   Indium scan ordered by ID but radiology unable to obtain needed material for scan until 05/26 and scan cannot be completed until 5/27    Karius testing pending per Dr. Laguna  Discussed with Dr. Laguna 05/25- he recommends repeat blood cultures, change IV Cefepime to Merrem   Encourage IS and Oob as able     Blood cultures negative growth to date   Positive for cryptococcal infection  Mri brain pending  Lumbar puncture pending  Hold xarelto  Indium scan pending  Antimicrobials per infectious disease

## 2025-05-29 NOTE — ASSESSMENT & PLAN NOTE
- BLE duplex with chronic RLE DVT    -prior hx of IVC filter in 2002   Hold xarelto for lumbar puncture

## 2025-05-30 LAB
ABSOLUTE EOSINOPHIL (OHS): 0.03 K/UL
ABSOLUTE MONOCYTE (OHS): 0.04 K/UL (ref 0.3–1)
ABSOLUTE NEUTROPHIL COUNT (OHS): 1.65 K/UL (ref 1.8–7.7)
ANION GAP (OHS): 8 MMOL/L (ref 8–16)
BACTERIA BLD CULT: NORMAL
BACTERIA BLD CULT: NORMAL
BASOPHILS # BLD AUTO: 0.01 K/UL
BASOPHILS NFR BLD AUTO: 0.3 %
BUN SERPL-MCNC: 17 MG/DL (ref 8–23)
CALCIUM SERPL-MCNC: 7.8 MG/DL (ref 8.7–10.5)
CHLORIDE SERPL-SCNC: 106 MMOL/L (ref 95–110)
CLARITY CSF: CLEAR
CO2 SERPL-SCNC: 20 MMOL/L (ref 23–29)
COLOR CSF: COLORLESS
CREAT SERPL-MCNC: 0.8 MG/DL (ref 0.5–1.4)
CRYPTOC AG SER QL IA.RAPID: NEGATIVE
CSF TUBE NUMBER (OHS): 1
CSF TUBE NUMBER (OHS): 1
ERYTHROCYTE [DISTWIDTH] IN BLOOD BY AUTOMATED COUNT: 14.6 % (ref 11.5–14.5)
GFR SERPLBLD CREATININE-BSD FMLA CKD-EPI: >60 ML/MIN/1.73/M2
GLUCOSE CSF-MCNC: 53 MG/DL (ref 40–70)
GLUCOSE SERPL-MCNC: 107 MG/DL (ref 70–110)
HCT VFR BLD AUTO: 34.1 % (ref 40–54)
HGB BLD-MCNC: 11.6 GM/DL (ref 14–18)
IMM GRANULOCYTES # BLD AUTO: 0.05 K/UL (ref 0–0.04)
IMM GRANULOCYTES NFR BLD AUTO: 1.7 % (ref 0–0.5)
LYMPHOCYTES # BLD AUTO: 1.11 K/UL (ref 1–4.8)
MCH RBC QN AUTO: 31.6 PG (ref 27–31)
MCHC RBC AUTO-ENTMCNC: 34 G/DL (ref 32–36)
MCV RBC AUTO: 93 FL (ref 82–98)
NUCLEATED RBC (/100WBC) (OHS): 0 /100 WBC
PLATELET # BLD AUTO: 185 K/UL (ref 150–450)
PMV BLD AUTO: 9.4 FL (ref 9.2–12.9)
POTASSIUM SERPL-SCNC: 4 MMOL/L (ref 3.5–5.1)
PROT CSF-MCNC: 42 MG/DL (ref 15–40)
RBC # BLD AUTO: 3.67 M/UL (ref 4.6–6.2)
RBC # CSF: 0 /CU MM
RELATIVE EOSINOPHIL (OHS): 1 %
RELATIVE LYMPHOCYTE (OHS): 38.4 % (ref 18–48)
RELATIVE MONOCYTE (OHS): 1.4 % (ref 4–15)
RELATIVE NEUTROPHIL (OHS): 57.2 % (ref 38–73)
SODIUM SERPL-SCNC: 134 MMOL/L (ref 136–145)
SPECIMEN VOL CSF: 2 ML
WBC # BLD AUTO: 2.89 K/UL (ref 3.9–12.7)
WBC # CSF: 3 /CU MM

## 2025-05-30 PROCEDURE — 63600175 PHARM REV CODE 636 W HCPCS: Mod: JZ,TB,HCNC | Performed by: STUDENT IN AN ORGANIZED HEALTH CARE EDUCATION/TRAINING PROGRAM

## 2025-05-30 PROCEDURE — 88108 CYTOPATH CONCENTRATE TECH: CPT | Mod: TC,HCNC | Performed by: STUDENT IN AN ORGANIZED HEALTH CARE EDUCATION/TRAINING PROGRAM

## 2025-05-30 PROCEDURE — 85025 COMPLETE CBC W/AUTO DIFF WBC: CPT | Mod: HCNC | Performed by: NURSE PRACTITIONER

## 2025-05-30 PROCEDURE — 87205 SMEAR GRAM STAIN: CPT | Mod: HCNC | Performed by: STUDENT IN AN ORGANIZED HEALTH CARE EDUCATION/TRAINING PROGRAM

## 2025-05-30 PROCEDURE — 89051 BODY FLUID CELL COUNT: CPT | Mod: HCNC | Performed by: STUDENT IN AN ORGANIZED HEALTH CARE EDUCATION/TRAINING PROGRAM

## 2025-05-30 PROCEDURE — 87483 CNS DNA AMP PROBE TYPE 12-25: CPT | Mod: HCNC | Performed by: STUDENT IN AN ORGANIZED HEALTH CARE EDUCATION/TRAINING PROGRAM

## 2025-05-30 PROCEDURE — 99232 SBSQ HOSP IP/OBS MODERATE 35: CPT | Mod: HCNC,,, | Performed by: INTERNAL MEDICINE

## 2025-05-30 PROCEDURE — 86255 FLUORESCENT ANTIBODY SCREEN: CPT | Mod: HCNC | Performed by: FAMILY MEDICINE

## 2025-05-30 PROCEDURE — 27000207 HC ISOLATION: Mod: HCNC

## 2025-05-30 PROCEDURE — 21400001 HC TELEMETRY ROOM: Mod: HCNC

## 2025-05-30 PROCEDURE — 87102 FUNGUS ISOLATION CULTURE: CPT | Mod: HCNC | Performed by: STUDENT IN AN ORGANIZED HEALTH CARE EDUCATION/TRAINING PROGRAM

## 2025-05-30 PROCEDURE — 84157 ASSAY OF PROTEIN OTHER: CPT | Mod: HCNC | Performed by: STUDENT IN AN ORGANIZED HEALTH CARE EDUCATION/TRAINING PROGRAM

## 2025-05-30 PROCEDURE — 82310 ASSAY OF CALCIUM: CPT | Mod: HCNC | Performed by: NURSE PRACTITIONER

## 2025-05-30 PROCEDURE — 36415 COLL VENOUS BLD VENIPUNCTURE: CPT | Mod: HCNC | Performed by: NURSE PRACTITIONER

## 2025-05-30 PROCEDURE — 25000003 PHARM REV CODE 250: Mod: HCNC | Performed by: INTERNAL MEDICINE

## 2025-05-30 PROCEDURE — 82945 GLUCOSE OTHER FLUID: CPT | Mod: HCNC | Performed by: STUDENT IN AN ORGANIZED HEALTH CARE EDUCATION/TRAINING PROGRAM

## 2025-05-30 PROCEDURE — 86403 PARTICLE AGGLUT ANTBDY SCRN: CPT | Mod: HCNC | Performed by: STUDENT IN AN ORGANIZED HEALTH CARE EDUCATION/TRAINING PROGRAM

## 2025-05-30 PROCEDURE — 25000003 PHARM REV CODE 250: Mod: HCNC | Performed by: HOSPITALIST

## 2025-05-30 PROCEDURE — 99233 SBSQ HOSP IP/OBS HIGH 50: CPT | Mod: HCNC,95,, | Performed by: STUDENT IN AN ORGANIZED HEALTH CARE EDUCATION/TRAINING PROGRAM

## 2025-05-30 PROCEDURE — A9570 INDIUM IN-111 AUTO WBC: HCPCS | Mod: JZ,TB,HCNC | Performed by: FAMILY MEDICINE

## 2025-05-30 PROCEDURE — 25000003 PHARM REV CODE 250: Mod: HCNC | Performed by: STUDENT IN AN ORGANIZED HEALTH CARE EDUCATION/TRAINING PROGRAM

## 2025-05-30 PROCEDURE — 88108 CYTOPATH CONCENTRATE TECH: CPT | Mod: 26,HCNC,, | Performed by: STUDENT IN AN ORGANIZED HEALTH CARE EDUCATION/TRAINING PROGRAM

## 2025-05-30 PROCEDURE — 009U3ZX DRAINAGE OF SPINAL CANAL, PERCUTANEOUS APPROACH, DIAGNOSTIC: ICD-10-PCS | Performed by: RADIOLOGY

## 2025-05-30 PROCEDURE — 87210 SMEAR WET MOUNT SALINE/INK: CPT | Mod: HCNC | Performed by: STUDENT IN AN ORGANIZED HEALTH CARE EDUCATION/TRAINING PROGRAM

## 2025-05-30 RX ADMIN — FLUCYTOSINE 2250 MG: 500 CAPSULE ORAL at 01:05

## 2025-05-30 RX ADMIN — CETIRIZINE HYDROCHLORIDE 10 MG: 10 TABLET, FILM COATED ORAL at 10:05

## 2025-05-30 RX ADMIN — DEXTROSE MONOHYDRATE: 5 INJECTION INTRAVENOUS at 05:05

## 2025-05-30 RX ADMIN — INDIUM IN-111 OXYQUINOLINE 0.31 MILLICURIE: 1 SOLUTION INTRAVENOUS at 01:05

## 2025-05-30 RX ADMIN — FLUCYTOSINE 2250 MG: 500 CAPSULE ORAL at 11:05

## 2025-05-30 RX ADMIN — PANTOPRAZOLE SODIUM 40 MG: 40 TABLET, DELAYED RELEASE ORAL at 10:05

## 2025-05-30 RX ADMIN — FLUCYTOSINE 2250 MG: 500 CAPSULE ORAL at 12:05

## 2025-05-30 RX ADMIN — DIPHENHYDRAMINE HYDROCHLORIDE 25 MG: 25 CAPSULE ORAL at 04:05

## 2025-05-30 RX ADMIN — TAMSULOSIN HYDROCHLORIDE 0.4 MG: 0.4 CAPSULE ORAL at 10:05

## 2025-05-30 RX ADMIN — FLUCYTOSINE 2250 MG: 500 CAPSULE ORAL at 06:05

## 2025-05-30 RX ADMIN — AMPHOTERICIN B 350 MG: 50 INJECTABLE, LIPOSOMAL INTRAVENOUS at 07:05

## 2025-05-30 RX ADMIN — SODIUM CHLORIDE 500 ML: 9 INJECTION, SOLUTION INTRAVENOUS at 04:05

## 2025-05-30 RX ADMIN — FLUCYTOSINE 2250 MG: 500 CAPSULE ORAL at 07:05

## 2025-05-30 NOTE — SUBJECTIVE & OBJECTIVE
Interval History: Afebrile overnight. Pleasant during interview. Will stop scheduled Tylenol and monitor fever curve. Patient will need to be hospitalized for 14 day course of induction management. Then will follow with consolidation treatment.    Review of Systems   Constitutional:  Negative for activity change, appetite change and fever.   Respiratory:  Negative for shortness of breath.    Gastrointestinal:  Negative for nausea and vomiting.   Allergic/Immunologic: Positive for immunocompromised state.     Objective:     Vital Signs (Most Recent):  Temp: 98 °F (36.7 °C) (05/30/25 1119)  Pulse: 89 (05/30/25 1119)  Resp: 18 (05/30/25 1119)  BP: (!) 121/56 (05/30/25 1119)  SpO2: 96 % (05/30/25 1119) Vital Signs (24h Range):  Temp:  [97.9 °F (36.6 °C)-98.5 °F (36.9 °C)] 98 °F (36.7 °C)  Pulse:  [] 89  Resp:  [18] 18  SpO2:  [92 %-96 %] 96 %  BP: (107-128)/(56-64) 121/56     Weight: 87.5 kg (192 lb 14.4 oz)  Body mass index is 26.9 kg/m².  No intake or output data in the 24 hours ending 05/30/25 1327      Physical Exam  Vitals and nursing note reviewed. Exam conducted with a chaperone present (spouse).   Constitutional:       General: He is not in acute distress.     Appearance: He is ill-appearing. He is not toxic-appearing.   HENT:      Head: Normocephalic and atraumatic.   Cardiovascular:      Rate and Rhythm: Normal rate.   Pulmonary:      Effort: Pulmonary effort is normal. No respiratory distress.   Abdominal:      Palpations: Abdomen is soft.      Tenderness: There is no abdominal tenderness.   Musculoskeletal:      Right lower leg: No edema.      Left lower leg: No edema.   Skin:     General: Skin is warm.   Neurological:      Mental Status: He is alert and oriented to person, place, and time.           Significant Labs: All pertinent labs within the past 24 hours have been reviewed.  CBC:   Recent Labs   Lab 05/30/25  1051   WBC 2.89*   HGB 11.6*   HCT 34.1*        CMP:   Recent Labs   Lab  05/29/25  1531 05/30/25  0953   NA  --  134*   K  --  4.0   CL  --  106   CO2  --  20*   GLU  --  107   BUN  --  17   CREATININE 0.9 0.8   CALCIUM  --  7.8*   ANIONGAP  --  8       Significant Imaging:  NM Inflammatory Whole body pending

## 2025-05-30 NOTE — SUBJECTIVE & OBJECTIVE
"Interval History:  Patient has been diagnosed with Cryptococcus based on Karius testing.  MRI brain with and without contrast negative for evidence of infection.  Lumbar puncture today.  Serum cryptococcal antigen in process.  Indium scan scheduled.  Patient tolerating amphotericin and flucytosine thus far.  Last recorded fever of 100.4 about 24 hours ago.     Review of Systems   Constitutional:  Positive for activity change. Negative for appetite change, chills and fever.   Musculoskeletal:  Negative for back pain.   Neurological:  Negative for headaches.   All other systems reviewed and are negative.    Objective:     Vital Signs (Most Recent):  Temp: 98.4 °F (36.9 °C) (05/30/25 0358)  Pulse: 85 (05/30/25 0433)  Resp: 18 (05/30/25 0358)  BP: 108/63 (05/30/25 0358)  SpO2: (!) 94 % (05/30/25 0358) Vital Signs (24h Range):  Temp:  [97.3 °F (36.3 °C)-100 °F (37.8 °C)] 98.4 °F (36.9 °C)  Pulse:  [] 85  Resp:  [18] 18  SpO2:  [91 %-94 %] 94 %  BP: (108-128)/(58-64) 108/63     Weight: 87.5 kg (192 lb 14.4 oz)  Body mass index is 26.9 kg/m².    Estimated Creatinine Clearance: 86 mL/min (based on SCr of 0.9 mg/dL).     Physical Exam  Constitutional:       General: He is not in acute distress.     Appearance: Normal appearance. He is not ill-appearing.   Cardiovascular:      Rate and Rhythm: Normal rate and regular rhythm.      Pulses: Normal pulses.      Heart sounds: Normal heart sounds. No murmur heard.     No friction rub. No gallop.   Pulmonary:      Effort: Pulmonary effort is normal. No respiratory distress.      Breath sounds: Normal breath sounds.   Abdominal:      General: Abdomen is flat. Bowel sounds are normal. There is no distension.      Palpations: Abdomen is soft.      Tenderness: There is no abdominal tenderness.   Skin:     General: Skin is warm and dry.   Neurological:      Mental Status: He is alert.          Significant Labs: Blood Culture: No results for input(s): "LABBLOO" in the last 4320 " "hours.  CBC: No results for input(s): "WBC", "HGB", "HCT", "PLT" in the last 48 hours.  CMP:   Recent Labs   Lab 05/29/25  1531   CREATININE 0.9     CSF: No results for input(s): "CSFCULTURE" in the last 4320 hours.  Microbiology Results (last 7 days)       Procedure Component Value Units Date/Time    Blood culture [[id number]]  (Normal) Collected: 05/25/25 1026    Order Status: Completed Specimen: Blood from Peripheral, Antecubital, Right Updated: 05/29/25 1802     Blood Culture No Growth After 96 hours    Blood culture [[id number]]  (Normal) Collected: 05/25/25 1025    Order Status: Completed Specimen: Blood from Peripheral, Antecubital, Left Updated: 05/29/25 1802     Blood Culture No Growth After 96 hours    Cryptococcal antigen [[id number]] Collected: 05/29/25 1531    Order Status: Sent Specimen: Blood, Venous Updated: 05/29/25 1553    Cryptococcal antigen, CSF [[id number]]     Order Status: Sent Specimen: CSF (Spinal Fluid)     CSF culture [[id number]]     Order Status: Sent Specimen: CSF (Spinal Fluid) from CSF Tap, Tube 1     Fungus culture [[id number]]     Order Status: Sent Specimen: CSF (Spinal Fluid) from Cerebrospinal Fluid     Christiana Ink (CSF) [[id number]]     Order Status: Sent Specimen: CSF (Spinal Fluid) from CSF Tap, Tube 1     Fungus culture [[id number]]     Order Status: Canceled Specimen: CSF (Spinal Fluid) from Cerebrospinal Fluid     CSF culture [[id number]]     Order Status: Canceled Specimen: CSF (Spinal Fluid) from CSF Tap, Tube 1           All pertinent labs within the past 24 hours have been reviewed.    Significant Imaging: MRI: I have reviewed all pertinent results/findings within the past 24 hours:  brain with no evidence of cryptococcus  "

## 2025-05-30 NOTE — PROGRESS NOTES
[facility]  [facility] Medicine  Progress Note    Patient Name: Armando Ingram Jr.  MRN: [id number]  Patient Class: IP- Inpatient   Admission Date: 5/16/2025  Length of Stay: 13 days  Attending Physician: Lonnie Schaefer MD  Primary Care Provider: Brian Soares MD  Subjective     Principal Problem:Neutropenic fever    HPI:  Armando Ingram Jr. is a 65 y.o. male with a PMH  has a past medical history of Closed right hip fracture, Colon cancer, DVT (deep venous thrombosis) (2002), Hairy cell leukemia (06/06/2024), and Nephrolithiasis. who presented to the ED for further evaluation of acute onset fever with T-max measuring 101.0 F earlier today.  Patient reported significant history of hairy cell leukemia in his followed by Dr. Dow and Dr. Flores from Hematology/Oncology and was recently prescribed a Z-Cordell for 4 days for treatment of a cough.  Patient was instructed to go to the ED if he endorsed fever greater than 103.0 F but presented to the ED due to ulcer experiencing associated chills, throbbing headache, and persistent fever.  He reported no known alleviating or aggravating factors noted with all other review of systems negative except as noted above.  He is not currently on active treatment for his leukemia and reported being in his usual state of health prior to onset of symptoms.  Initial workup in the ED revealed patient to be afebrile with T-max measuring 100.4 F, pancytopenic, lactic acid/procalcitonin within normal limits, flu/COVID negative, chest x-ray negative for acute findings, UA positive for 2+ LE, 11 RBCs, free found WBCs.  Patient initiated on cefepime with cultures obtained and pending and admitted to Hospital Medicine under observation for continued medical management and treatment of neutropenic fever.    PCP: Brian Soares      Overview/Hospital Course:  Continued on IV Cefepime. Zyvoz added 05/18 due to persistent fever. Hematology consulted. Cultures remain NGTD   Resp viral  panel negative. ID consulted to eval due to fevers  Cefepime changed to IV Merrem per ID recs on 05/21, unclear source of fevers at this time. Zyvox discontinued per ID.   CT Abd/Pelvis showed bladder thickening but otherwise negative.   ID recommend TTE, BLE duplex and possible Indium scan for source of infection and de-escalate abx to Cefepime on 05/23.   TTE with no vegetations. BLE duplex showed chronic RLE DVT. Indium scan pending tentatively planned for 05/26-05/27 as radiology does not have required materials at this time and scan will take 2 days to complete per RT.   Persistent fevers, abx changed back to Merrem 05/25 per ID recs. Karius pending   5/26 fever curve trending down. Respiratory infection panel negative. Asymptomatic. Continue intravenous antibiotic(s)   5/27 neutropenic fever persists despite scheduled tylenol. Awaiting tagged wbc scan per infectious disease recommendations.   5/28 febrile overnight. Infectious disease recommending addition of doxy. Hem/onc following. Awaiting karius and imaging. Refused lumbar puncture. If afebrile x 48h, discharge   5/29 febrile. Positive cryptococcal infection. Infectious disease adjusting antimicrobials. Plans for lumbar puncture, mri brain, and indium scan.  5/30/2025: s/p LP today.  Opening pressure within normal range at 18. clear CSF fluid noted. Cell count, glucose, Christiana Ink, Cryptococcal antigen, MS panel, Fungal Culture, and Bacterial culture obtained. Patient received first dose of induction therapy for cryptococcal infection and ding well. Indium Scan in progress. Given toxic nature of amphotericin and flucytosine will closely monitor chemistries. Oncology consulted with partners and decision made to defer treatment for hair cell leukemia until infection has been treated.     Interval History: Afebrile overnight. Pleasant during interview. Will stop scheduled Tylenol and monitor fever curve. Patient will need to be hospitalized for 14 day course  of induction management. Then will follow with consolidation treatment.    Review of Systems   Constitutional:  Negative for activity change, appetite change and fever.   Respiratory:  Negative for shortness of breath.    Gastrointestinal:  Negative for nausea and vomiting.   Allergic/Immunologic: Positive for immunocompromised state.     Objective:     Vital Signs (Most Recent):  Temp: 98 °F (36.7 °C) (05/30/25 1119)  Pulse: 89 (05/30/25 1119)  Resp: 18 (05/30/25 1119)  BP: (!) 121/56 (05/30/25 1119)  SpO2: 96 % (05/30/25 1119) Vital Signs (24h Range):  Temp:  [97.9 °F (36.6 °C)-98.5 °F (36.9 °C)] 98 °F (36.7 °C)  Pulse:  [] 89  Resp:  [18] 18  SpO2:  [92 %-96 %] 96 %  BP: (107-128)/(56-64) 121/56     Weight: 87.5 kg (192 lb 14.4 oz)  Body mass index is 26.9 kg/m².  No intake or output data in the 24 hours ending 05/30/25 1327      Physical Exam  Vitals and nursing note reviewed. Exam conducted with a chaperone present (spouse).   Constitutional:       General: He is not in acute distress.     Appearance: He is ill-appearing. He is not toxic-appearing.   HENT:      Head: Normocephalic and atraumatic.   Cardiovascular:      Rate and Rhythm: Normal rate.   Pulmonary:      Effort: Pulmonary effort is normal. No respiratory distress.   Abdominal:      Palpations: Abdomen is soft.      Tenderness: There is no abdominal tenderness.   Musculoskeletal:      Right lower leg: No edema.      Left lower leg: No edema.   Skin:     General: Skin is warm.   Neurological:      Mental Status: He is alert and oriented to person, place, and time.           Significant Labs: All pertinent labs within the past 24 hours have been reviewed.  CBC:   Recent Labs   Lab 05/30/25  1051   WBC 2.89*   HGB 11.6*   HCT 34.1*        CMP:   Recent Labs   Lab 05/29/25  1531 05/30/25  0953   NA  --  134*   K  --  4.0   CL  --  106   CO2  --  20*   GLU  --  107   BUN  --  17   CREATININE 0.9 0.8   CALCIUM  --  7.8*   ANIONGAP  --  8        Significant Imaging:  NM Inflammatory Whole body pending      Assessment & Plan  Neutropenic fever  FUO (fever of unknown origin)  Cryptococcosis  CXR with NAF, Flu/Covid neg   Possible UTI   Resp viral panel negative   Urine and blood cultures NGTD   Monitor VS   Hematology/Oncology consulted and following   ID consult due to persistent fever   Cefepime changed to IV Merrem, Zyvox discontinued   CT abd pelvis with bladder thickening and IVC filter but no other source of infection. TTE with no valvular abnormalities. BLE duplex showed chronic RLE DVT   Indium scan ordered by ID but radiology unable to obtain needed material for scan until 05/26 and scan cannot be completed until 5/27    Karius testing pending per Dr. Laguna  Discussed with Dr. Laguna 05/25- he recommends repeat blood cultures, change IV Cefepime to Merrem   Encourage IS and Oob as able     Blood cultures negative growth to date   Positive for cryptococcal infection  Mri brain pending  Lumbar puncture pending  Hold xarelto  Indium scan pending  Antimicrobials per infectious disease     5/30/25  Currently on treatment for cryptococcal infection. Will stop scheduled Tylenol and monitor fever curve.      CXR with NAF, Flu/Covid neg   Possible UTI   Resp viral panel negative   Urine and blood cultures NGTD   Monitor VS   Hematology/Oncology consulted and following   ID consult due to persistent fever   Cefepime changed to IV Merrem, Zyvox discontinued   CT abd pelvis with bladder thickening and IVC filter but no other source of infection. TTE with no valvular abnormalities. BLE duplex showed chronic RLE DVT   Indium scan ordered by ID but radiology unable to obtain needed material for scan until 05/26 and scan cannot be completed until 5/27    Karius testing pending per Dr. Laguna  Discussed with Dr. Laguna 05/25- he recommends repeat blood cultures, change IV Cefepime to Merrem   Encourage IS and Oob as able     Blood cultures negative growth  to date   Positive for cryptococcal infection  Mri brain pending  Lumbar puncture pending  Hold xarelto  Indium scan pending  Antimicrobials per infectious disease     5/30/25  Likely related to cryptococcal infection.   Hold scheduled Tylenol. Monitor fever curve  WBC scan pending  LP with CSF right now unremarkable. Christiana Ink, Cryptococcal antigen, MS panel, Fungal Culture, and Bacterial culture are still pending.  CXR with NAF, Flu/Covid neg   Possible UTI   Resp viral panel negative   Urine and blood cultures NGTD   Monitor VS   Hematology/Oncology consulted and following   ID consult due to persistent fever   Cefepime changed to IV Merrem, Zyvox discontinued   CT abd pelvis with bladder thickening and IVC filter but no other source of infection. TTE with no valvular abnormalities. BLE duplex showed chronic RLE DVT   Indium scan ordered by ID but radiology unable to obtain needed material for scan until 05/26 and scan cannot be completed until 5/27    Karius testing pending per Dr. Laguna  Discussed with Dr. Laguna 05/25- he recommends repeat blood cultures, change IV Cefepime to Merrem   Encourage IS and Oob as able     Blood cultures negative growth to date   Positive for cryptococcal infection  Mri brain pending  Lumbar puncture pending  Hold xarelto  Indium scan pending  Antimicrobials per infectious disease     5/30/25  MRI brain negative.   S/p LP; analysis so far unremarkable; awaiting Christiana Ink, Cryptococcal antigen, MS panel, Fungal Culture, and Bacterial culture   Per ID patient will need to stay hospitalized 14 days for induction therapy(agents are toxic in nature) followed by consolidation therapy    Hairy cell leukemia  Patient with known history of hairy cell leukemia and continues to follow Dr. Dow and Dr. Flores from Hematology/Oncology. Patient not currently on active treatment and currently undergoing treatment for neutropenic fever with broad-spectrum antibiotics as noted  above.  Hematology consulted and following     5/30/25  Hematology discussed with other partners and it was decided not to proceed with treatment for leukemia. This was discussed with family.   Pancytopenia  This patient is found to have pancytopenia, the likely etiology is , will monitor CBC . Will transfuse red blood cells if the hemoglobin is <7g/dL (or <8 in the setting of ACS). Hold DVT prophylaxis if platelets are <50k. The patient's hemoglobin, white blood cell count, and platelet count results have been reviewed and are listed below.  Recent Labs   Lab 05/30/25  1051   HGB 11.6*   WBC 2.89*        5/30/25  Stable today    History of DVT (deep vein thrombosis)  - BLE duplex with chronic RLE DVT    -prior hx of IVC filter in 2002   Hold xarelto for lumbar puncture   VTE Risk Mitigation (From admission, onward)           Ordered     rivaroxaban tablet 20 mg  With dinner         05/29/25 1333     Reason for No Pharmacological VTE Prophylaxis  Once        Question:  Reasons:  Answer:  Already adequately anticoagulated on oral Anticoagulants    05/17/25 0213     IP VTE HIGH RISK PATIENT  Once         05/17/25 0213     Place sequential compression device  Until discontinued         05/17/25 0213                    Discharge Planning   LAURI: 6/2/2025     Code Status: Full Code   Medical Readiness for Discharge Date:   Discharge Plan A: Home with family        Sena Melgar NP  Department of Hospital Medicine   [facility]

## 2025-05-30 NOTE — ASSESSMENT & PLAN NOTE
--Neutropenia improving   --Afebrile since midnight of 5/29   --Source now determined to be Cryptococcus per [place]  --Notably patient handled an otter over Easter (otter was at a rehab facility and released in the area around where patient's camper was located; all he did was pet the animal but no bites or scratches occurred); wife has turtle and corn snake but patient does not have contact with either   --Denies international travel and though he was located near a pond he did not swim in it  --Worked at [employer] up until 2 yrs ago surrounded by pigeon droppings   --Follow blood cultures; NGTD   --Continue Cryptococcal therapy (see other assessment)   --Indium scan scheduled

## 2025-05-30 NOTE — ASSESSMENT & PLAN NOTE
CXR with NAF, Flu/Covid neg   Possible UTI   Resp viral panel negative   Urine and blood cultures NGTD   Monitor VS   Hematology/Oncology consulted and following   ID consult due to persistent fever   Cefepime changed to IV Merrem, Zyvox discontinued   CT abd pelvis with bladder thickening and IVC filter but no other source of infection. TTE with no valvular abnormalities. BLE duplex showed chronic RLE DVT   Indium scan ordered by ID but radiology unable to obtain needed material for scan until 05/26 and scan cannot be completed until 5/27    Karius testing pending per Dr. Laguna  Discussed with Dr. Laguna 05/25- he recommends repeat blood cultures, change IV Cefepime to Merrem   Encourage IS and Oob as able     Blood cultures negative growth to date   Positive for cryptococcal infection  Mri brain pending  Lumbar puncture pending  Hold xarelto  Indium scan pending  Antimicrobials per infectious disease     5/30/25  Currently on treatment for cryptococcal infection. Will stop scheduled Tylenol and monitor fever curve.      CXR with NAF, Flu/Covid neg   Possible UTI   Resp viral panel negative   Urine and blood cultures NGTD   Monitor VS   Hematology/Oncology consulted and following   ID consult due to persistent fever   Cefepime changed to IV Merrem, Zyvox discontinued   CT abd pelvis with bladder thickening and IVC filter but no other source of infection. TTE with no valvular abnormalities. BLE duplex showed chronic RLE DVT   Indium scan ordered by ID but radiology unable to obtain needed material for scan until 05/26 and scan cannot be completed until 5/27    Karius testing pending per Dr. Laguna  Discussed with Dr. Laguna 05/25- he recommends repeat blood cultures, change IV Cefepime to Merrem   Encourage IS and Oob as able     Blood cultures negative growth to date   Positive for cryptococcal infection  Mri brain pending  Lumbar puncture pending  Hold xarelto  Indium scan pending  Antimicrobials per  infectious disease     5/30/25  Likely related to cryptococcal infection.   Hold scheduled Tylenol. Monitor fever curve  WBC scan pending  LP with CSF right now unremarkable. Christiana Ink, Cryptococcal antigen, MS panel, Fungal Culture, and Bacterial culture are still pending.  CXR with NAF, Flu/Covid neg   Possible UTI   Resp viral panel negative   Urine and blood cultures NGTD   Monitor VS   Hematology/Oncology consulted and following   ID consult due to persistent fever   Cefepime changed to IV Merrem, Zyvox discontinued   CT abd pelvis with bladder thickening and IVC filter but no other source of infection. TTE with no valvular abnormalities. BLE duplex showed chronic RLE DVT   Indium scan ordered by ID but radiology unable to obtain needed material for scan until 05/26 and scan cannot be completed until 5/27    Karius testing pending per Dr. Laguna  Discussed with Dr. Laguna 05/25- he recommends repeat blood cultures, change IV Cefepime to Merrem   Encourage IS and Oob as able     Blood cultures negative growth to date   Positive for cryptococcal infection  Mri brain pending  Lumbar puncture pending  Hold xarelto  Indium scan pending  Antimicrobials per infectious disease     5/30/25  MRI brain negative.   S/p LP; analysis so far unremarkable; awaiting Christiana Ink, Cryptococcal antigen, MS panel, Fungal Culture, and Bacterial culture   Per ID patient will need to stay hospitalized 14 days for induction therapy(agents are toxic in nature) followed by consolidation therapy

## 2025-05-30 NOTE — ASSESSMENT & PLAN NOTE
Patient diagnosed with hairy cell leukemia.  At this time no treatment has been started because of parameters with laboratory studies if an ANC greater than a 1000 variant patient is admitted now for several days with a history of fever workup to date being seen by infectious disease has failed to reveal a source could hairy cell be the source of fever absolutely at this particular point I agree with proceeding with the NM scan variant will discuss with leukemia service [facility] he has been seen by Dr. Delgado Guzman as for indium scan is negative consideration of primary treatment for hairy cell leukemia variant will discuss further as more information evolves  05/29/2025 variant patient states that he is feeling much better this morning unless fatigue no fever at this time would recommend I will be talking to Dr. Guzman leukemia Service to see in terms of recommendations for treatment in talked to the patient briefly about treatment would drugs such as 2D CA Rituxan.  Discussed implications of answered questions with him awaiting results of indium scan clearance by Infectious Disease to make sure all infectious etiologies resolved at this point before considering fever related to progression of hairy cell leukemia  05/30/2025 results of infectious disease workup demonstrates cryptococcosis variant workup in progress in treatment variant at this time discussed case with Dr. Guzman leukemia section [facility] and concur treatment for this do not proceed with treatment for hairy cell leukemia at this point family is aware in discussed

## 2025-05-30 NOTE — PLAN OF CARE
05/30/25 1104   Rounds   Attendance Provider;Nurse ;Charge nurse;Physical therapist   Discharge Plan A Home with family   Why the patient remains in the hospital Requires continued medical care   Transition of Care Barriers None

## 2025-05-30 NOTE — PROGRESS NOTES
[facility]  Infectious Disease  Progress Note    Patient Name: Armando Ingram Jr.  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   --would certainly explain the ongoing fevers  --will need to determine location of infection  --contrast MRI of brain with no evidence of infection   --awaiting Indium scan   --LP performed 5/30; CSF with normal cell count, protein, and glucose; follow for kandice ink, cryptococcal antigen, me panel, fungal culture, and bacterial culture  --notably serum cryptococcal antigen negative   --opening pressure normal at 17   --continue induction therapy using IV amphotericin and oral flucytosine  --require drug toxicity monitoring; need daily cbc and cmp for electrolytes/renal function/blood counts  --induction therapy will be for 14 days if CNS infection confirmed  --after completing induction therapy would switch to 8 weeks of consolidation therapy using high-dose PO fluconazole 800 mg daily followed by 12 months of maintenance therapy with lower dose fluconazole 200 mg daily   --ultimately treatment course will be determined by location of infection  --induction therapy would need to be completed inpatient due to highly toxic nature of both drugs  --if CNS infection ruled out then may be possible to treat with 400 mg daily fluconazole for up to 12 months but data is minimal   --Above d/w primary team and oncology as well as patient/wife    Hematology  History of DVT (deep vein thrombosis)  Anticoagulation per primary team    Oncology  Pancytopenia  Associated with underlying malignancy but could also be due to infection. Appreciate oncology.    Hairy cell  leukemia  Management per oncology    Other  FUO (fever of unknown origin)  --Neutropenia improving   --Afebrile since midnight of 5/29   --Source now determined to be Cryptococcus per Karius  --Notably patient handled an otter over Easter (otter was at a rehab facility and released in the area around where patient's camper was located; all he did was pet the animal but no bites or scratches occurred); wife has turtle and corn snake but patient does not have contact with either   --Denies international travel and though he was located near a pond he did not swim in it  --Worked at [employer] up until 2 yrs ago surrounded by pigeon droppings   --Follow blood cultures; NGTD   --Continue Cryptococcal therapy (see other assessment)   --Indium scan scheduled         Thank you for your consult. I will follow-up with patient. Please contact us if you have any additional questions.    Flo Laguna, DO  Infectious Disease  [facility]    Subjective:     Principal Problem:Neutropenic fever    HPI: 65 y.o. male with a PMH  has a past medical history of Closed right hip fracture, Colon cancer, DVT (deep venous thrombosis) (2002), Hairy cell leukemia (06/06/2024), and Nephrolithiasis.     He was admitted with neutropenic fever.  Tmax was 101.  Labs and   Imaging test reviewed - flu/COVID negative, chest x-ray negative for acute findings, UA positive for 2+ LE, 11 RBCs,    Interval History:  Patient has been diagnosed with Cryptococcus based on Karius testing.  MRI brain with and without contrast negative for evidence of infection.  Lumbar puncture today.  Serum cryptococcal antigen in process.  Indium scan scheduled.  Patient tolerating amphotericin and flucytosine thus far.  Last recorded fever of 100.4 about 24 hours ago.     Review of Systems   Constitutional:  Positive for activity change. Negative for appetite change, chills and fever.   Musculoskeletal:  Negative for back pain.   Neurological:  Negative for  "headaches.   All other systems reviewed and are negative.    Objective:     Vital Signs (Most Recent):  Temp: 98.4 °F (36.9 °C) (05/30/25 0358)  Pulse: 85 (05/30/25 0433)  Resp: 18 (05/30/25 0358)  BP: 108/63 (05/30/25 0358)  SpO2: (!) 94 % (05/30/25 0358) Vital Signs (24h Range):  Temp:  [97.3 °F (36.3 °C)-100 °F (37.8 °C)] 98.4 °F (36.9 °C)  Pulse:  [] 85  Resp:  [18] 18  SpO2:  [91 %-94 %] 94 %  BP: (108-128)/(58-64) 108/63     Weight: 87.5 kg (192 lb 14.4 oz)  Body mass index is 26.9 kg/m².    Estimated Creatinine Clearance: 86 mL/min (based on SCr of 0.9 mg/dL).     Physical Exam  Constitutional:       General: He is not in acute distress.     Appearance: Normal appearance. He is not ill-appearing.   Cardiovascular:      Rate and Rhythm: Normal rate and regular rhythm.      Pulses: Normal pulses.      Heart sounds: Normal heart sounds. No murmur heard.     No friction rub. No gallop.   Pulmonary:      Effort: Pulmonary effort is normal. No respiratory distress.      Breath sounds: Normal breath sounds.   Abdominal:      General: Abdomen is flat. Bowel sounds are normal. There is no distension.      Palpations: Abdomen is soft.      Tenderness: There is no abdominal tenderness.   Skin:     General: Skin is warm and dry.   Neurological:      Mental Status: He is alert.          Significant Labs: Blood Culture: No results for input(s): "LABBLOO" in the last 4320 hours.  CBC: No results for input(s): "WBC", "HGB", "HCT", "PLT" in the last 48 hours.  CMP:   Recent Labs   Lab 05/29/25  1531   CREATININE 0.9     CSF: No results for input(s): "CSFCULTURE" in the last 4320 hours.  Microbiology Results (last 7 days)       Procedure Component Value Units Date/Time    Blood culture [[id number]]  (Normal) Collected: 05/25/25 1026    Order Status: Completed Specimen: Blood from Peripheral, Antecubital, Right Updated: 05/29/25 1802     Blood Culture No Growth After 96 hours    Blood culture [[id number]]  (Normal) " Collected: 05/25/25 1025    Order Status: Completed Specimen: Blood from Peripheral, Antecubital, Left Updated: 05/29/25 1802     Blood Culture No Growth After 96 hours    Cryptococcal antigen [[id number]] Collected: 05/29/25 1531    Order Status: Sent Specimen: Blood, Venous Updated: 05/29/25 1553    Cryptococcal antigen, CSF [[id number]]     Order Status: Sent Specimen: CSF (Spinal Fluid)     CSF culture [[id number]]     Order Status: Sent Specimen: CSF (Spinal Fluid) from CSF Tap, Tube 1     Fungus culture [[id number]]     Order Status: Sent Specimen: CSF (Spinal Fluid) from Cerebrospinal Fluid     Christiana Ink (CSF) [[id number]]     Order Status: Sent Specimen: CSF (Spinal Fluid) from CSF Tap, Tube 1     Fungus culture [[id number]]     Order Status: Canceled Specimen: CSF (Spinal Fluid) from Cerebrospinal Fluid     CSF culture [[id number]]     Order Status: Canceled Specimen: CSF (Spinal Fluid) from CSF Tap, Tube 1           All pertinent labs within the past 24 hours have been reviewed.    Significant Imaging: MRI: I have reviewed all pertinent results/findings within the past 24 hours:  brain with no evidence of cryptococcus

## 2025-05-30 NOTE — PROGRESS NOTES
[facility]  Hematology/Oncology  Progress Note    Patient Name: Armando Ingram Jr.  Admission Date: 5/16/2025  Hospital Length of Stay: 13 days  Code Status: Full Code     Subjective:     HPI:  65-year-old male diagnosed with hairy cell leukemia in 2024.  Patient has a previous colon cancer stage I patient has been seen and evaluated at that time by Dr. Delgado Guzman patient was recommended for observation since ANC is always remained above a 1000 patient has been admitted now to the hospital for evaluation fever workup to date is failed to reveal source current patient is having an indium scan done we are asked to see the patient for further evaluation ECOG status 2    Interval History:  Patient and wife at bedside very excited over diagnosis of cryptococcus    Oncology Treatment Plan:   OP HAIRY CELL LEUKEMIA cladribine riTUXimab    Medications:  Continuous Infusions:  Scheduled Meds:   acetaminophen  500 mg Oral Q24H    amphotericin B liposome  4 mg/kg Intravenous Q24H    cetirizine  10 mg Oral Daily    D5W 100 mL flush bag   Intravenous Q24H    D5W 100 mL flush bag   Intravenous Q24H    diphenhydrAMINE  25 mg Oral Q24H    flucytosine  25 mg/kg Oral Q6H    fluticasone propionate  2 spray Each Nostril Daily    pantoprazole  40 mg Oral Daily    [START ON 6/1/2025] rivaroxaban  20 mg Oral Daily with dinner    sodium chloride 0.9%  500 mL Intravenous Q24H    sodium chloride 0.9%  500 mL Intravenous Q24H    tamsulosin  0.4 mg Oral Daily     PRN Meds:  Current Facility-Administered Medications:     acetaminophen, 650 mg, Oral, Q4H PRN    albuterol-ipratropium, 3 mL, Nebulization, Q6H PRN    aluminum-magnesium hydroxide-simethicone, 30 mL, Oral, QID PRN    benzonatate, 100 mg, Oral, TID PRN    dextrose 50%, 12.5 g, Intravenous, PRN    dextrose 50%, 25 g, Intravenous, PRN    glucagon (human recombinant), 1 mg, Intramuscular, PRN    glucose, 16 g, Oral, PRN    glucose, 24 g, Oral, PRN     HYDROcodone-acetaminophen, 1 tablet, Oral, Q6H PRN    melatonin, 6 mg, Oral, Nightly PRN    morphine, 2 mg, Intravenous, Q4H PRN    naloxone, 0.02 mg, Intravenous, PRN    ondansetron, 4 mg, Intravenous, Q8H PRN    promethazine, 25 mg, Oral, Q6H PRN    senna-docusate, 1 tablet, Oral, BID PRN    sodium chloride 0.9%, 10 mL, Intravenous, Q12H PRN     Review of Systems   Constitutional:  Positive for fatigue. Negative for activity change, appetite change, chills, diaphoresis, fever and unexpected weight change.   HENT:  Negative for congestion, dental problem, drooling, ear discharge, ear pain, facial swelling, hearing loss, mouth sores, nosebleeds, postnasal drip, rhinorrhea, sinus pressure, sneezing, sore throat, tinnitus, trouble swallowing and voice change.    Eyes:  Negative for photophobia, pain, discharge, redness, itching and visual disturbance.   Respiratory:  Negative for apnea, cough, choking, chest tightness, shortness of breath, wheezing and stridor.    Cardiovascular:  Negative for chest pain, palpitations and leg swelling.   Gastrointestinal:  Negative for abdominal distention, abdominal pain, anal bleeding, blood in stool, constipation, diarrhea, nausea, rectal pain and vomiting.   Endocrine: Negative for cold intolerance, heat intolerance, polydipsia, polyphagia and polyuria.   Genitourinary:  Negative for decreased urine volume, difficulty urinating, dysuria, enuresis, flank pain, frequency, genital sores, hematuria, penile discharge, penile pain, penile swelling, scrotal swelling, testicular pain and urgency.   Musculoskeletal:  Negative for arthralgias, back pain, gait problem, joint swelling, myalgias, neck pain and neck stiffness.   Skin:  Negative for color change, pallor, rash and wound.   Allergic/Immunologic: Negative for environmental allergies, food allergies and immunocompromised state.   Neurological:  Positive for weakness. Negative for dizziness, tremors, seizures, syncope, facial  asymmetry, speech difficulty, light-headedness, numbness and headaches.   Hematological:  Negative for adenopathy. Does not bruise/bleed easily.   Psychiatric/Behavioral:  Negative for agitation, behavioral problems, confusion, decreased concentration, dysphoric mood, hallucinations, self-injury, sleep disturbance and suicidal ideas. The patient is not nervous/anxious and is not hyperactive.      Objective:     Vital Signs (Most Recent):  Temp: 98.4 °F (36.9 °C) (05/30/25 0358)  Pulse: 85 (05/30/25 0433)  Resp: 18 (05/30/25 0358)  BP: 108/63 (05/30/25 0358)  SpO2: (!) 94 % (05/30/25 0358) Vital Signs (24h Range):  Temp:  [97.3 °F (36.3 °C)-100 °F (37.8 °C)] 98.4 °F (36.9 °C)  Pulse:  [] 85  Resp:  [18] 18  SpO2:  [91 %-94 %] 94 %  BP: (108-128)/(58-64) 108/63     Weight: 87.5 kg (192 lb 14.4 oz)  Body mass index is 26.9 kg/m².  Body surface area is 2.09 meters squared.      Intake/Output Summary (Last 24 hours) at 5/30/2025 0737  Last data filed at 5/29/2025 1230  Gross per 24 hour   Intake 480 ml   Output --   Net 480 ml        Physical Exam  Vitals reviewed.   Constitutional:       General: He is not in acute distress.     Appearance: Normal appearance. He is well-developed. He is ill-appearing. He is not diaphoretic.   HENT:      Head: Normocephalic.      Right Ear: External ear normal.      Left Ear: External ear normal.      Nose: Nose normal.      Right Sinus: No maxillary sinus tenderness or frontal sinus tenderness.      Left Sinus: No maxillary sinus tenderness or frontal sinus tenderness.      Mouth/Throat:      Pharynx: No oropharyngeal exudate.   Eyes:      General: Lids are normal. No scleral icterus.        Right eye: No discharge.         Left eye: No discharge.      Extraocular Movements:      Right eye: Normal extraocular motion.      Left eye: Normal extraocular motion.      Conjunctiva/sclera:      Right eye: Right conjunctiva is not injected. No hemorrhage.     Left eye: Left conjunctiva is  "not injected. No hemorrhage.     Pupils: Pupils are equal, round, and reactive to light.   Neck:      Thyroid: No thyromegaly.      Vascular: No JVD.      Trachea: No tracheal deviation.   Cardiovascular:      Rate and Rhythm: Normal rate.   Pulmonary:      Effort: Pulmonary effort is normal. No respiratory distress.      Breath sounds: No stridor.   Abdominal:      General: Bowel sounds are normal.      Palpations: Abdomen is soft. There is no hepatomegaly, splenomegaly or mass.      Tenderness: There is no abdominal tenderness.   Musculoskeletal:         General: No tenderness. Normal range of motion.      Cervical back: Normal range of motion and neck supple.   Lymphadenopathy:      Head:      Right side of head: No posterior auricular or occipital adenopathy.      Left side of head: No posterior auricular or occipital adenopathy.      Cervical: No cervical adenopathy.      Right cervical: No superficial, deep or posterior cervical adenopathy.     Left cervical: No superficial, deep or posterior cervical adenopathy.      Upper Body:      Right upper body: No supraclavicular adenopathy.      Left upper body: No supraclavicular adenopathy.   Skin:     General: Skin is dry.      Findings: No erythema or rash.      Nails: There is no clubbing.   Neurological:      Mental Status: He is alert and oriented to person, place, and time.      Cranial Nerves: No cranial nerve deficit.      Coordination: Coordination normal.   Psychiatric:         Behavior: Behavior normal.         Thought Content: Thought content normal.         Judgment: Judgment normal.          Significant Labs:   BMP:   Recent Labs   Lab 05/29/25  1531   CREATININE 0.9   , CBC: No results for input(s): "WBC", "HGB", "HCT", "PLT" in the last 48 hours., CMP:   Recent Labs   Lab 05/29/25  1531   CREATININE 0.9   , Coagulation: No results for input(s): "PT", "INR", "APTT" in the last 48 hours., Haptoglobin: No results for input(s): "HAPTOGLOBIN" in the last " "48 hours., Immunology: No results for input(s): "SPEP", "JOEL", "SELVIN", "FREELAMBDALI" in the last 48 hours., LDH: No results for input(s): "LDHCSF", "BFSOURCE" in the last 48 hours., LFTs: No results for input(s): "ALT", "AST", "ALKPHOS", "BILITOT", "PROT", "ALBUMIN" in the last 48 hours., Reticulocytes: No results for input(s): "RETIC" in the last 48 hours., Tumor Markers: No results for input(s): "PSA", "CEA", "", "AFPTM", "SP1668", "" in the last 48 hours.    Invalid input(s): "ALGTM", Uric Acid No results for input(s): "URICACID" in the last 48 hours., and Urine Studies: No results for input(s): "COLORU", "APPEARANCEUA", "PHUR", "SPECGRAV", "PROTEINUA", "GLUCUA", "KETONESU", "BILIRUBINUA", "OCCULTUA", "NITRITE", "UROBILINOGEN", "LEUKOCYTESUR", "RBCUA", "WBCUA", "BACTERIA", "SQUAMEPITHEL", "HYALINECASTS" in the last 48 hours.    Invalid input(s): "WRIGHTSUR"    Diagnostic Results:  I have reviewed all pertinent imaging results/findings within the past 24 hours.  Assessment/Plan:     FUO (fever of unknown origin)  Patient has FU 0 with underlying hairy cell leukemia    History of DVT (deep vein thrombosis)  No evidence of DVT on recent ultrasound    Pancytopenia  Agree with the broad-spectrum antibiotic at this time but failed to elicit cause ANC greater than a 1000    Hairy cell leukemia  Patient diagnosed with hairy cell leukemia.  At this time no treatment has been started because of parameters with laboratory studies if an ANC greater than a 1000 variant patient is admitted now for several days with a history of fever workup to date being seen by infectious disease has failed to reveal a source could hairy cell be the source of fever absolutely at this particular point I agree with proceeding with the NM scan variant will discuss with leukemia service [facility] he has been seen by Dr. Delgado Guzman as for indium scan is negative consideration of primary treatment for hairy cell " leukemia variant will discuss further as more information evolves  05/29/2025 variant patient states that he is feeling much better this morning unless fatigue no fever at this time would recommend I will be talking to Dr. Guzman leukemia Service to see in terms of recommendations for treatment in talked to the patient briefly about treatment would drugs such as 2D CA Rituxan.  Discussed implications of answered questions with him awaiting results of indium scan clearance by Infectious Disease to make sure all infectious etiologies resolved at this point before considering fever related to progression of hairy cell leukemia  05/30/2025 results of infectious disease workup demonstrates cryptococcosis variant workup in progress in treatment variant at this time discussed case with Dr. Guzman leukemia section [facility] and concur treatment for this do not proceed with treatment for hairy cell leukemia at this point family is aware in discussed        Thank you for your consult. I will follow-up with patient. Please contact us if you have any additional questions.     Samuel Dow MD  Hematology/Oncology  [facility]

## 2025-05-30 NOTE — INTERVAL H&P NOTE
The patient has been examined and the H&P has been reviewed:    I concur with the findings and changes have been noted since the H&P was written: lumbar puncture for FUO.        Active Hospital Problems    Diagnosis  POA    *Neutropenic fever [D70.9, R50.81]  Yes    Cryptococcosis [B45.9]  No    FUO (fever of unknown origin) [R50.9]  Yes    History of DVT (deep vein thrombosis) [Z86.718]  Not Applicable     Chronic    Pancytopenia [D61.818]  Yes    Hairy cell leukemia [C91.40]  Yes        1 Patient Communication      Component 7 d ago   Final Pathologic Diagnosis RIGHT ILIAC CREST BONE MARROW ASPIRATE, BONE MARROW CLOT, AND BONE MARROW CORE BIOPSY WITH:    CELLULARITY=20-30%, TRILINEAGE HEMATOPOIETIC ACTIVITY.  HAIRY CELL LEUKEMIA.  SEE COMMENT.  GRADE 1 RETICULAR FIBROSIS.  ADEQUATE STORAGE IRON.  ADEQUATE NUMBER OF MEGAKARYOCYTES.   Comment: Interp By Melonie Calderon MD, Signed on 06/05/2024 at 11:22              Resolved Hospital Problems   No resolved problems to display.

## 2025-05-30 NOTE — ASSESSMENT & PLAN NOTE
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   --would certainly explain the ongoing fevers  --will need to determine location of infection  --contrast MRI of brain with no evidence of infection   --awaiting Indium scan   --LP performed 5/30; CSF with normal cell count, protein, and glucose; follow for kandice ink, cryptococcal antigen, me panel, fungal culture, and bacterial culture  --notably serum cryptococcal antigen negative   --opening pressure normal at 17   --continue induction therapy using IV amphotericin and oral flucytosine  --require drug toxicity monitoring; need daily cbc and cmp for electrolytes/renal function/blood counts  --induction therapy will be for 14 days if CNS infection confirmed  --after completing induction therapy would switch to 8 weeks of consolidation therapy using high-dose PO fluconazole 800 mg daily followed by 12 months of maintenance therapy with lower dose fluconazole 200 mg daily   --ultimately treatment course will be determined by location of infection  --induction therapy would need to be completed inpatient due to highly toxic nature of both drugs  --if CNS infection ruled out then may be possible to treat with 400 mg daily fluconazole for up to 12 months but data is minimal   --Above d/w primary team and oncology as well as patient/wife

## 2025-05-30 NOTE — ASSESSMENT & PLAN NOTE
This patient is found to have pancytopenia, the likely etiology is , will monitor CBC . Will transfuse red blood cells if the hemoglobin is <7g/dL (or <8 in the setting of ACS). Hold DVT prophylaxis if platelets are <50k. The patient's hemoglobin, white blood cell count, and platelet count results have been reviewed and are listed below.  Recent Labs   Lab 05/30/25  1051   HGB 11.6*   WBC 2.89*        5/30/25  Stable today

## 2025-05-30 NOTE — ASSESSMENT & PLAN NOTE
Patient with known history of hairy cell leukemia and continues to follow Dr. Dow and Dr. Flores from Hematology/Oncology. Patient not currently on active treatment and currently undergoing treatment for neutropenic fever with broad-spectrum antibiotics as noted above.  Hematology consulted and following     5/30/25  Hematology discussed with other partners and it was decided not to proceed with treatment for leukemia. This was discussed with family.

## 2025-05-30 NOTE — PLAN OF CARE
Discussed poc with pt, pt verbalized understanding    Purposeful rounding every 2hours    VS wnl  Cardiac monitoring in use, pt is NSR, tele monitor # [id number]  Blood glucose monitoring   Fall precautions in place, remains injury free  Pt denies c/o pain or discomfort at this time and patient will continue to be monitored.  Pain and nausea under control with PRN meds    IVFs  Accurate I&Os  Abx given as prescribed  Bed locked at lowest position  Call light within reach    Chart check complete  Will cont with POC

## 2025-05-30 NOTE — SUBJECTIVE & OBJECTIVE
Interval History:  Patient and wife at bedside very excited over diagnosis of cryptococcus    Oncology Treatment Plan:   OP HAIRY CELL LEUKEMIA cladribine riTUXimab    Medications:  Continuous Infusions:  Scheduled Meds:   acetaminophen  500 mg Oral Q24H    amphotericin B liposome  4 mg/kg Intravenous Q24H    cetirizine  10 mg Oral Daily    D5W 100 mL flush bag   Intravenous Q24H    D5W 100 mL flush bag   Intravenous Q24H    diphenhydrAMINE  25 mg Oral Q24H    flucytosine  25 mg/kg Oral Q6H    fluticasone propionate  2 spray Each Nostril Daily    pantoprazole  40 mg Oral Daily    [START ON 6/1/2025] rivaroxaban  20 mg Oral Daily with dinner    sodium chloride 0.9%  500 mL Intravenous Q24H    sodium chloride 0.9%  500 mL Intravenous Q24H    tamsulosin  0.4 mg Oral Daily     PRN Meds:  Current Facility-Administered Medications:     acetaminophen, 650 mg, Oral, Q4H PRN    albuterol-ipratropium, 3 mL, Nebulization, Q6H PRN    aluminum-magnesium hydroxide-simethicone, 30 mL, Oral, QID PRN    benzonatate, 100 mg, Oral, TID PRN    dextrose 50%, 12.5 g, Intravenous, PRN    dextrose 50%, 25 g, Intravenous, PRN    glucagon (human recombinant), 1 mg, Intramuscular, PRN    glucose, 16 g, Oral, PRN    glucose, 24 g, Oral, PRN    HYDROcodone-acetaminophen, 1 tablet, Oral, Q6H PRN    melatonin, 6 mg, Oral, Nightly PRN    morphine, 2 mg, Intravenous, Q4H PRN    naloxone, 0.02 mg, Intravenous, PRN    ondansetron, 4 mg, Intravenous, Q8H PRN    promethazine, 25 mg, Oral, Q6H PRN    senna-docusate, 1 tablet, Oral, BID PRN    sodium chloride 0.9%, 10 mL, Intravenous, Q12H PRN     Review of Systems   Constitutional:  Positive for fatigue. Negative for activity change, appetite change, chills, diaphoresis, fever and unexpected weight change.   HENT:  Negative for congestion, dental problem, drooling, ear discharge, ear pain, facial swelling, hearing loss, mouth sores, nosebleeds, postnasal drip, rhinorrhea, sinus pressure, sneezing, sore  throat, tinnitus, trouble swallowing and voice change.    Eyes:  Negative for photophobia, pain, discharge, redness, itching and visual disturbance.   Respiratory:  Negative for apnea, cough, choking, chest tightness, shortness of breath, wheezing and stridor.    Cardiovascular:  Negative for chest pain, palpitations and leg swelling.   Gastrointestinal:  Negative for abdominal distention, abdominal pain, anal bleeding, blood in stool, constipation, diarrhea, nausea, rectal pain and vomiting.   Endocrine: Negative for cold intolerance, heat intolerance, polydipsia, polyphagia and polyuria.   Genitourinary:  Negative for decreased urine volume, difficulty urinating, dysuria, enuresis, flank pain, frequency, genital sores, hematuria, penile discharge, penile pain, penile swelling, scrotal swelling, testicular pain and urgency.   Musculoskeletal:  Negative for arthralgias, back pain, gait problem, joint swelling, myalgias, neck pain and neck stiffness.   Skin:  Negative for color change, pallor, rash and wound.   Allergic/Immunologic: Negative for environmental allergies, food allergies and immunocompromised state.   Neurological:  Positive for weakness. Negative for dizziness, tremors, seizures, syncope, facial asymmetry, speech difficulty, light-headedness, numbness and headaches.   Hematological:  Negative for adenopathy. Does not bruise/bleed easily.   Psychiatric/Behavioral:  Negative for agitation, behavioral problems, confusion, decreased concentration, dysphoric mood, hallucinations, self-injury, sleep disturbance and suicidal ideas. The patient is not nervous/anxious and is not hyperactive.      Objective:     Vital Signs (Most Recent):  Temp: 98.4 °F (36.9 °C) (05/30/25 0358)  Pulse: 85 (05/30/25 0433)  Resp: 18 (05/30/25 0358)  BP: 108/63 (05/30/25 0358)  SpO2: (!) 94 % (05/30/25 0358) Vital Signs (24h Range):  Temp:  [97.3 °F (36.3 °C)-100 °F (37.8 °C)] 98.4 °F (36.9 °C)  Pulse:  [] 85  Resp:  [18]  18  SpO2:  [91 %-94 %] 94 %  BP: (108-128)/(58-64) 108/63     Weight: 87.5 kg (192 lb 14.4 oz)  Body mass index is 26.9 kg/m².  Body surface area is 2.09 meters squared.      Intake/Output Summary (Last 24 hours) at 5/30/2025 0737  Last data filed at 5/29/2025 1230  Gross per 24 hour   Intake 480 ml   Output --   Net 480 ml        Physical Exam  Vitals reviewed.   Constitutional:       General: He is not in acute distress.     Appearance: Normal appearance. He is well-developed. He is ill-appearing. He is not diaphoretic.   HENT:      Head: Normocephalic.      Right Ear: External ear normal.      Left Ear: External ear normal.      Nose: Nose normal.      Right Sinus: No maxillary sinus tenderness or frontal sinus tenderness.      Left Sinus: No maxillary sinus tenderness or frontal sinus tenderness.      Mouth/Throat:      Pharynx: No oropharyngeal exudate.   Eyes:      General: Lids are normal. No scleral icterus.        Right eye: No discharge.         Left eye: No discharge.      Extraocular Movements:      Right eye: Normal extraocular motion.      Left eye: Normal extraocular motion.      Conjunctiva/sclera:      Right eye: Right conjunctiva is not injected. No hemorrhage.     Left eye: Left conjunctiva is not injected. No hemorrhage.     Pupils: Pupils are equal, round, and reactive to light.   Neck:      Thyroid: No thyromegaly.      Vascular: No JVD.      Trachea: No tracheal deviation.   Cardiovascular:      Rate and Rhythm: Normal rate.   Pulmonary:      Effort: Pulmonary effort is normal. No respiratory distress.      Breath sounds: No stridor.   Abdominal:      General: Bowel sounds are normal.      Palpations: Abdomen is soft. There is no hepatomegaly, splenomegaly or mass.      Tenderness: There is no abdominal tenderness.   Musculoskeletal:         General: No tenderness. Normal range of motion.      Cervical back: Normal range of motion and neck supple.   Lymphadenopathy:      Head:      Right side  "of head: No posterior auricular or occipital adenopathy.      Left side of head: No posterior auricular or occipital adenopathy.      Cervical: No cervical adenopathy.      Right cervical: No superficial, deep or posterior cervical adenopathy.     Left cervical: No superficial, deep or posterior cervical adenopathy.      Upper Body:      Right upper body: No supraclavicular adenopathy.      Left upper body: No supraclavicular adenopathy.   Skin:     General: Skin is dry.      Findings: No erythema or rash.      Nails: There is no clubbing.   Neurological:      Mental Status: He is alert and oriented to person, place, and time.      Cranial Nerves: No cranial nerve deficit.      Coordination: Coordination normal.   Psychiatric:         Behavior: Behavior normal.         Thought Content: Thought content normal.         Judgment: Judgment normal.          Significant Labs:   BMP:   Recent Labs   Lab 05/29/25  1531   CREATININE 0.9   , CBC: No results for input(s): "WBC", "HGB", "HCT", "PLT" in the last 48 hours., CMP:   Recent Labs   Lab 05/29/25  1531   CREATININE 0.9   , Coagulation: No results for input(s): "PT", "INR", "APTT" in the last 48 hours., Haptoglobin: No results for input(s): "HAPTOGLOBIN" in the last 48 hours., Immunology: No results for input(s): "SPEP", "JOEL", "SELVIN", "FREELAMBDALI" in the last 48 hours., LDH: No results for input(s): "LDHCSF", "BFSOURCE" in the last 48 hours., LFTs: No results for input(s): "ALT", "AST", "ALKPHOS", "BILITOT", "PROT", "ALBUMIN" in the last 48 hours., Reticulocytes: No results for input(s): "RETIC" in the last 48 hours., Tumor Markers: No results for input(s): "PSA", "CEA", "", "AFPTM", "OR5020", "" in the last 48 hours.    Invalid input(s): "ALGTM", Uric Acid No results for input(s): "URICACID" in the last 48 hours., and Urine Studies: No results for input(s): "COLORU", "APPEARANCEUA", "PHUR", "SPECGRAV", "PROTEINUA", "GLUCUA", "KETONESU", "BILIRUBINUA", " ""OCCULTUA", "NITRITE", "UROBILINOGEN", "LEUKOCYTESUR", "RBCUA", "WBCUA", "BACTERIA", "SQUAMEPITHEL", "HYALINECASTS" in the last 48 hours.    Invalid input(s): "WRIGHTSUR"    Diagnostic Results:  I have reviewed all pertinent imaging results/findings within the past 24 hours.  "

## 2025-05-30 NOTE — OP NOTE
Pre Op Diagnosis: fever of unknown origin     Post Op Diagnosis: same     Procedure:  lumbar puncture     Procedure performed by: Zehra SARAVIA, Flaco GIRON     Written Informed Consent Obtained: Yes     Specimen Removed:  yes     Estimated Blood Loss:  minimal     Findings: Local anesthesia     Sedation:  no     The patient tolerated the procedure well and there were no complications.      Disposition:  F/U in clinic or with ordering physician    Discharge instructions:  Light activity for 24 hours.  Remove band aid in 24 hours.  No baths (showers are appropriate).      Sterile technique was performed in the lower back, lidocaine was used as a local anesthetic. OP 17, 11 cc clear CSF collected and sent to the lab.   Pt tolerated the procedure well without immediate complications.  Please see radiologist report for details. F/u with PCP and/or ordering physician.

## 2025-05-30 NOTE — PLAN OF CARE
Patient is in stable condition, no acute distress, remained free from injuries, receiving IV antifungals, no pain reported this shift, on cardiac monitoring,VSS, and all active orders reviewed. 24 hr chart check performed.

## 2025-05-31 PROBLEM — R74.01 TRANSAMINITIS: Status: ACTIVE | Noted: 2025-05-31

## 2025-05-31 LAB
ABSOLUTE EOSINOPHIL (OHS): 0.06 K/UL
ABSOLUTE MONOCYTE (OHS): 0.04 K/UL (ref 0.3–1)
ABSOLUTE NEUTROPHIL COUNT (OHS): 1.63 K/UL (ref 1.8–7.7)
ALBUMIN SERPL BCP-MCNC: 1.8 G/DL (ref 3.5–5.2)
ALP SERPL-CCNC: 195 UNIT/L (ref 40–150)
ALT SERPL W/O P-5'-P-CCNC: 27 UNIT/L (ref 10–44)
ANION GAP (OHS): 7 MMOL/L (ref 8–16)
AST SERPL-CCNC: 53 UNIT/L (ref 11–45)
BASOPHILS # BLD AUTO: 0.01 K/UL
BASOPHILS NFR BLD AUTO: 0.3 %
BILIRUB DIRECT SERPL-MCNC: 0.2 MG/DL (ref 0.1–0.3)
BILIRUB SERPL-MCNC: 0.3 MG/DL (ref 0.1–1)
BUN SERPL-MCNC: 21 MG/DL (ref 8–23)
C GATTII+NEOFOR DNA CSF QL NAA+NON-PROBE: NOT DETECTED
CALCIUM SERPL-MCNC: 7.8 MG/DL (ref 8.7–10.5)
CHLORIDE SERPL-SCNC: 107 MMOL/L (ref 95–110)
CMV DNA CSF QL NAA+NON-PROBE: NOT DETECTED
CO2 SERPL-SCNC: 20 MMOL/L (ref 23–29)
CREAT SERPL-MCNC: 0.9 MG/DL (ref 0.5–1.4)
CRYPTOC AG CSF QL IA.RAPID: NEGATIVE
E COLI K1 DNA CSF QL NAA+NON-PROBE: NOT DETECTED
ERYTHROCYTE [DISTWIDTH] IN BLOOD BY AUTOMATED COUNT: 14.7 % (ref 11.5–14.5)
EV RNA CSF QL NAA+NON-PROBE: NOT DETECTED
GFR SERPLBLD CREATININE-BSD FMLA CKD-EPI: >60 ML/MIN/1.73/M2
GLUCOSE SERPL-MCNC: 116 MG/DL (ref 70–110)
GP B STREP DNA CSF QL NAA+NON-PROBE: NOT DETECTED
HAEM INFLU DNA CSF QL NAA+NON-PROBE: NOT DETECTED
HCT VFR BLD AUTO: 31.3 % (ref 40–54)
HGB BLD-MCNC: 10.5 GM/DL (ref 14–18)
HHV6 DNA CSF QL NAA+NON-PROBE: NOT DETECTED
HSV1 DNA CSF QL NAA+NON-PROBE: NOT DETECTED
HSV2 DNA CSF QL NAA+NON-PROBE: NOT DETECTED
IMM GRANULOCYTES # BLD AUTO: 0.04 K/UL (ref 0–0.04)
IMM GRANULOCYTES NFR BLD AUTO: 1.4 % (ref 0–0.5)
INDIA INK PREP CSF: NORMAL
L MONOCYTOG DNA CSF QL NAA+NON-PROBE: NOT DETECTED
LYMPHOCYTES # BLD AUTO: 1.17 K/UL (ref 1–4.8)
MCH RBC QN AUTO: 31.3 PG (ref 27–31)
MCHC RBC AUTO-ENTMCNC: 33.5 G/DL (ref 32–36)
MCV RBC AUTO: 93 FL (ref 82–98)
N MEN DNA CSF QL NAA+NON-PROBE: NOT DETECTED
NUCLEATED RBC (/100WBC) (OHS): 0 /100 WBC
PARECHOVIRUS A RNA CSF QL NAA+NON-PROBE: NOT DETECTED
PLATELET # BLD AUTO: 187 K/UL (ref 150–450)
PMV BLD AUTO: 9.9 FL (ref 9.2–12.9)
POTASSIUM SERPL-SCNC: 3.9 MMOL/L (ref 3.5–5.1)
PROT SERPL-MCNC: 5.8 GM/DL (ref 6–8.4)
RBC # BLD AUTO: 3.36 M/UL (ref 4.6–6.2)
RELATIVE EOSINOPHIL (OHS): 2 %
RELATIVE LYMPHOCYTE (OHS): 39.7 % (ref 18–48)
RELATIVE MONOCYTE (OHS): 1.4 % (ref 4–15)
RELATIVE NEUTROPHIL (OHS): 55.2 % (ref 38–73)
S PNEUM DNA CSF QL NAA+NON-PROBE: NOT DETECTED
SODIUM SERPL-SCNC: 134 MMOL/L (ref 136–145)
VZV DNA CSF QL NAA+NON-PROBE: NOT DETECTED
WBC # BLD AUTO: 2.95 K/UL (ref 3.9–12.7)

## 2025-05-31 PROCEDURE — 25000003 PHARM REV CODE 250: Mod: HCNC | Performed by: HOSPITALIST

## 2025-05-31 PROCEDURE — 82248 BILIRUBIN DIRECT: CPT | Mod: HCNC | Performed by: FAMILY MEDICINE

## 2025-05-31 PROCEDURE — 21400001 HC TELEMETRY ROOM: Mod: HCNC

## 2025-05-31 PROCEDURE — 25000003 PHARM REV CODE 250: Mod: HCNC | Performed by: STUDENT IN AN ORGANIZED HEALTH CARE EDUCATION/TRAINING PROGRAM

## 2025-05-31 PROCEDURE — 27000207 HC ISOLATION: Mod: HCNC

## 2025-05-31 PROCEDURE — 25000003 PHARM REV CODE 250: Mod: HCNC | Performed by: FAMILY MEDICINE

## 2025-05-31 PROCEDURE — 63600175 PHARM REV CODE 636 W HCPCS: Mod: JZ,TB,HCNC | Performed by: STUDENT IN AN ORGANIZED HEALTH CARE EDUCATION/TRAINING PROGRAM

## 2025-05-31 PROCEDURE — 36415 COLL VENOUS BLD VENIPUNCTURE: CPT | Mod: HCNC | Performed by: NURSE PRACTITIONER

## 2025-05-31 PROCEDURE — 80053 COMPREHEN METABOLIC PANEL: CPT | Mod: HCNC | Performed by: NURSE PRACTITIONER

## 2025-05-31 PROCEDURE — 85025 COMPLETE CBC W/AUTO DIFF WBC: CPT | Mod: HCNC | Performed by: NURSE PRACTITIONER

## 2025-05-31 PROCEDURE — 25000003 PHARM REV CODE 250: Mod: HCNC | Performed by: INTERNAL MEDICINE

## 2025-05-31 RX ORDER — INDIUM IN-111 OXYQUINOLINE 1 UG/ML
0.31 SOLUTION INTRAVENOUS
Status: COMPLETED | OUTPATIENT
Start: 2025-05-30 | End: 2025-05-30

## 2025-05-31 RX ADMIN — TAMSULOSIN HYDROCHLORIDE 0.4 MG: 0.4 CAPSULE ORAL at 09:05

## 2025-05-31 RX ADMIN — PANTOPRAZOLE SODIUM 40 MG: 40 TABLET, DELAYED RELEASE ORAL at 09:05

## 2025-05-31 RX ADMIN — AMPHOTERICIN B 350 MG: 50 INJECTABLE, LIPOSOMAL INTRAVENOUS at 04:05

## 2025-05-31 RX ADMIN — DEXTROSE MONOHYDRATE: 5 INJECTION INTRAVENOUS at 06:05

## 2025-05-31 RX ADMIN — CETIRIZINE HYDROCHLORIDE 10 MG: 10 TABLET, FILM COATED ORAL at 09:05

## 2025-05-31 RX ADMIN — SODIUM CHLORIDE 500 ML: 9 INJECTION, SOLUTION INTRAVENOUS at 06:05

## 2025-05-31 RX ADMIN — RIVAROXABAN 20 MG: 20 TABLET, FILM COATED ORAL at 04:05

## 2025-05-31 RX ADMIN — ACETAMINOPHEN 650 MG: 325 TABLET ORAL at 07:05

## 2025-05-31 RX ADMIN — DIPHENHYDRAMINE HYDROCHLORIDE 25 MG: 25 CAPSULE ORAL at 03:05

## 2025-05-31 RX ADMIN — SODIUM CHLORIDE 500 ML: 9 INJECTION, SOLUTION INTRAVENOUS at 03:05

## 2025-05-31 RX ADMIN — FLUCYTOSINE 2250 MG: 500 CAPSULE ORAL at 11:05

## 2025-05-31 RX ADMIN — DEXTROSE MONOHYDRATE: 5 INJECTION INTRAVENOUS at 03:05

## 2025-05-31 RX ADMIN — FLUCYTOSINE 2250 MG: 500 CAPSULE ORAL at 05:05

## 2025-05-31 NOTE — PLAN OF CARE
Discussed poc with pt, pt verbalized understanding    Purposeful rounding every 2hours    VS wnl  Cardiac monitoring in use, pt is NSR, tele monitor # [id number]  Neutropenic/enhanced resp isolation  Fall precautions in place, remains injury free  Pt denies c/o Pain and nausea       Accurate I&Os  Abx given as prescribed  Bed locked at lowest position  Call light within reach    Chart check complete  Will cont with POC    [facility] WBC tracer scan done today - negative findings  Xarelto added back to regimen

## 2025-05-31 NOTE — SUBJECTIVE & OBJECTIVE
Interval History: See hospital course    Review of Systems   Constitutional:  Negative for activity change, appetite change and fever.   Respiratory:  Negative for shortness of breath.    Gastrointestinal:  Negative for nausea and vomiting.   Allergic/Immunologic: Positive for immunocompromised state.     Objective:     Vital Signs (Most Recent):  Temp: 98.8 °F (37.1 °C) (05/31/25 1226)  Pulse: 77 (05/31/25 1226)  Resp: 20 (05/31/25 1226)  BP: 130/67 (05/31/25 1226)  SpO2: (!) 94 % (05/31/25 1226) Vital Signs (24h Range):  Temp:  [97.9 °F (36.6 °C)-99.2 °F (37.3 °C)] 98.8 °F (37.1 °C)  Pulse:  [75-91] 77  Resp:  [18-20] 20  SpO2:  [92 %-95 %] 94 %  BP: (103-130)/(54-67) 130/67     Weight: 86.2 kg (190 lb 0.6 oz)  Body mass index is 26.5 kg/m².    Intake/Output Summary (Last 24 hours) at 5/31/2025 1317  Last data filed at 5/31/2025 0930  Gross per 24 hour   Intake 2562.73 ml   Output 650 ml   Net 1912.73 ml         Physical Exam  Vitals and nursing note reviewed. Exam conducted with a chaperone present (spouse).   Constitutional:       General: He is not in acute distress.     Appearance: He is ill-appearing. He is not toxic-appearing.   HENT:      Head: Normocephalic and atraumatic.   Cardiovascular:      Rate and Rhythm: Normal rate.   Pulmonary:      Effort: Pulmonary effort is normal. No respiratory distress.   Abdominal:      Palpations: Abdomen is soft.      Tenderness: There is no abdominal tenderness.   Musculoskeletal:      Right lower leg: No edema.      Left lower leg: No edema.   Skin:     General: Skin is warm.   Neurological:      Mental Status: He is alert and oriented to person, place, and time.          Significant Labs: All pertinent labs within the past 24 hours have been reviewed.  CBC:   Recent Labs   Lab 05/30/25  1051 05/31/25  0552   WBC 2.89* 2.95*   HGB 11.6* 10.5*   HCT 34.1* 31.3*    187     CMP:   Recent Labs   Lab 05/29/25  1531 05/30/25  0953 05/31/25  0552   NA  --  134* 134*    K  --  4.0 3.9   CL  --  106 107   CO2  --  20* 20*   GLU  --  107 116*   BUN  --  17 21   CREATININE 0.9 0.8 0.9   CALCIUM  --  7.8* 7.8*   PROT  --   --  5.8*   ALBUMIN  --   --  1.8*   BILITOT  --   --  0.3   ALKPHOS  --   --  195*   AST  --   --  53*   ALT  --   --  27   ANIONGAP  --  8 7*       Significant Imaging: I have reviewed all pertinent imaging results/findings within the past 24 hours.

## 2025-05-31 NOTE — ASSESSMENT & PLAN NOTE
CXR with NAF, Flu/Covid neg   Possible UTI   Resp viral panel negative   Urine and blood cultures NGTD   Monitor VS   Hematology/Oncology consulted and following   ID consult due to persistent fever   Cefepime changed to IV Merrem, Zyvox discontinued   CT abd pelvis with bladder thickening and IVC filter but no other source of infection. TTE with no valvular abnormalities. BLE duplex showed chronic RLE DVT   [facility] scan ordered by ID but radiology unable to obtain needed material for scan until 05/26 and scan cannot be completed until 5/27    Karius testing pending per Dr. Laguna  Discussed with Dr. Laguna 05/25- he recommends repeat blood cultures, change IV Cefepime to Merrem   Encourage IS and Oob as able     Blood cultures negative growth to date   Positive for cryptococcal infection  Mri brain pending  Lumbar puncture pending  Hold xarelto  [facility]ium scan pending  Antimicrobials per infectious disease     5/30/25  Currently on treatment for cryptococcal infection. Will stop scheduled Tylenol and monitor fever curve.      CXR with NAF, Flu/Covid neg   Possible UTI   Resp viral panel negative   Urine and blood cultures NGTD   Monitor VS   Hematology/Oncology consulted and following   ID consult due to persistent fever   Cefepime changed to IV Merrem, Zyvox discontinued   CT abd pelvis with bladder thickening and IVC filter but no other source of infection. TTE with no valvular abnormalities. BLE duplex showed chronic RLE DVT   [facility] scan ordered by ID but radiology unable to obtain needed material for scan until 05/26 and scan cannot be completed until 5/27    Karius testing pending per Dr. Laguna  Discussed with Dr. Laguna 05/25- he recommends repeat blood cultures, change IV Cefepime to Merrem   Encourage IS and Oob as able     Blood cultures negative growth to date   Positive for cryptococcal infection  Mri brain pending  Lumbar puncture pending  Hold xarelto  [facility]ium scan pending  Antimicrobials per  infectious disease     5/30/25  Likely related to cryptococcal infection.   Hold scheduled Tylenol. Monitor fever curve  WBC scan pending  LP with CSF right now unremarkable. Christiana Ink, Cryptococcal antigen, MS panel, Fungal Culture, and Bacterial culture are still pending.  CXR with NAF, Flu/Covid neg   Possible UTI   Resp viral panel negative   Urine and blood cultures NGTD   Monitor VS   Hematology/Oncology consulted and following   ID consult due to persistent fever   Cefepime changed to IV Merrem, Zyvox discontinued   CT abd pelvis with bladder thickening and IVC filter but no other source of infection. TTE with no valvular abnormalities. BLE duplex showed chronic RLE DVT   [facility] scan ordered by ID but radiology unable to obtain needed material for scan until 05/26 and scan cannot be completed until 5/27    Karius testing pending per Dr. Laguna  Discussed with Dr. Laguna 05/25- he recommends repeat blood cultures, change IV Cefepime to Merrem   Encourage IS and Oob as able     Blood cultures negative growth to date   Positive for cryptococcal infection  Mri brain pending  Lumbar puncture pending  Hold xarelto  [facility]ium scan pending  Antimicrobials per infectious disease     5/30/25  MRI brain negative.   S/p LP; analysis so far unremarkable; awaiting Christiana Ink, Cryptococcal antigen, MS panel, Fungal Culture, and Bacterial culture   Per ID patient will need to stay hospitalized 14 days for induction therapy(agents are toxic in nature) followed by consolidation therapy    5/31/25  Patient has remained afebrile for 48 hours.  Source of cryptococcus infeciton unclear. Serum cryptococcus antigen negative. Christiana ink negative. Indium scan also negative today. Discussed with ID who recommends stopping flucytosine. Continue Amphotericin until CSF Crypto Ag is known. If negative and patient remains afebrile, patient will likely discharge on fluconazole treatment for non meningitis/non-pulmonary cryptococcus which can be  treated as antigenemia with extended course of fluconazole.   CXR with NAF, Flu/Covid neg   Possible UTI   Resp viral panel negative   Urine and blood cultures NGTD   Monitor VS   Hematology/Oncology consulted and following   ID consult due to persistent fever   Cefepime changed to IV Merrem, Zyvox discontinued   CT abd pelvis with bladder thickening and IVC filter but no other source of infection. TTE with no valvular abnormalities. BLE duplex showed chronic RLE DVT   [facility] scan ordered by ID but radiology unable to obtain needed material for scan until 05/26 and scan cannot be completed until 5/27    Karius testing pending per Dr. Laguna  Discussed with Dr. Laguna 05/25- he recommends repeat blood cultures, change IV Cefepime to Merrem   Encourage IS and Oob as able     Blood cultures negative growth to date   Positive for cryptococcal infection  Mri brain pending  Lumbar puncture pending  Hold xarelto  [facility]ium scan pending  Antimicrobials per infectious disease     5/30/25  Currently on treatment for cryptococcal infection. Will stop scheduled Tylenol and monitor fever curve.      CXR with NAF, Flu/Covid neg   Possible UTI   Resp viral panel negative   Urine and blood cultures NGTD   Monitor VS   Hematology/Oncology consulted and following   ID consult due to persistent fever   Cefepime changed to IV Merrem, Zyvox discontinued   CT abd pelvis with bladder thickening and IVC filter but no other source of infection. TTE with no valvular abnormalities. BLE duplex showed chronic RLE DVT   [facility] scan ordered by ID but radiology unable to obtain needed material for scan until 05/26 and scan cannot be completed until 5/27    Karius testing pending per Dr. Laguna  Discussed with Dr. Laguna 05/25- he recommends repeat blood cultures, change IV Cefepime to Merrem   Encourage IS and Oob as able     Blood cultures negative growth to date   Positive for cryptococcal infection  Mri brain pending  Lumbar puncture  pending  Hold xarelto  [facility] scan pending  Antimicrobials per infectious disease     5/30/25  Likely related to cryptococcal infection.   Hold scheduled Tylenol. Monitor fever curve  WBC scan pending  LP with CSF right now unremarkable. Christiana Ink, Cryptococcal antigen, MS panel, Fungal Culture, and Bacterial culture are still pending.  CXR with NAF, Flu/Covid neg   Possible UTI   Resp viral panel negative   Urine and blood cultures NGTD   Monitor VS   Hematology/Oncology consulted and following   ID consult due to persistent fever   Cefepime changed to IV Merrem, Zyvox discontinued   CT abd pelvis with bladder thickening and IVC filter but no other source of infection. TTE with no valvular abnormalities. BLE duplex showed chronic RLE DVT   [facility] scan ordered by ID but radiology unable to obtain needed material for scan until 05/26 and scan cannot be completed until 5/27    Karius testing pending per Dr. Laguna  Discussed with Dr. Laguna 05/25- he recommends repeat blood cultures, change IV Cefepime to Merrem   Encourage IS and Oob as able     Blood cultures negative growth to date   Positive for cryptococcal infection  Mri brain pending  Lumbar puncture pending  Hold xarelto  [facility] scan pending  Antimicrobials per infectious disease     5/30/25  MRI brain negative.   S/p LP; analysis so far unremarkable; awaiting Christiana Ink, Cryptococcal antigen, MS panel, Fungal Culture, and Bacterial culture   Per ID patient will need to stay hospitalized 14 days for induction therapy(agents are toxic in nature) followed by consolidation therapy    5/31/25  Patient has remained afebrile without Tylenol. See plan above.  CXR with NAF, Flu/Covid neg   Possible UTI   Resp viral panel negative   Urine and blood cultures NGTD   Monitor VS   Hematology/Oncology consulted and following   ID consult due to persistent fever   Cefepime changed to IV Merrem, Zyvox discontinued   CT abd pelvis with bladder thickening and IVC filter but no  other source of infection. TTE with no valvular abnormalities. BLE duplex showed chronic RLE DVT   [facility] scan ordered by ID but radiology unable to obtain needed material for scan until 05/26 and scan cannot be completed until 5/27    Karius testing pending per Dr. Laguna  Discussed with Dr. Laguna 05/25- he recommends repeat blood cultures, change IV Cefepime to Merrem   Encourage IS and Oob as able     Blood cultures negative growth to date   Positive for cryptococcal infection  Mri brain pending  Lumbar puncture pending  Hold xarelto  [facility] scan pending  Antimicrobials per infectious disease     5/30/25  Currently on treatment for cryptococcal infection. Will stop scheduled Tylenol and monitor fever curve.      CXR with NAF, Flu/Covid neg   Possible UTI   Resp viral panel negative   Urine and blood cultures NGTD   Monitor VS   Hematology/Oncology consulted and following   ID consult due to persistent fever   Cefepime changed to IV Merrem, Zyvox discontinued   CT abd pelvis with bladder thickening and IVC filter but no other source of infection. TTE with no valvular abnormalities. BLE duplex showed chronic RLE DVT   [facility] scan ordered by ID but radiology unable to obtain needed material for scan until 05/26 and scan cannot be completed until 5/27    Karius testing pending per Dr. Laguna  Discussed with Dr. Laguna 05/25- he recommends repeat blood cultures, change IV Cefepime to Merrem   Encourage IS and Oob as able     Blood cultures negative growth to date   Positive for cryptococcal infection  Mri brain pending  Lumbar puncture pending  Hold xarelto  [facility] scan pending  Antimicrobials per infectious disease     5/30/25  Likely related to cryptococcal infection.   Hold scheduled Tylenol. Monitor fever curve  WBC scan pending  LP with CSF right now unremarkable. Christiana Ink, Cryptococcal antigen, MS panel, Fungal Culture, and Bacterial culture are still pending.  CXR with NAF, Flu/Covid neg   Possible UTI    Resp viral panel negative   Urine and blood cultures NGTD   Monitor VS   Hematology/Oncology consulted and following   ID consult due to persistent fever   Cefepime changed to IV Merrem, Zyvox discontinued   CT abd pelvis with bladder thickening and IVC filter but no other source of infection. TTE with no valvular abnormalities. BLE duplex showed chronic RLE DVT   [facility] scan ordered by ID but radiology unable to obtain needed material for scan until 05/26 and scan cannot be completed until 5/27    Karius testing pending per Dr. Laguna  Discussed with Dr. Laguna 05/25- he recommends repeat blood cultures, change IV Cefepime to Merrem   Encourage IS and Oob as able     Blood cultures negative growth to date   Positive for cryptococcal infection  Mri brain pending  Lumbar puncture pending  Hold xarelto  [facility] scan pending  Antimicrobials per infectious disease     5/30/25  MRI brain negative.   S/p LP; analysis so far unremarkable; awaiting Christiana Ink, Cryptococcal antigen, MS panel, Fungal Culture, and Bacterial culture   Per ID patient will need to stay hospitalized 14 days for induction therapy(agents are toxic in nature) followed by consolidation therapy    5/31/25   Source of infection is unclear. Serum cryptococcus antigen negative. Christiana ink negative. [facility] scan also negative today. Discussed with ID who recommends stopping flucytosine. Continue Amphotericin until CSF Crypto Ag is known. If negative and patient remains afebrile, patient will likely discharge on fluconazole treatment for non meningitis/non-pulmonary cryptococcus which can be treated as antigenemia with extended course of fluconazole.

## 2025-05-31 NOTE — ASSESSMENT & PLAN NOTE
This patient is found to have pancytopenia, the likely etiology is , will monitor CBC . Will transfuse red blood cells if the hemoglobin is <7g/dL (or <8 in the setting of ACS). Hold DVT prophylaxis if platelets are <50k. The patient's hemoglobin, white blood cell count, and platelet count results have been reviewed and are listed below.  Recent Labs   Lab 05/31/25  0552   HGB 10.5*   WBC 2.95*        5/30/25  Stable today

## 2025-05-31 NOTE — PROGRESS NOTES
[facility]  [facility] Medicine  Progress Note    Patient Name: Armando Ingram Jr.  MRN: [id number]  Patient Class: IP- Inpatient   Admission Date: 5/16/2025  Length of Stay: 14 days  Attending Physician: Lonnie Schaefer MD  Primary Care Provider: Brian Soares MD    Subjective     Principal Problem:Neutropenic fever    HPI:  Armando Ingram Jr. is a 65 y.o. male with a PMH  has a past medical history of Closed right hip fracture, Colon cancer, DVT (deep venous thrombosis) (2002), Hairy cell leukemia (06/06/2024), and Nephrolithiasis. who presented to the ED for further evaluation of acute onset fever with T-max measuring 101.0 F earlier today.  Patient reported significant history of hairy cell leukemia in his followed by Dr. Dow and Dr. Flores from Hematology/Oncology and was recently prescribed a Z-Cordell for 4 days for treatment of a cough.  Patient was instructed to go to the ED if he endorsed fever greater than 103.0 F but presented to the ED due to ulcer experiencing associated chills, throbbing headache, and persistent fever.  He reported no known alleviating or aggravating factors noted with all other review of systems negative except as noted above.  He is not currently on active treatment for his leukemia and reported being in his usual state of health prior to onset of symptoms.  Initial workup in the ED revealed patient to be afebrile with T-max measuring 100.4 F, pancytopenic, lactic acid/procalcitonin within normal limits, flu/COVID negative, chest x-ray negative for acute findings, UA positive for 2+ LE, 11 RBCs, free found WBCs.  Patient initiated on cefepime with cultures obtained and pending and admitted to Hospital Medicine under observation for continued medical management and treatment of neutropenic fever.    PCP: Brian Soares      Overview/Hospital Course:  Continued on IV Cefepime. Zyvoz added 05/18 due to persistent fever. Hematology consulted. Cultures remain NGTD   Resp viral  panel negative. ID consulted to eval due to fevers  Cefepime changed to IV Merrem per ID recs on 05/21, unclear source of fevers at this time. Zyvox discontinued per ID.   CT Abd/Pelvis showed bladder thickening but otherwise negative.   ID recommend TTE, BLE duplex and possible Indium scan for source of infection and de-escalate abx to Cefepime on 05/23.   TTE with no vegetations. BLE duplex showed chronic RLE DVT. Indium scan pending tentatively planned for 05/26-05/27 as radiology does not have required materials at this time and scan will take 2 days to complete per RT.   Persistent fevers, abx changed back to Merrem 05/25 per ID recs. Karius pending   5/26 fever curve trending down. Respiratory infection panel negative. Asymptomatic. Continue intravenous antibiotic(s)   5/27 neutropenic fever persists despite scheduled tylenol. Awaiting tagged wbc scan per infectious disease recommendations.   5/28 febrile overnight. Infectious disease recommending addition of doxy. Hem/onc following. Awaiting karius and imaging. Refused lumbar puncture. If afebrile x 48h, discharge   5/29 febrile. Positive cryptococcal infection. Infectious disease adjusting antimicrobials. Plans for lumbar puncture, mri brain, and indium scan.  5/30/2025: s/p LP today.  Opening pressure within normal range at 18. clear CSF fluid noted. Cell count, glucose, Christiana Ink, Cryptococcal antigen, MS panel, Fungal Culture, and Bacterial culture obtained. Patient received first dose of induction therapy for cryptococcal infection and ding well. Indium Scan in progress. Given toxic nature of amphotericin and flucytosine will closely monitor chemistries. Oncology consulted with partners and decision made to defer treatment for hair cell leukemia until infection has been treated.   5/31/24: Patient has remained afebrile today. ALP and AST elevated after 48 hours of treatment. Serum cryptococcus antigen negative. Christiana ink negative. Indium scan also  negative today. Discussed with ID who recommends stopping flucytosine. Continue Amphotericin until CSF Crypto Ag is known. If negative and patient remains afebrile, patient will likely discharge on fluconazole treatment for non meningitis/non-pulmonary cryptococcus which can be treated as antigenemia with extended course of fluconazole.     Interval History: See hospital course    Review of Systems   Constitutional:  Negative for activity change, appetite change and fever.   Respiratory:  Negative for shortness of breath.    Gastrointestinal:  Negative for nausea and vomiting.   Allergic/Immunologic: Positive for immunocompromised state.     Objective:     Vital Signs (Most Recent):  Temp: 98.8 °F (37.1 °C) (05/31/25 1226)  Pulse: 77 (05/31/25 1226)  Resp: 20 (05/31/25 1226)  BP: 130/67 (05/31/25 1226)  SpO2: (!) 94 % (05/31/25 1226) Vital Signs (24h Range):  Temp:  [97.9 °F (36.6 °C)-99.2 °F (37.3 °C)] 98.8 °F (37.1 °C)  Pulse:  [75-91] 77  Resp:  [18-20] 20  SpO2:  [92 %-95 %] 94 %  BP: (103-130)/(54-67) 130/67     Weight: 86.2 kg (190 lb 0.6 oz)  Body mass index is 26.5 kg/m².    Intake/Output Summary (Last 24 hours) at 5/31/2025 1317  Last data filed at 5/31/2025 0930  Gross per 24 hour   Intake 2562.73 ml   Output 650 ml   Net 1912.73 ml         Physical Exam  Vitals and nursing note reviewed. Exam conducted with a chaperone present (spouse).   Constitutional:       General: He is not in acute distress.     Appearance: He is ill-appearing. He is not toxic-appearing.   HENT:      Head: Normocephalic and atraumatic.   Cardiovascular:      Rate and Rhythm: Normal rate.   Pulmonary:      Effort: Pulmonary effort is normal. No respiratory distress.   Abdominal:      Palpations: Abdomen is soft.      Tenderness: There is no abdominal tenderness.   Musculoskeletal:      Right lower leg: No edema.      Left lower leg: No edema.   Skin:     General: Skin is warm.   Neurological:      Mental Status: He is alert and  oriented to person, place, and time.          Significant Labs: All pertinent labs within the past 24 hours have been reviewed.  CBC:   Recent Labs   Lab 05/30/25  1051 05/31/25  0552   WBC 2.89* 2.95*   HGB 11.6* 10.5*   HCT 34.1* 31.3*    187     CMP:   Recent Labs   Lab 05/29/25  1531 05/30/25  0953 05/31/25  0552   NA  --  134* 134*   K  --  4.0 3.9   CL  --  106 107   CO2  --  20* 20*   GLU  --  107 116*   BUN  --  17 21   CREATININE 0.9 0.8 0.9   CALCIUM  --  7.8* 7.8*   PROT  --   --  5.8*   ALBUMIN  --   --  1.8*   BILITOT  --   --  0.3   ALKPHOS  --   --  195*   AST  --   --  53*   ALT  --   --  27   ANIONGAP  --  8 7*       Significant Imaging: I have reviewed all pertinent imaging results/findings within the past 24 hours.      Assessment & Plan  Neutropenic fever  FUO (fever of unknown origin)  Cryptococcosis  CXR with NAF, Flu/Covid neg   Possible UTI   Resp viral panel negative   Urine and blood cultures NGTD   Monitor VS   Hematology/Oncology consulted and following   ID consult due to persistent fever   Cefepime changed to IV Merrem, Zyvox discontinued   CT abd pelvis with bladder thickening and IVC filter but no other source of infection. TTE with no valvular abnormalities. BLE duplex showed chronic RLE DVT   Indium scan ordered by ID but radiology unable to obtain needed material for scan until 05/26 and scan cannot be completed until 5/27    Karius testing pending per Dr. Laguna  Discussed with Dr. Laguna 05/25- he recommends repeat blood cultures, change IV Cefepime to Merrem   Encourage IS and Oob as able     Blood cultures negative growth to date   Positive for cryptococcal infection  Mri brain pending  Lumbar puncture pending  Hold xarelto  Indium scan pending  Antimicrobials per infectious disease     5/30/25  Currently on treatment for cryptococcal infection. Will stop scheduled Tylenol and monitor fever curve.      CXR with NAF, Flu/Covid neg   Possible UTI   Resp viral panel  negative   Urine and blood cultures NGTD   Monitor VS   Hematology/Oncology consulted and following   ID consult due to persistent fever   Cefepime changed to IV Merrem, Zyvox discontinued   CT abd pelvis with bladder thickening and IVC filter but no other source of infection. TTE with no valvular abnormalities. BLE duplex showed chronic RLE DVT   Indium scan ordered by ID but radiology unable to obtain needed material for scan until 05/26 and scan cannot be completed until 5/27    Karius testing pending per Dr. Laguna  Discussed with Dr. Laguna 05/25- he recommends repeat blood cultures, change IV Cefepime to Merrem   Encourage IS and Oob as able     Blood cultures negative growth to date   Positive for cryptococcal infection  Mri brain pending  Lumbar puncture pending  Hold xarelto  Indium scan pending  Antimicrobials per infectious disease     5/30/25  Likely related to cryptococcal infection.   Hold scheduled Tylenol. Monitor fever curve  WBC scan pending  LP with CSF right now unremarkable. Christiana Ink, Cryptococcal antigen, MS panel, Fungal Culture, and Bacterial culture are still pending.  CXR with NAF, Flu/Covid neg   Possible UTI   Resp viral panel negative   Urine and blood cultures NGTD   Monitor VS   Hematology/Oncology consulted and following   ID consult due to persistent fever   Cefepime changed to IV Merrem, Zyvox discontinued   CT abd pelvis with bladder thickening and IVC filter but no other source of infection. TTE with no valvular abnormalities. BLE duplex showed chronic RLE DVT   Indium scan ordered by ID but radiology unable to obtain needed material for scan until 05/26 and scan cannot be completed until 5/27    Karius testing pending per Dr. Laguna  Discussed with Dr. Laguna 05/25- he recommends repeat blood cultures, change IV Cefepime to Merrem   Encourage IS and Oob as able     Blood cultures negative growth to date   Positive for cryptococcal infection  Mri brain pending  Lumbar  puncture pending  Hold xarelto  Indium scan pending  Antimicrobials per infectious disease     5/30/25  MRI brain negative.   S/p LP; analysis so far unremarkable; awaiting Christiana Ink, Cryptococcal antigen, MS panel, Fungal Culture, and Bacterial culture   Per ID patient will need to stay hospitalized 14 days for induction therapy(agents are toxic in nature) followed by consolidation therapy    5/31/25  Patient has remained afebrile for 48 hours.  Source of cryptococcus infeciton unclear. Serum cryptococcus antigen negative. Christiana ink negative. Indium scan also negative today. Discussed with ID who recommends stopping flucytosine. Continue Amphotericin until CSF Crypto Ag is known. If negative and patient remains afebrile, patient will likely discharge on fluconazole treatment for non meningitis/non-pulmonary cryptococcus which can be treated as antigenemia with extended course of fluconazole.   CXR with NAF, Flu/Covid neg   Possible UTI   Resp viral panel negative   Urine and blood cultures NGTD   Monitor VS   Hematology/Oncology consulted and following   ID consult due to persistent fever   Cefepime changed to IV Merrem, Zyvox discontinued   CT abd pelvis with bladder thickening and IVC filter but no other source of infection. TTE with no valvular abnormalities. BLE duplex showed chronic RLE DVT   Indium scan ordered by ID but radiology unable to obtain needed material for scan until 05/26 and scan cannot be completed until 5/27    Karius testing pending per Dr. Laguna  Discussed with Dr. Laguna 05/25- he recommends repeat blood cultures, change IV Cefepime to Merrem   Encourage IS and Oob as able     Blood cultures negative growth to date   Positive for cryptococcal infection  Mri brain pending  Lumbar puncture pending  Hold xarelto  Indium scan pending  Antimicrobials per infectious disease     5/30/25  Currently on treatment for cryptococcal infection. Will stop scheduled Tylenol and monitor fever  curve.      CXR with NAF, Flu/Covid neg   Possible UTI   Resp viral panel negative   Urine and blood cultures NGTD   Monitor VS   Hematology/Oncology consulted and following   ID consult due to persistent fever   Cefepime changed to IV Merrem, Zyvox discontinued   CT abd pelvis with bladder thickening and IVC filter but no other source of infection. TTE with no valvular abnormalities. BLE duplex showed chronic RLE DVT   Indium scan ordered by ID but radiology unable to obtain needed material for scan until 05/26 and scan cannot be completed until 5/27    Karius testing pending per Dr. Laguna  Discussed with Dr. Laguna 05/25- he recommends repeat blood cultures, change IV Cefepime to Merrem   Encourage IS and Oob as able     Blood cultures negative growth to date   Positive for cryptococcal infection  Mri brain pending  Lumbar puncture pending  Hold xarelto  Indium scan pending  Antimicrobials per infectious disease     5/30/25  Likely related to cryptococcal infection.   Hold scheduled Tylenol. Monitor fever curve  WBC scan pending  LP with CSF right now unremarkable. Christiana Ink, Cryptococcal antigen, MS panel, Fungal Culture, and Bacterial culture are still pending.  CXR with NAF, Flu/Covid neg   Possible UTI   Resp viral panel negative   Urine and blood cultures NGTD   Monitor VS   Hematology/Oncology consulted and following   ID consult due to persistent fever   Cefepime changed to IV Merrem, Zyvox discontinued   CT abd pelvis with bladder thickening and IVC filter but no other source of infection. TTE with no valvular abnormalities. BLE duplex showed chronic RLE DVT   Indium scan ordered by ID but radiology unable to obtain needed material for scan until 05/26 and scan cannot be completed until 5/27    Karius testing pending per Dr. Laguna  Discussed with Dr. Laguna 05/25- he recommends repeat blood cultures, change IV Cefepime to Merrem   Encourage IS and Oob as able     Blood cultures negative growth to  date   Positive for cryptococcal infection  Mri brain pending  Lumbar puncture pending  Hold xarelto  Indium scan pending  Antimicrobials per infectious disease     5/30/25  MRI brain negative.   S/p LP; analysis so far unremarkable; awaiting Christiana Ink, Cryptococcal antigen, MS panel, Fungal Culture, and Bacterial culture   Per ID patient will need to stay hospitalized 14 days for induction therapy(agents are toxic in nature) followed by consolidation therapy    5/31/25  Patient has remained afebrile without Tylenol. See plan above.  CXR with NAF, Flu/Covid neg   Possible UTI   Resp viral panel negative   Urine and blood cultures NGTD   Monitor VS   Hematology/Oncology consulted and following   ID consult due to persistent fever   Cefepime changed to IV Merrem, Zyvox discontinued   CT abd pelvis with bladder thickening and IVC filter but no other source of infection. TTE with no valvular abnormalities. BLE duplex showed chronic RLE DVT   Indium scan ordered by ID but radiology unable to obtain needed material for scan until 05/26 and scan cannot be completed until 5/27    Karius testing pending per Dr. Laguna  Discussed with Dr. Laguna 05/25- he recommends repeat blood cultures, change IV Cefepime to Merrem   Encourage IS and Oob as able     Blood cultures negative growth to date   Positive for cryptococcal infection  Mri brain pending  Lumbar puncture pending  Hold xarelto  Indium scan pending  Antimicrobials per infectious disease     5/30/25  Currently on treatment for cryptococcal infection. Will stop scheduled Tylenol and monitor fever curve.      CXR with NAF, Flu/Covid neg   Possible UTI   Resp viral panel negative   Urine and blood cultures NGTD   Monitor VS   Hematology/Oncology consulted and following   ID consult due to persistent fever   Cefepime changed to IV Merrem, Zyvox discontinued   CT abd pelvis with bladder thickening and IVC filter but no other source of infection. TTE with no valvular  abnormalities. BLE duplex showed chronic RLE DVT   Indium scan ordered by ID but radiology unable to obtain needed material for scan until 05/26 and scan cannot be completed until 5/27    Karius testing pending per Dr. Laguna  Discussed with Dr. Laguna 05/25- he recommends repeat blood cultures, change IV Cefepime to Merrem   Encourage IS and Oob as able     Blood cultures negative growth to date   Positive for cryptococcal infection  Mri brain pending  Lumbar puncture pending  Hold xarelto  Indium scan pending  Antimicrobials per infectious disease     5/30/25  Likely related to cryptococcal infection.   Hold scheduled Tylenol. Monitor fever curve  WBC scan pending  LP with CSF right now unremarkable. Christiana Ink, Cryptococcal antigen, MS panel, Fungal Culture, and Bacterial culture are still pending.  CXR with NAF, Flu/Covid neg   Possible UTI   Resp viral panel negative   Urine and blood cultures NGTD   Monitor VS   Hematology/Oncology consulted and following   ID consult due to persistent fever   Cefepime changed to IV Merrem, Zyvox discontinued   CT abd pelvis with bladder thickening and IVC filter but no other source of infection. TTE with no valvular abnormalities. BLE duplex showed chronic RLE DVT   Indium scan ordered by ID but radiology unable to obtain needed material for scan until 05/26 and scan cannot be completed until 5/27    Karius testing pending per Dr. Laguna  Discussed with Dr. Laguna 05/25- he recommends repeat blood cultures, change IV Cefepime to Merrem   Encourage IS and Oob as able     Blood cultures negative growth to date   Positive for cryptococcal infection  Mri brain pending  Lumbar puncture pending  Hold xarelto  Indium scan pending  Antimicrobials per infectious disease     5/30/25  MRI brain negative.   S/p LP; analysis so far unremarkable; awaiting Christiana Ink, Cryptococcal antigen, MS panel, Fungal Culture, and Bacterial culture   Per ID patient will need to stay hospitalized 14  days for induction therapy(agents are toxic in nature) followed by consolidation therapy    5/31/25   Source of infection is unclear. Serum cryptococcus antigen negative. Christiana ink negative. Indium scan also negative today. Discussed with ID who recommends stopping flucytosine. Continue Amphotericin until CSF Crypto Ag is known. If negative and patient remains afebrile, patient will likely discharge on fluconazole treatment for non meningitis/non-pulmonary cryptococcus which can be treated as antigenemia with extended course of fluconazole.     Hairy cell leukemia  Patient with known history of hairy cell leukemia and continues to follow Dr. Dow and Dr. Flores from Hematology/Oncology. Patient not currently on active treatment and currently undergoing treatment for neutropenic fever with broad-spectrum antibiotics as noted above.  Hematology consulted and following     5/30/25  Hematology discussed with other partners and it was decided not to proceed with treatment for leukemia. This was discussed with family.   Pancytopenia  This patient is found to have pancytopenia, the likely etiology is , will monitor CBC . Will transfuse red blood cells if the hemoglobin is <7g/dL (or <8 in the setting of ACS). Hold DVT prophylaxis if platelets are <50k. The patient's hemoglobin, white blood cell count, and platelet count results have been reviewed and are listed below.  Recent Labs   Lab 05/31/25  0552   HGB 10.5*   WBC 2.95*        5/30/25  Stable today    History of DVT (deep vein thrombosis)  - BLE duplex with chronic RLE DVT    -prior hx of IVC filter in 2002   Hold xarelto for lumbar puncture   VTE Risk Mitigation (From admission, onward)           Ordered     rivaroxaban tablet 20 mg  With dinner         05/29/25 1333     Reason for No Pharmacological VTE Prophylaxis  Once        Question:  Reasons:  Answer:  Already adequately anticoagulated on oral Anticoagulants    05/17/25 0213     IP VTE HIGH RISK  PATIENT  Once         05/17/25 0213     Place sequential compression device  Until discontinued         05/17/25 0213                    Discharge Planning   LAURI: 6/16/2025     Code Status: Full Code   Medical Readiness for Discharge Date:   Discharge Plan A: Home with family          Sena Melgar NP  Department of Hospital Medicine   [facility] - [facility]

## 2025-05-31 NOTE — CARE UPDATE
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 Worked until about 2 yrs ago then got diagnosed with hairy cell leukemia. No cancer treatment indicated at this time per oncology.     Patient has now remained afebrile going on 48 hrs. On induction therapy with amphotericin + flucytosine. Liver enzymes elevated today. Suspect this is due more so to flucytosine than amphotericin based on review of reported drug side effects. Will stop flucytosine and leave amphotericin for now while monitoring LFT.     MRI brain with contrast negative. CT chest negative. LP with normal opening pressure. CSF meningitis panel negative. Serum cryptococcus antigen negative. Christiana ink negative. Indium scan negative. Location of cryptococcus remains unclear.     Per review of literature/guidelines, non-meningitis/non-pulmonary cryptococcus in absence of AIDS or immunosuppression can be treated as antigenemia with course of PO fluconazole 800 mg daily x 10 weeks followed by PO fluconazole 200 mg daily for minimum of 6 months.     At this time, recommend 24 more hours of defervescence and awaiting CSF Crypto Ag. If Ag negative and patient remains fever free can plan to discharge home on fluconazole treatment detailed above. Will ensure close follow up in clinic.    unable to assess

## 2025-05-31 NOTE — ASSESSMENT & PLAN NOTE
Lab Results   Component Value Date    AST 55 (H) 06/01/2025    AST 53 (H) 05/31/2025    ALT 34 06/01/2025    ALT 27 05/31/2025    ALKPHOS 194 (H) 06/01/2025    ALKPHOS 195 (H) 05/31/2025

## 2025-06-01 LAB
ABSOLUTE EOSINOPHIL (OHS): 0.05 K/UL
ABSOLUTE MONOCYTE (OHS): 0.03 K/UL (ref 0.3–1)
ABSOLUTE NEUTROPHIL COUNT (OHS): 1.65 K/UL (ref 1.8–7.7)
ALBUMIN SERPL BCP-MCNC: 1.8 G/DL (ref 3.5–5.2)
ALP SERPL-CCNC: 194 UNIT/L (ref 40–150)
ALT SERPL W/O P-5'-P-CCNC: 34 UNIT/L (ref 10–44)
ANION GAP (OHS): 7 MMOL/L (ref 8–16)
AST SERPL-CCNC: 55 UNIT/L (ref 11–45)
BASOPHILS # BLD AUTO: 0 K/UL
BASOPHILS NFR BLD AUTO: 0 %
BILIRUB DIRECT SERPL-MCNC: 0.2 MG/DL (ref 0.1–0.3)
BILIRUB SERPL-MCNC: 0.4 MG/DL (ref 0.1–1)
BUN SERPL-MCNC: 17 MG/DL (ref 8–23)
CALCIUM SERPL-MCNC: 8 MG/DL (ref 8.7–10.5)
CHLORIDE SERPL-SCNC: 110 MMOL/L (ref 95–110)
CO2 SERPL-SCNC: 21 MMOL/L (ref 23–29)
CREAT SERPL-MCNC: 0.9 MG/DL (ref 0.5–1.4)
ERYTHROCYTE [DISTWIDTH] IN BLOOD BY AUTOMATED COUNT: 14.6 % (ref 11.5–14.5)
GFR SERPLBLD CREATININE-BSD FMLA CKD-EPI: >60 ML/MIN/1.73/M2
GLUCOSE SERPL-MCNC: 115 MG/DL (ref 70–110)
HCT VFR BLD AUTO: 31.8 % (ref 40–54)
HGB BLD-MCNC: 10.5 GM/DL (ref 14–18)
IMM GRANULOCYTES # BLD AUTO: 0.04 K/UL (ref 0–0.04)
IMM GRANULOCYTES NFR BLD AUTO: 1.4 % (ref 0–0.5)
LYMPHOCYTES # BLD AUTO: 1.08 K/UL (ref 1–4.8)
MCH RBC QN AUTO: 31.3 PG (ref 27–31)
MCHC RBC AUTO-ENTMCNC: 33 G/DL (ref 32–36)
MCV RBC AUTO: 95 FL (ref 82–98)
NUCLEATED RBC (/100WBC) (OHS): 0 /100 WBC
PLATELET # BLD AUTO: 218 K/UL (ref 150–450)
PMV BLD AUTO: 9.3 FL (ref 9.2–12.9)
POTASSIUM SERPL-SCNC: 4.5 MMOL/L (ref 3.5–5.1)
PROT SERPL-MCNC: 5.8 GM/DL (ref 6–8.4)
RBC # BLD AUTO: 3.35 M/UL (ref 4.6–6.2)
RELATIVE EOSINOPHIL (OHS): 1.8 %
RELATIVE LYMPHOCYTE (OHS): 37.9 % (ref 18–48)
RELATIVE MONOCYTE (OHS): 1.1 % (ref 4–15)
RELATIVE NEUTROPHIL (OHS): 57.8 % (ref 38–73)
SODIUM SERPL-SCNC: 138 MMOL/L (ref 136–145)
WBC # BLD AUTO: 2.85 K/UL (ref 3.9–12.7)

## 2025-06-01 PROCEDURE — 25000003 PHARM REV CODE 250: Mod: HCNC | Performed by: STUDENT IN AN ORGANIZED HEALTH CARE EDUCATION/TRAINING PROGRAM

## 2025-06-01 PROCEDURE — 27000207 HC ISOLATION: Mod: HCNC

## 2025-06-01 PROCEDURE — 21400001 HC TELEMETRY ROOM: Mod: HCNC

## 2025-06-01 PROCEDURE — 82248 BILIRUBIN DIRECT: CPT | Mod: HCNC | Performed by: FAMILY MEDICINE

## 2025-06-01 PROCEDURE — 25000003 PHARM REV CODE 250: Mod: HCNC | Performed by: HOSPITALIST

## 2025-06-01 PROCEDURE — 80053 COMPREHEN METABOLIC PANEL: CPT | Mod: HCNC | Performed by: NURSE PRACTITIONER

## 2025-06-01 PROCEDURE — 36415 COLL VENOUS BLD VENIPUNCTURE: CPT | Mod: HCNC | Performed by: NURSE PRACTITIONER

## 2025-06-01 PROCEDURE — 85025 COMPLETE CBC W/AUTO DIFF WBC: CPT | Mod: HCNC | Performed by: NURSE PRACTITIONER

## 2025-06-01 PROCEDURE — 63600175 PHARM REV CODE 636 W HCPCS: Mod: JZ,TB,HCNC | Performed by: STUDENT IN AN ORGANIZED HEALTH CARE EDUCATION/TRAINING PROGRAM

## 2025-06-01 PROCEDURE — 25000003 PHARM REV CODE 250: Mod: HCNC | Performed by: INTERNAL MEDICINE

## 2025-06-01 PROCEDURE — 25000003 PHARM REV CODE 250: Mod: HCNC | Performed by: FAMILY MEDICINE

## 2025-06-01 RX ADMIN — RIVAROXABAN 20 MG: 20 TABLET, FILM COATED ORAL at 05:06

## 2025-06-01 RX ADMIN — AMPHOTERICIN B 350 MG: 50 INJECTABLE, LIPOSOMAL INTRAVENOUS at 05:06

## 2025-06-01 RX ADMIN — FLUCYTOSINE 2250 MG: 500 CAPSULE ORAL at 11:06

## 2025-06-01 RX ADMIN — DEXTROSE MONOHYDRATE: 5 INJECTION INTRAVENOUS at 08:06

## 2025-06-01 RX ADMIN — FLUCYTOSINE 2250 MG: 500 CAPSULE ORAL at 09:06

## 2025-06-01 RX ADMIN — DEXTROSE MONOHYDRATE: 5 INJECTION INTRAVENOUS at 05:06

## 2025-06-01 RX ADMIN — CETIRIZINE HYDROCHLORIDE 10 MG: 10 TABLET, FILM COATED ORAL at 09:06

## 2025-06-01 RX ADMIN — FLUCYTOSINE 2250 MG: 500 CAPSULE ORAL at 06:06

## 2025-06-01 RX ADMIN — TAMSULOSIN HYDROCHLORIDE 0.4 MG: 0.4 CAPSULE ORAL at 09:06

## 2025-06-01 RX ADMIN — FLUCYTOSINE 2250 MG: 500 CAPSULE ORAL at 12:06

## 2025-06-01 RX ADMIN — SODIUM CHLORIDE 500 ML: 9 INJECTION, SOLUTION INTRAVENOUS at 04:06

## 2025-06-01 RX ADMIN — PANTOPRAZOLE SODIUM 40 MG: 40 TABLET, DELAYED RELEASE ORAL at 09:06

## 2025-06-01 RX ADMIN — DIPHENHYDRAMINE HYDROCHLORIDE 25 MG: 25 CAPSULE ORAL at 04:06

## 2025-06-01 RX ADMIN — SODIUM CHLORIDE 500 ML: 9 INJECTION, SOLUTION INTRAVENOUS at 06:06

## 2025-06-01 NOTE — PLAN OF CARE
Problem: Adult Inpatient Plan of Care  Goal: Patient-Specific Goal (Individualized)  Outcome: Progressing  Goal: Absence of Hospital-Acquired Illness or Injury  Outcome: Progressing     Problem: Fall Injury Risk  Goal: Absence of Fall and Fall-Related Injury  Outcome: Progressing

## 2025-06-01 NOTE — ASSESSMENT & PLAN NOTE
- BLE duplex with chronic RLE DVT    -prior hx of IVC filter in 2002   Hold xarelto for lumbar puncture     6/1/25  Xarelto resumed yesterday and H/H today unchanged from previous.

## 2025-06-01 NOTE — PROGRESS NOTES
[facility] Med Surg 3  [facility] Medicine  Progress Note    Patient Name: Armando Ingram Jr.  MRN: [id number]  Patient Class: IP- Inpatient   Admission Date: 5/16/2025  Length of Stay: 15 days  Attending Physician: Lonnie Schaefer MD  Primary Care Provider: Brian Soares MD    Subjective     Principal Problem:Neutropenic fever    HPI:  Armando Ingram Jr. is a 65 y.o. male with a PMH  has a past medical history of Closed right hip fracture, Colon cancer, DVT (deep venous thrombosis) (2002), Hairy cell leukemia (06/06/2024), and Nephrolithiasis. who presented to the ED for further evaluation of acute onset fever with T-max measuring 101.0 F earlier today.  Patient reported significant history of hairy cell leukemia in his followed by Dr. Dow and Dr. Flores from Hematology/Oncology and was recently prescribed a Z-Cordell for 4 days for treatment of a cough.  Patient was instructed to go to the ED if he endorsed fever greater than 103.0 F but presented to the ED due to ulcer experiencing associated chills, throbbing headache, and persistent fever.  He reported no known alleviating or aggravating factors noted with all other review of systems negative except as noted above.  He is not currently on active treatment for his leukemia and reported being in his usual state of health prior to onset of symptoms.  Initial workup in the ED revealed patient to be afebrile with T-max measuring 100.4 F, pancytopenic, lactic acid/procalcitonin within normal limits, flu/COVID negative, chest x-ray negative for acute findings, UA positive for 2+ LE, 11 RBCs, free found WBCs.  Patient initiated on cefepime with cultures obtained and pending and admitted to Hospital Medicine under observation for continued medical management and treatment of neutropenic fever.    PCP: Brian Soares      Overview/Hospital Course:  Continued on IV Cefepime. Zyvoz added 05/18 due to persistent fever. Hematology consulted. Cultures remain NGTD   Resp viral  panel negative. ID consulted to eval due to fevers  Cefepime changed to IV Merrem per ID recs on 05/21, unclear source of fevers at this time. Zyvox discontinued per ID.   CT Abd/Pelvis showed bladder thickening but otherwise negative.   ID recommend TTE, BLE duplex and possible Indium scan for source of infection and de-escalate abx to Cefepime on 05/23.   TTE with no vegetations. BLE duplex showed chronic RLE DVT. Indium scan pending tentatively planned for 05/26-05/27 as radiology does not have required materials at this time and scan will take 2 days to complete per RT.   Persistent fevers, abx changed back to Merrem 05/25 per ID recs. Karius pending   5/26 fever curve trending down. Respiratory infection panel negative. Asymptomatic. Continue intravenous antibiotic(s)   5/27 neutropenic fever persists despite scheduled tylenol. Awaiting tagged wbc scan per infectious disease recommendations.   5/28 febrile overnight. Infectious disease recommending addition of doxy. Hem/onc following. Awaiting karius and imaging. Refused lumbar puncture. If afebrile x 48h, discharge   5/29 febrile. Positive cryptococcal infection. Infectious disease adjusting antimicrobials. Plans for lumbar puncture, mri brain, and indium scan.  5/30/2025: s/p LP today.  Opening pressure within normal range at 18. clear CSF fluid noted. Cell count, glucose, Christiana Ink, Cryptococcal antigen, MS panel, Fungal Culture, and Bacterial culture obtained. Patient received first dose of induction therapy for cryptococcal infection and ding well. Indium Scan in progress. Given toxic nature of amphotericin and flucytosine will closely monitor chemistries. Oncology consulted with partners and decision made to defer treatment for hair cell leukemia until infection has been treated.   5/31/25: Patient has remained afebrile today. ALP and AST elevated after 48 hours of treatment. Serum cryptococcus antigen negative. Christiana ink negative. Indium scan also  negative today. Discussed with ID who recommends stopping flucytosine. Continue Amphotericin until CSF Crypto Ag is known. If negative and patient remains afebrile, patient will likely discharge on fluconazole treatment for non meningitis/non-pulmonary cryptococcus which can be treated as antigenemia with extended course of fluconazole.   6/1/25: Xarelto resumed yesterday and H/H unchanged from previous. Febrile overnight (Tmax 101.9) in absence of flucytosine. Although both serum and CSF were negative for Cryptotoccus Antigen it was decided proceed with complete induction therapy with Amphotericin and Flucytosine. Will monitor closely liver enzymes and fever curve. ALP today 194, AST 55.     Interval History: : Xarelto resumed yesterday and H/H unchanged from previous. Febrile overnight (Tmax 101.9) in absence of flucytosine. Although both serum and CSF were negative for Cryptotoccus Antigen it was decided proceed with complete induction therapy with Amphotericin and Flucytosine. Will monitor closely liver enzymes and fever curve. ALP today 194, AST 55.     Review of Systems   Constitutional:  Negative for activity change, appetite change and fever.   Respiratory:  Negative for shortness of breath.    Gastrointestinal:  Negative for nausea and vomiting.   Allergic/Immunologic: Positive for immunocompromised state.     Objective:     Vital Signs (Most Recent):  Temp: 98.1 °F (36.7 °C) (06/01/25 0757)  Pulse: 83 (06/01/25 1109)  Resp: 19 (06/01/25 0757)  BP: 134/62 (06/01/25 0757)  SpO2: (!) 94 % (06/01/25 0757) Vital Signs (24h Range):  Temp:  [98.1 °F (36.7 °C)-101.9 °F (38.8 °C)] 98.1 °F (36.7 °C)  Pulse:  [] 83  Resp:  [14-20] 19  SpO2:  [93 %-95 %] 94 %  BP: (115-170)/(62-91) 134/62     Weight: 86.2 kg (190 lb 0.6 oz)  Body mass index is 26.5 kg/m².    Intake/Output Summary (Last 24 hours) at 6/1/2025 1146  Last data filed at 6/1/2025 0705  Gross per 24 hour   Intake 1340.3 ml   Output --   Net 1340.3 ml          Physical Exam  Vitals and nursing note reviewed. Exam conducted with a chaperone present (spouse).   Constitutional:       General: He is not in acute distress.     Appearance: He is ill-appearing. He is not toxic-appearing.   HENT:      Head: Normocephalic and atraumatic.   Cardiovascular:      Rate and Rhythm: Normal rate.   Pulmonary:      Effort: Pulmonary effort is normal. No respiratory distress.   Abdominal:      Palpations: Abdomen is soft.      Tenderness: There is no abdominal tenderness.   Musculoskeletal:      Right lower leg: No edema.      Left lower leg: No edema.   Skin:     General: Skin is warm.   Neurological:      Mental Status: He is alert and oriented to person, place, and time.           Significant Labs: All pertinent labs within the past 24 hours have been reviewed.  CBC:   Recent Labs   Lab 05/31/25  0552 06/01/25  0352   WBC 2.95* 2.85*   HGB 10.5* 10.5*   HCT 31.3* 31.8*    218     CMP:   Recent Labs   Lab 05/31/25  0552 06/01/25  0352   * 138   K 3.9 4.5    110   CO2 20* 21*   * 115*   BUN 21 17   CREATININE 0.9 0.9   CALCIUM 7.8* 8.0*   PROT 5.8* 5.8*   ALBUMIN 1.8* 1.8*   BILITOT 0.3 0.4   ALKPHOS 195* 194*   AST 53* 55*   ALT 27 34   ANIONGAP 7* 7*       Significant Imaging: I have reviewed all pertinent imaging results/findings within the past 24 hours.      Assessment & Plan  Neutropenic fever  FUO (fever of unknown origin)  Cryptococcosis  CXR with NAF, Flu/Covid neg   Possible UTI   Resp viral panel negative   Urine and blood cultures NGTD   Monitor VS   Hematology/Oncology consulted and following   ID consult due to persistent fever   Cefepime changed to IV Merrem, Zyvox discontinued   CT abd pelvis with bladder thickening and IVC filter but no other source of infection. TTE with no valvular abnormalities. BLE duplex showed chronic RLE DVT   Indium scan ordered by ID but radiology unable to obtain needed material for scan until 05/26 and scan  cannot be completed until 5/27    Karius testing pending per Dr. Laguna  Discussed with Dr. Laguna 05/25- he recommends repeat blood cultures, change IV Cefepime to Merrem   Encourage IS and Oob as able     Blood cultures negative growth to date   Positive for cryptococcal infection  Mri brain pending  Lumbar puncture pending  Hold xarelto  Indium scan pending  Antimicrobials per infectious disease     5/30/25  Currently on treatment for cryptococcal infection. Will stop scheduled Tylenol and monitor fever curve.      CXR with NAF, Flu/Covid neg   Possible UTI   Resp viral panel negative   Urine and blood cultures NGTD   Monitor VS   Hematology/Oncology consulted and following   ID consult due to persistent fever   Cefepime changed to IV Merrem, Zyvox discontinued   CT abd pelvis with bladder thickening and IVC filter but no other source of infection. TTE with no valvular abnormalities. BLE duplex showed chronic RLE DVT   Indium scan ordered by ID but radiology unable to obtain needed material for scan until 05/26 and scan cannot be completed until 5/27    Karius testing pending per Dr. Laguna  Discussed with Dr. Laguna 05/25- he recommends repeat blood cultures, change IV Cefepime to Merrem   Encourage IS and Oob as able     Blood cultures negative growth to date   Positive for cryptococcal infection  Mri brain pending  Lumbar puncture pending  Hold xarelto  Indium scan pending  Antimicrobials per infectious disease     5/30/25  Likely related to cryptococcal infection.   Hold scheduled Tylenol. Monitor fever curve  WBC scan pending  LP with CSF right now unremarkable. Christiana Ink, Cryptococcal antigen, MS panel, Fungal Culture, and Bacterial culture are still pending.  CXR with NAF, Flu/Covid neg   Possible UTI   Resp viral panel negative   Urine and blood cultures NGTD   Monitor VS   Hematology/Oncology consulted and following   ID consult due to persistent fever   Cefepime changed to IV Merrem, Zyvox  discontinued   CT abd pelvis with bladder thickening and IVC filter but no other source of infection. TTE with no valvular abnormalities. BLE duplex showed chronic RLE DVT   Indium scan ordered by ID but radiology unable to obtain needed material for scan until 05/26 and scan cannot be completed until 5/27    Karius testing pending per Dr. Laguna  Discussed with Dr. Laguna 05/25- he recommends repeat blood cultures, change IV Cefepime to Merrem   Encourage IS and Oob as able     Blood cultures negative growth to date   Positive for cryptococcal infection  Mri brain pending  Lumbar puncture pending  Hold xarelto  Indium scan pending  Antimicrobials per infectious disease     5/30/25  MRI brain negative.   S/p LP; analysis so far unremarkable; awaiting Christiana Ink, Cryptococcal antigen, MS panel, Fungal Culture, and Bacterial culture   Per ID patient will need to stay hospitalized 14 days for induction therapy(agents are toxic in nature) followed by consolidation therapy    5/31/25  Patient has remained afebrile for 48 hours.  Source of cryptococcus infeciton unclear. Serum cryptococcus antigen negative. Christiana ink negative. Indium scan also negative today. Discussed with ID who recommends stopping flucytosine. Continue Amphotericin until CSF Crypto Ag is known. If negative and patient remains afebrile, patient will likely discharge on fluconazole treatment for non meningitis/non-pulmonary cryptococcus which can be treated as antigenemia with extended course of fluconazole.   CXR with NAF, Flu/Covid neg   Possible UTI   Resp viral panel negative   Urine and blood cultures NGTD   Monitor VS   Hematology/Oncology consulted and following   ID consult due to persistent fever   Cefepime changed to IV Merrem, Zyvox discontinued   CT abd pelvis with bladder thickening and IVC filter but no other source of infection. TTE with no valvular abnormalities. BLE duplex showed chronic RLE DVT   Indium scan ordered by ID but radiology  unable to obtain needed material for scan until 05/26 and scan cannot be completed until 5/27    Karius testing pending per Dr. Laguna  Discussed with Dr. Laguna 05/25- he recommends repeat blood cultures, change IV Cefepime to Merrem   Encourage IS and Oob as able     Blood cultures negative growth to date   Positive for cryptococcal infection  Mri brain pending  Lumbar puncture pending  Hold xarelto  Indium scan pending  Antimicrobials per infectious disease     5/30/25  Currently on treatment for cryptococcal infection. Will stop scheduled Tylenol and monitor fever curve.      CXR with NAF, Flu/Covid neg   Possible UTI   Resp viral panel negative   Urine and blood cultures NGTD   Monitor VS   Hematology/Oncology consulted and following   ID consult due to persistent fever   Cefepime changed to IV Merrem, Zyvox discontinued   CT abd pelvis with bladder thickening and IVC filter but no other source of infection. TTE with no valvular abnormalities. BLE duplex showed chronic RLE DVT   Indium scan ordered by ID but radiology unable to obtain needed material for scan until 05/26 and scan cannot be completed until 5/27    Karius testing pending per Dr. Laguna  Discussed with Dr. Laguna 05/25- he recommends repeat blood cultures, change IV Cefepime to Merrem   Encourage IS and Oob as able     Blood cultures negative growth to date   Positive for cryptococcal infection  Mri brain pending  Lumbar puncture pending  Hold xarelto  Indium scan pending  Antimicrobials per infectious disease     5/30/25  Likely related to cryptococcal infection.   Hold scheduled Tylenol. Monitor fever curve  WBC scan pending  LP with CSF right now unremarkable. Christiana Ink, Cryptococcal antigen, MS panel, Fungal Culture, and Bacterial culture are still pending.  CXR with NAF, Flu/Covid neg   Possible UTI   Resp viral panel negative   Urine and blood cultures NGTD   Monitor VS   Hematology/Oncology consulted and following   ID consult due to  persistent fever   Cefepime changed to IV Merrem, Zyvox discontinued   CT abd pelvis with bladder thickening and IVC filter but no other source of infection. TTE with no valvular abnormalities. BLE duplex showed chronic RLE DVT   Indium scan ordered by ID but radiology unable to obtain needed material for scan until 05/26 and scan cannot be completed until 5/27    Karius testing pending per Dr. Laguna  Discussed with Dr. Laguna 05/25- he recommends repeat blood cultures, change IV Cefepime to Merrem   Encourage IS and Oob as able     Blood cultures negative growth to date   Positive for cryptococcal infection  Mri brain pending  Lumbar puncture pending  Hold xarelto  Indium scan pending  Antimicrobials per infectious disease     5/30/25  MRI brain negative.   S/p LP; analysis so far unremarkable; awaiting Christiana Ink, Cryptococcal antigen, MS panel, Fungal Culture, and Bacterial culture   Per ID patient will need to stay hospitalized 14 days for induction therapy(agents are toxic in nature) followed by consolidation therapy    5/31/25  Patient has remained afebrile without Tylenol. See plan above.  CXR with NAF, Flu/Covid neg   Possible UTI   Resp viral panel negative   Urine and blood cultures NGTD   Monitor VS   Hematology/Oncology consulted and following   ID consult due to persistent fever   Cefepime changed to IV Merrem, Zyvox discontinued   CT abd pelvis with bladder thickening and IVC filter but no other source of infection. TTE with no valvular abnormalities. BLE duplex showed chronic RLE DVT   Indium scan ordered by ID but radiology unable to obtain needed material for scan until 05/26 and scan cannot be completed until 5/27    Karius testing pending per Dr. Laguna  Discussed with Dr. aLguna 05/25- he recommends repeat blood cultures, change IV Cefepime to Merrem   Encourage IS and Oob as able     Blood cultures negative growth to date   Positive for cryptococcal infection  Mri brain pending  Lumbar  puncture pending  Hold xarelto  Indium scan pending  Antimicrobials per infectious disease     5/30/25  Currently on treatment for cryptococcal infection. Will stop scheduled Tylenol and monitor fever curve.      CXR with NAF, Flu/Covid neg   Possible UTI   Resp viral panel negative   Urine and blood cultures NGTD   Monitor VS   Hematology/Oncology consulted and following   ID consult due to persistent fever   Cefepime changed to IV Merrem, Zyvox discontinued   CT abd pelvis with bladder thickening and IVC filter but no other source of infection. TTE with no valvular abnormalities. BLE duplex showed chronic RLE DVT   Indium scan ordered by ID but radiology unable to obtain needed material for scan until 05/26 and scan cannot be completed until 5/27    Karius testing pending per Dr. Laguna  Discussed with Dr. Laguna 05/25- he recommends repeat blood cultures, change IV Cefepime to Merrem   Encourage IS and Oob as able     Blood cultures negative growth to date   Positive for cryptococcal infection  Mri brain pending  Lumbar puncture pending  Hold xarelto  Indium scan pending  Antimicrobials per infectious disease     5/30/25  Likely related to cryptococcal infection.   Hold scheduled Tylenol. Monitor fever curve  WBC scan pending  LP with CSF right now unremarkable. Christiana Ink, Cryptococcal antigen, MS panel, Fungal Culture, and Bacterial culture are still pending.  CXR with NAF, Flu/Covid neg   Possible UTI   Resp viral panel negative   Urine and blood cultures NGTD   Monitor VS   Hematology/Oncology consulted and following   ID consult due to persistent fever   Cefepime changed to IV Merrem, Zyvox discontinued   CT abd pelvis with bladder thickening and IVC filter but no other source of infection. TTE with no valvular abnormalities. BLE duplex showed chronic RLE DVT   Indium scan ordered by ID but radiology unable to obtain needed material for scan until 05/26 and scan cannot be completed until 5/27    Karius  testing pending per Dr. Laguna  Discussed with Dr. Laguna 05/25- he recommends repeat blood cultures, change IV Cefepime to Merrem   Encourage IS and Oob as able     Blood cultures negative growth to date   Positive for cryptococcal infection  Mri brain pending  Lumbar puncture pending  Hold xarelto  Indium scan pending  Antimicrobials per infectious disease     5/30/25  MRI brain negative.   S/p LP; analysis so far unremarkable; awaiting Christiana Ink, Cryptococcal antigen, MS panel, Fungal Culture, and Bacterial culture   Per ID patient will need to stay hospitalized 14 days for induction therapy(agents are toxic in nature) followed by consolidation therapy    5/31/25   Source of infection is unclear. Serum cryptococcus antigen negative. Christiana ink negative. Indium scan also negative today. Discussed with ID who recommends stopping flucytosine. Continue Amphotericin until CSF Crypto Ag is known. If negative and patient remains afebrile, patient will likely discharge on fluconazole treatment for non meningitis/non-pulmonary cryptococcus which can be treated as antigenemia with extended course of fluconazole.     6/1/25  ANC stable  CXR with NAF, Flu/Covid neg   Possible UTI   Resp viral panel negative   Urine and blood cultures NGTD   Monitor VS   Hematology/Oncology consulted and following   ID consult due to persistent fever   Cefepime changed to IV Merrem, Zyvox discontinued   CT abd pelvis with bladder thickening and IVC filter but no other source of infection. TTE with no valvular abnormalities. BLE duplex showed chronic RLE DVT   Indium scan ordered by ID but radiology unable to obtain needed material for scan until 05/26 and scan cannot be completed until 5/27    Karius testing pending per Dr. Laguna  Discussed with Dr. Laguna 05/25- he recommends repeat blood cultures, change IV Cefepime to Merrem   Encourage IS and Oob as able     Blood cultures negative growth to date   Positive for cryptococcal  infection  Mri brain pending  Lumbar puncture pending  Hold xarelto  Indium scan pending  Antimicrobials per infectious disease     5/30/25  Currently on treatment for cryptococcal infection. Will stop scheduled Tylenol and monitor fever curve.      CXR with NAF, Flu/Covid neg   Possible UTI   Resp viral panel negative   Urine and blood cultures NGTD   Monitor VS   Hematology/Oncology consulted and following   ID consult due to persistent fever   Cefepime changed to IV Merrem, Zyvox discontinued   CT abd pelvis with bladder thickening and IVC filter but no other source of infection. TTE with no valvular abnormalities. BLE duplex showed chronic RLE DVT   Indium scan ordered by ID but radiology unable to obtain needed material for scan until 05/26 and scan cannot be completed until 5/27    Karius testing pending per Dr. Laguna  Discussed with Dr. Laguna 05/25- he recommends repeat blood cultures, change IV Cefepime to Merrem   Encourage IS and Oob as able     Blood cultures negative growth to date   Positive for cryptococcal infection  Mri brain pending  Lumbar puncture pending  Hold xarelto  Indium scan pending  Antimicrobials per infectious disease     5/30/25  Likely related to cryptococcal infection.   Hold scheduled Tylenol. Monitor fever curve  WBC scan pending  LP with CSF right now unremarkable. Christiana Ink, Cryptococcal antigen, MS panel, Fungal Culture, and Bacterial culture are still pending.  CXR with NAF, Flu/Covid neg   Possible UTI   Resp viral panel negative   Urine and blood cultures NGTD   Monitor VS   Hematology/Oncology consulted and following   ID consult due to persistent fever   Cefepime changed to IV Merrem, Zyvox discontinued   CT abd pelvis with bladder thickening and IVC filter but no other source of infection. TTE with no valvular abnormalities. BLE duplex showed chronic RLE DVT   Indium scan ordered by ID but radiology unable to obtain needed material for scan until 05/26 and scan cannot  be completed until 5/27    Karius testing pending per Dr. Laguna  Discussed with Dr. Laguna 05/25- he recommends repeat blood cultures, change IV Cefepime to Merrem   Encourage IS and Oob as able     Blood cultures negative growth to date   Positive for cryptococcal infection  Mri brain pending  Lumbar puncture pending  Hold xarelto  Indium scan pending  Antimicrobials per infectious disease     5/30/25  MRI brain negative.   S/p LP; analysis so far unremarkable; awaiting Christiana Ink, Cryptococcal antigen, MS panel, Fungal Culture, and Bacterial culture   Per ID patient will need to stay hospitalized 14 days for induction therapy(agents are toxic in nature) followed by consolidation therapy    5/31/25  Patient has remained afebrile for 48 hours.  Source of cryptococcus infeciton unclear. Serum cryptococcus antigen negative. Christiana ink negative. Indium scan also negative today. Discussed with ID who recommends stopping flucytosine. Continue Amphotericin until CSF Crypto Ag is known. If negative and patient remains afebrile, patient will likely discharge on fluconazole treatment for non meningitis/non-pulmonary cryptococcus which can be treated as antigenemia with extended course of fluconazole.   CXR with NAF, Flu/Covid neg   Possible UTI   Resp viral panel negative   Urine and blood cultures NGTD   Monitor VS   Hematology/Oncology consulted and following   ID consult due to persistent fever   Cefepime changed to IV Merrem, Zyvox discontinued   CT abd pelvis with bladder thickening and IVC filter but no other source of infection. TTE with no valvular abnormalities. BLE duplex showed chronic RLE DVT   Indium scan ordered by ID but radiology unable to obtain needed material for scan until 05/26 and scan cannot be completed until 5/27    Karius testing pending per Dr. Laguna  Discussed with Dr. Laguna 05/25- he recommends repeat blood cultures, change IV Cefepime to Merrem   Encourage IS and Oob as able     Blood  cultures negative growth to date   Positive for cryptococcal infection  Mri brain pending  Lumbar puncture pending  Hold xarelto  Indium scan pending  Antimicrobials per infectious disease     5/30/25  Currently on treatment for cryptococcal infection. Will stop scheduled Tylenol and monitor fever curve.      CXR with NAF, Flu/Covid neg   Possible UTI   Resp viral panel negative   Urine and blood cultures NGTD   Monitor VS   Hematology/Oncology consulted and following   ID consult due to persistent fever   Cefepime changed to IV Merrem, Zyvox discontinued   CT abd pelvis with bladder thickening and IVC filter but no other source of infection. TTE with no valvular abnormalities. BLE duplex showed chronic RLE DVT   Indium scan ordered by ID but radiology unable to obtain needed material for scan until 05/26 and scan cannot be completed until 5/27    Karius testing pending per Dr. Laguna  Discussed with Dr. Laguna 05/25- he recommends repeat blood cultures, change IV Cefepime to Merrem   Encourage IS and Oob as able     Blood cultures negative growth to date   Positive for cryptococcal infection  Mri brain pending  Lumbar puncture pending  Hold xarelto  Indium scan pending  Antimicrobials per infectious disease     5/30/25  Likely related to cryptococcal infection.   Hold scheduled Tylenol. Monitor fever curve  WBC scan pending  LP with CSF right now unremarkable. Christiana Ink, Cryptococcal antigen, MS panel, Fungal Culture, and Bacterial culture are still pending.  CXR with NAF, Flu/Covid neg   Possible UTI   Resp viral panel negative   Urine and blood cultures NGTD   Monitor VS   Hematology/Oncology consulted and following   ID consult due to persistent fever   Cefepime changed to IV Merrem, Zyvox discontinued   CT abd pelvis with bladder thickening and IVC filter but no other source of infection. TTE with no valvular abnormalities. BLE duplex showed chronic RLE DVT   Indium scan ordered by ID but radiology unable to  obtain needed material for scan until 05/26 and scan cannot be completed until 5/27    Karius testing pending per Dr. Laguna  Discussed with Dr. Laguna 05/25- he recommends repeat blood cultures, change IV Cefepime to Merrem   Encourage IS and Oob as able     Blood cultures negative growth to date   Positive for cryptococcal infection  Mri brain pending  Lumbar puncture pending  Hold xarelto  Indium scan pending  Antimicrobials per infectious disease     5/30/25  MRI brain negative.   S/p LP; analysis so far unremarkable; awaiting Christiana Ink, Cryptococcal antigen, MS panel, Fungal Culture, and Bacterial culture   Per ID patient will need to stay hospitalized 14 days for induction therapy(agents are toxic in nature) followed by consolidation therapy    5/31/25  Patient has remained afebrile without Tylenol. See plan above.  CXR with NAF, Flu/Covid neg   Possible UTI   Resp viral panel negative   Urine and blood cultures NGTD   Monitor VS   Hematology/Oncology consulted and following   ID consult due to persistent fever   Cefepime changed to IV Merrem, Zyvox discontinued   CT abd pelvis with bladder thickening and IVC filter but no other source of infection. TTE with no valvular abnormalities. BLE duplex showed chronic RLE DVT   Indium scan ordered by ID but radiology unable to obtain needed material for scan until 05/26 and scan cannot be completed until 5/27    Karius testing pending per Dr. Laguna  Discussed with Dr. Laguna 05/25- he recommends repeat blood cultures, change IV Cefepime to Merrem   Encourage IS and Oob as able     Blood cultures negative growth to date   Positive for cryptococcal infection  Mri brain pending  Lumbar puncture pending  Hold xarelto  Indium scan pending  Antimicrobials per infectious disease     5/30/25  Currently on treatment for cryptococcal infection. Will stop scheduled Tylenol and monitor fever curve.      CXR with NAF, Flu/Covid neg   Possible UTI   Resp viral panel negative    Urine and blood cultures NGTD   Monitor VS   Hematology/Oncology consulted and following   ID consult due to persistent fever   Cefepime changed to IV Merrem, Zyvox discontinued   CT abd pelvis with bladder thickening and IVC filter but no other source of infection. TTE with no valvular abnormalities. BLE duplex showed chronic RLE DVT   Indium scan ordered by ID but radiology unable to obtain needed material for scan until 05/26 and scan cannot be completed until 5/27    Karius testing pending per Dr. Laguna  Discussed with Dr. Laguna 05/25- he recommends repeat blood cultures, change IV Cefepime to Merrem   Encourage IS and Oob as able     Blood cultures negative growth to date   Positive for cryptococcal infection  Mri brain pending  Lumbar puncture pending  Hold xarelto  Indium scan pending  Antimicrobials per infectious disease     5/30/25  Likely related to cryptococcal infection.   Hold scheduled Tylenol. Monitor fever curve  WBC scan pending  LP with CSF right now unremarkable. Christiana Ink, Cryptococcal antigen, MS panel, Fungal Culture, and Bacterial culture are still pending.  CXR with NAF, Flu/Covid neg   Possible UTI   Resp viral panel negative   Urine and blood cultures NGTD   Monitor VS   Hematology/Oncology consulted and following   ID consult due to persistent fever   Cefepime changed to IV Merrem, Zyvox discontinued   CT abd pelvis with bladder thickening and IVC filter but no other source of infection. TTE with no valvular abnormalities. BLE duplex showed chronic RLE DVT   Indium scan ordered by ID but radiology unable to obtain needed material for scan until 05/26 and scan cannot be completed until 5/27    Karius testing pending per Dr. Laguna  Discussed with Dr. Laguna 05/25- he recommends repeat blood cultures, change IV Cefepime to Merrem   Encourage IS and Oob as able     Blood cultures negative growth to date   Positive for cryptococcal infection  Mri brain pending  Lumbar puncture  pending  Hold xarelto  Indium scan pending  Antimicrobials per infectious disease     5/30/25  MRI brain negative.   S/p LP; analysis so far unremarkable; awaiting Christiana Ink, Cryptococcal antigen, MS panel, Fungal Culture, and Bacterial culture   Per ID patient will need to stay hospitalized 14 days for induction therapy(agents are toxic in nature) followed by consolidation therapy    5/31/25   Source of infection is unclear. Serum cryptococcus antigen negative. Christiana ink negative. Indium scan also negative today. Discussed with ID who recommends stopping flucytosine. Continue Amphotericin until CSF Crypto Ag is known. If negative and patient remains afebrile, patient will likely discharge on fluconazole treatment for non meningitis/non-pulmonary cryptococcus which can be treated as antigenemia with extended course of fluconazole.     6/1/25  Febrile overnight (Tmax 101.9) in absence of flucytosine. Although both serum and CSF were negative for Cryptotoccus Antigen. It was decided proceed with complete induction therapy with Amphotericin and Flucytosine. Will monitor closely liver enzymes and fever curve. ALP today 194, AST 55.   CXR with NAF, Flu/Covid neg   Possible UTI   Resp viral panel negative   Urine and blood cultures NGTD   Monitor VS   Hematology/Oncology consulted and following   ID consult due to persistent fever   Cefepime changed to IV Merrem, Zyvox discontinued   CT abd pelvis with bladder thickening and IVC filter but no other source of infection. TTE with no valvular abnormalities. BLE duplex showed chronic RLE DVT   Indium scan ordered by ID but radiology unable to obtain needed material for scan until 05/26 and scan cannot be completed until 5/27    Karius testing pending per Dr. Laguna  Discussed with Dr. Laguna 05/25- he recommends repeat blood cultures, change IV Cefepime to Merrem   Encourage IS and Oob as able     Blood cultures negative growth to date   Positive for cryptococcal  pending  Hold xarelto  Indium scan pending  Antimicrobials per infectious disease     5/30/25  MRI brain negative.   S/p LP; analysis so far unremarkable; awaiting Christiana Ink, Cryptococcal antigen, MS panel, Fungal Culture, and Bacterial culture   Per ID patient will need to stay hospitalized 14 days for induction therapy(agents are toxic in nature) followed by consolidation therapy    5/31/25   Source of infection is unclear. Serum cryptococcus antigen negative. Christiana ink negative. Indium scan also negative today. Discussed with ID who recommends stopping flucytosine. Continue Amphotericin until CSF Crypto Ag is known. If negative and patient remains afebrile, patient will likely discharge on fluconazole treatment for non meningitis/non-pulmonary cryptococcus which can be treated as antigenemia with extended course of fluconazole.     6/1/25  Febrile overnight (Tmax 101.9) in absence of flucytosine. Although both serum and CSF were negative for Cryptotoccus Antigen. It was decided proceed with complete induction therapy with Amphotericin and Flucytosine. Will monitor closely liver enzymes and fever curve. ALP today 194, AST 55.   Hairy cell leukemia  Patient with known history of hairy cell leukemia and continues to follow Dr. Dow and Dr. Flores from Hematology/Oncology. Patient not currently on active treatment and currently undergoing treatment for neutropenic fever with broad-spectrum antibiotics as noted above.  Hematology consulted and following     5/30/25  Hematology discussed with other partners and it was decided not to proceed with treatment for leukemia. This was discussed with family.   Pancytopenia  This patient is found to have pancytopenia, the likely etiology is , will monitor CBC . Will transfuse red blood cells if the hemoglobin is <7g/dL (or <8 in the setting of ACS). Hold DVT prophylaxis if platelets are <50k. The patient's hemoglobin, white blood cell count, and platelet count results have  been reviewed and are listed below.  Recent Labs   Lab 06/01/25  0352   HGB 10.5*   WBC 2.85*        5/30/25  Stable today    6/1/25  Remains stable  History of DVT (deep vein thrombosis)  - BLE duplex with chronic RLE DVT    -prior hx of IVC filter in 2002   Hold xarelto for lumbar puncture     6/1/25  Xarelto resumed yesterday and H/H today unchanged from previous.   Transaminitis  Lab Results   Component Value Date    AST 55 (H) 06/01/2025    AST 53 (H) 05/31/2025    ALT 34 06/01/2025    ALT 27 05/31/2025    ALKPHOS 194 (H) 06/01/2025    ALKPHOS 195 (H) 05/31/2025        VTE Risk Mitigation (From admission, onward)           Ordered     rivaroxaban tablet 20 mg  With dinner         05/31/25 1415     Reason for No Pharmacological VTE Prophylaxis  Once        Question:  Reasons:  Answer:  Already adequately anticoagulated on oral Anticoagulants    05/17/25 0213     IP VTE HIGH RISK PATIENT  Once         05/17/25 0213     Place sequential compression device  Until discontinued         05/17/25 0213                    Discharge Planning   LAURI: 6/16/2025     Code Status: Full Code   Medical Readiness for Discharge Date:   Discharge Plan A: Home with family        Sena Melgar NP  Department of Hospital Medicine   [facility] - Med Surg 3

## 2025-06-01 NOTE — CARE UPDATE
Patient spiked fever of 101.9F after stopping flucytosine. Unclear if elevated LFT connected to antimicrobials. Will add back flucytosine today and continue amphotericin. Location of cryptococcus remains unclear. All CSF studies negative. MRI brain and CT chest negative. Continue induction therapy for now. Final recommendations to be provided by Dr Villanueva who comes on service tomorrow. For more details see note from 5/31.

## 2025-06-01 NOTE — ASSESSMENT & PLAN NOTE
CXR with NAF, Flu/Covid neg   Possible UTI   Resp viral panel negative   Urine and blood cultures NGTD   Monitor VS   Hematology/Oncology consulted and following   ID consult due to persistent fever   Cefepime changed to IV Merrem, Zyvox discontinued   CT abd pelvis with bladder thickening and IVC filter but no other source of infection. TTE with no valvular abnormalities. BLE duplex showed chronic RLE DVT   Indium scan ordered by ID but radiology unable to obtain needed material for scan until 05/26 and scan cannot be completed until 5/27    Karius testing pending per Dr. Laguna  Discussed with Dr. Laguna 05/25- he recommends repeat blood cultures, change IV Cefepime to Merrem   Encourage IS and Oob as able     Blood cultures negative growth to date   Positive for cryptococcal infection  Mri brain pending  Lumbar puncture pending  Hold xarelto  Indium scan pending  Antimicrobials per infectious disease     5/30/25  Currently on treatment for cryptococcal infection. Will stop scheduled Tylenol and monitor fever curve.      CXR with NAF, Flu/Covid neg   Possible UTI   Resp viral panel negative   Urine and blood cultures NGTD   Monitor VS   Hematology/Oncology consulted and following   ID consult due to persistent fever   Cefepime changed to IV Merrem, Zyvox discontinued   CT abd pelvis with bladder thickening and IVC filter but no other source of infection. TTE with no valvular abnormalities. BLE duplex showed chronic RLE DVT   Indium scan ordered by ID but radiology unable to obtain needed material for scan until 05/26 and scan cannot be completed until 5/27    Karius testing pending per Dr. Laguna  Discussed with Dr. Laguna 05/25- he recommends repeat blood cultures, change IV Cefepime to Merrem   Encourage IS and Oob as able     Blood cultures negative growth to date   Positive for cryptococcal infection  Mri brain pending  Lumbar puncture pending  Hold xarelto  Indium scan pending  Antimicrobials per  infectious disease     5/30/25  Likely related to cryptococcal infection.   Hold scheduled Tylenol. Monitor fever curve  WBC scan pending  LP with CSF right now unremarkable. Christiana Ink, Cryptococcal antigen, MS panel, Fungal Culture, and Bacterial culture are still pending.  CXR with NAF, Flu/Covid neg   Possible UTI   Resp viral panel negative   Urine and blood cultures NGTD   Monitor VS   Hematology/Oncology consulted and following   ID consult due to persistent fever   Cefepime changed to IV Merrem, Zyvox discontinued   CT abd pelvis with bladder thickening and IVC filter but no other source of infection. TTE with no valvular abnormalities. BLE duplex showed chronic RLE DVT   Indium scan ordered by ID but radiology unable to obtain needed material for scan until 05/26 and scan cannot be completed until 5/27    Karius testing pending per Dr. Laguna  Discussed with Dr. Laguna 05/25- he recommends repeat blood cultures, change IV Cefepime to Merrem   Encourage IS and Oob as able     Blood cultures negative growth to date   Positive for cryptococcal infection  Mri brain pending  Lumbar puncture pending  Hold xarelto  Indium scan pending  Antimicrobials per infectious disease     5/30/25  MRI brain negative.   S/p LP; analysis so far unremarkable; awaiting Christiana Ink, Cryptococcal antigen, MS panel, Fungal Culture, and Bacterial culture   Per ID patient will need to stay hospitalized 14 days for induction therapy(agents are toxic in nature) followed by consolidation therapy    5/31/25  Patient has remained afebrile for 48 hours.  Source of cryptococcus infeciton unclear. Serum cryptococcus antigen negative. Christiana ink negative. Indium scan also negative today. Discussed with ID who recommends stopping flucytosine. Continue Amphotericin until CSF Crypto Ag is known. If negative and patient remains afebrile, patient will likely discharge on fluconazole treatment for non meningitis/non-pulmonary cryptococcus which can be  treated as antigenemia with extended course of fluconazole.   CXR with NAF, Flu/Covid neg   Possible UTI   Resp viral panel negative   Urine and blood cultures NGTD   Monitor VS   Hematology/Oncology consulted and following   ID consult due to persistent fever   Cefepime changed to IV Merrem, Zyvox discontinued   CT abd pelvis with bladder thickening and IVC filter but no other source of infection. TTE with no valvular abnormalities. BLE duplex showed chronic RLE DVT   Indium scan ordered by ID but radiology unable to obtain needed material for scan until 05/26 and scan cannot be completed until 5/27    Karius testing pending per Dr. Laguna  Discussed with Dr. Laguna 05/25- he recommends repeat blood cultures, change IV Cefepime to Merrem   Encourage IS and Oob as able     Blood cultures negative growth to date   Positive for cryptococcal infection  Mri brain pending  Lumbar puncture pending  Hold xarelto  Indium scan pending  Antimicrobials per infectious disease     5/30/25  Currently on treatment for cryptococcal infection. Will stop scheduled Tylenol and monitor fever curve.      CXR with NAF, Flu/Covid neg   Possible UTI   Resp viral panel negative   Urine and blood cultures NGTD   Monitor VS   Hematology/Oncology consulted and following   ID consult due to persistent fever   Cefepime changed to IV Merrem, Zyvox discontinued   CT abd pelvis with bladder thickening and IVC filter but no other source of infection. TTE with no valvular abnormalities. BLE duplex showed chronic RLE DVT   [facility]ium scan ordered by ID but radiology unable to obtain needed material for scan until 05/26 and scan cannot be completed until 5/27    Karius testing pending per Dr. Laguna  Discussed with Dr. Laguna 05/25- he recommends repeat blood cultures, change IV Cefepime to Merrem   Encourage IS and Oob as able     Blood cultures negative growth to date   Positive for cryptococcal infection  Mri brain pending  Lumbar puncture  pending  Hold xarelto  Indium scan pending  Antimicrobials per infectious disease     5/30/25  Likely related to cryptococcal infection.   Hold scheduled Tylenol. Monitor fever curve  WBC scan pending  LP with CSF right now unremarkable. Christiana Ink, Cryptococcal antigen, MS panel, Fungal Culture, and Bacterial culture are still pending.  CXR with NAF, Flu/Covid neg   Possible UTI   Resp viral panel negative   Urine and blood cultures NGTD   Monitor VS   Hematology/Oncology consulted and following   ID consult due to persistent fever   Cefepime changed to IV Merrem, Zyvox discontinued   CT abd pelvis with bladder thickening and IVC filter but no other source of infection. TTE with no valvular abnormalities. BLE duplex showed chronic RLE DVT   [facility]ium scan ordered by ID but radiology unable to obtain needed material for scan until 05/26 and scan cannot be completed until 5/27    Karius testing pending per Dr. Laguna  Discussed with Dr. Laguna 05/25- he recommends repeat blood cultures, change IV Cefepime to Merrem   Encourage IS and Oob as able     Blood cultures negative growth to date   Positive for cryptococcal infection  Mri brain pending  Lumbar puncture pending  Hold xarelto  Indium scan pending  Antimicrobials per infectious disease     5/30/25  MRI brain negative.   S/p LP; analysis so far unremarkable; awaiting Christiana Ink, Cryptococcal antigen, MS panel, Fungal Culture, and Bacterial culture   Per ID patient will need to stay hospitalized 14 days for induction therapy(agents are toxic in nature) followed by consolidation therapy    5/31/25  Patient has remained afebrile without Tylenol. See plan above.  CXR with NAF, Flu/Covid neg   Possible UTI   Resp viral panel negative   Urine and blood cultures NGTD   Monitor VS   Hematology/Oncology consulted and following   ID consult due to persistent fever   Cefepime changed to IV Merrem, Zyvox discontinued   CT abd pelvis with bladder thickening and IVC filter but no  other source of infection. TTE with no valvular abnormalities. BLE duplex showed chronic RLE DVT   [facility]ium scan ordered by ID but radiology unable to obtain needed material for scan until 05/26 and scan cannot be completed until 5/27    Karius testing pending per Dr. Laguna  Discussed with Dr. Laguna 05/25- he recommends repeat blood cultures, change IV Cefepime to Merrem   Encourage IS and Oob as able     Blood cultures negative growth to date   Positive for cryptococcal infection  Mri brain pending  Lumbar puncture pending  Hold xarelto  [facility]ium scan pending  Antimicrobials per infectious disease     5/30/25  Currently on treatment for cryptococcal infection. Will stop scheduled Tylenol and monitor fever curve.      CXR with NAF, Flu/Covid neg   Possible UTI   Resp viral panel negative   Urine and blood cultures NGTD   Monitor VS   Hematology/Oncology consulted and following   ID consult due to persistent fever   Cefepime changed to IV Merrem, Zyvox discontinued   CT abd pelvis with bladder thickening and IVC filter but no other source of infection. TTE with no valvular abnormalities. BLE duplex showed chronic RLE DVT   [facility]ium scan ordered by ID but radiology unable to obtain needed material for scan until 05/26 and scan cannot be completed until 5/27    Karius testing pending per Dr. Laguna  Discussed with Dr. Laguna 05/25- he recommends repeat blood cultures, change IV Cefepime to Merrem   Encourage IS and Oob as able     Blood cultures negative growth to date   Positive for cryptococcal infection  Mri brain pending  Lumbar puncture pending  Hold xarelto  [facility]ium scan pending  Antimicrobials per infectious disease     5/30/25  Likely related to cryptococcal infection.   Hold scheduled Tylenol. Monitor fever curve  WBC scan pending  LP with CSF right now unremarkable. Christiana Ink, Cryptococcal antigen, MS panel, Fungal Culture, and Bacterial culture are still pending.  CXR with NAF, Flu/Covid neg   Possible UTI    Resp viral panel negative   Urine and blood cultures NGTD   Monitor VS   Hematology/Oncology consulted and following   ID consult due to persistent fever   Cefepime changed to IV Merrem, Zyvox discontinued   CT abd pelvis with bladder thickening and IVC filter but no other source of infection. TTE with no valvular abnormalities. BLE duplex showed chronic RLE DVT   [facility]ium scan ordered by ID but radiology unable to obtain needed material for scan until 05/26 and scan cannot be completed until 5/27    Karius testing pending per Dr. Laguna  Discussed with Dr. Laguna 05/25- he recommends repeat blood cultures, change IV Cefepime to Merrem   Encourage IS and Oob as able     Blood cultures negative growth to date   Positive for cryptococcal infection  Mri brain pending  Lumbar puncture pending  Hold xarelto  [facility]ium scan pending  Antimicrobials per infectious disease     5/30/25  MRI brain negative.   S/p LP; analysis so far unremarkable; awaiting Christiana Ink, Cryptococcal antigen, MS panel, Fungal Culture, and Bacterial culture   Per ID patient will need to stay hospitalized 14 days for induction therapy(agents are toxic in nature) followed by consolidation therapy    5/31/25   Source of infection is unclear. Serum cryptococcus antigen negative. Christiana ink negative. Indium scan also negative today. Discussed with ID who recommends stopping flucytosine. Continue Amphotericin until CSF Crypto Ag is known. If negative and patient remains afebrile, patient will likely discharge on fluconazole treatment for non meningitis/non-pulmonary cryptococcus which can be treated as antigenemia with extended course of fluconazole.     6/1/25  ANC stable  CXR with NAF, Flu/Covid neg   Possible UTI   Resp viral panel negative   Urine and blood cultures NGTD   Monitor VS   Hematology/Oncology consulted and following   ID consult due to persistent fever   Cefepime changed to IV Merrem, Zyvox discontinued   CT abd pelvis with bladder  thickening and IVC filter but no other source of infection. TTE with no valvular abnormalities. BLE duplex showed chronic RLE DVT   [facility]ium scan ordered by ID but radiology unable to obtain needed material for scan until 05/26 and scan cannot be completed until 5/27    Karius testing pending per Dr. Laguna  Discussed with Dr. Laguna 05/25- he recommends repeat blood cultures, change IV Cefepime to Merrem   Encourage IS and Oob as able     Blood cultures negative growth to date   Positive for cryptococcal infection  Mri brain pending  Lumbar puncture pending  Hold xarelto  [facility]ium scan pending  Antimicrobials per infectious disease     5/30/25  Currently on treatment for cryptococcal infection. Will stop scheduled Tylenol and monitor fever curve.      CXR with NAF, Flu/Covid neg   Possible UTI   Resp viral panel negative   Urine and blood cultures NGTD   Monitor VS   Hematology/Oncology consulted and following   ID consult due to persistent fever   Cefepime changed to IV Merrem, Zyvox discontinued   CT abd pelvis with bladder thickening and IVC filter but no other source of infection. TTE with no valvular abnormalities. BLE duplex showed chronic RLE DVT   [facility]ium scan ordered by ID but radiology unable to obtain needed material for scan until 05/26 and scan cannot be completed until 5/27    Karius testing pending per Dr. Laguna  Discussed with Dr. Laguna 05/25- he recommends repeat blood cultures, change IV Cefepime to Merrem   Encourage IS and Oob as able     Blood cultures negative growth to date   Positive for cryptococcal infection  Mri brain pending  Lumbar puncture pending  Hold xarelto  [facility]ium scan pending  Antimicrobials per infectious disease     5/30/25  Likely related to cryptococcal infection.   Hold scheduled Tylenol. Monitor fever curve  WBC scan pending  LP with CSF right now unremarkable. Christiana Ink, Cryptococcal antigen, MS panel, Fungal Culture, and Bacterial culture are still pending.  CXR with  NAF, Flu/Covid neg   Possible UTI   Resp viral panel negative   Urine and blood cultures NGTD   Monitor VS   Hematology/Oncology consulted and following   ID consult due to persistent fever   Cefepime changed to IV Merrem, Zyvox discontinued   CT abd pelvis with bladder thickening and IVC filter but no other source of infection. TTE with no valvular abnormalities. BLE duplex showed chronic RLE DVT   [facility] scan ordered by ID but radiology unable to obtain needed material for scan until 05/26 and scan cannot be completed until 5/27    Karius testing pending per Dr. Laguna  Discussed with Dr. Laguna 05/25- he recommends repeat blood cultures, change IV Cefepime to Merrem   Encourage IS and Oob as able     Blood cultures negative growth to date   Positive for cryptococcal infection  Mri brain pending  Lumbar puncture pending  Hold xarelto  [facility]ium scan pending  Antimicrobials per infectious disease     5/30/25  MRI brain negative.   S/p LP; analysis so far unremarkable; awaiting Christiana Ink, Cryptococcal antigen, MS panel, Fungal Culture, and Bacterial culture   Per ID patient will need to stay hospitalized 14 days for induction therapy(agents are toxic in nature) followed by consolidation therapy    5/31/25  Patient has remained afebrile for 48 hours.  Source of cryptococcus infeciton unclear. Serum cryptococcus antigen negative. Christiana ink negative. [facility]ium scan also negative today. Discussed with ID who recommends stopping flucytosine. Continue Amphotericin until CSF Crypto Ag is known. If negative and patient remains afebrile, patient will likely discharge on fluconazole treatment for non meningitis/non-pulmonary cryptococcus which can be treated as antigenemia with extended course of fluconazole.   CXR with NAF, Flu/Covid neg   Possible UTI   Resp viral panel negative   Urine and blood cultures NGTD   Monitor VS   Hematology/Oncology consulted and following   ID consult due to persistent fever   Cefepime changed to  IV Merrem, Zyvox discontinued   CT abd pelvis with bladder thickening and IVC filter but no other source of infection. TTE with no valvular abnormalities. BLE duplex showed chronic RLE DVT   [facility]ium scan ordered by ID but radiology unable to obtain needed material for scan until 05/26 and scan cannot be completed until 5/27    Karius testing pending per Dr. Laguna  Discussed with Dr. Laguna 05/25- he recommends repeat blood cultures, change IV Cefepime to Merrem   Encourage IS and Oob as able     Blood cultures negative growth to date   Positive for cryptococcal infection  Mri brain pending  Lumbar puncture pending  Hold xarelto  [facility]ium scan pending  Antimicrobials per infectious disease     5/30/25  Currently on treatment for cryptococcal infection. Will stop scheduled Tylenol and monitor fever curve.      CXR with NAF, Flu/Covid neg   Possible UTI   Resp viral panel negative   Urine and blood cultures NGTD   Monitor VS   Hematology/Oncology consulted and following   ID consult due to persistent fever   Cefepime changed to IV Merrem, Zyvox discontinued   CT abd pelvis with bladder thickening and IVC filter but no other source of infection. TTE with no valvular abnormalities. BLE duplex showed chronic RLE DVT   [facility]ium scan ordered by ID but radiology unable to obtain needed material for scan until 05/26 and scan cannot be completed until 5/27    Karius testing pending per Dr. Laguna  Discussed with Dr. Laguna 05/25- he recommends repeat blood cultures, change IV Cefepime to Merrem   Encourage IS and Oob as able     Blood cultures negative growth to date   Positive for cryptococcal infection  Mri brain pending  Lumbar puncture pending  Hold xarelto  [facility]ium scan pending  Antimicrobials per infectious disease     5/30/25  Likely related to cryptococcal infection.   Hold scheduled Tylenol. Monitor fever curve  WBC scan pending  LP with CSF right now unremarkable. Christiana Ink, Cryptococcal antigen, MS panel, Fungal  Culture, and Bacterial culture are still pending.  CXR with NAF, Flu/Covid neg   Possible UTI   Resp viral panel negative   Urine and blood cultures NGTD   Monitor VS   Hematology/Oncology consulted and following   ID consult due to persistent fever   Cefepime changed to IV Merrem, Zyvox discontinued   CT abd pelvis with bladder thickening and IVC filter but no other source of infection. TTE with no valvular abnormalities. BLE duplex showed chronic RLE DVT   [facility]ium scan ordered by ID but radiology unable to obtain needed material for scan until 05/26 and scan cannot be completed until 5/27    Karius testing pending per Dr. Laguna  Discussed with Dr. Laguna 05/25- he recommends repeat blood cultures, change IV Cefepime to Merrem   Encourage IS and Oob as able     Blood cultures negative growth to date   Positive for cryptococcal infection  Mri brain pending  Lumbar puncture pending  Hold xarelto  [facility]ium scan pending  Antimicrobials per infectious disease     5/30/25  MRI brain negative.   S/p LP; analysis so far unremarkable; awaiting Christiana Ink, Cryptococcal antigen, MS panel, Fungal Culture, and Bacterial culture   Per ID patient will need to stay hospitalized 14 days for induction therapy(agents are toxic in nature) followed by consolidation therapy    5/31/25  Patient has remained afebrile without Tylenol. See plan above.  CXR with NAF, Flu/Covid neg   Possible UTI   Resp viral panel negative   Urine and blood cultures NGTD   Monitor VS   Hematology/Oncology consulted and following   ID consult due to persistent fever   Cefepime changed to IV Merrem, Zyvox discontinued   CT abd pelvis with bladder thickening and IVC filter but no other source of infection. TTE with no valvular abnormalities. BLE duplex showed chronic RLE DVT   [facility]ium scan ordered by ID but radiology unable to obtain needed material for scan until 05/26 and scan cannot be completed until 5/27    Karius testing pending per Dr. Laguna  Discussed  with Dr. Laguna 05/25- he recommends repeat blood cultures, change IV Cefepime to Merrem   Encourage IS and Oob as able     Blood cultures negative growth to date   Positive for cryptococcal infection  Mri brain pending  Lumbar puncture pending  Hold xarelto  [facility]ium scan pending  Antimicrobials per infectious disease     5/30/25  Currently on treatment for cryptococcal infection. Will stop scheduled Tylenol and monitor fever curve.      CXR with NAF, Flu/Covid neg   Possible UTI   Resp viral panel negative   Urine and blood cultures NGTD   Monitor VS   Hematology/Oncology consulted and following   ID consult due to persistent fever   Cefepime changed to IV Merrem, Zyvox discontinued   CT abd pelvis with bladder thickening and IVC filter but no other source of infection. TTE with no valvular abnormalities. BLE duplex showed chronic RLE DVT   [facility] scan ordered by ID but radiology unable to obtain needed material for scan until 05/26 and scan cannot be completed until 5/27    Karius testing pending per Dr. Laguna  Discussed with Dr. Laguna 05/25- he recommends repeat blood cultures, change IV Cefepime to Merrem   Encourage IS and Oob as able     Blood cultures negative growth to date   Positive for cryptococcal infection  Mri brain pending  Lumbar puncture pending  Hold xarelto  [facility]ium scan pending  Antimicrobials per infectious disease     5/30/25  Likely related to cryptococcal infection.   Hold scheduled Tylenol. Monitor fever curve  WBC scan pending  LP with CSF right now unremarkable. Christiana Ink, Cryptococcal antigen, MS panel, Fungal Culture, and Bacterial culture are still pending.  CXR with NAF, Flu/Covid neg   Possible UTI   Resp viral panel negative   Urine and blood cultures NGTD   Monitor VS   Hematology/Oncology consulted and following   ID consult due to persistent fever   Cefepime changed to IV Merrem, Zyvox discontinued   CT abd pelvis with bladder thickening and IVC filter but no other source of  infection. TTE with no valvular abnormalities. BLE duplex showed chronic RLE DVT   [facility] scan ordered by ID but radiology unable to obtain needed material for scan until 05/26 and scan cannot be completed until 5/27    Karius testing pending per Dr. Laguna  Discussed with Dr. Laguna 05/25- he recommends repeat blood cultures, change IV Cefepime to Merrem   Encourage IS and Oob as able     Blood cultures negative growth to date   Positive for cryptococcal infection  Mri brain pending  Lumbar puncture pending  Hold xarelto  [facility]ium scan pending  Antimicrobials per infectious disease     5/30/25  MRI brain negative.   S/p LP; analysis so far unremarkable; awaiting Christiana Ink, Cryptococcal antigen, MS panel, Fungal Culture, and Bacterial culture   Per ID patient will need to stay hospitalized 14 days for induction therapy(agents are toxic in nature) followed by consolidation therapy    5/31/25   Source of infection is unclear. Serum cryptococcus antigen negative. Christiana ink negative. [facility]ium scan also negative today. Discussed with ID who recommends stopping flucytosine. Continue Amphotericin until CSF Crypto Ag is known. If negative and patient remains afebrile, patient will likely discharge on fluconazole treatment for non meningitis/non-pulmonary cryptococcus which can be treated as antigenemia with extended course of fluconazole.     6/1/25  Febrile overnight (Tmax 101.9) in absence of flucytosine. Although both serum and CSF were negative for Cryptotoccus Antigen. It was decided proceed with complete induction therapy with Amphotericin and Flucytosine. Will monitor closely liver enzymes and fever curve. ALP today 194, AST 55.   CXR with NAF, Flu/Covid neg   Possible UTI   Resp viral panel negative   Urine and blood cultures NGTD   Monitor VS   Hematology/Oncology consulted and following   ID consult due to persistent fever   Cefepime changed to IV Merrem, Zyvox discontinued   CT abd pelvis with bladder thickening  and IVC filter but no other source of infection. TTE with no valvular abnormalities. BLE duplex showed chronic RLE DVT   [facility] scan ordered by ID but radiology unable to obtain needed material for scan until 05/26 and scan cannot be completed until 5/27    Karius testing pending per Dr. Laguna  Discussed with Dr. Laguna 05/25- he recommends repeat blood cultures, change IV Cefepime to Merrem   Encourage IS and Oob as able     Blood cultures negative growth to date   Positive for cryptococcal infection  Mri brain pending  Lumbar puncture pending  Hold xarelto  Indium scan pending  Antimicrobials per infectious disease     5/30/25  Currently on treatment for cryptococcal infection. Will stop scheduled Tylenol and monitor fever curve.      CXR with NAF, Flu/Covid neg   Possible UTI   Resp viral panel negative   Urine and blood cultures NGTD   Monitor VS   Hematology/Oncology consulted and following   ID consult due to persistent fever   Cefepime changed to IV Merrem, Zyvox discontinued   CT abd pelvis with bladder thickening and IVC filter but no other source of infection. TTE with no valvular abnormalities. BLE duplex showed chronic RLE DVT   Indium scan ordered by ID but radiology unable to obtain needed material for scan until 05/26 and scan cannot be completed until 5/27    Karius testing pending per Dr. Laguna  Discussed with Dr. Laguna 05/25- he recommends repeat blood cultures, change IV Cefepime to Merrem   Encourage IS and Oob as able     Blood cultures negative growth to date   Positive for cryptococcal infection  Mri brain pending  Lumbar puncture pending  Hold xarelto  Indium scan pending  Antimicrobials per infectious disease     5/30/25  Likely related to cryptococcal infection.   Hold scheduled Tylenol. Monitor fever curve  WBC scan pending  LP with CSF right now unremarkable. Christiana Ink, Cryptococcal antigen, MS panel, Fungal Culture, and Bacterial culture are still pending.  CXR with NAF, Flu/Covid  neg   Possible UTI   Resp viral panel negative   Urine and blood cultures NGTD   Monitor VS   Hematology/Oncology consulted and following   ID consult due to persistent fever   Cefepime changed to IV Merrem, Zyvox discontinued   CT abd pelvis with bladder thickening and IVC filter but no other source of infection. TTE with no valvular abnormalities. BLE duplex showed chronic RLE DVT   Indium scan ordered by ID but radiology unable to obtain needed material for scan until 05/26 and scan cannot be completed until 5/27    Karius testing pending per Dr. Laguna  Discussed with Dr. Laguna 05/25- he recommends repeat blood cultures, change IV Cefepime to Merrem   Encourage IS and Oob as able     Blood cultures negative growth to date   Positive for cryptococcal infection  Mri brain pending  Lumbar puncture pending  Hold xarelto  Indium scan pending  Antimicrobials per infectious disease     5/30/25  MRI brain negative.   S/p LP; analysis so far unremarkable; awaiting Christiana Ink, Cryptococcal antigen, MS panel, Fungal Culture, and Bacterial culture   Per ID patient will need to stay hospitalized 14 days for induction therapy(agents are toxic in nature) followed by consolidation therapy    5/31/25  Patient has remained afebrile for 48 hours.  Source of cryptococcus infeciton unclear. Serum cryptococcus antigen negative. Christiana ink negative. Indium scan also negative today. Discussed with ID who recommends stopping flucytosine. Continue Amphotericin until CSF Crypto Ag is known. If negative and patient remains afebrile, patient will likely discharge on fluconazole treatment for non meningitis/non-pulmonary cryptococcus which can be treated as antigenemia with extended course of fluconazole.   CXR with NAF, Flu/Covid neg   Possible UTI   Resp viral panel negative   Urine and blood cultures NGTD   Monitor VS   Hematology/Oncology consulted and following   ID consult due to persistent fever   Cefepime changed to IV Merrem, Zyvox  discontinued   CT abd pelvis with bladder thickening and IVC filter but no other source of infection. TTE with no valvular abnormalities. BLE duplex showed chronic RLE DVT   Indium scan ordered by ID but radiology unable to obtain needed material for scan until 05/26 and scan cannot be completed until 5/27    Karius testing pending per Dr. Laguna  Discussed with Dr. Laguna 05/25- he recommends repeat blood cultures, change IV Cefepime to Merrem   Encourage IS and Oob as able     Blood cultures negative growth to date   Positive for cryptococcal infection  Mri brain pending  Lumbar puncture pending  Hold xarelto  Indium scan pending  Antimicrobials per infectious disease     5/30/25  Currently on treatment for cryptococcal infection. Will stop scheduled Tylenol and monitor fever curve.      CXR with NAF, Flu/Covid neg   Possible UTI   Resp viral panel negative   Urine and blood cultures NGTD   Monitor VS   Hematology/Oncology consulted and following   ID consult due to persistent fever   Cefepime changed to IV Merrem, Zyvox discontinued   CT abd pelvis with bladder thickening and IVC filter but no other source of infection. TTE with no valvular abnormalities. BLE duplex showed chronic RLE DVT   Indium scan ordered by ID but radiology unable to obtain needed material for scan until 05/26 and scan cannot be completed until 5/27    Karius testing pending per Dr. Laguna  Discussed with Dr. Laguna 05/25- he recommends repeat blood cultures, change IV Cefepime to Merrem   Encourage IS and Oob as able     Blood cultures negative growth to date   Positive for cryptococcal infection  Mri brain pending  Lumbar puncture pending  Hold xarelto  Indium scan pending  Antimicrobials per infectious disease     5/30/25  Likely related to cryptococcal infection.   Hold scheduled Tylenol. Monitor fever curve  WBC scan pending  LP with CSF right now unremarkable. Christiana Ink, Cryptococcal antigen, MS panel, Fungal Culture, and  Bacterial culture are still pending.  CXR with NAF, Flu/Covid neg   Possible UTI   Resp viral panel negative   Urine and blood cultures NGTD   Monitor VS   Hematology/Oncology consulted and following   ID consult due to persistent fever   Cefepime changed to IV Merrem, Zyvox discontinued   CT abd pelvis with bladder thickening and IVC filter but no other source of infection. TTE with no valvular abnormalities. BLE duplex showed chronic RLE DVT   Indium scan ordered by ID but radiology unable to obtain needed material for scan until 05/26 and scan cannot be completed until 5/27    Karius testing pending per Dr. Laguna  Discussed with Dr. Laguna 05/25- he recommends repeat blood cultures, change IV Cefepime to Merrem   Encourage IS and Oob as able     Blood cultures negative growth to date   Positive for cryptococcal infection  Mri brain pending  Lumbar puncture pending  Hold xarelto  Indium scan pending  Antimicrobials per infectious disease     5/30/25  MRI brain negative.   S/p LP; analysis so far unremarkable; awaiting Christiana Ink, Cryptococcal antigen, MS panel, Fungal Culture, and Bacterial culture   Per ID patient will need to stay hospitalized 14 days for induction therapy(agents are toxic in nature) followed by consolidation therapy    5/31/25  Patient has remained afebrile without Tylenol. See plan above.  CXR with NAF, Flu/Covid neg   Possible UTI   Resp viral panel negative   Urine and blood cultures NGTD   Monitor VS   Hematology/Oncology consulted and following   ID consult due to persistent fever   Cefepime changed to IV Merrem, Zyvox discontinued   CT abd pelvis with bladder thickening and IVC filter but no other source of infection. TTE with no valvular abnormalities. BLE duplex showed chronic RLE DVT   Indium scan ordered by ID but radiology unable to obtain needed material for scan until 05/26 and scan cannot be completed until 5/27    Karius testing pending per Dr. Laguna  Discussed with   Luz Elena 05/25- he recommends repeat blood cultures, change IV Cefepime to Merrem   Encourage IS and Oob as able     Blood cultures negative growth to date   Positive for cryptococcal infection  Mri brain pending  Lumbar puncture pending  Hold xarelto  Indium scan pending  Antimicrobials per infectious disease     5/30/25  Currently on treatment for cryptococcal infection. Will stop scheduled Tylenol and monitor fever curve.      CXR with NAF, Flu/Covid neg   Possible UTI   Resp viral panel negative   Urine and blood cultures NGTD   Monitor VS   Hematology/Oncology consulted and following   ID consult due to persistent fever   Cefepime changed to IV Merrem, Zyvox discontinued   CT abd pelvis with bladder thickening and IVC filter but no other source of infection. TTE with no valvular abnormalities. BLE duplex showed chronic RLE DVT   Indium scan ordered by ID but radiology unable to obtain needed material for scan until 05/26 and scan cannot be completed until 5/27    Karius testing pending per Dr. Laguna  Discussed with Dr. Laguna 05/25- he recommends repeat blood cultures, change IV Cefepime to Merrem   Encourage IS and Oob as able     Blood cultures negative growth to date   Positive for cryptococcal infection  Mri brain pending  Lumbar puncture pending  Hold xarelto  Indium scan pending  Antimicrobials per infectious disease     5/30/25  Likely related to cryptococcal infection.   Hold scheduled Tylenol. Monitor fever curve  WBC scan pending  LP with CSF right now unremarkable. Christiana Ink, Cryptococcal antigen, MS panel, Fungal Culture, and Bacterial culture are still pending.  CXR with NAF, Flu/Covid neg   Possible UTI   Resp viral panel negative   Urine and blood cultures NGTD   Monitor VS   Hematology/Oncology consulted and following   ID consult due to persistent fever   Cefepime changed to IV Merrem, Zyvox discontinued   CT abd pelvis with bladder thickening and IVC filter but no other source of infection.  TTE with no valvular abnormalities. BLE duplex showed chronic RLE DVT   Indium scan ordered by ID but radiology unable to obtain needed material for scan until 05/26 and scan cannot be completed until 5/27    Karius testing pending per Dr. Laguna  Discussed with Dr. Laguna 05/25- he recommends repeat blood cultures, change IV Cefepime to Merrem   Encourage IS and Oob as able     Blood cultures negative growth to date   Positive for cryptococcal infection  Mri brain pending  Lumbar puncture pending  Hold xarelto  Indium scan pending  Antimicrobials per infectious disease     5/30/25  MRI brain negative.   S/p LP; analysis so far unremarkable; awaiting Christiana Ink, Cryptococcal antigen, MS panel, Fungal Culture, and Bacterial culture   Per ID patient will need to stay hospitalized 14 days for induction therapy(agents are toxic in nature) followed by consolidation therapy    5/31/25   Source of infection is unclear. Serum cryptococcus antigen negative. Christiana ink negative. Indium scan also negative today. Discussed with ID who recommends stopping flucytosine. Continue Amphotericin until CSF Crypto Ag is known. If negative and patient remains afebrile, patient will likely discharge on fluconazole treatment for non meningitis/non-pulmonary cryptococcus which can be treated as antigenemia with extended course of fluconazole.     6/1/25  Febrile overnight (Tmax 101.9) in absence of flucytosine. Although both serum and CSF were negative for Cryptotoccus Antigen. It was decided proceed with complete induction therapy with Amphotericin and Flucytosine. Will monitor closely liver enzymes and fever curve. ALP today 194, AST 55.

## 2025-06-01 NOTE — ASSESSMENT & PLAN NOTE
This patient is found to have pancytopenia, the likely etiology is , will monitor CBC . Will transfuse red blood cells if the hemoglobin is <7g/dL (or <8 in the setting of ACS). Hold DVT prophylaxis if platelets are <50k. The patient's hemoglobin, white blood cell count, and platelet count results have been reviewed and are listed below.  Recent Labs   Lab 06/01/25  0352   HGB 10.5*   WBC 2.85*        5/30/25  Stable today    6/1/25  Remains stable

## 2025-06-02 PROBLEM — R50.9 FUO (FEVER OF UNKNOWN ORIGIN): Status: RESOLVED | Noted: 2025-05-24 | Resolved: 2025-06-02

## 2025-06-02 LAB
ABSOLUTE NEUTROPHIL MANUAL (OHS): 1.9 K/UL
ALBUMIN SERPL BCP-MCNC: 1.8 G/DL (ref 3.5–5.2)
ALP SERPL-CCNC: 204 UNIT/L (ref 40–150)
ALT SERPL W/O P-5'-P-CCNC: 33 UNIT/L (ref 10–44)
ANION GAP (OHS): 8 MMOL/L (ref 8–16)
AST SERPL-CCNC: 60 UNIT/L (ref 11–45)
BILIRUB DIRECT SERPL-MCNC: 0.2 MG/DL (ref 0.1–0.3)
BILIRUB SERPL-MCNC: 0.4 MG/DL (ref 0.1–1)
BUN SERPL-MCNC: 14 MG/DL (ref 8–23)
CALCIUM SERPL-MCNC: 7.8 MG/DL (ref 8.7–10.5)
CHLORIDE SERPL-SCNC: 106 MMOL/L (ref 95–110)
CO2 SERPL-SCNC: 22 MMOL/L (ref 23–29)
CREAT SERPL-MCNC: 0.9 MG/DL (ref 0.5–1.4)
EOSINOPHIL NFR BLD MANUAL: 2 % (ref 0–8)
ERYTHROCYTE [DISTWIDTH] IN BLOOD BY AUTOMATED COUNT: 14.8 % (ref 11.5–14.5)
ESTROGEN SERPL-MCNC: NORMAL PG/ML
FUNGUS SPEC CULT: NORMAL
GFR SERPLBLD CREATININE-BSD FMLA CKD-EPI: >60 ML/MIN/1.73/M2
GLUCOSE SERPL-MCNC: 109 MG/DL (ref 70–110)
HCT VFR BLD AUTO: 31.4 % (ref 40–54)
HGB BLD-MCNC: 10.4 GM/DL (ref 14–18)
INSULIN SERPL-ACNC: NORMAL U[IU]/ML
LAB AP GROSS DESCRIPTION: NORMAL
LAB AP NON-GYN INTERPRETATION SPECIMEN 1: NORMAL
LAB AP PERFORMING LOCATION(S): NORMAL
LYMPHOCYTES NFR BLD MANUAL: 33 % (ref 18–48)
MCH RBC QN AUTO: 31.3 PG (ref 27–31)
MCHC RBC AUTO-ENTMCNC: 33.1 G/DL (ref 32–36)
MCV RBC AUTO: 95 FL (ref 82–98)
MONOCYTES NFR BLD MANUAL: 6 % (ref 4–15)
NEUTROPHILS NFR BLD MANUAL: 59 % (ref 38–73)
NUCLEATED RBC (/100WBC) (OHS): 0 /100 WBC
PLATELET # BLD AUTO: 227 K/UL (ref 150–450)
PMV BLD AUTO: 8.9 FL (ref 9.2–12.9)
POTASSIUM SERPL-SCNC: 4.1 MMOL/L (ref 3.5–5.1)
PROT SERPL-MCNC: 5.8 GM/DL (ref 6–8.4)
RBC # BLD AUTO: 3.32 M/UL (ref 4.6–6.2)
SODIUM SERPL-SCNC: 136 MMOL/L (ref 136–145)
WBC # BLD AUTO: 3.21 K/UL (ref 3.9–12.7)

## 2025-06-02 PROCEDURE — 25000003 PHARM REV CODE 250: Mod: HCNC | Performed by: STUDENT IN AN ORGANIZED HEALTH CARE EDUCATION/TRAINING PROGRAM

## 2025-06-02 PROCEDURE — 80053 COMPREHEN METABOLIC PANEL: CPT | Mod: HCNC | Performed by: NURSE PRACTITIONER

## 2025-06-02 PROCEDURE — 36415 COLL VENOUS BLD VENIPUNCTURE: CPT | Mod: HCNC | Performed by: NURSE PRACTITIONER

## 2025-06-02 PROCEDURE — 25000003 PHARM REV CODE 250: Mod: HCNC | Performed by: FAMILY MEDICINE

## 2025-06-02 PROCEDURE — 99233 SBSQ HOSP IP/OBS HIGH 50: CPT | Mod: NSCH,HCNC,, | Performed by: INTERNAL MEDICINE

## 2025-06-02 PROCEDURE — 25000003 PHARM REV CODE 250: Mod: HCNC | Performed by: INTERNAL MEDICINE

## 2025-06-02 PROCEDURE — 27000207 HC ISOLATION: Mod: HCNC

## 2025-06-02 PROCEDURE — 85025 COMPLETE CBC W/AUTO DIFF WBC: CPT | Mod: HCNC | Performed by: NURSE PRACTITIONER

## 2025-06-02 PROCEDURE — 21400001 HC TELEMETRY ROOM: Mod: HCNC

## 2025-06-02 PROCEDURE — 82248 BILIRUBIN DIRECT: CPT | Mod: HCNC | Performed by: FAMILY MEDICINE

## 2025-06-02 PROCEDURE — 63600175 PHARM REV CODE 636 W HCPCS: Mod: JZ,TB,HCNC | Performed by: STUDENT IN AN ORGANIZED HEALTH CARE EDUCATION/TRAINING PROGRAM

## 2025-06-02 PROCEDURE — 25000003 PHARM REV CODE 250: Mod: HCNC | Performed by: HOSPITALIST

## 2025-06-02 RX ADMIN — DIPHENHYDRAMINE HYDROCHLORIDE 25 MG: 25 CAPSULE ORAL at 03:06

## 2025-06-02 RX ADMIN — DEXTROSE MONOHYDRATE: 5 INJECTION INTRAVENOUS at 06:06

## 2025-06-02 RX ADMIN — FLUCYTOSINE 2250 MG: 500 CAPSULE ORAL at 06:06

## 2025-06-02 RX ADMIN — SODIUM CHLORIDE 500 ML: 9 INJECTION, SOLUTION INTRAVENOUS at 07:06

## 2025-06-02 RX ADMIN — TAMSULOSIN HYDROCHLORIDE 0.4 MG: 0.4 CAPSULE ORAL at 09:06

## 2025-06-02 RX ADMIN — FLUCYTOSINE 2250 MG: 500 CAPSULE ORAL at 05:06

## 2025-06-02 RX ADMIN — PANTOPRAZOLE SODIUM 40 MG: 40 TABLET, DELAYED RELEASE ORAL at 09:06

## 2025-06-02 RX ADMIN — FLUCYTOSINE 2250 MG: 500 CAPSULE ORAL at 11:06

## 2025-06-02 RX ADMIN — CETIRIZINE HYDROCHLORIDE 10 MG: 10 TABLET, FILM COATED ORAL at 09:06

## 2025-06-02 RX ADMIN — SODIUM CHLORIDE 500 ML: 9 INJECTION, SOLUTION INTRAVENOUS at 03:06

## 2025-06-02 RX ADMIN — AMPHOTERICIN B 350 MG: 50 INJECTABLE, LIPOSOMAL INTRAVENOUS at 04:06

## 2025-06-02 RX ADMIN — RIVAROXABAN 20 MG: 20 TABLET, FILM COATED ORAL at 06:06

## 2025-06-02 RX ADMIN — DEXTROSE MONOHYDRATE: 5 INJECTION INTRAVENOUS at 03:06

## 2025-06-02 NOTE — PROGRESS NOTES
[facility] - Med Surg 3  Adult Nutrition  Progress Note    SUMMARY       Recommendations    Recommendation/Intervention:   1. Recommend continuing a Regular diet.   2. Encourage PO intake with feeding assistance as warranted.   3. Weigh x1 weekly.    Goals:   1. Pt will tolerate and consume >75% EEN and EPN by RD follow up.    Nutrition Goal Status: progressing towards goal  Communication of RD Recs: other (comment) (RD progress note, POC, sticky note)    Nutrition Discharge Planning    Nutrition Discharge Planning: General healthy diet    Assessment and Plan    Interventions:  1. General healthful diet.  2. Collaboration by nutrition professional with other providers.     Malnutrition Assessment 06/02/2025     Skin (Micronutrient): none (Nacho score 22)                                 Reason for Assessment    Reason For Assessment: RD follow-up  Diagnosis: cancer diagnosis/related complications (Neutropenic fever)    General Information Comments:   5/26/25: 65 y.o. male admitted for neutropenic fever. PMH: CA, hx of DVT, nephrolithiasis. Pt seen for LOS screening. Pt is currently getting a Regular diet without any ONS. RD unable to speak w/ pt dt isolation precautions. Per EMR pt has had % of meal intakes with an average of 75% or more since admit, reported good appetite, BMI WNL and GI status WNL. Per EMR pt has lost 3 kg in 1 mo which is a 3% weight loss which is not deemed significant. RD to reach out to dining associates to ensure meal orders are being taken. RD unable to perform NFPE at this time. Labs and meds reviewed.    Follow up:    6/2/25: Pt seen for follow up. Pt is currently getting a Regular diet without any ONS. RD unable to speak w/ pt at bedside dt isolation precautions. Per EMR pt has been eating % of all meals with a 75% or more average, BMI WNL, GI WNL, no PIs noted. RD to continue to follow per policy. Labs and meds reviewed.    Nutrition/Diet History    Spiritual, Cultural Beliefs,  "Judaism Practices, Values that Affect Care: no  Food Allergies: NKFA  Factors Affecting Nutritional Intake: None identified at this time  Nutrition Related Social Determinants of Health: SDOH: Unable to assess at this time.       Anthropometrics    Height: 5' 11" (180.3 cm)  Height (inches): 71 in  Height Method: Stated  Weight: 84.3 kg (185 lb 13.6 oz)  Weight (lb): 185.85 lb  Weight Method: Bed Scale  Ideal Body Weight (IBW), Male: 172 lb  % Ideal Body Weight, Male (lb): 108.05 %  BMI (Calculated): 25.9  BMI Grade: 18.5-24.9 - normal  Usual Body Weight (UBW), k.2 kg  % Usual Body Weight: 96.88  % Weight Change From Usual Weight: -3.33 %       Wt Readings from Last 15 Encounters:   25 84.3 kg (185 lb 13.6 oz)   25 87.2 kg (192 lb 3.9 oz)   25 87.7 kg (193 lb 5.5 oz)   25 87.5 kg (192 lb 12.7 oz)   24 85.7 kg (188 lb 15 oz)   10/22/24 84.6 kg (186 lb 8.2 oz)   10/15/24 81.2 kg (179 lb)   10/15/24 81.2 kg (179 lb)   10/09/24 84.6 kg (186 lb 9.9 oz)   24 84.8 kg (187 lb)   07/15/24 87.3 kg (192 lb 7.4 oz)   24 87.1 kg (192 lb 0.3 oz)   24 83.9 kg (185 lb)   24 88.3 kg (194 lb 12.4 oz)   23 88.3 kg (194 lb 12.4 oz)       Lab/Procedures/Meds    Pertinent Labs Reviewed: reviewed  Pertinent Medications Reviewed: reviewed    BMP  Lab Results   Component Value Date     2025    K 4.1 2025     2025    CO2 22 (L) 2025    BUN 14 2025    CREATININE 0.9 2025    CALCIUM 7.8 (L) 2025    ANIONGAP 8 2025    EGFRNORACEVR >60 2025     Lab Results   Component Value Date    CALCIUM 7.8 (L) 2025    PHOS 3.5 10/16/2024     Lab Results   Component Value Date    ALBUMIN 1.8 (L) 2025     Lab Results   Component Value Date    ALT 33 2025    AST 60 (H) 2025    ALKPHOS 204 (H) 2025    BILITOT 0.4 2025     No results for input(s): "POCTGLUCOSE" in the last 24 hours.    Lab Results "   Component Value Date    HGBA1C 5.7 (H) 04/15/2025     Lab Results   Component Value Date    WBC 3.21 (L) 06/02/2025    HGB 10.4 (L) 06/02/2025    HCT 31.4 (L) 06/02/2025    MCV 95 06/02/2025     06/02/2025       Scheduled Meds:   amphotericin B liposome  4 mg/kg Intravenous Q24H    cetirizine  10 mg Oral Daily    D5W 100 mL flush bag   Intravenous Q24H    D5W 100 mL flush bag   Intravenous Q24H    diphenhydrAMINE  25 mg Oral Q24H    flucytosine  25 mg/kg Oral Q6H    pantoprazole  40 mg Oral Daily    rivaroxaban  20 mg Oral Daily with dinner    sodium chloride 0.9%  500 mL Intravenous Q24H    sodium chloride 0.9%  500 mL Intravenous Q24H    tamsulosin  0.4 mg Oral Daily     Continuous Infusions:  PRN Meds:.  Current Facility-Administered Medications:     acetaminophen, 650 mg, Oral, Q4H PRN    albuterol-ipratropium, 3 mL, Nebulization, Q6H PRN    aluminum-magnesium hydroxide-simethicone, 30 mL, Oral, QID PRN    benzonatate, 100 mg, Oral, TID PRN    dextrose 50%, 12.5 g, Intravenous, PRN    dextrose 50%, 25 g, Intravenous, PRN    glucagon (human recombinant), 1 mg, Intramuscular, PRN    glucose, 16 g, Oral, PRN    glucose, 24 g, Oral, PRN    HYDROcodone-acetaminophen, 1 tablet, Oral, Q6H PRN    melatonin, 6 mg, Oral, Nightly PRN    morphine, 2 mg, Intravenous, Q4H PRN    naloxone, 0.02 mg, Intravenous, PRN    ondansetron, 4 mg, Intravenous, Q8H PRN    promethazine, 25 mg, Oral, Q6H PRN    senna-docusate, 1 tablet, Oral, BID PRN    sodium chloride 0.9%, 10 mL, Intravenous, Q12H PRN      Estimated/Assessed Needs    Weight Used For Calorie Calculations: 84.3 kg (185 lb 13.6 oz)  Energy Calorie Requirements (kcal): 8232-6114 (25-30 kcal/kg (CA))  Energy Need Method: Kcal/kg  Protein Requirements:  g (1-1.2 g/kg (CA))  Weight Used For Protein Calculations: 84.3 kg (185 lb 13.6 oz)  Fluid Requirements (mL): 3164-4983 ml  Estimated Fluid Requirement Method: RDA Method  RDA Method (mL): 2108  CHO Requirement:  264-316 (3202-7481 kcals/8)      Nutrition Prescription Ordered    Current Diet Order: Regular    Evaluation of Received Nutrient/Fluid Intake  I/O: (Net since admit):   5/26/25: +5210 ml   6/2/25: +9486.6 ml    Energy Calories Required: meeting needs  Protein Required: meeting needs  Fluid Required: meeting needs  Total Fluid Intake (mL): 1018.7  Comments: LBM: 6/2  Tolerance: tolerating  % Intake of Estimated Energy Needs: 75 - 100 %  % Meal Intake: 75 - 100 %    PES Statement  No nutrition diagnosis at this time    Nutrition Risk    Level of Risk/Frequency of Follow-up: low (Follow up x1 weekly)     Monitor and Evaluation    Monitor and Evaluation: Energy intake, Food and beverage intake, Protein intake, Carbohydrate intake, Diet order, Weight, Electrolyte and renal panel, Gastrointestinal profile, Glucose/endocrine profile, Nutrition focused physical findings, Skin     Nutrition Follow-Up    RD Follow-up?: Yes (Follow up x1 weekly)    Olga Mejias RDN, LDN

## 2025-06-02 NOTE — PLAN OF CARE
06/02/25 1556   Rounds   Attendance Provider;Nurse ;Charge nurse;Physical therapist   Discharge Plan A Home with family   Why the patient remains in the hospital Requires continued medical care   Transition of Care Barriers None

## 2025-06-02 NOTE — PLAN OF CARE
Nutrition Recommendation/Intervention 06/02/2025:   1. Recommend continuing a Regular diet.   2. Encourage PO intake with feeding assistance as warranted.   3. Weigh x1 weekly.  4. Collaboration by nutrition professional with other providers.    Goals:   1. Pt will tolerate and consume >75% EEN and EPN by RD follow up.    Olga Mejias RDN, LDN

## 2025-06-03 LAB
ALBUMIN SERPL BCP-MCNC: 1.7 G/DL (ref 3.5–5.2)
ALP SERPL-CCNC: 195 UNIT/L (ref 40–150)
ALT SERPL W/O P-5'-P-CCNC: 33 UNIT/L (ref 10–44)
ANION GAP (OHS): 8 MMOL/L (ref 8–16)
AST SERPL-CCNC: 45 UNIT/L (ref 11–45)
BILIRUB DIRECT SERPL-MCNC: 0.2 MG/DL (ref 0.1–0.3)
BILIRUB SERPL-MCNC: 0.3 MG/DL (ref 0.1–1)
BUN SERPL-MCNC: 13 MG/DL (ref 8–23)
CALCIUM SERPL-MCNC: 7.8 MG/DL (ref 8.7–10.5)
CHLORIDE SERPL-SCNC: 108 MMOL/L (ref 95–110)
CO2 SERPL-SCNC: 22 MMOL/L (ref 23–29)
CREAT SERPL-MCNC: 0.9 MG/DL (ref 0.5–1.4)
GFR SERPLBLD CREATININE-BSD FMLA CKD-EPI: >60 ML/MIN/1.73/M2
GLUCOSE SERPL-MCNC: 117 MG/DL (ref 70–110)
HOLD SPECIMEN: NORMAL
POTASSIUM SERPL-SCNC: 3.7 MMOL/L (ref 3.5–5.1)
PROT SERPL-MCNC: 5.6 GM/DL (ref 6–8.4)
SODIUM SERPL-SCNC: 138 MMOL/L (ref 136–145)

## 2025-06-03 PROCEDURE — 99233 SBSQ HOSP IP/OBS HIGH 50: CPT | Mod: NSCH,HCNC,, | Performed by: INTERNAL MEDICINE

## 2025-06-03 PROCEDURE — 21400001 HC TELEMETRY ROOM: Mod: HCNC

## 2025-06-03 PROCEDURE — 25000003 PHARM REV CODE 250: Mod: HCNC | Performed by: INTERNAL MEDICINE

## 2025-06-03 PROCEDURE — 36415 COLL VENOUS BLD VENIPUNCTURE: CPT | Mod: HCNC | Performed by: INTERNAL MEDICINE

## 2025-06-03 PROCEDURE — 25000003 PHARM REV CODE 250: Mod: HCNC | Performed by: FAMILY MEDICINE

## 2025-06-03 PROCEDURE — 93010 ELECTROCARDIOGRAM REPORT: CPT | Mod: HCNC,,, | Performed by: INTERNAL MEDICINE

## 2025-06-03 PROCEDURE — 93005 ELECTROCARDIOGRAM TRACING: CPT | Mod: HCNC

## 2025-06-03 PROCEDURE — 80053 COMPREHEN METABOLIC PANEL: CPT | Mod: HCNC | Performed by: INTERNAL MEDICINE

## 2025-06-03 PROCEDURE — 25000003 PHARM REV CODE 250: Mod: HCNC | Performed by: HOSPITALIST

## 2025-06-03 PROCEDURE — 27000207 HC ISOLATION: Mod: HCNC

## 2025-06-03 PROCEDURE — 25000003 PHARM REV CODE 250: Mod: HCNC | Performed by: STUDENT IN AN ORGANIZED HEALTH CARE EDUCATION/TRAINING PROGRAM

## 2025-06-03 PROCEDURE — 63600175 PHARM REV CODE 636 W HCPCS: Mod: JZ,TB,HCNC | Performed by: INTERNAL MEDICINE

## 2025-06-03 PROCEDURE — 82248 BILIRUBIN DIRECT: CPT | Mod: HCNC | Performed by: FAMILY MEDICINE

## 2025-06-03 RX ADMIN — SODIUM CHLORIDE 500 ML: 9 INJECTION, SOLUTION INTRAVENOUS at 02:06

## 2025-06-03 RX ADMIN — FLUCYTOSINE 2250 MG: 500 CAPSULE ORAL at 05:06

## 2025-06-03 RX ADMIN — PANTOPRAZOLE SODIUM 40 MG: 40 TABLET, DELAYED RELEASE ORAL at 10:06

## 2025-06-03 RX ADMIN — DIPHENHYDRAMINE HYDROCHLORIDE 25 MG: 25 CAPSULE ORAL at 02:06

## 2025-06-03 RX ADMIN — FLUCYTOSINE 2250 MG: 500 CAPSULE ORAL at 11:06

## 2025-06-03 RX ADMIN — DEXTROSE MONOHYDRATE: 5 INJECTION INTRAVENOUS at 06:06

## 2025-06-03 RX ADMIN — SODIUM CHLORIDE 500 ML: 9 INJECTION, SOLUTION INTRAVENOUS at 06:06

## 2025-06-03 RX ADMIN — RIVAROXABAN 20 MG: 20 TABLET, FILM COATED ORAL at 04:06

## 2025-06-03 RX ADMIN — AMPHOTERICIN B 350 MG: 50 INJECTABLE, LIPOSOMAL INTRAVENOUS at 04:06

## 2025-06-03 RX ADMIN — TAMSULOSIN HYDROCHLORIDE 0.4 MG: 0.4 CAPSULE ORAL at 10:06

## 2025-06-03 RX ADMIN — CETIRIZINE HYDROCHLORIDE 10 MG: 10 TABLET, FILM COATED ORAL at 09:06

## 2025-06-03 RX ADMIN — DEXTROSE MONOHYDRATE: 5 INJECTION INTRAVENOUS at 03:06

## 2025-06-03 NOTE — PROGRESS NOTES
[facility]  [facility]  Progress Note    Patient Name: Armando Ingram Jr.  MRN: [id number]  Patient Class: IP- Inpatient   Admission Date: 5/16/2025  Length of Stay: 17 days  Attending Physician: Lainey Tao MD  Primary Care Provider: Brian Soares MD        Subjective     Principal Problem:Neutropenic fever        HPI:  Armando Ingram Jr. is a 65 y.o. male with a PMH  has a past medical history of Closed right hip fracture, Colon cancer, DVT (deep venous thrombosis) (2002), Hairy cell leukemia (06/06/2024), and Nephrolithiasis. who presented to the ED for further evaluation of acute onset fever with T-max measuring 101.0 F earlier today.  Patient reported significant history of hairy cell leukemia in his followed by Dr. Dow and Dr. Flores from Hematology/Oncology and was recently prescribed a Z-Cordell for 4 days for treatment of a cough.  Patient was instructed to go to the ED if he endorsed fever greater than 103.0 F but presented to the ED due to ulcer experiencing associated chills, throbbing headache, and persistent fever.  He reported no known alleviating or aggravating factors noted with all other review of systems negative except as noted above.  He is not currently on active treatment for his leukemia and reported being in his usual state of health prior to onset of symptoms.  Initial workup in the ED revealed patient to be afebrile with T-max measuring 100.4 F, pancytopenic, lactic acid/procalcitonin within normal limits, flu/COVID negative, chest x-ray negative for acute findings, UA positive for 2+ LE, 11 RBCs, free found WBCs.  Patient initiated on cefepime with cultures obtained and pending and admitted to Hospital Medicine under observation for continued medical management and treatment of neutropenic fever.    PCP: Brian Soares      Overview/Hospital Course:  Continued on IV Cefepime. Zyvoz added 05/18 due to persistent fever. Hematology consulted. Cultures remain NGTD    Resp viral panel negative. ID consulted to eval due to fevers  Cefepime changed to IV Merrem per ID recs on 05/21, unclear source of fevers at this time. Zyvox discontinued per ID.   CT Abd/Pelvis showed bladder thickening but otherwise negative.   ID recommend TTE, BLE duplex and possible Indium scan for source of infection and de-escalate abx to Cefepime on 05/23.   TTE with no vegetations. BLE duplex showed chronic RLE DVT. Indium scan pending tentatively planned for 05/26-05/27 as radiology does not have required materials at this time and scan will take 2 days to complete per RT.   Persistent fevers, abx changed back to Merrem 05/25 per ID recs. Karius pending   5/26 fever curve trending down. Respiratory infection panel negative. Asymptomatic. Continue intravenous antibiotic(s)   5/27 neutropenic fever persists despite scheduled tylenol. Awaiting tagged wbc scan per infectious disease recommendations.   5/28 febrile overnight. Infectious disease recommending addition of doxy. Hem/onc following. Awaiting karius and imaging. Refused lumbar puncture. If afebrile x 48h, discharge   5/29 febrile. Positive cryptococcal infection. Infectious disease adjusting antimicrobials. Plans for lumbar puncture, mri brain, and indium scan.  5/30/2025: s/p LP today.  Opening pressure within normal range at 18. clear CSF fluid noted. Cell count, glucose, Christiana Ink, Cryptococcal antigen, MS panel, Fungal Culture, and Bacterial culture obtained. Patient received first dose of induction therapy for cryptococcal infection and ding well. Indium Scan in progress. Given toxic nature of amphotericin and flucytosine will closely monitor chemistries. Oncology consulted with partners and decision made to defer treatment for hair cell leukemia until infection has been treated.   5/31/25: Patient has remained afebrile today. ALP and AST elevated after 48 hours of treatment. Serum cryptococcus antigen negative. Christiana ink negative. Indium  scan also negative today. Discussed with ID who recommends stopping flucytosine. Continue Amphotericin until CSF Crypto Ag is known. If negative and patient remains afebrile, patient will likely discharge on fluconazole treatment for non meningitis/non-pulmonary cryptococcus which can be treated as antigenemia with extended course of fluconazole.   6/1/25: Xarelto resumed yesterday and H/H unchanged from previous. Febrile overnight (Tmax 101.9) in absence of flucytosine. Although both serum and CSF were negative for Cryptotoccus Antigen it was decided proceed with complete induction therapy with Amphotericin and Flucytosine. Will monitor closely liver enzymes and fever curve. ALP today 194, AST 55.     Discussed with Dr. Villanueva.  He plans on continuing to review chart but likely we will DC amphotericin and flucytosine in a.m. and monitor fever curve.  He may be able to be placed on p.o. Diflucan.    Interval History:  Patient seen and examined at bedside he is without complaint.  T-max was 99.1°.    Review of Systems   Constitutional:  Positive for activity change and fatigue.   Respiratory: Negative.     Cardiovascular: Negative.    Gastrointestinal: Negative.    All other systems reviewed and are negative.    Objective:     Vital Signs (Most Recent):  Temp: 99.1 °F (37.3 °C) (06/03/25 1619)  Pulse: 95 (06/03/25 1619)  Resp: 19 (06/03/25 1619)  BP: (!) 146/80 (06/03/25 1619)  SpO2: 98 % (06/03/25 1619) Vital Signs (24h Range):  Temp:  [98.2 °F (36.8 °C)-99.3 °F (37.4 °C)] 99.1 °F (37.3 °C)  Pulse:  [74-97] 95  Resp:  [16-19] 19  SpO2:  [90 %-98 %] 98 %  BP: (112-146)/(56-80) 146/80     Weight: 84.3 kg (185 lb 13.6 oz)  Body mass index is 25.92 kg/m².    Intake/Output Summary (Last 24 hours) at 6/3/2025 1740  Last data filed at 6/3/2025 0603  Gross per 24 hour   Intake 2761.94 ml   Output --   Net 2761.94 ml         Physical Exam  Vitals reviewed.   Constitutional:       Appearance: He is ill-appearing.   HENT:       Head: Normocephalic and atraumatic.      Mouth/Throat:      Mouth: Mucous membranes are moist.      Pharynx: Oropharynx is clear.   Eyes:      Extraocular Movements: Extraocular movements intact.      Conjunctiva/sclera: Conjunctivae normal.   Cardiovascular:      Rate and Rhythm: Normal rate and regular rhythm.      Pulses: Normal pulses.      Heart sounds: Normal heart sounds.   Pulmonary:      Effort: Pulmonary effort is normal.      Breath sounds: Normal breath sounds.   Abdominal:      General: Bowel sounds are normal.      Palpations: Abdomen is soft.   Musculoskeletal:         General: Normal range of motion.      Cervical back: Normal range of motion and neck supple.   Skin:     General: Skin is warm and dry.   Neurological:      General: No focal deficit present.      Mental Status: He is alert and oriented to person, place, and time. Mental status is at baseline.               Significant Labs: All pertinent labs within the past 24 hours have been reviewed.  CBC:   Recent Labs   Lab 06/02/25  0704   WBC 3.21*   HGB 10.4*   HCT 31.4*        CMP:   Recent Labs   Lab 06/02/25  0704 06/03/25  0334    138   K 4.1 3.7    108   CO2 22* 22*    117*   BUN 14 13   CREATININE 0.9 0.9   CALCIUM 7.8* 7.8*   PROT 5.8* 5.6*   ALBUMIN 1.8* 1.7*   BILITOT 0.4 0.3   ALKPHOS 204* 195*   AST 60* 45   ALT 33 33   ANIONGAP 8 8       Significant Imaging: I have reviewed all pertinent imaging results/findings within the past 24 hours.      Assessment & Plan  Neutropenic fever  Cryptococcosis      5/30/25  MRI brain negative.   S/p LP; analysis so far unremarkable; awaiting Christiana Ink, Cryptococcal antigen, MS panel, Fungal Culture, and Bacterial culture   Per ID patient will need to stay hospitalized 14 days for induction therapy(agents are toxic in nature) followed by consolidation therapy    5/31/25   Source of infection is unclear. Serum cryptococcus antigen negative. Christiana ink negative. Indium scan  also negative today. Discussed with ID who recommends stopping flucytosine. Continue Amphotericin until CSF Crypto Ag is known. If negative and patient remains afebrile, patient will likely discharge on fluconazole treatment for non meningitis/non-pulmonary cryptococcus which can be treated as antigenemia with extended course of fluconazole.     6/1/25  Febrile overnight (Tmax 101.9) in absence of flucytosine. Although both serum and CSF were negative for Cryptotoccus Antigen. It was decided proceed with complete induction therapy with Amphotericin and Flucytosine. Will monitor closely liver enzymes and fever curve. ALP today 194, AST 55.   CXR with NAF, Flu/Covid neg   Possible UTI   Resp viral panel negative   Urine and blood cultures NGTD   Monitor VS   CT abd pelvis with bladder thickening and IVC filter but no other source of infection. TTE with no valvular abnormalities. BLE duplex showed chronic RLE DVT   Encourage IS and Oob as able             Continue current treatment further recs per ID are pending      5/30/25  MRI brain negative.   S/p LP; analysis so far unremarkable; awaiting Christiana Ink, Cryptococcal antigen negative, MS panel, Fungal Culture, and Bacterial culture   Per ID patient will need to stay hospitalized 14 days for induction therapy(agents are toxic in nature) followed by consolidation therapy    5/31/25   Source of infection is unclear. Serum cryptococcus antigen negative. Christiana ink negative. Indium scan also negative today. Discussed with ID who recommends stopping flucytosine. Continue Amphotericin until CSF Crypto Ag is known. If negative and patient remains afebrile, patient will likely discharge on fluconazole treatment for non meningitis/non-pulmonary cryptococcus which can be treated as antigenemia with extended course of fluconazole.     6/1/25  Febrile overnight (Tmax 101.9) in absence of flucytosine. Although both serum and CSF were negative for Cryptotoccus Antigen. It was decided  proceed with complete induction therapy with Amphotericin and Flucytosine. Will monitor closely liver enzymes and fever curve. ALP today 194, AST 55.   CXR with NAF, Flu/Covid neg   Resp viral panel negative   Urine and blood cultures NGTD   Monitor VS   CT abd pelvis with bladder thickening and IVC filter but no other source of infection. TTE with no valvular abnormalities. BLE duplex showed chronic RLE DVT       Continue current treatment further recs per ID are pending  Hairy cell leukemia  Patient with known history of hairy cell leukemia and continues to follow Dr. Dow and Dr. Flores from Hematology/Oncology. Patient not currently on active treatment and currently undergoing treatment for neutropenic fever with broad-spectrum antibiotics as noted above.  Hematology consulted and following     5/30/25  Hematology discussed with other partners and it was decided not to proceed with treatment for leukemia. This was discussed with family.   Pancytopenia  This patient is found to have pancytopenia, the likely etiology is , will monitor CBC . Will transfuse red blood cells if the hemoglobin is <7g/dL (or <8 in the setting of ACS). Hold DVT prophylaxis if platelets are <50k. The patient's hemoglobin, white blood cell count, and platelet count results have been reviewed.      Remains stable  History of DVT (deep vein thrombosis)  - BLE duplex with chronic RLE DVT    -prior hx of IVC filter in 2002   Hold xarelto for lumbar puncture     6/1/25  Xarelto resumed yesterday and H/H today unchanged from previous.   Transaminitis  Lab Results   Component Value Date    AST 45 06/03/2025    AST 60 (H) 06/02/2025    ALT 33 06/03/2025    ALT 33 06/02/2025    ALKPHOS 195 (H) 06/03/2025    ALKPHOS 204 (H) 06/02/2025        VTE Risk Mitigation (From admission, onward)           Ordered     rivaroxaban tablet 20 mg  With dinner         05/31/25 1415     Reason for No Pharmacological VTE Prophylaxis  Once        Question:   Reasons:  Answer:  Already adequately anticoagulated on oral Anticoagulants    05/17/25 0213     IP VTE HIGH RISK PATIENT  Once         05/17/25 0213     Place sequential compression device  Until discontinued         05/17/25 0213                    Discharge Planning   LAURI: 6/6/2025     Code Status: Full Code   Medical Readiness for Discharge Date:   Discharge Plan A: Home with family                        Lainey Basurto MD  Department of Hospital Medicine   [facility]

## 2025-06-03 NOTE — SUBJECTIVE & OBJECTIVE
Interval History:  Patient seen and examined at bedside he is without complaint.  T-max was 99.1°.    Review of Systems   Constitutional:  Positive for activity change and fatigue.   Respiratory: Negative.     Cardiovascular: Negative.    Gastrointestinal: Negative.    All other systems reviewed and are negative.    Objective:     Vital Signs (Most Recent):  Temp: 99.1 °F (37.3 °C) (06/03/25 1619)  Pulse: 95 (06/03/25 1619)  Resp: 19 (06/03/25 1619)  BP: (!) 146/80 (06/03/25 1619)  SpO2: 98 % (06/03/25 1619) Vital Signs (24h Range):  Temp:  [98.2 °F (36.8 °C)-99.3 °F (37.4 °C)] 99.1 °F (37.3 °C)  Pulse:  [74-97] 95  Resp:  [16-19] 19  SpO2:  [90 %-98 %] 98 %  BP: (112-146)/(56-80) 146/80     Weight: 84.3 kg (185 lb 13.6 oz)  Body mass index is 25.92 kg/m².    Intake/Output Summary (Last 24 hours) at 6/3/2025 1740  Last data filed at 6/3/2025 0603  Gross per 24 hour   Intake 2761.94 ml   Output --   Net 2761.94 ml         Physical Exam  Vitals reviewed.   Constitutional:       Appearance: He is ill-appearing.   HENT:      Head: Normocephalic and atraumatic.      Mouth/Throat:      Mouth: Mucous membranes are moist.      Pharynx: Oropharynx is clear.   Eyes:      Extraocular Movements: Extraocular movements intact.      Conjunctiva/sclera: Conjunctivae normal.   Cardiovascular:      Rate and Rhythm: Normal rate and regular rhythm.      Pulses: Normal pulses.      Heart sounds: Normal heart sounds.   Pulmonary:      Effort: Pulmonary effort is normal.      Breath sounds: Normal breath sounds.   Abdominal:      General: Bowel sounds are normal.      Palpations: Abdomen is soft.   Musculoskeletal:         General: Normal range of motion.      Cervical back: Normal range of motion and neck supple.   Skin:     General: Skin is warm and dry.   Neurological:      General: No focal deficit present.      Mental Status: He is alert and oriented to person, place, and time. Mental status is at baseline.               Significant  Labs: All pertinent labs within the past 24 hours have been reviewed.  CBC:   Recent Labs   Lab 06/02/25  0704   WBC 3.21*   HGB 10.4*   HCT 31.4*        CMP:   Recent Labs   Lab 06/02/25  0704 06/03/25  0334    138   K 4.1 3.7    108   CO2 22* 22*    117*   BUN 14 13   CREATININE 0.9 0.9   CALCIUM 7.8* 7.8*   PROT 5.8* 5.6*   ALBUMIN 1.8* 1.7*   BILITOT 0.4 0.3   ALKPHOS 204* 195*   AST 60* 45   ALT 33 33   ANIONGAP 8 8       Significant Imaging: I have reviewed all pertinent imaging results/findings within the past 24 hours.

## 2025-06-03 NOTE — ASSESSMENT & PLAN NOTE
Lab Results   Component Value Date    AST 45 06/03/2025    AST 60 (H) 06/02/2025    ALT 33 06/03/2025    ALT 33 06/02/2025    ALKPHOS 195 (H) 06/03/2025    ALKPHOS 204 (H) 06/02/2025

## 2025-06-03 NOTE — PLAN OF CARE
Problem: Adult Inpatient Plan of Care  Goal: Plan of Care Review  Outcome: Progressing  Goal: Absence of Hospital-Acquired Illness or Injury  Outcome: Progressing  Goal: Optimal Comfort and Wellbeing  Outcome: Progressing     Problem: Fall Injury Risk  Goal: Absence of Fall and Fall-Related Injury  Outcome: Progressing

## 2025-06-03 NOTE — ASSESSMENT & PLAN NOTE
This patient is found to have pancytopenia, the likely etiology is , will monitor CBC . Will transfuse red blood cells if the hemoglobin is <7g/dL (or <8 in the setting of ACS). Hold DVT prophylaxis if platelets are <50k. The patient's hemoglobin, white blood cell count, and platelet count results have been reviewed.      Remains stable

## 2025-06-03 NOTE — SUBJECTIVE & OBJECTIVE
Interval History:  Patient has been diagnosed with Cryptococcus based on Karius testing.  MRI brain with and without contrast negative for evidence of infection.  Lumbar puncture today.  Serum cryptococcal antigen in process.  Indium scan scheduled.  Patient tolerating amphotericin and flucytosine thus far.  Last recorded fever of 100.4 about 24 hours ago.     06/02-  Karius done -05/04- showed cryptococcus   neoformans    Serum crytococcal antigen -negative  Csf crytococcal antigen - neg  Review of Systems   Constitutional:  Positive for activity change. Negative for appetite change, chills and fever.   Musculoskeletal:  Negative for back pain.   Neurological:  Negative for headaches.   All other systems reviewed and are negative.    Objective:     Vital Signs (Most Recent):  Temp: 98.5 °F (36.9 °C) (06/03/25 1131)  Pulse: 85 (06/03/25 1131)  Resp: 18 (06/03/25 1131)  BP: 117/61 (06/03/25 1131)  SpO2: 96 % (06/03/25 1131) Vital Signs (24h Range):  Temp:  [98.2 °F (36.8 °C)-99.3 °F (37.4 °C)] 98.5 °F (36.9 °C)  Pulse:  [74-97] 85  Resp:  [16-19] 18  SpO2:  [90 %-96 %] 96 %  BP: (112-133)/(56-66) 117/61     Weight: 84.3 kg (185 lb 13.6 oz)  Body mass index is 25.92 kg/m².    Estimated Creatinine Clearance: 86 mL/min (based on SCr of 0.9 mg/dL).     Physical Exam  Constitutional:       General: He is not in acute distress.     Appearance: Normal appearance. He is not ill-appearing.   Cardiovascular:      Rate and Rhythm: Normal rate and regular rhythm.      Pulses: Normal pulses.      Heart sounds: Normal heart sounds. No murmur heard.     No friction rub. No gallop.   Pulmonary:      Effort: Pulmonary effort is normal. No respiratory distress.      Breath sounds: Normal breath sounds.   Abdominal:      General: Abdomen is flat. Bowel sounds are normal. There is no distension.      Palpations: Abdomen is soft.      Tenderness: There is no abdominal tenderness.   Skin:     General: Skin is warm and dry.   Neurological:  "     Mental Status: He is alert.          Significant Labs: Blood Culture: No results for input(s): "LABBLOO" in the last 4320 hours.  CBC:   Recent Labs   Lab 06/02/25  0704   WBC 3.21*   HGB 10.4*   HCT 31.4*        CMP:   Recent Labs   Lab 06/02/25  0704 06/03/25  0334    138   K 4.1 3.7    108   CO2 22* 22*    117*   BUN 14 13   CREATININE 0.9 0.9   CALCIUM 7.8* 7.8*   PROT 5.8* 5.6*   ALBUMIN 1.8* 1.7*   BILITOT 0.4 0.3   ALKPHOS 204* 195*   AST 60* 45   ALT 33 33   ANIONGAP 8 8     CSF:   Recent Labs   Lab 05/30/25 0909   CSFCULTURE No Growth To Date     Microbiology Results (last 7 days)       Procedure Component Value Units Date/Time    CSF culture [[id number]] Collected: 05/30/25 0909    Order Status: Completed Specimen: CSF (Spinal Fluid) from CSF Tap, Tube 1 Updated: 06/03/25 0742     CULTURE, CSF No Growth To Date     GRAM STAIN Cytospin indicates:      No WBCs, epithelial cells or organisms seen    Fungus culture [[id number]]  (Normal) Collected: 05/30/25 0909    Order Status: Completed Specimen: CSF (Spinal Fluid) from Cerebrospinal Fluid Updated: 06/02/25 0937     Fungal Culture Culture In Progress    Cryptococcal antigen, CSF [[id number]]  (Normal) Collected: 05/30/25 0909    Order Status: Completed Specimen: CSF (Spinal Fluid) from CSF Tap, Tube 1 Updated: 05/31/25 1619     Cryptococcal Antigen, CSF Negative    Christiana Ink (CSF) [[id number]] Collected: 05/30/25 0909    Order Status: Completed Specimen: CSF (Spinal Fluid) from CSF Tap, Tube 1 Updated: 05/31/25 0052     CHRISTIANA INK No encapsulated yeast seen    Blood culture [[id number]]  (Normal) Collected: 05/25/25 1026    Order Status: Completed Specimen: Blood from Peripheral, Antecubital, Right Updated: 05/30/25 1801     Blood Culture No Growth After 5 Days    Blood culture [[id number]]  (Normal) Collected: 05/25/25 1025    Order Status: Completed Specimen: Blood from Peripheral, Antecubital, Left Updated: 05/30/25 1801     " Blood Culture No Growth After 5 Days    Cryptococcal antigen [[id number]]  (Normal) Collected: 05/29/25 1531    Order Status: Completed Specimen: Blood, Venous Updated: 05/30/25 1339     Cryptococcal Antigen, Serum Negative    Fungus culture [[id number]]     Order Status: Canceled Specimen: CSF (Spinal Fluid) from Cerebrospinal Fluid     CSF culture [[id number]]     Order Status: Canceled Specimen: CSF (Spinal Fluid) from CSF Tap, Tube 1           All pertinent labs within the past 24 hours have been reviewed.    Significant Imaging: MRI: I have reviewed all pertinent results/findings within the past 24 hours:  brain with no evidence of cryptococcus

## 2025-06-03 NOTE — ASSESSMENT & PLAN NOTE
with empiric meropenem.  As fever has persisted.  Will hold Zyvox due to associated thrombocytopenia,   will follow blood cultures.  Will monitor fever curve.  Will do pan CT of the chest abdomen pelvis if fever persist    05/22-   he continues to have persistent fever.  CT scan of the chest abdomen and pelvis did not show any obvious abscess.  Will do indium scan.  Will also do lower extremity Doppler.  Will send serum procalcitonin   on empiric meropenem.  Will plan to de-escalate  if no cultures are positive  05/23-   neutropenia has improved.  Cultures are negative till date.  Lower extremity Doppler shows chronic DVT.  Will switch to cefepime.  And will plan to stop antibiotics soon if fever improves.   indium scan was ordered but this may not be done until next week    06/02- Karius showed cryptococcus but serum cryptococcal antigen and csf cryptococcal antigen  assay -negative-  Will stop Amphotericin /flucytosine in AM

## 2025-06-03 NOTE — SUBJECTIVE & OBJECTIVE
Interval History:  Patient seen and examined at bedside feeling well.    Review of Systems   Constitutional:  Positive for activity change and fatigue.   Respiratory: Negative.     Cardiovascular: Negative.    Gastrointestinal: Negative.    All other systems reviewed and are negative.    Objective:     Vital Signs (Most Recent):  Temp: 99.1 °F (37.3 °C) (06/02/25 1615)  Pulse: 92 (06/02/25 1615)  Resp: 19 (06/02/25 1615)  BP: 133/66 (06/02/25 1615)  SpO2: 96 % (06/02/25 1615) Vital Signs (24h Range):  Temp:  [98.2 °F (36.8 °C)-99.1 °F (37.3 °C)] 99.1 °F (37.3 °C)  Pulse:  [76-94] 92  Resp:  [17-19] 19  SpO2:  [92 %-96 %] 96 %  BP: (108-135)/(57-66) 133/66     Weight: 84.3 kg (185 lb 13.6 oz)  Body mass index is 25.92 kg/m².    Intake/Output Summary (Last 24 hours) at 6/2/2025 1905  Last data filed at 6/2/2025 1218  Gross per 24 hour   Intake 1120.09 ml   Output --   Net 1120.09 ml         Physical Exam  Vitals reviewed.   Constitutional:       Appearance: He is ill-appearing.   HENT:      Head: Normocephalic and atraumatic.      Mouth/Throat:      Mouth: Mucous membranes are moist.      Pharynx: Oropharynx is clear.   Eyes:      Extraocular Movements: Extraocular movements intact.      Conjunctiva/sclera: Conjunctivae normal.   Cardiovascular:      Rate and Rhythm: Normal rate and regular rhythm.      Pulses: Normal pulses.      Heart sounds: Normal heart sounds.   Pulmonary:      Effort: Pulmonary effort is normal.      Breath sounds: Normal breath sounds.   Abdominal:      General: Bowel sounds are normal.      Palpations: Abdomen is soft.   Musculoskeletal:         General: Normal range of motion.      Cervical back: Normal range of motion and neck supple.   Skin:     General: Skin is warm and dry.   Neurological:      General: No focal deficit present.      Mental Status: He is alert and oriented to person, place, and time. Mental status is at baseline.               Significant Labs: All pertinent labs within  "the past 24 hours have been reviewed.  Blood Culture: No results for input(s): "LABBLOO" in the last 48 hours.  CBC:   Recent Labs   Lab 06/01/25  0352 06/02/25  0704   WBC 2.85* 3.21*   HGB 10.5* 10.4*   HCT 31.8* 31.4*    227     CMP:   Recent Labs   Lab 06/01/25  0352 06/02/25  0704    136   K 4.5 4.1    106   CO2 21* 22*   * 109   BUN 17 14   CREATININE 0.9 0.9   CALCIUM 8.0* 7.8*   PROT 5.8* 5.8*   ALBUMIN 1.8* 1.8*   BILITOT 0.4 0.4   ALKPHOS 194* 204*   AST 55* 60*   ALT 34 33   ANIONGAP 7* 8       Significant Imaging: I have reviewed all pertinent imaging results/findings within the past 24 hours.  "

## 2025-06-03 NOTE — PROGRESS NOTES
[facility]  Infectious Disease  Progress Note    Patient Name: Armando Ingram Jr.  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   --would certainly explain the ongoing fevers  --will need to determine location of infection  --contrast MRI of brain with no evidence of infection   --awaiting Indium scan   --LP performed 5/30; CSF with normal cell count, protein, and glucose; follow for kandice ink, cryptococcal antigen, me panel, fungal culture, and bacterial culture  --notably serum cryptococcal antigen negative   --opening pressure normal at 17   --continue induction therapy using IV amphotericin and oral flucytosine  --require drug toxicity monitoring; need daily cbc and cmp for electrolytes/renal function/blood counts  --induction therapy will be for 14 days if CNS infection confirmed  --after completing induction therapy would switch to 8 weeks of consolidation therapy using high-dose PO fluconazole 800 mg daily followed by 12 months of maintenance therapy with lower dose fluconazole 200 mg daily   --ultimately treatment course will be determined by location of infection  --induction therapy would need to be completed inpatient due to highly toxic nature of both drugs  --if CNS infection ruled out then may be possible to treat with 400 mg daily fluconazole for up to 12 months but data is minimal   --Above d/w primary team and oncology as well as patient/wife    06/2- will review plan of care with Dr Laguna-  Serum and CSF cryptococcal assay are negative    Will check EKG for qtc       Hematology  History of DVT (deep vein thrombosis)  Anticoagulation per primary  team    Oncology  * Neutropenic fever   with empiric meropenem.  As fever has persisted.  Will hold Zyvox due to associated thrombocytopenia,   will follow blood cultures.  Will monitor fever curve.  Will do pan CT of the chest abdomen pelvis if fever persist    05/22-   he continues to have persistent fever.  CT scan of the chest abdomen and pelvis did not show any obvious abscess.  Will do indium scan.  Will also do lower extremity Doppler.  Will send serum procalcitonin   on empiric meropenem.  Will plan to de-escalate  if no cultures are positive  05/23-   neutropenia has improved.  Cultures are negative till date.  Lower extremity Doppler shows chronic DVT.  Will switch to cefepime.  And will plan to stop antibiotics soon if fever improves.   indium scan was ordered but this may not be done until next week    06/02- Karius showed cryptococcus but serum cryptococcal antigen and csf cryptococcal antigen  assay -negative-  Will stop Amphotericin /flucytosine in AM     Hairy cell leukemia  Management per oncology        Anticipated Disposition:     Thank you for your consult. I will follow-up with patient. Please contact us if you have any additional questions.    Armando Villanueva MD, [facility]  Infectious Disease  [facility]    Subjective:     Principal Problem:Neutropenic fever    HPI: 65 y.o. male with a PMH  has a past medical history of Closed right hip fracture, Colon cancer, DVT (deep venous thrombosis) (2002), Hairy cell leukemia (06/06/2024), and Nephrolithiasis.     He was admitted with neutropenic fever.  Tmax was 101.  Labs and   Imaging test reviewed - flu/COVID negative, chest x-ray negative for acute findings, UA positive for 2+ LE, 11 RBCs,    Interval History:  Patient has been diagnosed with Cryptococcus based on Karius testing.  MRI brain with and without contrast negative for evidence of infection.  Lumbar puncture today.  Serum cryptococcal antigen in process.  Indium scan scheduled.  Patient  "tolerating amphotericin and flucytosine thus far.  Last recorded fever of 100.4 about 24 hours ago.     06/02-  Karius done -05/04- showed cryptococcus   neoformans    Serum crytococcal antigen -negative  Csf crytococcal antigen - neg  Review of Systems   Constitutional:  Positive for activity change. Negative for appetite change, chills and fever.   Musculoskeletal:  Negative for back pain.   Neurological:  Negative for headaches.   All other systems reviewed and are negative.    Objective:     Vital Signs (Most Recent):  Temp: 98.5 °F (36.9 °C) (06/03/25 1131)  Pulse: 85 (06/03/25 1131)  Resp: 18 (06/03/25 1131)  BP: 117/61 (06/03/25 1131)  SpO2: 96 % (06/03/25 1131) Vital Signs (24h Range):  Temp:  [98.2 °F (36.8 °C)-99.3 °F (37.4 °C)] 98.5 °F (36.9 °C)  Pulse:  [74-97] 85  Resp:  [16-19] 18  SpO2:  [90 %-96 %] 96 %  BP: (112-133)/(56-66) 117/61     Weight: 84.3 kg (185 lb 13.6 oz)  Body mass index is 25.92 kg/m².    Estimated Creatinine Clearance: 86 mL/min (based on SCr of 0.9 mg/dL).     Physical Exam  Constitutional:       General: He is not in acute distress.     Appearance: Normal appearance. He is not ill-appearing.   Cardiovascular:      Rate and Rhythm: Normal rate and regular rhythm.      Pulses: Normal pulses.      Heart sounds: Normal heart sounds. No murmur heard.     No friction rub. No gallop.   Pulmonary:      Effort: Pulmonary effort is normal. No respiratory distress.      Breath sounds: Normal breath sounds.   Abdominal:      General: Abdomen is flat. Bowel sounds are normal. There is no distension.      Palpations: Abdomen is soft.      Tenderness: There is no abdominal tenderness.   Skin:     General: Skin is warm and dry.   Neurological:      Mental Status: He is alert.          Significant Labs: Blood Culture: No results for input(s): "LABBLOO" in the last 4320 hours.  CBC:   Recent Labs   Lab 06/02/25  0704   WBC 3.21*   HGB 10.4*   HCT 31.4*        CMP:   Recent Labs   Lab " 06/02/25  0704 06/03/25  0334    138   K 4.1 3.7    108   CO2 22* 22*    117*   BUN 14 13   CREATININE 0.9 0.9   CALCIUM 7.8* 7.8*   PROT 5.8* 5.6*   ALBUMIN 1.8* 1.7*   BILITOT 0.4 0.3   ALKPHOS 204* 195*   AST 60* 45   ALT 33 33   ANIONGAP 8 8     CSF:   Recent Labs   Lab 05/30/25 0909   CSFCULTURE No Growth To Date     Microbiology Results (last 7 days)       Procedure Component Value Units Date/Time    CSF culture [[id number]] Collected: 05/30/25 0909    Order Status: Completed Specimen: CSF (Spinal Fluid) from CSF Tap, Tube 1 Updated: 06/03/25 0742     CULTURE, CSF No Growth To Date     GRAM STAIN Cytospin indicates:      No WBCs, epithelial cells or organisms seen    Fungus culture [[id number]]  (Normal) Collected: 05/30/25 0909    Order Status: Completed Specimen: CSF (Spinal Fluid) from Cerebrospinal Fluid Updated: 06/02/25 0937     Fungal Culture Culture In Progress    Cryptococcal antigen, CSF [[id number]]  (Normal) Collected: 05/30/25 0909    Order Status: Completed Specimen: CSF (Spinal Fluid) from CSF Tap, Tube 1 Updated: 05/31/25 1619     Cryptococcal Antigen, CSF Negative    Crhistiana Ink (CSF) [[id number]] Collected: 05/30/25 0909    Order Status: Completed Specimen: CSF (Spinal Fluid) from CSF Tap, Tube 1 Updated: 05/31/25 0052     CHRISTIANA INK No encapsulated yeast seen    Blood culture [[id number]]  (Normal) Collected: 05/25/25 1026    Order Status: Completed Specimen: Blood from Peripheral, Antecubital, Right Updated: 05/30/25 1801     Blood Culture No Growth After 5 Days    Blood culture [[id number]]  (Normal) Collected: 05/25/25 1025    Order Status: Completed Specimen: Blood from Peripheral, Antecubital, Left Updated: 05/30/25 1801     Blood Culture No Growth After 5 Days    Cryptococcal antigen [[id number]]  (Normal) Collected: 05/29/25 1531    Order Status: Completed Specimen: Blood, Venous Updated: 05/30/25 1339     Cryptococcal Antigen, Serum Negative    Fungus culture  [[id number]]     Order Status: Canceled Specimen: CSF (Spinal Fluid) from Cerebrospinal Fluid     CSF culture [[id number]]     Order Status: Canceled Specimen: CSF (Spinal Fluid) from CSF Tap, Tube 1           All pertinent labs within the past 24 hours have been reviewed.    Significant Imaging: MRI: I have reviewed all pertinent results/findings within the past 24 hours:  brain with no evidence of cryptococcus

## 2025-06-03 NOTE — ASSESSMENT & PLAN NOTE
5/30/25  MRI brain negative.   S/p LP; analysis so far unremarkable; awaiting Christiana Ink, Cryptococcal antigen, MS panel, Fungal Culture, and Bacterial culture   Per ID patient will need to stay hospitalized 14 days for induction therapy(agents are toxic in nature) followed by consolidation therapy    5/31/25   Source of infection is unclear. Serum cryptococcus antigen negative. Christiana ink negative. Indium scan also negative today. Discussed with ID who recommends stopping flucytosine. Continue Amphotericin until CSF Crypto Ag is known. If negative and patient remains afebrile, patient will likely discharge on fluconazole treatment for non meningitis/non-pulmonary cryptococcus which can be treated as antigenemia with extended course of fluconazole.     6/1/25  Febrile overnight (Tmax 101.9) in absence of flucytosine. Although both serum and CSF were negative for Cryptotoccus Antigen. It was decided proceed with complete induction therapy with Amphotericin and Flucytosine. Will monitor closely liver enzymes and fever curve. ALP today 194, AST 55.   CXR with NAF, Flu/Covid neg   Possible UTI   Resp viral panel negative   Urine and blood cultures NGTD   Monitor VS   Hematology/Oncology consulted and following   ID consult due to persistent fever   Cefepime changed to IV Merrem, Zyvox discontinued   CT abd pelvis with bladder thickening and IVC filter but no other source of infection. TTE with no valvular abnormalities. BLE duplex showed chronic RLE DVT   Indium scan ordered by ID but radiology unable to obtain needed material for scan until 05/26 and scan cannot be completed until 5/27    Karius testing pending per Dr. Laguna  Discussed with Dr. Laguna 05/25- he recommends repeat blood cultures, change IV Cefepime to Merrem   Encourage IS and Oob as able     Blood cultures negative growth to date   Positive for cryptococcal infection  Mri brain pending  Lumbar puncture pending  Hold xarelto  Indium scan  pending  Antimicrobials per infectious disease         Continue current treatment further recs per ID are pending

## 2025-06-03 NOTE — ASSESSMENT & PLAN NOTE
5/30/25  MRI brain negative.   S/p LP; analysis so far unremarkable; awaiting Christiana Ink, Cryptococcal antigen, MS panel, Fungal Culture, and Bacterial culture   Per ID patient will need to stay hospitalized 14 days for induction therapy(agents are toxic in nature) followed by consolidation therapy    5/31/25   Source of infection is unclear. Serum cryptococcus antigen negative. Christiana ink negative. Indium scan also negative today. Discussed with ID who recommends stopping flucytosine. Continue Amphotericin until CSF Crypto Ag is known. If negative and patient remains afebrile, patient will likely discharge on fluconazole treatment for non meningitis/non-pulmonary cryptococcus which can be treated as antigenemia with extended course of fluconazole.     6/1/25  Febrile overnight (Tmax 101.9) in absence of flucytosine. Although both serum and CSF were negative for Cryptotoccus Antigen. It was decided proceed with complete induction therapy with Amphotericin and Flucytosine. Will monitor closely liver enzymes and fever curve. ALP today 194, AST 55.   CXR with NAF, Flu/Covid neg   Possible UTI   Resp viral panel negative   Urine and blood cultures NGTD   Monitor VS   CT abd pelvis with bladder thickening and IVC filter but no other source of infection. TTE with no valvular abnormalities. BLE duplex showed chronic RLE DVT   Encourage IS and Oob as able             Continue current treatment further recs per ID are pending      5/30/25  MRI brain negative.   S/p LP; analysis so far unremarkable; awaiting Christiana Ink, Cryptococcal antigen negative, MS panel, Fungal Culture, and Bacterial culture   Per ID patient will need to stay hospitalized 14 days for induction therapy(agents are toxic in nature) followed by consolidation therapy    5/31/25   Source of infection is unclear. Serum cryptococcus antigen negative. Christiana ink negative. Indium scan also negative today. Discussed with ID who recommends stopping flucytosine.  Continue Amphotericin until CSF Crypto Ag is known. If negative and patient remains afebrile, patient will likely discharge on fluconazole treatment for non meningitis/non-pulmonary cryptococcus which can be treated as antigenemia with extended course of fluconazole.     6/1/25  Febrile overnight (Tmax 101.9) in absence of flucytosine. Although both serum and CSF were negative for Cryptotoccus Antigen. It was decided proceed with complete induction therapy with Amphotericin and Flucytosine. Will monitor closely liver enzymes and fever curve. ALP today 194, AST 55.   CXR with NAF, Flu/Covid neg   Resp viral panel negative   Urine and blood cultures NGTD   Monitor VS   CT abd pelvis with bladder thickening and IVC filter but no other source of infection. TTE with no valvular abnormalities. BLE duplex showed chronic RLE DVT       Continue current treatment further recs per ID are pending

## 2025-06-03 NOTE — ASSESSMENT & PLAN NOTE
This patient is found to have pancytopenia, the likely etiology is , will monitor CBC . Will transfuse red blood cells if the hemoglobin is <7g/dL (or <8 in the setting of ACS). Hold DVT prophylaxis if platelets are <50k. The patient's hemoglobin, white blood cell count, and platelet count results have been reviewed and are listed below.  Recent Labs   Lab 06/02/25  0704   HGB 10.4*   WBC 3.21*          Remains stable

## 2025-06-03 NOTE — ASSESSMENT & PLAN NOTE
Lab Results   Component Value Date    AST 60 (H) 06/02/2025    AST 55 (H) 06/01/2025    ALT 33 06/02/2025    ALT 34 06/01/2025    ALKPHOS 204 (H) 06/02/2025    ALKPHOS 194 (H) 06/01/2025

## 2025-06-03 NOTE — PROGRESS NOTES
[facility]  [facility] Medicine  Progress Note    Patient Name: Armando Ingram Jr.  MRN: [id number]  Patient Class: IP- Inpatient   Admission Date: 5/16/2025  Length of Stay: 16 days  Attending Physician: Lainey Tao MD  Primary Care Provider: Brian Soares MD        Subjective     Principal Problem:Neutropenic fever        HPI:  Armando Ingram Jr. is a 65 y.o. male with a PMH  has a past medical history of Closed right hip fracture, Colon cancer, DVT (deep venous thrombosis) (2002), Hairy cell leukemia (06/06/2024), and Nephrolithiasis. who presented to the ED for further evaluation of acute onset fever with T-max measuring 101.0 F earlier today.  Patient reported significant history of hairy cell leukemia in his followed by Dr. Dow and Dr. Flores from Hematology/Oncology and was recently prescribed a Z-Cordell for 4 days for treatment of a cough.  Patient was instructed to go to the ED if he endorsed fever greater than 103.0 F but presented to the ED due to ulcer experiencing associated chills, throbbing headache, and persistent fever.  He reported no known alleviating or aggravating factors noted with all other review of systems negative except as noted above.  He is not currently on active treatment for his leukemia and reported being in his usual state of health prior to onset of symptoms.  Initial workup in the ED revealed patient to be afebrile with T-max measuring 100.4 F, pancytopenic, lactic acid/procalcitonin within normal limits, flu/COVID negative, chest x-ray negative for acute findings, UA positive for 2+ LE, 11 RBCs, free found WBCs.  Patient initiated on cefepime with cultures obtained and pending and admitted to Hospital Medicine under observation for continued medical management and treatment of neutropenic fever.    PCP: Brian Soares      Overview/Hospital Course:  Continued on IV Cefepime. Zyvoz added 05/18 due to persistent fever. Hematology consulted. Cultures remain NGTD    Resp viral panel negative. ID consulted to eval due to fevers  Cefepime changed to IV Merrem per ID recs on 05/21, unclear source of fevers at this time. Zyvox discontinued per ID.   CT Abd/Pelvis showed bladder thickening but otherwise negative.   ID recommend TTE, BLE duplex and possible Indium scan for source of infection and de-escalate abx to Cefepime on 05/23.   TTE with no vegetations. BLE duplex showed chronic RLE DVT. Indium scan pending tentatively planned for 05/26-05/27 as radiology does not have required materials at this time and scan will take 2 days to complete per RT.   Persistent fevers, abx changed back to Merrem 05/25 per ID recs. Karius pending   5/26 fever curve trending down. Respiratory infection panel negative. Asymptomatic. Continue intravenous antibiotic(s)   5/27 neutropenic fever persists despite scheduled tylenol. Awaiting tagged wbc scan per infectious disease recommendations.   5/28 febrile overnight. Infectious disease recommending addition of doxy. Hem/onc following. Awaiting karius and imaging. Refused lumbar puncture. If afebrile x 48h, discharge   5/29 febrile. Positive cryptococcal infection. Infectious disease adjusting antimicrobials. Plans for lumbar puncture, mri brain, and indium scan.  5/30/2025: s/p LP today.  Opening pressure within normal range at 18. clear CSF fluid noted. Cell count, glucose, Christiana Ink, Cryptococcal antigen, MS panel, Fungal Culture, and Bacterial culture obtained. Patient received first dose of induction therapy for cryptococcal infection and ding well. Indium Scan in progress. Given toxic nature of amphotericin and flucytosine will closely monitor chemistries. Oncology consulted with partners and decision made to defer treatment for hair cell leukemia until infection has been treated.   5/31/25: Patient has remained afebrile today. ALP and AST elevated after 48 hours of treatment. Serum cryptococcus antigen negative. Christiana ink negative. Indium  scan also negative today. Discussed with ID who recommends stopping flucytosine. Continue Amphotericin until CSF Crypto Ag is known. If negative and patient remains afebrile, patient will likely discharge on fluconazole treatment for non meningitis/non-pulmonary cryptococcus which can be treated as antigenemia with extended course of fluconazole.   6/1/25: Xarelto resumed yesterday and H/H unchanged from previous. Febrile overnight (Tmax 101.9) in absence of flucytosine. Although both serum and CSF were negative for Cryptotoccus Antigen it was decided proceed with complete induction therapy with Amphotericin and Flucytosine. Will monitor closely liver enzymes and fever curve. ALP today 194, AST 55.     Interval History:  Patient seen and examined at bedside feeling well.    Review of Systems   Constitutional:  Positive for activity change and fatigue.   Respiratory: Negative.     Cardiovascular: Negative.    Gastrointestinal: Negative.    All other systems reviewed and are negative.    Objective:     Vital Signs (Most Recent):  Temp: 99.1 °F (37.3 °C) (06/02/25 1615)  Pulse: 92 (06/02/25 1615)  Resp: 19 (06/02/25 1615)  BP: 133/66 (06/02/25 1615)  SpO2: 96 % (06/02/25 1615) Vital Signs (24h Range):  Temp:  [98.2 °F (36.8 °C)-99.1 °F (37.3 °C)] 99.1 °F (37.3 °C)  Pulse:  [76-94] 92  Resp:  [17-19] 19  SpO2:  [92 %-96 %] 96 %  BP: (108-135)/(57-66) 133/66     Weight: 84.3 kg (185 lb 13.6 oz)  Body mass index is 25.92 kg/m².    Intake/Output Summary (Last 24 hours) at 6/2/2025 1905  Last data filed at 6/2/2025 1218  Gross per 24 hour   Intake 1120.09 ml   Output --   Net 1120.09 ml         Physical Exam  Vitals reviewed.   Constitutional:       Appearance: He is ill-appearing.   HENT:      Head: Normocephalic and atraumatic.      Mouth/Throat:      Mouth: Mucous membranes are moist.      Pharynx: Oropharynx is clear.   Eyes:      Extraocular Movements: Extraocular movements intact.      Conjunctiva/sclera: Conjunctivae  "normal.   Cardiovascular:      Rate and Rhythm: Normal rate and regular rhythm.      Pulses: Normal pulses.      Heart sounds: Normal heart sounds.   Pulmonary:      Effort: Pulmonary effort is normal.      Breath sounds: Normal breath sounds.   Abdominal:      General: Bowel sounds are normal.      Palpations: Abdomen is soft.   Musculoskeletal:         General: Normal range of motion.      Cervical back: Normal range of motion and neck supple.   Skin:     General: Skin is warm and dry.   Neurological:      General: No focal deficit present.      Mental Status: He is alert and oriented to person, place, and time. Mental status is at baseline.               Significant Labs: All pertinent labs within the past 24 hours have been reviewed.  Blood Culture: No results for input(s): "LABBLOO" in the last 48 hours.  CBC:   Recent Labs   Lab 06/01/25  0352 06/02/25  0704   WBC 2.85* 3.21*   HGB 10.5* 10.4*   HCT 31.8* 31.4*    227     CMP:   Recent Labs   Lab 06/01/25  0352 06/02/25  0704    136   K 4.5 4.1    106   CO2 21* 22*   * 109   BUN 17 14   CREATININE 0.9 0.9   CALCIUM 8.0* 7.8*   PROT 5.8* 5.8*   ALBUMIN 1.8* 1.8*   BILITOT 0.4 0.4   ALKPHOS 194* 204*   AST 55* 60*   ALT 34 33   ANIONGAP 7* 8       Significant Imaging: I have reviewed all pertinent imaging results/findings within the past 24 hours.      Assessment & Plan  Neutropenic fever  FUO (fever of unknown origin) (Resolved: 6/2/2025)  Cryptococcosis      5/30/25  MRI brain negative.   S/p LP; analysis so far unremarkable; awaiting Christiana Ink, Cryptococcal antigen, MS panel, Fungal Culture, and Bacterial culture   Per ID patient will need to stay hospitalized 14 days for induction therapy(agents are toxic in nature) followed by consolidation therapy    5/31/25   Source of infection is unclear. Serum cryptococcus antigen negative. Christiana ink negative. Indium scan also negative today. Discussed with ID who recommends stopping " flucytosine. Continue Amphotericin until CSF Crypto Ag is known. If negative and patient remains afebrile, patient will likely discharge on fluconazole treatment for non meningitis/non-pulmonary cryptococcus which can be treated as antigenemia with extended course of fluconazole.     6/1/25  Febrile overnight (Tmax 101.9) in absence of flucytosine. Although both serum and CSF were negative for Cryptotoccus Antigen. It was decided proceed with complete induction therapy with Amphotericin and Flucytosine. Will monitor closely liver enzymes and fever curve. ALP today 194, AST 55.   CXR with NAF, Flu/Covid neg   Possible UTI   Resp viral panel negative   Urine and blood cultures NGTD   Monitor VS   Hematology/Oncology consulted and following   ID consult due to persistent fever   Cefepime changed to IV Merrem, Zyvox discontinued   CT abd pelvis with bladder thickening and IVC filter but no other source of infection. TTE with no valvular abnormalities. BLE duplex showed chronic RLE DVT   Indium scan ordered by ID but radiology unable to obtain needed material for scan until 05/26 and scan cannot be completed until 5/27    Karius testing pending per Dr. Laguna  Discussed with Dr. Laguna 05/25- he recommends repeat blood cultures, change IV Cefepime to Merrem   Encourage IS and Oob as able     Blood cultures negative growth to date   Positive for cryptococcal infection  Mri brain pending  Lumbar puncture pending  Hold xarelto  Indium scan pending  Antimicrobials per infectious disease         Continue current treatment further recs per ID are pending  Hairy cell leukemia  Patient with known history of hairy cell leukemia and continues to follow Dr. Dow and Dr. Flores from Hematology/Oncology. Patient not currently on active treatment and currently undergoing treatment for neutropenic fever with broad-spectrum antibiotics as noted above.  Hematology consulted and following     5/30/25  Hematology discussed with other  partners and it was decided not to proceed with treatment for leukemia. This was discussed with family.   Pancytopenia  This patient is found to have pancytopenia, the likely etiology is , will monitor CBC . Will transfuse red blood cells if the hemoglobin is <7g/dL (or <8 in the setting of ACS). Hold DVT prophylaxis if platelets are <50k. The patient's hemoglobin, white blood cell count, and platelet count results have been reviewed and are listed below.  Recent Labs   Lab 06/02/25  0704   HGB 10.4*   WBC 3.21*          Remains stable  History of DVT (deep vein thrombosis)  - BLE duplex with chronic RLE DVT    -prior hx of IVC filter in 2002   Hold xarelto for lumbar puncture     6/1/25  Xarelto resumed yesterday and H/H today unchanged from previous.   Transaminitis  Lab Results   Component Value Date    AST 60 (H) 06/02/2025    AST 55 (H) 06/01/2025    ALT 33 06/02/2025    ALT 34 06/01/2025    ALKPHOS 204 (H) 06/02/2025    ALKPHOS 194 (H) 06/01/2025        VTE Risk Mitigation (From admission, onward)           Ordered     rivaroxaban tablet 20 mg  With dinner         05/31/25 1415     Reason for No Pharmacological VTE Prophylaxis  Once        Question:  Reasons:  Answer:  Already adequately anticoagulated on oral Anticoagulants    05/17/25 0213     IP VTE HIGH RISK PATIENT  Once         05/17/25 0213     Place sequential compression device  Until discontinued         05/17/25 0213                    Discharge Planning   LAURI: 6/12/2025     Code Status: Full Code   Medical Readiness for Discharge Date:   Discharge Plan A: Home with family                        Lainey Basurto MD  Department of Hospital Medicine   [facility]

## 2025-06-03 NOTE — ASSESSMENT & PLAN NOTE
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   --would certainly explain the ongoing fevers  --will need to determine location of infection  --contrast MRI of brain with no evidence of infection   --awaiting Indium scan   --LP performed 5/30; CSF with normal cell count, protein, and glucose; follow for kandice ink, cryptococcal antigen, me panel, fungal culture, and bacterial culture  --notably serum cryptococcal antigen negative   --opening pressure normal at 17   --continue induction therapy using IV amphotericin and oral flucytosine  --require drug toxicity monitoring; need daily cbc and cmp for electrolytes/renal function/blood counts  --induction therapy will be for 14 days if CNS infection confirmed  --after completing induction therapy would switch to 8 weeks of consolidation therapy using high-dose PO fluconazole 800 mg daily followed by 12 months of maintenance therapy with lower dose fluconazole 200 mg daily   --ultimately treatment course will be determined by location of infection  --induction therapy would need to be completed inpatient due to highly toxic nature of both drugs  --if CNS infection ruled out then may be possible to treat with 400 mg daily fluconazole for up to 12 months but data is minimal   --Above d/w primary team and oncology as well as patient/wife    06/2- will review plan of care with Dr Laguna-  Serum and CSF cryptococcal assay are negative    Will check EKG for qtc

## 2025-06-04 LAB
ALBUMIN SERPL BCP-MCNC: 1.8 G/DL (ref 3.5–5.2)
ALP SERPL-CCNC: 225 UNIT/L (ref 40–150)
ALT SERPL W/O P-5'-P-CCNC: 39 UNIT/L (ref 10–44)
AST SERPL-CCNC: 58 UNIT/L (ref 11–45)
BILIRUB DIRECT SERPL-MCNC: 0.2 MG/DL (ref 0.1–0.3)
BILIRUB SERPL-MCNC: 0.4 MG/DL (ref 0.1–1)
HOLD SPECIMEN: NORMAL
PROT SERPL-MCNC: 5.7 GM/DL (ref 6–8.4)

## 2025-06-04 PROCEDURE — 36415 COLL VENOUS BLD VENIPUNCTURE: CPT | Mod: HCNC | Performed by: FAMILY MEDICINE

## 2025-06-04 PROCEDURE — 21400001 HC TELEMETRY ROOM: Mod: HCNC

## 2025-06-04 PROCEDURE — 25000003 PHARM REV CODE 250: Mod: HCNC | Performed by: HOSPITALIST

## 2025-06-04 PROCEDURE — 27000207 HC ISOLATION: Mod: HCNC

## 2025-06-04 PROCEDURE — 80076 HEPATIC FUNCTION PANEL: CPT | Mod: HCNC | Performed by: FAMILY MEDICINE

## 2025-06-04 PROCEDURE — 25000003 PHARM REV CODE 250: Mod: HCNC | Performed by: INTERNAL MEDICINE

## 2025-06-04 PROCEDURE — 25000003 PHARM REV CODE 250: Mod: HCNC | Performed by: FAMILY MEDICINE

## 2025-06-04 RX ADMIN — PANTOPRAZOLE SODIUM 40 MG: 40 TABLET, DELAYED RELEASE ORAL at 09:06

## 2025-06-04 RX ADMIN — CETIRIZINE HYDROCHLORIDE 10 MG: 10 TABLET, FILM COATED ORAL at 09:06

## 2025-06-04 RX ADMIN — TAMSULOSIN HYDROCHLORIDE 0.4 MG: 0.4 CAPSULE ORAL at 09:06

## 2025-06-04 RX ADMIN — RIVAROXABAN 20 MG: 20 TABLET, FILM COATED ORAL at 05:06

## 2025-06-04 NOTE — SUBJECTIVE & OBJECTIVE
Interval History:  Patient has been diagnosed with Cryptococcus based on Karius testing.  MRI brain with and without contrast negative for evidence of infection.  Lumbar puncture today.  Serum cryptococcal antigen in process.  Indium scan scheduled.  Patient tolerating amphotericin and flucytosine thus far.  Last recorded fever of 100.4 about 24 hours ago.     06/02-  Karius done -05/04- showed cryptococcus   neoformans    Serum crytococcal antigen -negative  Csf crytococcal antigen - neg  06/03- tolerating meds  Review of Systems   Constitutional:  Positive for activity change. Negative for appetite change, chills and fever.   Musculoskeletal:  Negative for back pain.   Neurological:  Negative for headaches.   All other systems reviewed and are negative.    Objective:     Vital Signs (Most Recent):  Temp: 98.1 °F (36.7 °C) (06/04/25 0446)  Pulse: 83 (06/04/25 0755)  Resp: 18 (06/04/25 0446)  BP: (!) 109/51 (06/04/25 0446)  SpO2: (!) 93 % (06/04/25 0446) Vital Signs (24h Range):  Temp:  [98.1 °F (36.7 °C)-99.1 °F (37.3 °C)] 98.1 °F (36.7 °C)  Pulse:  [71-98] 83  Resp:  [16-19] 18  SpO2:  [92 %-98 %] 93 %  BP: (109-148)/(51-80) 109/51     Weight: 84.3 kg (185 lb 13.6 oz)  Body mass index is 25.92 kg/m².    Estimated Creatinine Clearance: 86 mL/min (based on SCr of 0.9 mg/dL).     Physical Exam  Constitutional:       General: He is not in acute distress.     Appearance: Normal appearance. He is not ill-appearing.   Cardiovascular:      Rate and Rhythm: Normal rate and regular rhythm.      Pulses: Normal pulses.      Heart sounds: Normal heart sounds. No murmur heard.     No friction rub. No gallop.   Pulmonary:      Effort: Pulmonary effort is normal. No respiratory distress.      Breath sounds: Normal breath sounds.   Abdominal:      General: Abdomen is flat. Bowel sounds are normal. There is no distension.      Palpations: Abdomen is soft.      Tenderness: There is no abdominal tenderness.   Skin:     General: Skin  "is warm and dry.   Neurological:      Mental Status: He is alert.          Significant Labs: Blood Culture: No results for input(s): "LABBLOO" in the last 4320 hours.  CBC:   No results for input(s): "WBC", "HGB", "HCT", "PLT" in the last 48 hours.    CMP:   Recent Labs   Lab 06/03/25  0334 06/04/25  0319     --    K 3.7  --      --    CO2 22*  --    *  --    BUN 13  --    CREATININE 0.9  --    CALCIUM 7.8*  --    PROT 5.6* 5.7*   ALBUMIN 1.7* 1.8*   BILITOT 0.3 0.4   ALKPHOS 195* 225*   AST 45 58*   ALT 33 39   ANIONGAP 8  --      CSF:   Recent Labs   Lab 05/30/25 0909   CSFCULTURE No Growth To Date     Microbiology Results (last 7 days)       Procedure Component Value Units Date/Time    CSF culture [[id number]] Collected: 05/30/25 0909    Order Status: Completed Specimen: CSF (Spinal Fluid) from CSF Tap, Tube 1 Updated: 06/04/25 0738     CULTURE, CSF No Growth To Date     GRAM STAIN Cytospin indicates:      No WBCs, epithelial cells or organisms seen    Fungus culture [[id number]]  (Normal) Collected: 05/30/25 0909    Order Status: Completed Specimen: CSF (Spinal Fluid) from Cerebrospinal Fluid Updated: 06/02/25 0937     Fungal Culture Culture In Progress    Cryptococcal antigen, CSF [[id number]]  (Normal) Collected: 05/30/25 0909    Order Status: Completed Specimen: CSF (Spinal Fluid) from CSF Tap, Tube 1 Updated: 05/31/25 1619     Cryptococcal Antigen, CSF Negative    Christiana Ink (CSF) [[id number]] Collected: 05/30/25 0909    Order Status: Completed Specimen: CSF (Spinal Fluid) from CSF Tap, Tube 1 Updated: 05/31/25 0052     CHRISTIANA INK No encapsulated yeast seen    Blood culture [[id number]]  (Normal) Collected: 05/25/25 1026    Order Status: Completed Specimen: Blood from Peripheral, Antecubital, Right Updated: 05/30/25 1801     Blood Culture No Growth After 5 Days    Blood culture [[id number]]  (Normal) Collected: 05/25/25 1025    Order Status: Completed Specimen: Blood from Peripheral, " Antecubital, Left Updated: 05/30/25 1801     Blood Culture No Growth After 5 Days    Cryptococcal antigen [[id number]]  (Normal) Collected: 05/29/25 1531    Order Status: Completed Specimen: Blood, Venous Updated: 05/30/25 1339     Cryptococcal Antigen, Serum Negative          All pertinent labs within the past 24 hours have been reviewed.    Significant Imaging: MRI: I have reviewed all pertinent results/findings within the past 24 hours:  brain with no evidence of cryptococcus

## 2025-06-04 NOTE — ASSESSMENT & PLAN NOTE
Lab Results   Component Value Date    AST 58 (H) 06/04/2025    AST 45 06/03/2025    ALT 39 06/04/2025    ALT 33 06/03/2025    ALKPHOS 225 (H) 06/04/2025    ALKPHOS 195 (H) 06/03/2025

## 2025-06-04 NOTE — ASSESSMENT & PLAN NOTE
5/31/25   Source of infection is unclear. Serum cryptococcus antigen negative. Christiana ink negative. Indium scan also negative today. Discussed with ID who recommends stopping flucytosine. Continue Amphotericin until CSF Crypto Ag is known. If negative and patient remains afebrile, patient will likely discharge on fluconazole treatment for non meningitis/non-pulmonary cryptococcus which can be treated as antigenemia with extended course of fluconazole.     6/1/25  Febrile overnight (Tmax 101.9) in absence of flucytosine. Although both serum and CSF were negative for Cryptotoccus Antigen. It was decided proceed with complete induction therapy with Amphotericin and Flucytosine. Will monitor closely liver enzymes and fever curve. ALP today 194, AST 55.   CXR with NAF, Flu/Covid neg   Resp viral panel negative   Urine and blood cultures NGTD   Monitor VS   CT abd pelvis with bladder thickening and IVC filter but no other source of infection. TTE with no valvular abnormalities. BLE duplex showed chronic RLE DVT       Continue current treatment further recs per ID are pending

## 2025-06-04 NOTE — PROGRESS NOTES
[facility]  [facility] Medicine  Progress Note    Patient Name: Armando Ingram Jr.  MRN: [id number]  Patient Class: IP- Inpatient   Admission Date: 5/16/2025  Length of Stay: 18 days  Attending Physician: Lainey Tao MD  Primary Care Provider: Brian Soares MD        Subjective     Principal Problem:Neutropenic fever        HPI:  Armando Ingram Jr. is a 65 y.o. male with a PMH  has a past medical history of Closed right hip fracture, Colon cancer, DVT (deep venous thrombosis) (2002), Hairy cell leukemia (06/06/2024), and Nephrolithiasis. who presented to the ED for further evaluation of acute onset fever with T-max measuring 101.0 F earlier today.  Patient reported significant history of hairy cell leukemia in his followed by Dr. Dow and Dr. Flores from Hematology/Oncology and was recently prescribed a Z-Cordell for 4 days for treatment of a cough.  Patient was instructed to go to the ED if he endorsed fever greater than 103.0 F but presented to the ED due to ulcer experiencing associated chills, throbbing headache, and persistent fever.  He reported no known alleviating or aggravating factors noted with all other review of systems negative except as noted above.  He is not currently on active treatment for his leukemia and reported being in his usual state of health prior to onset of symptoms.  Initial workup in the ED revealed patient to be afebrile with T-max measuring 100.4 F, pancytopenic, lactic acid/procalcitonin within normal limits, flu/COVID negative, chest x-ray negative for acute findings, UA positive for 2+ LE, 11 RBCs, free found WBCs.  Patient initiated on cefepime with cultures obtained and pending and admitted to Hospital Medicine under observation for continued medical management and treatment of neutropenic fever.    PCP: Brian Soares      Overview/Hospital Course:  Continued on IV Cefepime. Zyvoz added 05/18 due to persistent fever. Hematology consulted. Cultures remain NGTD    Resp viral panel negative. ID consulted to eval due to fevers  Cefepime changed to IV Merrem per ID recs on 05/21, unclear source of fevers at this time. Zyvox discontinued per ID.   CT Abd/Pelvis showed bladder thickening but otherwise negative.   ID recommend TTE, BLE duplex and possible Indium scan for source of infection and de-escalate abx to Cefepime on 05/23.   TTE with no vegetations. BLE duplex showed chronic RLE DVT. Indium scan pending tentatively planned for 05/26-05/27 as radiology does not have required materials at this time and scan will take 2 days to complete per RT.   Persistent fevers, abx changed back to Merrem 05/25 per ID recs. Karius pending   5/26 fever curve trending down. Respiratory infection panel negative. Asymptomatic. Continue intravenous antibiotic(s)   5/27 neutropenic fever persists despite scheduled tylenol. Awaiting tagged wbc scan per infectious disease recommendations.   5/28 febrile overnight. Infectious disease recommending addition of doxy. Hem/onc following. Awaiting karius and imaging. Refused lumbar puncture. If afebrile x 48h, discharge   5/29 febrile. Positive cryptococcal infection. Infectious disease adjusting antimicrobials. Plans for lumbar puncture, mri brain, and indium scan.  5/30/2025: s/p LP today.  Opening pressure within normal range at 18. clear CSF fluid noted. Cell count, glucose, Christiana Ink, Cryptococcal antigen, MS panel, Fungal Culture, and Bacterial culture obtained. Patient received first dose of induction therapy for cryptococcal infection and ding well. Indium Scan in progress. Given toxic nature of amphotericin and flucytosine will closely monitor chemistries. Oncology consulted with partners and decision made to defer treatment for hair cell leukemia until infection has been treated.   5/31/25: Patient has remained afebrile today. ALP and AST elevated after 48 hours of treatment. Serum cryptococcus antigen negative. Christiana ink negative. Indium  scan also negative today. Discussed with ID who recommends stopping flucytosine. Continue Amphotericin until CSF Crypto Ag is known. If negative and patient remains afebrile, patient will likely discharge on fluconazole treatment for non meningitis/non-pulmonary cryptococcus which can be treated as antigenemia with extended course of fluconazole.   6/1/25: Xarelto resumed yesterday and H/H unchanged from previous. Febrile overnight (Tmax 101.9) in absence of flucytosine. Although both serum and CSF were negative for Cryptotoccus Antigen it was decided proceed with complete induction therapy with Amphotericin and Flucytosine. Will monitor closely liver enzymes and fever curve. ALP today 194, AST 55.     Discussed with Dr. Villanueva.  He continued amphotericin and flucytosine 6/3 after dose and monitor fever curve.  He may be able to be placed on p.o. Diflucan.    Interval History:  Patient seen and examined at bedside.  States he is feeling well and has no complaints.    Review of Systems   Constitutional:  Positive for activity change and fatigue.   Respiratory: Negative.     Cardiovascular: Negative.    Gastrointestinal: Negative.    All other systems reviewed and are negative.    Objective:     Vital Signs (Most Recent):  Temp: 98.1 °F (36.7 °C) (06/04/25 1651)  Pulse: 88 (06/04/25 1722)  Resp: 18 (06/04/25 1651)  BP: 120/63 (06/04/25 1651)  SpO2: (!) 94 % (06/04/25 1651) Vital Signs (24h Range):  Temp:  [98.1 °F (36.7 °C)-99.2 °F (37.3 °C)] 98.1 °F (36.7 °C)  Pulse:  [71-98] 88  Resp:  [16-18] 18  SpO2:  [92 %-95 %] 94 %  BP: (109-148)/(51-63) 120/63     Weight: 84.3 kg (185 lb 13.6 oz)  Body mass index is 25.92 kg/m².    Intake/Output Summary (Last 24 hours) at 6/4/2025 6548  Last data filed at 6/4/2025 0820  Gross per 24 hour   Intake 894.1 ml   Output --   Net 894.1 ml         Physical Exam  Vitals reviewed.   Constitutional:       Appearance: Normal appearance.   HENT:      Head: Normocephalic and atraumatic.       Mouth/Throat:      Mouth: Mucous membranes are moist.      Pharynx: Oropharynx is clear.   Eyes:      Extraocular Movements: Extraocular movements intact.      Conjunctiva/sclera: Conjunctivae normal.   Cardiovascular:      Rate and Rhythm: Normal rate and regular rhythm.      Pulses: Normal pulses.      Heart sounds: Normal heart sounds.   Pulmonary:      Effort: Pulmonary effort is normal.      Breath sounds: Normal breath sounds.   Abdominal:      General: Bowel sounds are normal.      Palpations: Abdomen is soft.   Musculoskeletal:         General: Normal range of motion.      Cervical back: Normal range of motion and neck supple.   Skin:     General: Skin is warm and dry.   Neurological:      General: No focal deficit present.      Mental Status: He is alert and oriented to person, place, and time. Mental status is at baseline.               Significant Labs: All pertinent labs within the past 24 hours have been reviewed.    CMP:   Recent Labs   Lab 06/03/25  0334 06/04/25  0319     --    K 3.7  --      --    CO2 22*  --    *  --    BUN 13  --    CREATININE 0.9  --    CALCIUM 7.8*  --    PROT 5.6* 5.7*   ALBUMIN 1.7* 1.8*   BILITOT 0.3 0.4   ALKPHOS 195* 225*   AST 45 58*   ALT 33 39   ANIONGAP 8  --        Significant Imaging: I have reviewed all pertinent imaging results/findings within the past 24 hours.      Assessment & Plan  Neutropenic fever  Cryptococcosis      5/31/25   Source of infection is unclear. Serum cryptococcus antigen negative. Christiana ink negative. Indium scan also negative today. Discussed with ID who recommends stopping flucytosine. Continue Amphotericin until CSF Crypto Ag is known. If negative and patient remains afebrile, patient will likely discharge on fluconazole treatment for non meningitis/non-pulmonary cryptococcus which can be treated as antigenemia with extended course of fluconazole.     6/1/25  Febrile overnight (Tmax 101.9) in absence of flucytosine.  Although both serum and CSF were negative for Cryptotoccus Antigen. It was decided proceed with complete induction therapy with Amphotericin and Flucytosine. Will monitor closely liver enzymes and fever curve. ALP today 194, AST 55.   CXR with NAF, Flu/Covid neg   Resp viral panel negative   Urine and blood cultures NGTD   Monitor VS   CT abd pelvis with bladder thickening and IVC filter but no other source of infection. TTE with no valvular abnormalities. BLE duplex showed chronic RLE DVT       Continue current treatment further recs per ID are pending  Hairy cell leukemia  Patient with known history of hairy cell leukemia and continues to follow Dr. Dow and Dr. Flores from Hematology/Oncology. Patient not currently on active treatment and currently undergoing treatment for neutropenic fever with broad-spectrum antibiotics as noted above.  Hematology consulted and following     5/30/25  Hematology discussed with other partners and it was decided not to proceed with treatment for leukemia. This was discussed with family.   Pancytopenia  This patient is found to have pancytopenia, the likely etiology is , will monitor CBC . Will transfuse red blood cells if the hemoglobin is <7g/dL (or <8 in the setting of ACS). Hold DVT prophylaxis if platelets are <50k. The patient's hemoglobin, white blood cell count, and platelet count results have been reviewed.      Remains stable  History of DVT (deep vein thrombosis)  - BLE duplex with chronic RLE DVT    -prior hx of IVC filter in 2002   Hold xarelto for lumbar puncture     6/1/25  Xarelto resumed   Transaminitis  Lab Results   Component Value Date    AST 58 (H) 06/04/2025    AST 45 06/03/2025    ALT 39 06/04/2025    ALT 33 06/03/2025    ALKPHOS 225 (H) 06/04/2025    ALKPHOS 195 (H) 06/03/2025        VTE Risk Mitigation (From admission, onward)           Ordered     rivaroxaban tablet 20 mg  With dinner         05/31/25 1415     Reason for No Pharmacological VTE  Prophylaxis  Once        Question:  Reasons:  Answer:  Already adequately anticoagulated on oral Anticoagulants    05/17/25 0213     IP VTE HIGH RISK PATIENT  Once         05/17/25 0213     Place sequential compression device  Until discontinued         05/17/25 0213                    Discharge Planning   LAURI: 6/7/2025     Code Status: Full Code   Medical Readiness for Discharge Date:   Discharge Plan A: Home with family                        Lainey Basurto MD  Department of Hospital Medicine   [facility]

## 2025-06-04 NOTE — SUBJECTIVE & OBJECTIVE
Interval History:  Patient seen and examined at bedside.  States he is feeling well and has no complaints.    Review of Systems   Constitutional:  Positive for activity change and fatigue.   Respiratory: Negative.     Cardiovascular: Negative.    Gastrointestinal: Negative.    All other systems reviewed and are negative.    Objective:     Vital Signs (Most Recent):  Temp: 98.1 °F (36.7 °C) (06/04/25 1651)  Pulse: 88 (06/04/25 1722)  Resp: 18 (06/04/25 1651)  BP: 120/63 (06/04/25 1651)  SpO2: (!) 94 % (06/04/25 1651) Vital Signs (24h Range):  Temp:  [98.1 °F (36.7 °C)-99.2 °F (37.3 °C)] 98.1 °F (36.7 °C)  Pulse:  [71-98] 88  Resp:  [16-18] 18  SpO2:  [92 %-95 %] 94 %  BP: (109-148)/(51-63) 120/63     Weight: 84.3 kg (185 lb 13.6 oz)  Body mass index is 25.92 kg/m².    Intake/Output Summary (Last 24 hours) at 6/4/2025 1748  Last data filed at 6/4/2025 0820  Gross per 24 hour   Intake 894.1 ml   Output --   Net 894.1 ml         Physical Exam  Vitals reviewed.   Constitutional:       Appearance: Normal appearance.   HENT:      Head: Normocephalic and atraumatic.      Mouth/Throat:      Mouth: Mucous membranes are moist.      Pharynx: Oropharynx is clear.   Eyes:      Extraocular Movements: Extraocular movements intact.      Conjunctiva/sclera: Conjunctivae normal.   Cardiovascular:      Rate and Rhythm: Normal rate and regular rhythm.      Pulses: Normal pulses.      Heart sounds: Normal heart sounds.   Pulmonary:      Effort: Pulmonary effort is normal.      Breath sounds: Normal breath sounds.   Abdominal:      General: Bowel sounds are normal.      Palpations: Abdomen is soft.   Musculoskeletal:         General: Normal range of motion.      Cervical back: Normal range of motion and neck supple.   Skin:     General: Skin is warm and dry.   Neurological:      General: No focal deficit present.      Mental Status: He is alert and oriented to person, place, and time. Mental status is at baseline.                Significant Labs: All pertinent labs within the past 24 hours have been reviewed.    CMP:   Recent Labs   Lab 06/03/25  0334 06/04/25  0319     --    K 3.7  --      --    CO2 22*  --    *  --    BUN 13  --    CREATININE 0.9  --    CALCIUM 7.8*  --    PROT 5.6* 5.7*   ALBUMIN 1.7* 1.8*   BILITOT 0.3 0.4   ALKPHOS 195* 225*   AST 45 58*   ALT 33 39   ANIONGAP 8  --        Significant Imaging: I have reviewed all pertinent imaging results/findings within the past 24 hours.

## 2025-06-04 NOTE — ASSESSMENT & PLAN NOTE
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   --would certainly explain the ongoing fevers  --will need to determine location of infection  --contrast MRI of brain with no evidence of infection   --awaiting Indium scan   --LP performed 5/30; CSF with normal cell count, protein, and glucose; follow for kandice ink, cryptococcal antigen, me panel, fungal culture, and bacterial culture  --notably serum cryptococcal antigen negative   --opening pressure normal at 17   --continue induction therapy using IV amphotericin and oral flucytosine  --require drug toxicity monitoring; need daily cbc and cmp for electrolytes/renal function/blood counts  --induction therapy will be for 14 days if CNS infection confirmed  --after completing induction therapy would switch to 8 weeks of consolidation therapy using high-dose PO fluconazole 800 mg daily followed by 12 months of maintenance therapy with lower dose fluconazole 200 mg daily   --ultimately treatment course will be determined by location of infection  --induction therapy would need to be completed inpatient due to highly toxic nature of both drugs  --if CNS infection ruled out then may be possible to treat with 400 mg daily fluconazole for up to 12 months but data is minimal   --Above d/w primary team and oncology as well as patient/wife    06/2- will review plan of care with Dr Laguna-  Serum and CSF cryptococcal assay are negative    Will check EKG for qtc   06/03- will monitor for fever - will stop Amphoterin

## 2025-06-04 NOTE — ASSESSMENT & PLAN NOTE
with empiric meropenem.  As fever has persisted.  Will hold Zyvox due to associated thrombocytopenia,   will follow blood cultures.  Will monitor fever curve.  Will do pan CT of the chest abdomen pelvis if fever persist    05/22-   he continues to have persistent fever.  CT scan of the chest abdomen and pelvis did not show any obvious abscess.  Will do indium scan.  Will also do lower extremity Doppler.  Will send serum procalcitonin   on empiric meropenem.  Will plan to de-escalate  if no cultures are positive  05/23-   neutropenia has improved.  Cultures are negative till date.  Lower extremity Doppler shows chronic DVT.  Will switch to cefepime.  And will plan to stop antibiotics soon if fever improves.   indium scan was ordered but this may not be done until next week    06/02- Karius showed cryptococcus but serum cryptococcal antigen and csf cryptococcal antigen  assay -negative-  Will stop Amphotericin /flucytosine in AM   06/03- will monitor for fever .  Will use empiric 2 weeks of Fluconazole but will need to check qtc .  Will start in AM and will follow in clinic

## 2025-06-04 NOTE — PROGRESS NOTES
[facility]  Infectious Disease  Progress Note    Patient Name: Armando Ingram Jr.  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   --would certainly explain the ongoing fevers  --will need to determine location of infection  --contrast MRI of brain with no evidence of infection   --awaiting Indium scan   --LP performed 5/30; CSF with normal cell count, protein, and glucose; follow for kandice ink, cryptococcal antigen, me panel, fungal culture, and bacterial culture  --notably serum cryptococcal antigen negative   --opening pressure normal at 17   --continue induction therapy using IV amphotericin and oral flucytosine  --require drug toxicity monitoring; need daily cbc and cmp for electrolytes/renal function/blood counts  --induction therapy will be for 14 days if CNS infection confirmed  --after completing induction therapy would switch to 8 weeks of consolidation therapy using high-dose PO fluconazole 800 mg daily followed by 12 months of maintenance therapy with lower dose fluconazole 200 mg daily   --ultimately treatment course will be determined by location of infection  --induction therapy would need to be completed inpatient due to highly toxic nature of both drugs  --if CNS infection ruled out then may be possible to treat with 400 mg daily fluconazole for up to 12 months but data is minimal   --Above d/w primary team and oncology as well as patient/wife    06/2- will review plan of care with Dr Laguna-  Serum and CSF cryptococcal assay are negative    Will check EKG for qtc   06/03- will monitor for fever - will stop Amphoterin      Oncology  * Neutropenic fever   with  empiric meropenem.  As fever has persisted.  Will hold Zyvox due to associated thrombocytopenia,   will follow blood cultures.  Will monitor fever curve.  Will do pan CT of the chest abdomen pelvis if fever persist    05/22-   he continues to have persistent fever.  CT scan of the chest abdomen and pelvis did not show any obvious abscess.  Will do indium scan.  Will also do lower extremity Doppler.  Will send serum procalcitonin   on empiric meropenem.  Will plan to de-escalate  if no cultures are positive  05/23-   neutropenia has improved.  Cultures are negative till date.  Lower extremity Doppler shows chronic DVT.  Will switch to cefepime.  And will plan to stop antibiotics soon if fever improves.   indium scan was ordered but this may not be done until next week    06/02- Karius showed cryptococcus but serum cryptococcal antigen and csf cryptococcal antigen  assay -negative-  Will stop Amphotericin /flucytosine in AM   06/03- will monitor for fever .  Will use empiric 2 weeks of Fluconazole but will need to check qtc .  Will start in AM and will follow in clinic    Pancytopenia  Associated with underlying malignancy but could also be due to infection. Appreciate oncology.        Anticipated Disposition:     Thank you for your consult. I will follow-up with patient. Please contact us if you have any additional questions.    Armando Villanueva MD, [facility]  Infectious Disease  [facility]    Subjective:     Principal Problem:Neutropenic fever    HPI: 65 y.o. male with a PMH  has a past medical history of Closed right hip fracture, Colon cancer, DVT (deep venous thrombosis) (2002), Hairy cell leukemia (06/06/2024), and Nephrolithiasis.     He was admitted with neutropenic fever.  Tmax was 101.  Labs and   Imaging test reviewed - flu/COVID negative, chest x-ray negative for acute findings, UA positive for 2+ LE, 11 RBCs,    Interval History:  Patient has been diagnosed with Cryptococcus based on Karius testing.   MRI brain with and without contrast negative for evidence of infection.  Lumbar puncture today.  Serum cryptococcal antigen in process.  Indium scan scheduled.  Patient tolerating amphotericin and flucytosine thus far.  Last recorded fever of 100.4 about 24 hours ago.     06/02-  Karius done -05/04- showed cryptococcus   neoformans    Serum crytococcal antigen -negative  Csf crytococcal antigen - neg  06/03- tolerating meds  Review of Systems   Constitutional:  Positive for activity change. Negative for appetite change, chills and fever.   Musculoskeletal:  Negative for back pain.   Neurological:  Negative for headaches.   All other systems reviewed and are negative.    Objective:     Vital Signs (Most Recent):  Temp: 98.1 °F (36.7 °C) (06/04/25 0446)  Pulse: 83 (06/04/25 0755)  Resp: 18 (06/04/25 0446)  BP: (!) 109/51 (06/04/25 0446)  SpO2: (!) 93 % (06/04/25 0446) Vital Signs (24h Range):  Temp:  [98.1 °F (36.7 °C)-99.1 °F (37.3 °C)] 98.1 °F (36.7 °C)  Pulse:  [71-98] 83  Resp:  [16-19] 18  SpO2:  [92 %-98 %] 93 %  BP: (109-148)/(51-80) 109/51     Weight: 84.3 kg (185 lb 13.6 oz)  Body mass index is 25.92 kg/m².    Estimated Creatinine Clearance: 86 mL/min (based on SCr of 0.9 mg/dL).     Physical Exam  Constitutional:       General: He is not in acute distress.     Appearance: Normal appearance. He is not ill-appearing.   Cardiovascular:      Rate and Rhythm: Normal rate and regular rhythm.      Pulses: Normal pulses.      Heart sounds: Normal heart sounds. No murmur heard.     No friction rub. No gallop.   Pulmonary:      Effort: Pulmonary effort is normal. No respiratory distress.      Breath sounds: Normal breath sounds.   Abdominal:      General: Abdomen is flat. Bowel sounds are normal. There is no distension.      Palpations: Abdomen is soft.      Tenderness: There is no abdominal tenderness.   Skin:     General: Skin is warm and dry.   Neurological:      Mental Status: He is alert.          Significant  "Labs: Blood Culture: No results for input(s): "LABBLOO" in the last 4320 hours.  CBC:   No results for input(s): "WBC", "HGB", "HCT", "PLT" in the last 48 hours.    CMP:   Recent Labs   Lab 06/03/25  0334 06/04/25  0319     --    K 3.7  --      --    CO2 22*  --    *  --    BUN 13  --    CREATININE 0.9  --    CALCIUM 7.8*  --    PROT 5.6* 5.7*   ALBUMIN 1.7* 1.8*   BILITOT 0.3 0.4   ALKPHOS 195* 225*   AST 45 58*   ALT 33 39   ANIONGAP 8  --      CSF:   Recent Labs   Lab 05/30/25 0909   CSFCULTURE No Growth To Date     Microbiology Results (last 7 days)       Procedure Component Value Units Date/Time    CSF culture [[id number]] Collected: 05/30/25 0909    Order Status: Completed Specimen: CSF (Spinal Fluid) from CSF Tap, Tube 1 Updated: 06/04/25 0738     CULTURE, CSF No Growth To Date     GRAM STAIN Cytospin indicates:      No WBCs, epithelial cells or organisms seen    Fungus culture [[id number]]  (Normal) Collected: 05/30/25 0909    Order Status: Completed Specimen: CSF (Spinal Fluid) from Cerebrospinal Fluid Updated: 06/02/25 0937     Fungal Culture Culture In Progress    Cryptococcal antigen, CSF [[id number]]  (Normal) Collected: 05/30/25 0909    Order Status: Completed Specimen: CSF (Spinal Fluid) from CSF Tap, Tube 1 Updated: 05/31/25 1619     Cryptococcal Antigen, CSF Negative    Christiana Ink (CSF) [[id number]] Collected: 05/30/25 0909    Order Status: Completed Specimen: CSF (Spinal Fluid) from CSF Tap, Tube 1 Updated: 05/31/25 0052     CHRISTIANA INK No encapsulated yeast seen    Blood culture [[id number]]  (Normal) Collected: 05/25/25 1026    Order Status: Completed Specimen: Blood from Peripheral, Antecubital, Right Updated: 05/30/25 1801     Blood Culture No Growth After 5 Days    Blood culture [[id number]]  (Normal) Collected: 05/25/25 1025    Order Status: Completed Specimen: Blood from Peripheral, Antecubital, Left Updated: 05/30/25 1804     Blood Culture No Growth After 5 Days    " Cryptococcal antigen [[id number]]  (Normal) Collected: 05/29/25 1531    Order Status: Completed Specimen: Blood, Venous Updated: 05/30/25 1339     Cryptococcal Antigen, Serum Negative          All pertinent labs within the past 24 hours have been reviewed.    Significant Imaging: MRI: I have reviewed all pertinent results/findings within the past 24 hours:  brain with no evidence of cryptococcus

## 2025-06-04 NOTE — NURSING
Took over care for ROMEO Ann. I agree with her assessments of the patient and will continue to monitor.

## 2025-06-04 NOTE — PLAN OF CARE
POC reviewed with pt. Pt verbalizes understanding of POC. No questions at this time.  AAOx4. NADN.  NSR on cardiac monitor.  Pt remains free of falls. Activity ad-shay  No complaints at this time.  Safety measures in place. Will continue to monitor.  Informed pt to call for assistance before getting up. Pt verbalizes understanding.  Hourly rounding and chart check complete.

## 2025-06-04 NOTE — ASSESSMENT & PLAN NOTE
- BLE duplex with chronic RLE DVT    -prior hx of IVC filter in 2002   Hold xarelto for lumbar puncture     6/1/25  Xarelto resumed

## 2025-06-05 LAB
ABSOLUTE NEUTROPHIL MANUAL (OHS): 2.7 K/UL
ALBUMIN SERPL BCP-MCNC: 2 G/DL (ref 3.5–5.2)
ALP SERPL-CCNC: 239 UNIT/L (ref 40–150)
ALT SERPL W/O P-5'-P-CCNC: 38 UNIT/L (ref 10–44)
ANION GAP (OHS): 10 MMOL/L (ref 8–16)
AST SERPL-CCNC: 40 UNIT/L (ref 11–45)
BACTERIA CSF CULT: NO GROWTH
BILIRUB SERPL-MCNC: 0.4 MG/DL (ref 0.1–1)
BUN SERPL-MCNC: 15 MG/DL (ref 8–23)
CALCIUM SERPL-MCNC: 8.3 MG/DL (ref 8.7–10.5)
CHLORIDE SERPL-SCNC: 106 MMOL/L (ref 95–110)
CO2 SERPL-SCNC: 21 MMOL/L (ref 23–29)
CREAT SERPL-MCNC: 1 MG/DL (ref 0.5–1.4)
ERYTHROCYTE [DISTWIDTH] IN BLOOD BY AUTOMATED COUNT: 14.9 % (ref 11.5–14.5)
GFR SERPLBLD CREATININE-BSD FMLA CKD-EPI: >60 ML/MIN/1.73/M2
GLUCOSE SERPL-MCNC: 113 MG/DL (ref 70–110)
GRAM STN SPEC: NORMAL
GRAM STN SPEC: NORMAL
HCT VFR BLD AUTO: 36.8 % (ref 40–54)
HGB BLD-MCNC: 11.8 GM/DL (ref 14–18)
LYMPHOCYTES NFR BLD MANUAL: 26 % (ref 18–48)
MCH RBC QN AUTO: 30.7 PG (ref 27–31)
MCHC RBC AUTO-ENTMCNC: 32.1 G/DL (ref 32–36)
MCV RBC AUTO: 96 FL (ref 82–98)
MONOCYTES NFR BLD MANUAL: 12 % (ref 4–15)
NEUTROPHILS NFR BLD MANUAL: 62 % (ref 38–73)
NUCLEATED RBC (/100WBC) (OHS): 1 /100 WBC
PLATELET # BLD AUTO: 302 K/UL (ref 150–450)
PMV BLD AUTO: 8.8 FL (ref 9.2–12.9)
POTASSIUM SERPL-SCNC: 4.1 MMOL/L (ref 3.5–5.1)
PROT SERPL-MCNC: 6.7 GM/DL (ref 6–8.4)
RBC # BLD AUTO: 3.84 M/UL (ref 4.6–6.2)
SODIUM SERPL-SCNC: 137 MMOL/L (ref 136–145)
WBC # BLD AUTO: 4.42 K/UL (ref 3.9–12.7)

## 2025-06-05 PROCEDURE — 21400001 HC TELEMETRY ROOM: Mod: HCNC

## 2025-06-05 PROCEDURE — 80053 COMPREHEN METABOLIC PANEL: CPT | Mod: HCNC | Performed by: INTERNAL MEDICINE

## 2025-06-05 PROCEDURE — 25000003 PHARM REV CODE 250: Mod: HCNC | Performed by: FAMILY MEDICINE

## 2025-06-05 PROCEDURE — 63700000 PHARM REV CODE 250 ALT 637 W/O HCPCS: Mod: HCNC | Performed by: INTERNAL MEDICINE

## 2025-06-05 PROCEDURE — 27000207 HC ISOLATION: Mod: HCNC

## 2025-06-05 PROCEDURE — 36415 COLL VENOUS BLD VENIPUNCTURE: CPT | Mod: HCNC | Performed by: INTERNAL MEDICINE

## 2025-06-05 PROCEDURE — 25000003 PHARM REV CODE 250: Mod: HCNC | Performed by: INTERNAL MEDICINE

## 2025-06-05 PROCEDURE — 99232 SBSQ HOSP IP/OBS MODERATE 35: CPT | Mod: NSCH,HCNC,, | Performed by: INTERNAL MEDICINE

## 2025-06-05 PROCEDURE — 85025 COMPLETE CBC W/AUTO DIFF WBC: CPT | Mod: HCNC | Performed by: INTERNAL MEDICINE

## 2025-06-05 PROCEDURE — 25000003 PHARM REV CODE 250: Mod: HCNC | Performed by: HOSPITALIST

## 2025-06-05 PROCEDURE — 94799 UNLISTED PULMONARY SVC/PX: CPT | Mod: HCNC

## 2025-06-05 PROCEDURE — 94761 N-INVAS EAR/PLS OXIMETRY MLT: CPT | Mod: HCNC

## 2025-06-05 RX ADMIN — CETIRIZINE HYDROCHLORIDE 10 MG: 10 TABLET, FILM COATED ORAL at 09:06

## 2025-06-05 RX ADMIN — FLUCONAZOLE 400 MG: 150 TABLET ORAL at 09:06

## 2025-06-05 RX ADMIN — RIVAROXABAN 20 MG: 20 TABLET, FILM COATED ORAL at 05:06

## 2025-06-05 RX ADMIN — PANTOPRAZOLE SODIUM 40 MG: 40 TABLET, DELAYED RELEASE ORAL at 09:06

## 2025-06-05 RX ADMIN — TAMSULOSIN HYDROCHLORIDE 0.4 MG: 0.4 CAPSULE ORAL at 09:06

## 2025-06-05 NOTE — ASSESSMENT & PLAN NOTE
1 Blue Duffel bag  1 Zest Bodywash 20 oz  1 shampoo 3.0 oz  1 conditioner 3.0 oz  Old spice deodorant 2.6 oz  4 tshirts  4 boxers  3 long pants  2 shorts  7 pair of socks 5/31/25   Source of infection is unclear. Serum cryptococcus antigen negative. Christiana ink negative. Indium scan also negative today. Discussed with ID who recommends stopping flucytosine. Continue Amphotericin until CSF Crypto Ag is known. If negative and patient remains afebrile, patient will likely discharge on fluconazole treatment for non meningitis/non-pulmonary cryptococcus which can be treated as antigenemia with extended course of fluconazole.     6/1/25  Febrile overnight (Tmax 101.9) in absence of flucytosine. Although both serum and CSF were negative for Cryptotoccus Antigen. It was decided proceed with complete induction therapy with Amphotericin and Flucytosine. Will monitor closely liver enzymes and fever curve. ALP today 194, AST 55.   CXR with NAF, Flu/Covid neg   Resp viral panel negative   Urine and blood cultures NGTD   Monitor VS   CT abd pelvis with bladder thickening and IVC filter but no other source of infection. TTE with no valvular abnormalities. BLE duplex showed chronic RLE DVT   Continue current treatment further recs per ID are pending  5/31/25   Per ID patient changed to p.o. Diflucan 400 mg daily.  Temp last p.m. 99.8.  We will observe for 24 hours and if he remains afebrile discharged home and follow up with outpatient ID

## 2025-06-05 NOTE — PLAN OF CARE
06/05/25 1320   Rounds   Attendance Provider;Nurse ;Charge nurse;Physical therapist   Discharge Plan A Home with family   Why the patient remains in the hospital Requires continued medical care     Anticipated dc dispo: home with family.  Progress towards plan: continue to monitor

## 2025-06-05 NOTE — PLAN OF CARE
Pt resting in bed .   Pt awake and alert .   Hob elevated .   Pt on neutropenic precautions   Vitals stable .   Pt has been afebrile   POC discussed .   IV intact   Safety Measures in place   Chart check complete .   Will  continue to monitor .

## 2025-06-05 NOTE — PROGRESS NOTES
[facility]  [facility]  Progress Note    Patient Name: Armando Ingram Jr.  MRN: [id number]  Patient Class: IP- Inpatient   Admission Date: 5/16/2025  Length of Stay: 19 days  Attending Physician: Lainey Tao MD  Primary Care Provider: Brian Soares MD        Subjective     Principal Problem:Neutropenic fever        HPI:  Armando Ingram Jr. is a 65 y.o. male with a PMH  has a past medical history of Closed right hip fracture, Colon cancer, DVT (deep venous thrombosis) (2002), Hairy cell leukemia (06/06/2024), and Nephrolithiasis. who presented to the ED for further evaluation of acute onset fever with T-max measuring 101.0 F earlier today.  Patient reported significant history of hairy cell leukemia in his followed by Dr. Dow and Dr. Flores from Hematology/Oncology and was recently prescribed a Z-Cordell for 4 days for treatment of a cough.  Patient was instructed to go to the ED if he endorsed fever greater than 103.0 F but presented to the ED due to ulcer experiencing associated chills, throbbing headache, and persistent fever.  He reported no known alleviating or aggravating factors noted with all other review of systems negative except as noted above.  He is not currently on active treatment for his leukemia and reported being in his usual state of health prior to onset of symptoms.  Initial workup in the ED revealed patient to be afebrile with T-max measuring 100.4 F, pancytopenic, lactic acid/procalcitonin within normal limits, flu/COVID negative, chest x-ray negative for acute findings, UA positive for 2+ LE, 11 RBCs, free found WBCs.  Patient initiated on cefepime with cultures obtained and pending and admitted to Hospital Medicine under observation for continued medical management and treatment of neutropenic fever.    PCP: Brian Soares      Overview/Hospital Course:  Continued on IV Cefepime. Zyvoz added 05/18 due to persistent fever. Hematology consulted. Cultures remain NGTD    Resp viral panel negative. ID consulted to eval due to fevers  Cefepime changed to IV Merrem per ID recs on 05/21, unclear source of fevers at this time. Zyvox discontinued per ID.   CT Abd/Pelvis showed bladder thickening but otherwise negative.   ID recommend TTE, BLE duplex and possible Indium scan for source of infection and de-escalate abx to Cefepime on 05/23.   TTE with no vegetations. BLE duplex showed chronic RLE DVT. Indium scan pending tentatively planned for 05/26-05/27 as radiology does not have required materials at this time and scan will take 2 days to complete per RT.   Persistent fevers, abx changed back to Merrem 05/25 per ID recs. Karius pending   5/26 fever curve trending down. Respiratory infection panel negative. Asymptomatic. Continue intravenous antibiotic(s)   5/27 neutropenic fever persists despite scheduled tylenol. Awaiting tagged wbc scan per infectious disease recommendations.   5/28 febrile overnight. Infectious disease recommending addition of doxy. Hem/onc following. Awaiting karius and imaging. Refused lumbar puncture. If afebrile x 48h, discharge   5/29 febrile. Positive cryptococcal infection. Infectious disease adjusting antimicrobials. Plans for lumbar puncture, mri brain, and indium scan.  5/30/2025: s/p LP today.  Opening pressure within normal range at 18. clear CSF fluid noted. Cell count, glucose, Christiana Ink, Cryptococcal antigen, MS panel, Fungal Culture, and Bacterial culture obtained. Patient received first dose of induction therapy for cryptococcal infection and ding well. Indium Scan in progress. Given toxic nature of amphotericin and flucytosine will closely monitor chemistries. Oncology consulted with partners and decision made to defer treatment for hair cell leukemia until infection has been treated.   5/31/25: Patient has remained afebrile today. ALP and AST elevated after 48 hours of treatment. Serum cryptococcus antigen negative. Christiana ink negative. Indium  scan also negative today. Discussed with ID who recommends stopping flucytosine. Continue Amphotericin until CSF Crypto Ag is known. If negative and patient remains afebrile, patient will likely discharge on fluconazole treatment for non meningitis/non-pulmonary cryptococcus which can be treated as antigenemia with extended course of fluconazole.   6/1/25: Xarelto resumed yesterday and H/H unchanged from previous. Febrile overnight (Tmax 101.9) in absence of flucytosine. Although both serum and CSF were negative for Cryptotoccus Antigen it was decided proceed with complete induction therapy with Amphotericin and Flucytosine. Will monitor closely liver enzymes and fever curve. ALP today 194, AST 55.     Discussed with Dr. Villanueva.  He continued amphotericin and flucytosine 6/3 after dose and monitor fever curve.    Patient had temp of 99.8°.  P.o. Diflucan begun at 400 mg a day.  We will observe for 24 hours and if he remains afebrile we will plan on discharging    Interval History:  Patient seen and examined with wife at bedside.  Dr. Lopez in attendance as well.  Patient reports feeling better in his anxious to go home.  Explain that temp of 99.8° last p.m. was concerning and we will observe for 24 more hours to see if on p.o. Diflucan his no longer has fever.    Review of Systems   Constitutional:  Positive for activity change and fatigue.   Respiratory: Negative.     Cardiovascular: Negative.    Gastrointestinal: Negative.    All other systems reviewed and are negative.    Objective:     Vital Signs (Most Recent):  Temp: 98.4 °F (36.9 °C) (06/05/25 1610)  Pulse: 94 (06/05/25 1610)  Resp: 18 (06/05/25 1610)  BP: 119/64 (06/05/25 1610)  SpO2: (!) 94 % (06/05/25 1610) Vital Signs (24h Range):  Temp:  [98.1 °F (36.7 °C)-99.8 °F (37.7 °C)] 98.4 °F (36.9 °C)  Pulse:  [] 94  Resp:  [16-20] 18  SpO2:  [91 %-95 %] 94 %  BP: (110-131)/(56-74) 119/64     Weight: 80.5 kg (177 lb 7.5 oz)  Body mass index is 24.75  kg/m².    Intake/Output Summary (Last 24 hours) at 6/5/2025 1617  Last data filed at 6/5/2025 0423  Gross per 24 hour   Intake 120 ml   Output --   Net 120 ml         Physical Exam  Vitals reviewed.   Constitutional:       Appearance: Normal appearance.   HENT:      Head: Normocephalic and atraumatic.      Mouth/Throat:      Mouth: Mucous membranes are moist.      Pharynx: Oropharynx is clear.   Eyes:      Extraocular Movements: Extraocular movements intact.      Conjunctiva/sclera: Conjunctivae normal.   Cardiovascular:      Rate and Rhythm: Normal rate and regular rhythm.      Pulses: Normal pulses.      Heart sounds: Normal heart sounds.   Pulmonary:      Effort: Pulmonary effort is normal.      Breath sounds: Normal breath sounds.   Abdominal:      General: Bowel sounds are normal.      Palpations: Abdomen is soft.   Musculoskeletal:         General: Normal range of motion.      Cervical back: Normal range of motion and neck supple.   Skin:     General: Skin is warm and dry.   Neurological:      General: No focal deficit present.      Mental Status: He is alert and oriented to person, place, and time. Mental status is at baseline.               Significant Labs: All pertinent labs within the past 24 hours have been reviewed.  CBC:   Recent Labs   Lab 06/05/25  0438   WBC 4.42   HGB 11.8*   HCT 36.8*        CMP:   Recent Labs   Lab 06/04/25  0319 06/05/25  0438   NA  --  137   K  --  4.1   CL  --  106   CO2  --  21*   GLU  --  113*   BUN  --  15   CREATININE  --  1.0   CALCIUM  --  8.3*   PROT 5.7* 6.7   ALBUMIN 1.8* 2.0*   BILITOT 0.4 0.4   ALKPHOS 225* 239*   AST 58* 40   ALT 39 38   ANIONGAP  --  10       Significant Imaging: I have reviewed all pertinent imaging results/findings within the past 24 hours.      Assessment & Plan  Neutropenic fever  Cryptococcosis  5/31/25   Source of infection is unclear. Serum cryptococcus antigen negative. Christiana ink negative. Indium scan also negative today. Discussed  with ID who recommends stopping flucytosine. Continue Amphotericin until CSF Crypto Ag is known. If negative and patient remains afebrile, patient will likely discharge on fluconazole treatment for non meningitis/non-pulmonary cryptococcus which can be treated as antigenemia with extended course of fluconazole.     6/1/25  Febrile overnight (Tmax 101.9) in absence of flucytosine. Although both serum and CSF were negative for Cryptotoccus Antigen. It was decided proceed with complete induction therapy with Amphotericin and Flucytosine. Will monitor closely liver enzymes and fever curve. ALP today 194, AST 55.   CXR with NAF, Flu/Covid neg   Resp viral panel negative   Urine and blood cultures NGTD   Monitor VS   CT abd pelvis with bladder thickening and IVC filter but no other source of infection. TTE with no valvular abnormalities. BLE duplex showed chronic RLE DVT   Continue current treatment further recs per ID are pending  5/31/25   Per ID patient changed to p.o. Diflucan 400 mg daily.  Temp last p.m. 99.8.  We will observe for 24 hours and if he remains afebrile discharged home and follow up with outpatient ID  Hairy cell leukemia  Patient with known history of hairy cell leukemia and continues to follow Dr. Dow and Dr. Flores from Hematology/Oncology. Patient not currently on active treatment and currently undergoing treatment for neutropenic fever with broad-spectrum antibiotics as noted above.  Hematology consulted and following     5/30/25  Hematology discussed with other partners and it was decided not to proceed with treatment for leukemia. This was discussed with family.   Pancytopenia  This patient is found to have pancytopenia, the likely etiology is , will monitor CBC . Will transfuse red blood cells if the hemoglobin is <7g/dL (or <8 in the setting of ACS). Hold DVT prophylaxis if platelets are <50k. The patient's hemoglobin, white blood cell count, and platelet count results have been  reviewed.      Remains stable  History of DVT (deep vein thrombosis)  - BLE duplex with chronic RLE DVT    -prior hx of IVC filter in 2002   Hold xarelto for lumbar puncture     6/1/25  Xarelto resumed   Transaminitis  Lab Results   Component Value Date    AST 40 06/05/2025    AST 58 (H) 06/04/2025    ALT 38 06/05/2025    ALT 39 06/04/2025    ALKPHOS 239 (H) 06/05/2025    ALKPHOS 225 (H) 06/04/2025        VTE Risk Mitigation (From admission, onward)           Ordered     rivaroxaban tablet 20 mg  With dinner         05/31/25 1415     Reason for No Pharmacological VTE Prophylaxis  Once        Question:  Reasons:  Answer:  Already adequately anticoagulated on oral Anticoagulants    05/17/25 0213     IP VTE HIGH RISK PATIENT  Once         05/17/25 0213     Place sequential compression device  Until discontinued         05/17/25 0213                    Discharge Planning   LAURI: 6/6/2025     Code Status: Full Code   Medical Readiness for Discharge Date:   Discharge Plan A: Home with family                        Lainey Basurto MD  Department of Hospital Medicine   [facility]

## 2025-06-05 NOTE — ASSESSMENT & PLAN NOTE
5/31/25   Source of infection is unclear. Serum cryptococcus antigen negative. Christiana ink negative. Indium scan also negative today. Discussed with ID who recommends stopping flucytosine. Continue Amphotericin until CSF Crypto Ag is known. If negative and patient remains afebrile, patient will likely discharge on fluconazole treatment for non meningitis/non-pulmonary cryptococcus which can be treated as antigenemia with extended course of fluconazole.     6/1/25  Febrile overnight (Tmax 101.9) in absence of flucytosine. Although both serum and CSF were negative for Cryptotoccus Antigen. It was decided proceed with complete induction therapy with Amphotericin and Flucytosine. Will monitor closely liver enzymes and fever curve. ALP today 194, AST 55.   CXR with NAF, Flu/Covid neg   Resp viral panel negative   Urine and blood cultures NGTD   Monitor VS   CT abd pelvis with bladder thickening and IVC filter but no other source of infection. TTE with no valvular abnormalities. BLE duplex showed chronic RLE DVT   Continue current treatment further recs per ID are pending  5/31/25   Per ID patient changed to p.o. Diflucan 400 mg daily.  Temp last p.m. 99.8.  We will observe for 24 hours and if he remains afebrile discharged home and follow up with outpatient ID

## 2025-06-05 NOTE — SUBJECTIVE & OBJECTIVE
Interval History:  Patient seen and examined with wife at bedside.  Dr. Lopez in attendance as well.  Patient reports feeling better in his anxious to go home.  Explain that temp of 99.8° last p.m. was concerning and we will observe for 24 more hours to see if on p.o. Diflucan his no longer has fever.    Review of Systems   Constitutional:  Positive for activity change and fatigue.   Respiratory: Negative.     Cardiovascular: Negative.    Gastrointestinal: Negative.    All other systems reviewed and are negative.    Objective:     Vital Signs (Most Recent):  Temp: 98.4 °F (36.9 °C) (06/05/25 1610)  Pulse: 94 (06/05/25 1610)  Resp: 18 (06/05/25 1610)  BP: 119/64 (06/05/25 1610)  SpO2: (!) 94 % (06/05/25 1610) Vital Signs (24h Range):  Temp:  [98.1 °F (36.7 °C)-99.8 °F (37.7 °C)] 98.4 °F (36.9 °C)  Pulse:  [] 94  Resp:  [16-20] 18  SpO2:  [91 %-95 %] 94 %  BP: (110-131)/(56-74) 119/64     Weight: 80.5 kg (177 lb 7.5 oz)  Body mass index is 24.75 kg/m².    Intake/Output Summary (Last 24 hours) at 6/5/2025 1617  Last data filed at 6/5/2025 0423  Gross per 24 hour   Intake 120 ml   Output --   Net 120 ml         Physical Exam  Vitals reviewed.   Constitutional:       Appearance: Normal appearance.   HENT:      Head: Normocephalic and atraumatic.      Mouth/Throat:      Mouth: Mucous membranes are moist.      Pharynx: Oropharynx is clear.   Eyes:      Extraocular Movements: Extraocular movements intact.      Conjunctiva/sclera: Conjunctivae normal.   Cardiovascular:      Rate and Rhythm: Normal rate and regular rhythm.      Pulses: Normal pulses.      Heart sounds: Normal heart sounds.   Pulmonary:      Effort: Pulmonary effort is normal.      Breath sounds: Normal breath sounds.   Abdominal:      General: Bowel sounds are normal.      Palpations: Abdomen is soft.   Musculoskeletal:         General: Normal range of motion.      Cervical back: Normal range of motion and neck supple.   Skin:     General: Skin is  warm and dry.   Neurological:      General: No focal deficit present.      Mental Status: He is alert and oriented to person, place, and time. Mental status is at baseline.               Significant Labs: All pertinent labs within the past 24 hours have been reviewed.  CBC:   Recent Labs   Lab 06/05/25  0438   WBC 4.42   HGB 11.8*   HCT 36.8*        CMP:   Recent Labs   Lab 06/04/25  0319 06/05/25  0438   NA  --  137   K  --  4.1   CL  --  106   CO2  --  21*   GLU  --  113*   BUN  --  15   CREATININE  --  1.0   CALCIUM  --  8.3*   PROT 5.7* 6.7   ALBUMIN 1.8* 2.0*   BILITOT 0.4 0.4   ALKPHOS 225* 239*   AST 58* 40   ALT 39 38   ANIONGAP  --  10       Significant Imaging: I have reviewed all pertinent imaging results/findings within the past 24 hours.

## 2025-06-05 NOTE — ASSESSMENT & PLAN NOTE
Lab Results   Component Value Date    AST 40 06/05/2025    AST 58 (H) 06/04/2025    ALT 38 06/05/2025    ALT 39 06/04/2025    ALKPHOS 239 (H) 06/05/2025    ALKPHOS 225 (H) 06/04/2025

## 2025-06-06 ENCOUNTER — PATIENT MESSAGE (OUTPATIENT)
Dept: FAMILY MEDICINE | Facility: CLINIC | Age: 65
End: 2025-06-06
Payer: MEDICARE

## 2025-06-06 ENCOUNTER — HOSPITAL ENCOUNTER (INPATIENT)
Facility: HOSPITAL | Age: 65
LOS: 8 days | Discharge: HOME | DRG: 868 | End: 2025-06-14
Attending: INTERNAL MEDICINE | Admitting: INTERNAL MEDICINE
Payer: MEDICARE

## 2025-06-06 ENCOUNTER — TELEPHONE (OUTPATIENT)
Dept: INFECTIOUS DISEASES | Facility: CLINIC | Age: 65
End: 2025-06-06
Payer: MEDICARE

## 2025-06-06 ENCOUNTER — NURSE TRIAGE (OUTPATIENT)
Dept: ADMINISTRATIVE | Facility: CLINIC | Age: 65
End: 2025-06-06
Payer: MEDICARE

## 2025-06-06 ENCOUNTER — PATIENT MESSAGE (OUTPATIENT)
Dept: HEMATOLOGY/ONCOLOGY | Facility: CLINIC | Age: 65
End: 2025-06-06
Payer: MEDICARE

## 2025-06-06 VITALS
RESPIRATION RATE: 14 BRPM | DIASTOLIC BLOOD PRESSURE: 62 MMHG | BODY MASS INDEX: 24.85 KG/M2 | WEIGHT: 177.5 LBS | TEMPERATURE: 99 F | HEART RATE: 107 BPM | OXYGEN SATURATION: 96 % | HEIGHT: 71 IN | SYSTOLIC BLOOD PRESSURE: 125 MMHG

## 2025-06-06 DIAGNOSIS — I33.0 VEGETATION OF HEART VALVE: ICD-10-CM

## 2025-06-06 DIAGNOSIS — R07.9 CHEST PAIN: ICD-10-CM

## 2025-06-06 DIAGNOSIS — A41.9 SEPSIS: ICD-10-CM

## 2025-06-06 PROBLEM — E87.1 HYPONATREMIA: Status: ACTIVE | Noted: 2025-06-06

## 2025-06-06 PROBLEM — D64.9 ANEMIA: Status: ACTIVE | Noted: 2025-06-06

## 2025-06-06 LAB
ABSOLUTE EOSINOPHIL (OHS): 0.01 K/UL
ABSOLUTE MONOCYTE (OHS): 0.04 K/UL (ref 0.3–1)
ABSOLUTE NEUTROPHIL COUNT (OHS): 2.22 K/UL (ref 1.8–7.7)
ALBUMIN SERPL BCP-MCNC: 2 G/DL (ref 3.5–5.2)
ALP SERPL-CCNC: 236 UNIT/L (ref 40–150)
ALT SERPL W/O P-5'-P-CCNC: 36 UNIT/L (ref 10–44)
ANION GAP (OHS): 11 MMOL/L (ref 8–16)
AST SERPL-CCNC: 49 UNIT/L (ref 11–45)
B PERT DNA NPH QL NAA+PROBE: NOT DETECTED
BASOPHILS # BLD AUTO: 0 K/UL
BASOPHILS NFR BLD AUTO: 0 %
BILIRUB DIRECT SERPL-MCNC: 0.2 MG/DL (ref 0.1–0.3)
BILIRUB SERPL-MCNC: 0.5 MG/DL (ref 0.1–1)
BUN SERPL-MCNC: 14 MG/DL (ref 8–23)
C PNEUM DNA LOWER RESP QL NAA+NON-PROBE: NOT DETECTED
CALCIUM SERPL-MCNC: 7.7 MG/DL (ref 8.7–10.5)
CHLORIDE SERPL-SCNC: 103 MMOL/L (ref 95–110)
CO2 SERPL-SCNC: 19 MMOL/L (ref 23–29)
CREAT SERPL-MCNC: 0.9 MG/DL (ref 0.5–1.4)
ERYTHROCYTE [DISTWIDTH] IN BLOOD BY AUTOMATED COUNT: 14.9 % (ref 11.5–14.5)
FLUAV RNA NPH QL NAA+NON-PROBE: NOT DETECTED
FLUBV RNA NPH QL NAA+NON-PROBE: NOT DETECTED
GFR SERPLBLD CREATININE-BSD FMLA CKD-EPI: >60 ML/MIN/1.73/M2
GLUCOSE SERPL-MCNC: 122 MG/DL (ref 70–110)
HADV DNA NPH QL NAA+NON-PROBE: NOT DETECTED
HCOV 229E RNA NPH QL NAA+NON-PROBE: NOT DETECTED
HCOV HKU1 RNA NPH QL NAA+NON-PROBE: NOT DETECTED
HCOV NL63 RNA NPH QL NAA+NON-PROBE: NOT DETECTED
HCOV OC43 RNA NPH QL NAA+NON-PROBE: NOT DETECTED
HCT VFR BLD AUTO: 31.5 % (ref 40–54)
HGB BLD-MCNC: 10.6 GM/DL (ref 14–18)
HMPV RNA LOWER RESP QL NAA+NON-PROBE: NOT DETECTED
HMPV RNA NPH QL NAA+NON-PROBE: NOT DETECTED
HPIV1 RNA NPH QL NAA+NON-PROBE: NOT DETECTED
HPIV2 RNA NPH QL NAA+NON-PROBE: NOT DETECTED
HPIV3 RNA NPH QL NAA+NON-PROBE: NOT DETECTED
HPIV4 RNA NPH QL NAA+NON-PROBE: NOT DETECTED
IMM GRANULOCYTES # BLD AUTO: 0.05 K/UL (ref 0–0.04)
IMM GRANULOCYTES NFR BLD AUTO: 1.7 % (ref 0–0.5)
LACTATE SERPL-SCNC: 1.4 MMOL/L (ref 0.5–2.2)
LYMPHOCYTES # BLD AUTO: 0.67 K/UL (ref 1–4.8)
MCH RBC QN AUTO: 31.4 PG (ref 27–31)
MCHC RBC AUTO-ENTMCNC: 33.7 G/DL (ref 32–36)
MCV RBC AUTO: 93 FL (ref 82–98)
NUCLEATED RBC (/100WBC) (OHS): 0 /100 WBC
OHS QRS DURATION: 94 MS
OHS QTC CALCULATION: 406 MS
PLATELET # BLD AUTO: 226 K/UL (ref 150–450)
PMV BLD AUTO: 9 FL (ref 9.2–12.9)
POTASSIUM SERPL-SCNC: 3.6 MMOL/L (ref 3.5–5.1)
PROCALCITONIN SERPL-MCNC: 0.2 NG/ML
PROT SERPL-MCNC: 6.4 GM/DL (ref 6–8.4)
RBC # BLD AUTO: 3.38 M/UL (ref 4.6–6.2)
RELATIVE EOSINOPHIL (OHS): 0.3 %
RELATIVE LYMPHOCYTE (OHS): 22.4 % (ref 18–48)
RELATIVE MONOCYTE (OHS): 1.3 % (ref 4–15)
RELATIVE NEUTROPHIL (OHS): 74.3 % (ref 38–73)
RSV RNA NPH QL NAA+NON-PROBE: NOT DETECTED
RSV RNA NPH QL NAA+NON-PROBE: NOT DETECTED
RV+EV RNA NPH QL NAA+NON-PROBE: NOT DETECTED
SARS-COV-2 RNA RESP QL NAA+PROBE: NOT DETECTED
SODIUM SERPL-SCNC: 133 MMOL/L (ref 136–145)
SPECIMEN SOURCE: NORMAL
WBC # BLD AUTO: 2.99 K/UL (ref 3.9–12.7)

## 2025-06-06 PROCEDURE — 25000003 PHARM REV CODE 250: Mod: HCNC | Performed by: INTERNAL MEDICINE

## 2025-06-06 PROCEDURE — 82248 BILIRUBIN DIRECT: CPT | Mod: HCNC | Performed by: INTERNAL MEDICINE

## 2025-06-06 PROCEDURE — 0202U NFCT DS 22 TRGT SARS-COV-2: CPT | Mod: HCNC | Performed by: INTERNAL MEDICINE

## 2025-06-06 PROCEDURE — 36415 COLL VENOUS BLD VENIPUNCTURE: CPT | Mod: HCNC | Performed by: INTERNAL MEDICINE

## 2025-06-06 PROCEDURE — 0152U NFCT DS DNA UNTRGT NGNRJ SEQ: CPT | Performed by: INTERNAL MEDICINE

## 2025-06-06 PROCEDURE — 11000001 HC ACUTE MED/SURG PRIVATE ROOM: Mod: HCNC

## 2025-06-06 PROCEDURE — 83605 ASSAY OF LACTIC ACID: CPT | Mod: HCNC | Performed by: INTERNAL MEDICINE

## 2025-06-06 PROCEDURE — 21400001 HC TELEMETRY ROOM: Mod: HCNC

## 2025-06-06 PROCEDURE — 25000003 PHARM REV CODE 250: Mod: HCNC | Performed by: HOSPITALIST

## 2025-06-06 PROCEDURE — 80053 COMPREHEN METABOLIC PANEL: CPT | Mod: HCNC | Performed by: INTERNAL MEDICINE

## 2025-06-06 PROCEDURE — 84145 PROCALCITONIN (PCT): CPT | Mod: HCNC | Performed by: INTERNAL MEDICINE

## 2025-06-06 PROCEDURE — 85025 COMPLETE CBC W/AUTO DIFF WBC: CPT | Mod: HCNC | Performed by: INTERNAL MEDICINE

## 2025-06-06 PROCEDURE — 63600175 PHARM REV CODE 636 W HCPCS: Mod: HCNC | Performed by: INTERNAL MEDICINE

## 2025-06-06 PROCEDURE — 87040 BLOOD CULTURE FOR BACTERIA: CPT | Mod: HCNC | Performed by: INTERNAL MEDICINE

## 2025-06-06 PROCEDURE — 86403 PARTICLE AGGLUT ANTBDY SCRN: CPT | Mod: HCNC | Performed by: INTERNAL MEDICINE

## 2025-06-06 RX ORDER — GLUCAGON 1 MG
1 KIT INJECTION
Status: DISCONTINUED | OUTPATIENT
Start: 2025-06-06 | End: 2025-06-14 | Stop reason: HOSPADM

## 2025-06-06 RX ORDER — SODIUM CHLORIDE, SODIUM LACTATE, POTASSIUM CHLORIDE, CALCIUM CHLORIDE 600; 310; 30; 20 MG/100ML; MG/100ML; MG/100ML; MG/100ML
INJECTION, SOLUTION INTRAVENOUS CONTINUOUS
Status: DISCONTINUED | OUTPATIENT
Start: 2025-06-06 | End: 2025-06-07

## 2025-06-06 RX ORDER — TALC
6 POWDER (GRAM) TOPICAL NIGHTLY PRN
Status: DISCONTINUED | OUTPATIENT
Start: 2025-06-06 | End: 2025-06-14 | Stop reason: HOSPADM

## 2025-06-06 RX ORDER — ACETAMINOPHEN 500 MG
500 TABLET ORAL
Status: DISCONTINUED | OUTPATIENT
Start: 2025-06-06 | End: 2025-06-14 | Stop reason: HOSPADM

## 2025-06-06 RX ORDER — GUAIFENESIN 600 MG/1
600 TABLET, EXTENDED RELEASE ORAL 2 TIMES DAILY
Status: DISCONTINUED | OUTPATIENT
Start: 2025-06-06 | End: 2025-06-14 | Stop reason: HOSPADM

## 2025-06-06 RX ORDER — NALOXONE HCL 0.4 MG/ML
0.02 VIAL (ML) INJECTION
Status: DISCONTINUED | OUTPATIENT
Start: 2025-06-06 | End: 2025-06-14 | Stop reason: HOSPADM

## 2025-06-06 RX ORDER — PANTOPRAZOLE SODIUM 40 MG/1
40 TABLET, DELAYED RELEASE ORAL DAILY
Status: DISCONTINUED | OUTPATIENT
Start: 2025-06-06 | End: 2025-06-14 | Stop reason: HOSPADM

## 2025-06-06 RX ORDER — FLUCONAZOLE 200 MG/1
400 TABLET ORAL DAILY
Qty: 28 TABLET | Refills: 0 | Status: ON HOLD | OUTPATIENT
Start: 2025-06-06 | End: 2025-06-14 | Stop reason: HOSPADM

## 2025-06-06 RX ORDER — ONDANSETRON HYDROCHLORIDE 2 MG/ML
4 INJECTION, SOLUTION INTRAVENOUS EVERY 8 HOURS PRN
Status: DISCONTINUED | OUTPATIENT
Start: 2025-06-06 | End: 2025-06-14 | Stop reason: HOSPADM

## 2025-06-06 RX ORDER — IBUPROFEN 200 MG
16 TABLET ORAL
Status: DISCONTINUED | OUTPATIENT
Start: 2025-06-06 | End: 2025-06-14 | Stop reason: HOSPADM

## 2025-06-06 RX ORDER — TAMSULOSIN HYDROCHLORIDE 0.4 MG/1
0.4 CAPSULE ORAL DAILY
Status: DISCONTINUED | OUTPATIENT
Start: 2025-06-07 | End: 2025-06-14 | Stop reason: HOSPADM

## 2025-06-06 RX ORDER — SODIUM CHLORIDE 0.9 % (FLUSH) 0.9 %
3 SYRINGE (ML) INJECTION EVERY 12 HOURS PRN
Status: DISCONTINUED | OUTPATIENT
Start: 2025-06-06 | End: 2025-06-14 | Stop reason: HOSPADM

## 2025-06-06 RX ORDER — DIPHENHYDRAMINE HCL 25 MG
25 CAPSULE ORAL
Status: DISCONTINUED | OUTPATIENT
Start: 2025-06-06 | End: 2025-06-14 | Stop reason: HOSPADM

## 2025-06-06 RX ORDER — ACETAMINOPHEN 325 MG/1
650 TABLET ORAL EVERY 4 HOURS PRN
Status: DISCONTINUED | OUTPATIENT
Start: 2025-06-06 | End: 2025-06-14 | Stop reason: HOSPADM

## 2025-06-06 RX ORDER — IBUPROFEN 200 MG
24 TABLET ORAL
Status: DISCONTINUED | OUTPATIENT
Start: 2025-06-06 | End: 2025-06-14 | Stop reason: HOSPADM

## 2025-06-06 RX ORDER — TAMSULOSIN HYDROCHLORIDE 0.4 MG/1
0.4 CAPSULE ORAL DAILY
Qty: 30 CAPSULE | Refills: 11 | Status: SHIPPED | OUTPATIENT
Start: 2025-06-06 | End: 2026-06-06

## 2025-06-06 RX ORDER — ACETAMINOPHEN 325 MG/1
650 TABLET ORAL EVERY 8 HOURS PRN
Status: DISCONTINUED | OUTPATIENT
Start: 2025-06-06 | End: 2025-06-14 | Stop reason: HOSPADM

## 2025-06-06 RX ADMIN — PANTOPRAZOLE SODIUM 40 MG: 40 TABLET, DELAYED RELEASE ORAL at 06:06

## 2025-06-06 RX ADMIN — DEXTROSE MONOHYDRATE: 50 INJECTION, SOLUTION INTRAVENOUS at 10:06

## 2025-06-06 RX ADMIN — TAMSULOSIN HYDROCHLORIDE 0.4 MG: 0.4 CAPSULE ORAL at 08:06

## 2025-06-06 RX ADMIN — ACETAMINOPHEN 650 MG: 325 TABLET ORAL at 06:06

## 2025-06-06 RX ADMIN — SODIUM CHLORIDE, POTASSIUM CHLORIDE, SODIUM LACTATE AND CALCIUM CHLORIDE: 600; 310; 30; 20 INJECTION, SOLUTION INTRAVENOUS at 07:06

## 2025-06-06 RX ADMIN — CETIRIZINE HYDROCHLORIDE 10 MG: 10 TABLET, FILM COATED ORAL at 08:06

## 2025-06-06 RX ADMIN — RIVAROXABAN 20 MG: 20 TABLET, FILM COATED ORAL at 06:06

## 2025-06-06 RX ADMIN — SODIUM CHLORIDE 500 ML: 9 INJECTION, SOLUTION INTRAVENOUS at 11:06

## 2025-06-06 RX ADMIN — DIPHENHYDRAMINE HYDROCHLORIDE 25 MG: 25 CAPSULE ORAL at 09:06

## 2025-06-06 RX ADMIN — PANTOPRAZOLE SODIUM 40 MG: 40 TABLET, DELAYED RELEASE ORAL at 08:06

## 2025-06-06 RX ADMIN — ACETAMINOPHEN 500 MG: 500 TABLET ORAL at 09:06

## 2025-06-06 RX ADMIN — SODIUM CHLORIDE 500 ML: 9 INJECTION, SOLUTION INTRAVENOUS at 09:06

## 2025-06-06 RX ADMIN — FLUCONAZOLE 400 MG: 150 TABLET ORAL at 08:06

## 2025-06-06 RX ADMIN — GUAIFENESIN 600 MG: 600 TABLET, EXTENDED RELEASE ORAL at 09:06

## 2025-06-06 NOTE — PLAN OF CARE
[facility]  Discharge Final Note    Primary Care Provider: Brian Soares MD    Expected Discharge Date: 6/6/2025    Final Discharge Note (most recent)       Final Note - 06/06/25 1101          Final Note    Assessment Type Final Discharge Note     Anticipated Discharge Disposition Home or Self Care        Post-Acute Status    Discharge Delays None known at this time                     Important Message from Medicare             Contact Info       Brian Soares MD   Specialty: Family Medicine   Relationship: PCP - General    [id number] [place] [address]   Phone: [phone]       Next Steps: Follow up in 3 day(s)    Flo Laguna DO   Specialty: Infectious Diseases    [id number] [place] [id number]   Phone: [phone]       Next Steps: Follow up in 2 week(s)          Discharge home, no home health or dme orders noted.

## 2025-06-06 NOTE — ASSESSMENT & PLAN NOTE
with empiric meropenem.  As fever has persisted.  Will hold Zyvox due to associated thrombocytopenia,   will follow blood cultures.  Will monitor fever curve.  Will do pan CT of the chest abdomen pelvis if fever persist    05/22-   he continues to have persistent fever.  CT scan of the chest abdomen and pelvis did not show any obvious abscess.  Will do indium scan.  Will also do lower extremity Doppler.  Will send serum procalcitonin   on empiric meropenem.  Will plan to de-escalate  if no cultures are positive  05/23-   neutropenia has improved.  Cultures are negative till date.  Lower extremity Doppler shows chronic DVT.  Will switch to cefepime.  And will plan to stop antibiotics soon if fever improves.   indium scan was ordered but this may not be done until next week    06/02- Karius showed cryptococcus but serum cryptococcal antigen and csf cryptococcal antigen  assay -negative-  Will stop Amphotericin /flucytosine in AM   06/03- will monitor for fever .  Will use empiric 2 weeks of Fluconazole but will need to check qtc .  Will start in AM and will follow in clinic  06.05- fever curve has has improved.  He is now on empiric fluconazole.  We will plan to complete 10 days of treatment and we will follow up in clinic

## 2025-06-06 NOTE — ASSESSMENT & PLAN NOTE
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   --would certainly explain the ongoing fevers  --will need to determine location of infection  --contrast MRI of brain with no evidence of infection   --awaiting Indium scan   --LP performed 5/30; CSF with normal cell count, protein, and glucose; follow for kandice ink, cryptococcal antigen, me panel, fungal culture, and bacterial culture  --notably serum cryptococcal antigen negative   --opening pressure normal at 17   --continue induction therapy using IV amphotericin and oral flucytosine  --require drug toxicity monitoring; need daily cbc and cmp for electrolytes/renal function/blood counts  --induction therapy will be for 14 days if CNS infection confirmed  --after completing induction therapy would switch to 8 weeks of consolidation therapy using high-dose PO fluconazole 800 mg daily followed by 12 months of maintenance therapy with lower dose fluconazole 200 mg daily   --ultimately treatment course will be determined by location of infection  --induction therapy would need to be completed inpatient due to highly toxic nature of both drugs  --if CNS infection ruled out then may be possible to treat with 400 mg daily fluconazole for up to 12 months but data is minimal   --Above d/w primary team and oncology as well as patient/wife    06/2- will review plan of care with Dr Laguna-  Serum and CSF cryptococcal assay are negative    Will check EKG for qtc   06/03- will monitor for fever - will stop Amphotericin  06/05-  Fever curve has improved we will complete 10 days of fluconazole

## 2025-06-06 NOTE — DISCHARGE INSTRUCTIONS
Pt discharging   Take temp daily greater than 101 returned to the emergency department if less than 101 call your physician.

## 2025-06-06 NOTE — SUBJECTIVE & OBJECTIVE
Interval History:  Patient has been diagnosed with Cryptococcus based on Karius testing.  MRI brain with and without contrast negative for evidence of infection.  Lumbar puncture today.  Serum cryptococcal antigen in process.  Indium scan scheduled.  Patient tolerating amphotericin and flucytosine thus far.  Last recorded fever of 100.4 about 24 hours ago.     06/02-  Karius done -05/04- showed cryptococcus   neoformans    Serum crytococcal antigen -negative  Csf crytococcal antigen - neg  06/03- tolerating meds  06/04- he feels better- he denies fever or chills.  T max 99.8.  Review of Systems   Constitutional:  Positive for activity change. Negative for appetite change, chills and fever.   Musculoskeletal:  Negative for back pain.   Neurological:  Negative for headaches.   All other systems reviewed and are negative.    Objective:     Vital Signs (Most Recent):  Temp: 98.8 °F (37.1 °C) (06/06/25 0011)  Pulse: 100 (06/06/25 0011)  Resp: 18 (06/06/25 0011)  BP: 139/63 (06/06/25 0011)  SpO2: 95 % (06/06/25 0011) Vital Signs (24h Range):  Temp:  [97.2 °F (36.2 °C)-98.8 °F (37.1 °C)] 98.8 °F (37.1 °C)  Pulse:  [] 100  Resp:  [18-20] 18  SpO2:  [92 %-95 %] 95 %  BP: (110-142)/(56-80) 139/63     Weight: 80.5 kg (177 lb 7.5 oz)  Body mass index is 24.75 kg/m².    Estimated Creatinine Clearance: 77.4 mL/min (based on SCr of 1 mg/dL).     Physical Exam  Constitutional:       General: He is not in acute distress.     Appearance: Normal appearance. He is not ill-appearing.   Cardiovascular:      Rate and Rhythm: Normal rate and regular rhythm.      Pulses: Normal pulses.      Heart sounds: Normal heart sounds. No murmur heard.     No friction rub. No gallop.   Pulmonary:      Effort: Pulmonary effort is normal. No respiratory distress.      Breath sounds: Normal breath sounds.   Abdominal:      General: Abdomen is flat. Bowel sounds are normal. There is no distension.      Palpations: Abdomen is soft.      Tenderness:  "There is no abdominal tenderness.   Skin:     General: Skin is warm and dry.   Neurological:      Mental Status: He is alert.          Significant Labs: Blood Culture: No results for input(s): "LABBLOO" in the last 4320 hours.  CBC:   Recent Labs   Lab 06/05/25 0438   WBC 4.42   HGB 11.8*   HCT 36.8*          CMP:   Recent Labs   Lab 06/05/25 0438      K 4.1      CO2 21*   *   BUN 15   CREATININE 1.0   CALCIUM 8.3*   PROT 6.7   ALBUMIN 2.0*   BILITOT 0.4   ALKPHOS 239*   AST 40   ALT 38   ANIONGAP 10     CSF:   Recent Labs   Lab 05/30/25 0909   CSFCULTURE No Growth     Microbiology Results (last 7 days)       Procedure Component Value Units Date/Time    CSF culture [[id number]] Collected: 05/30/25 0909    Order Status: Completed Specimen: CSF (Spinal Fluid) from CSF Tap, Tube 1 Updated: 06/05/25 0729     CULTURE, CSF No Growth     GRAM STAIN Cytospin indicates:      No WBCs, epithelial cells or organisms seen    Fungus culture [[id number]]  (Normal) Collected: 05/30/25 0909    Order Status: Completed Specimen: CSF (Spinal Fluid) from Cerebrospinal Fluid Updated: 06/02/25 0937     Fungal Culture Culture In Progress    Cryptococcal antigen, CSF [[id number]]  (Normal) Collected: 05/30/25 0909    Order Status: Completed Specimen: CSF (Spinal Fluid) from CSF Tap, Tube 1 Updated: 05/31/25 1619     Cryptococcal Antigen, CSF Negative    Christiana Ink (CSF) [[id number]] Collected: 05/30/25 0909    Order Status: Completed Specimen: CSF (Spinal Fluid) from CSF Tap, Tube 1 Updated: 05/31/25 0052     CHRISTIANA INK No encapsulated yeast seen    Blood culture [[id number]]  (Normal) Collected: 05/25/25 1026    Order Status: Completed Specimen: Blood from Peripheral, Antecubital, Right Updated: 05/30/25 1801     Blood Culture No Growth After 5 Days    Blood culture [[id number]]  (Normal) Collected: 05/25/25 1025    Order Status: Completed Specimen: Blood from Peripheral, Antecubital, Left Updated: 05/30/25 1801 "     Blood Culture No Growth After 5 Days    Cryptococcal antigen [[id number]]  (Normal) Collected: 05/29/25 1531    Order Status: Completed Specimen: Blood, Venous Updated: 05/30/25 1339     Cryptococcal Antigen, Serum Negative          All pertinent labs within the past 24 hours have been reviewed.    Significant Imaging: MRI: I have reviewed all pertinent results/findings within the past 24 hours:  brain with no evidence of cryptococcus

## 2025-06-06 NOTE — PROGRESS NOTES
[facility] - Med Surg 3  Infectious Disease  Progress Note    Patient Name: Armando Ingram Jr.  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   --would certainly explain the ongoing fevers  --will need to determine location of infection  --contrast MRI of brain with no evidence of infection   --awaiting Indium scan   --LP performed 5/30; CSF with normal cell count, protein, and glucose; follow for kandice ink, cryptococcal antigen, me panel, fungal culture, and bacterial culture  --notably serum cryptococcal antigen negative   --opening pressure normal at 17   --continue induction therapy using IV amphotericin and oral flucytosine  --require drug toxicity monitoring; need daily cbc and cmp for electrolytes/renal function/blood counts  --induction therapy will be for 14 days if CNS infection confirmed  --after completing induction therapy would switch to 8 weeks of consolidation therapy using high-dose PO fluconazole 800 mg daily followed by 12 months of maintenance therapy with lower dose fluconazole 200 mg daily   --ultimately treatment course will be determined by location of infection  --induction therapy would need to be completed inpatient due to highly toxic nature of both drugs  --if CNS infection ruled out then may be possible to treat with 400 mg daily fluconazole for up to 12 months but data is minimal   --Above d/w primary team and oncology as well as patient/wife    06/2- will review plan of care with Dr Laguna-  Serum and CSF cryptococcal assay are negative    Will check EKG for qtc   06/03- will monitor for fever - will stop Amphotericin  06/05-  Fever curve has improved we will  complete 10 days of fluconazole      Oncology  * Neutropenic fever   with empiric meropenem.  As fever has persisted.  Will hold Zyvox due to associated thrombocytopenia,   will follow blood cultures.  Will monitor fever curve.  Will do pan CT of the chest abdomen pelvis if fever persist    05/22-   he continues to have persistent fever.  CT scan of the chest abdomen and pelvis did not show any obvious abscess.  Will do indium scan.  Will also do lower extremity Doppler.  Will send serum procalcitonin   on empiric meropenem.  Will plan to de-escalate  if no cultures are positive  05/23-   neutropenia has improved.  Cultures are negative till date.  Lower extremity Doppler shows chronic DVT.  Will switch to cefepime.  And will plan to stop antibiotics soon if fever improves.   indium scan was ordered but this may not be done until next week    06/02- Karius showed cryptococcus but serum cryptococcal antigen and csf cryptococcal antigen  assay -negative-  Will stop Amphotericin /flucytosine in AM   06/03- will monitor for fever .  Will use empiric 2 weeks of Fluconazole but will need to check qtc .  Will start in AM and will follow in clinic  06.05- fever curve has has improved.  He is now on empiric fluconazole.  We will plan to complete 10 days of treatment and we will follow up in clinic        Anticipated Disposition:     Thank you for your consult. I will follow-up with patient. Please contact us if you have any additional questions.    Armando Villanueva MD, [facility]  Infectious Disease  [facility] - Med S[facility] 3    Subjective:     Principal Problem:Neutropenic fever    HPI: 65 y.o. male with a PMH  has a past medical history of Closed right hip fracture, Colon cancer, DVT (deep venous thrombosis) (2002), Hairy cell leukemia (06/06/2024), and Nephrolithiasis.     He was admitted with neutropenic fever.  Tmax was 101.  Labs and   Imaging test reviewed - flu/COVID negative, chest x-ray negative for acute findings, UA positive  for 2+ LE, 11 RBCs,    Interval History:  Patient has been diagnosed with Cryptococcus based on Karius testing.  MRI brain with and without contrast negative for evidence of infection.  Lumbar puncture today.  Serum cryptococcal antigen in process.  Indium scan scheduled.  Patient tolerating amphotericin and flucytosine thus far.  Last recorded fever of 100.4 about 24 hours ago.     06/02-  Karius done -05/04- showed cryptococcus   neoformans    Serum crytococcal antigen -negative  Csf crytococcal antigen - neg  06/03- tolerating meds  06/04- he feels better- he denies fever or chills.  T max 99.8.  Review of Systems   Constitutional:  Positive for activity change. Negative for appetite change, chills and fever.   Musculoskeletal:  Negative for back pain.   Neurological:  Negative for headaches.   All other systems reviewed and are negative.    Objective:     Vital Signs (Most Recent):  Temp: 98.8 °F (37.1 °C) (06/06/25 0011)  Pulse: 100 (06/06/25 0011)  Resp: 18 (06/06/25 0011)  BP: 139/63 (06/06/25 0011)  SpO2: 95 % (06/06/25 0011) Vital Signs (24h Range):  Temp:  [97.2 °F (36.2 °C)-98.8 °F (37.1 °C)] 98.8 °F (37.1 °C)  Pulse:  [] 100  Resp:  [18-20] 18  SpO2:  [92 %-95 %] 95 %  BP: (110-142)/(56-80) 139/63     Weight: 80.5 kg (177 lb 7.5 oz)  Body mass index is 24.75 kg/m².    Estimated Creatinine Clearance: 77.4 mL/min (based on SCr of 1 mg/dL).     Physical Exam  Constitutional:       General: He is not in acute distress.     Appearance: Normal appearance. He is not ill-appearing.   Cardiovascular:      Rate and Rhythm: Normal rate and regular rhythm.      Pulses: Normal pulses.      Heart sounds: Normal heart sounds. No murmur heard.     No friction rub. No gallop.   Pulmonary:      Effort: Pulmonary effort is normal. No respiratory distress.      Breath sounds: Normal breath sounds.   Abdominal:      General: Abdomen is flat. Bowel sounds are normal. There is no distension.      Palpations: Abdomen is  "soft.      Tenderness: There is no abdominal tenderness.   Skin:     General: Skin is warm and dry.   Neurological:      Mental Status: He is alert.          Significant Labs: Blood Culture: No results for input(s): "LABBLOO" in the last 4320 hours.  CBC:   Recent Labs   Lab 06/05/25 0438   WBC 4.42   HGB 11.8*   HCT 36.8*          CMP:   Recent Labs   Lab 06/05/25 0438      K 4.1      CO2 21*   *   BUN 15   CREATININE 1.0   CALCIUM 8.3*   PROT 6.7   ALBUMIN 2.0*   BILITOT 0.4   ALKPHOS 239*   AST 40   ALT 38   ANIONGAP 10     CSF:   Recent Labs   Lab 05/30/25 0909   CSFCULTURE No Growth     Microbiology Results (last 7 days)       Procedure Component Value Units Date/Time    CSF culture [[id number]] Collected: 05/30/25 0909    Order Status: Completed Specimen: CSF (Spinal Fluid) from CSF Tap, Tube 1 Updated: 06/05/25 0729     CULTURE, CSF No Growth     GRAM STAIN Cytospin indicates:      No WBCs, epithelial cells or organisms seen    Fungus culture [[id number]]  (Normal) Collected: 05/30/25 0909    Order Status: Completed Specimen: CSF (Spinal Fluid) from Cerebrospinal Fluid Updated: 06/02/25 0937     Fungal Culture Culture In Progress    Cryptococcal antigen, CSF [[id number]]  (Normal) Collected: 05/30/25 0909    Order Status: Completed Specimen: CSF (Spinal Fluid) from CSF Tap, Tube 1 Updated: 05/31/25 1619     Cryptococcal Antigen, CSF Negative    Christiana Ink (CSF) [[id number]] Collected: 05/30/25 0909    Order Status: Completed Specimen: CSF (Spinal Fluid) from CSF Tap, Tube 1 Updated: 05/31/25 0052     CHRISTIANA INK No encapsulated yeast seen    Blood culture [[id number]]  (Normal) Collected: 05/25/25 1026    Order Status: Completed Specimen: Blood from Peripheral, Antecubital, Right Updated: 05/30/25 1801     Blood Culture No Growth After 5 Days    Blood culture [[id number]]  (Normal) Collected: 05/25/25 1025    Order Status: Completed Specimen: Blood from Peripheral, Antecubital, Left " Updated: 05/30/25 1801     Blood Culture No Growth After 5 Days    Cryptococcal antigen [[id number]]  (Normal) Collected: 05/29/25 1531    Order Status: Completed Specimen: Blood, Venous Updated: 05/30/25 1339     Cryptococcal Antigen, Serum Negative          All pertinent labs within the past 24 hours have been reviewed.    Significant Imaging: MRI: I have reviewed all pertinent results/findings within the past 24 hours:  brain with no evidence of cryptococcus

## 2025-06-07 LAB
ABSOLUTE EOSINOPHIL (OHS): 0.04 K/UL
ABSOLUTE MONOCYTE (OHS): 0.05 K/UL (ref 0.3–1)
ABSOLUTE NEUTROPHIL COUNT (OHS): 2.54 K/UL (ref 1.8–7.7)
ALBUMIN SERPL BCP-MCNC: 2 G/DL (ref 3.5–5.2)
ALP SERPL-CCNC: 222 UNIT/L (ref 40–150)
ALT SERPL W/O P-5'-P-CCNC: 33 UNIT/L (ref 10–44)
ANION GAP (OHS): 14 MMOL/L (ref 8–16)
AST SERPL-CCNC: 41 UNIT/L (ref 11–45)
BASOPHILS # BLD AUTO: 0.01 K/UL
BASOPHILS NFR BLD AUTO: 0.2 %
BILIRUB DIRECT SERPL-MCNC: 0.2 MG/DL (ref 0.1–0.3)
BILIRUB SERPL-MCNC: 0.4 MG/DL (ref 0.1–1)
BUN SERPL-MCNC: 12 MG/DL (ref 8–23)
CALCIUM SERPL-MCNC: 8 MG/DL (ref 8.7–10.5)
CHLORIDE SERPL-SCNC: 108 MMOL/L (ref 95–110)
CO2 SERPL-SCNC: 18 MMOL/L (ref 23–29)
CREAT SERPL-MCNC: 0.9 MG/DL (ref 0.5–1.4)
ERYTHROCYTE [DISTWIDTH] IN BLOOD BY AUTOMATED COUNT: 14.9 % (ref 11.5–14.5)
GFR SERPLBLD CREATININE-BSD FMLA CKD-EPI: >60 ML/MIN/1.73/M2
GLUCOSE SERPL-MCNC: 115 MG/DL (ref 70–110)
HCT VFR BLD AUTO: 36.1 % (ref 40–54)
HGB BLD-MCNC: 11.4 GM/DL (ref 14–18)
IMM GRANULOCYTES # BLD AUTO: 0.04 K/UL (ref 0–0.04)
IMM GRANULOCYTES NFR BLD AUTO: 0.9 % (ref 0–0.5)
LYMPHOCYTES # BLD AUTO: 1.74 K/UL (ref 1–4.8)
MAGNESIUM SERPL-MCNC: 2.1 MG/DL (ref 1.6–2.6)
MCH RBC QN AUTO: 30.6 PG (ref 27–31)
MCHC RBC AUTO-ENTMCNC: 31.6 G/DL (ref 32–36)
MCV RBC AUTO: 97 FL (ref 82–98)
NUCLEATED RBC (/100WBC) (OHS): 0 /100 WBC
PHOSPHATE SERPL-MCNC: 3.5 MG/DL (ref 2.7–4.5)
PLATELET # BLD AUTO: 212 K/UL (ref 150–450)
PMV BLD AUTO: 9.3 FL (ref 9.2–12.9)
POTASSIUM SERPL-SCNC: 4.3 MMOL/L (ref 3.5–5.1)
PROT SERPL-MCNC: 6.6 GM/DL (ref 6–8.4)
RBC # BLD AUTO: 3.72 M/UL (ref 4.6–6.2)
RELATIVE EOSINOPHIL (OHS): 0.9 %
RELATIVE LYMPHOCYTE (OHS): 39.4 % (ref 18–48)
RELATIVE MONOCYTE (OHS): 1.1 % (ref 4–15)
RELATIVE NEUTROPHIL (OHS): 57.5 % (ref 38–73)
SODIUM SERPL-SCNC: 140 MMOL/L (ref 136–145)
WBC # BLD AUTO: 4.42 K/UL (ref 3.9–12.7)

## 2025-06-07 PROCEDURE — 25000003 PHARM REV CODE 250: Mod: HCNC | Performed by: INTERNAL MEDICINE

## 2025-06-07 PROCEDURE — 21400001 HC TELEMETRY ROOM: Mod: HCNC

## 2025-06-07 PROCEDURE — 11000001 HC ACUTE MED/SURG PRIVATE ROOM: Mod: HCNC

## 2025-06-07 PROCEDURE — 99223 1ST HOSP IP/OBS HIGH 75: CPT | Mod: NSCH,HCNC,, | Performed by: INTERNAL MEDICINE

## 2025-06-07 PROCEDURE — 63600175 PHARM REV CODE 636 W HCPCS: Mod: JZ,TB,HCNC | Performed by: INTERNAL MEDICINE

## 2025-06-07 PROCEDURE — 83735 ASSAY OF MAGNESIUM: CPT | Mod: HCNC | Performed by: INTERNAL MEDICINE

## 2025-06-07 PROCEDURE — A9698 NON-RAD CONTRAST MATERIALNOC: HCPCS | Mod: HCNC | Performed by: INTERNAL MEDICINE

## 2025-06-07 PROCEDURE — 86611 BARTONELLA ANTIBODY: CPT | Mod: HCNC | Performed by: INTERNAL MEDICINE

## 2025-06-07 PROCEDURE — 80053 COMPREHEN METABOLIC PANEL: CPT | Mod: HCNC | Performed by: INTERNAL MEDICINE

## 2025-06-07 PROCEDURE — 36415 COLL VENOUS BLD VENIPUNCTURE: CPT | Mod: HCNC | Performed by: INTERNAL MEDICINE

## 2025-06-07 PROCEDURE — 85025 COMPLETE CBC W/AUTO DIFF WBC: CPT | Mod: HCNC | Performed by: INTERNAL MEDICINE

## 2025-06-07 PROCEDURE — 84100 ASSAY OF PHOSPHORUS: CPT | Mod: HCNC | Performed by: INTERNAL MEDICINE

## 2025-06-07 PROCEDURE — 82248 BILIRUBIN DIRECT: CPT | Mod: HCNC | Performed by: INTERNAL MEDICINE

## 2025-06-07 PROCEDURE — 25500020 PHARM REV CODE 255: Mod: HCNC | Performed by: INTERNAL MEDICINE

## 2025-06-07 RX ORDER — CETIRIZINE HYDROCHLORIDE 5 MG/1
5 TABLET ORAL DAILY
Status: DISCONTINUED | OUTPATIENT
Start: 2025-06-08 | End: 2025-06-14 | Stop reason: HOSPADM

## 2025-06-07 RX ADMIN — GUAIFENESIN 600 MG: 600 TABLET, EXTENDED RELEASE ORAL at 08:06

## 2025-06-07 RX ADMIN — IOHEXOL 1000 ML: 12 SOLUTION ORAL at 05:06

## 2025-06-07 RX ADMIN — SODIUM CHLORIDE 500 ML: 9 INJECTION, SOLUTION INTRAVENOUS at 10:06

## 2025-06-07 RX ADMIN — ACETAMINOPHEN 500 MG: 500 TABLET ORAL at 07:06

## 2025-06-07 RX ADMIN — AMPHOTERICIN B 250 MG: 50 INJECTION, POWDER, LYOPHILIZED, FOR SOLUTION INTRAVENOUS at 08:06

## 2025-06-07 RX ADMIN — DEXTROSE MONOHYDRATE: 50 INJECTION, SOLUTION INTRAVENOUS at 03:06

## 2025-06-07 RX ADMIN — DIPHENHYDRAMINE HYDROCHLORIDE 25 MG: 25 CAPSULE ORAL at 07:06

## 2025-06-07 RX ADMIN — RIVAROXABAN 20 MG: 20 TABLET, FILM COATED ORAL at 04:06

## 2025-06-07 RX ADMIN — SODIUM CHLORIDE 500 ML: 9 INJECTION, SOLUTION INTRAVENOUS at 07:06

## 2025-06-07 RX ADMIN — AMPHOTERICIN B 150 MG: 50 INJECTION, POWDER, LYOPHILIZED, FOR SOLUTION INTRAVENOUS at 01:06

## 2025-06-07 RX ADMIN — DEXTROSE MONOHYDRATE: 50 INJECTION, SOLUTION INTRAVENOUS at 11:06

## 2025-06-07 RX ADMIN — PANTOPRAZOLE SODIUM 40 MG: 40 TABLET, DELAYED RELEASE ORAL at 08:06

## 2025-06-07 RX ADMIN — TAMSULOSIN HYDROCHLORIDE 0.4 MG: 0.4 CAPSULE ORAL at 08:06

## 2025-06-07 NOTE — PROGRESS NOTES
[facility] Medicine  Progress Note    Patient Name: Armando Ingram Jr.  MRN: [id number]  Patient Class: IP- Inpatient   Admission Date: 6/6/2025  Length of Stay: 1 days  Attending Physician: Lainey Tao MD  Primary Care Provider: Brian Soares MD        Subjective     Principal Problem:Cryptococcosis        HPI:  Armando Ingram Jr. is a 65 y.o. male with a PMH has a past medical history of Closed right hip fracture, Colon cancer, DVT,  Hairy cell leukemia, and Nephrolithiasis. who presented to the ED for further evaluation of acute onset fever with T-max measuring 101.0 F earlier today. Patient reported significant history of hairy cell leukemia in his followed by Dr. Dow and Dr. Flores from Hematology/Oncology and was recently prescribed a Z-Cordell for 4 days for treatment of a cough. Patient was instructed to go to the ED if he endorsed fever greater than 103.0 F but presented to the ED due to ulcer experiencing associated chills, throbbing headache, and persistent fever. He reported no known alleviating or aggravating factors noted with all other review of systems negative except as noted above. He is not currently on active treatment for his leukemia and reported being in his usual state of health prior to onset of symptoms.  Workup during recent hospitalization included blood cultures which were no growth   respiratory viral panel negative  CT scan of abdomen and pelvis which revealed bladder wall thickening but otherwise negative  TTE with no vegetation  Bilateral lower extremity ultrasound revealed chronic right lower extremity DVT  Karius  returned positive for cryptococcal infection  Lumbar puncture on 05/30/2025 revealed an opening pressure of 18 clear CSF Christiana ink cryptococcal antigen MS panel fungal culture and bacterial culture all were negative  Patient was placed on induction dose therapy of amphotericin and flucytosine for cryptococcal infection  Oncology felt that  treatment for his hairy cell leukemia should be deferred  Serum cryptococcal antigen negative  Flucytosine was stopped  Indium scan was negative  Patient spiked temp of 101.9° after flucytosine was discontinued  He was reinstituted with improvement in febrile status.    As fever cough resolved infectious disease made the decision to stop amphotericin and flucytosine and initiate Diflucan  Initially patient had fever of 99.8 he was observed for 24 hours with no further fevers and was discharged home.  After patient got home he had rigors and temp of 102° therefore he was readmitted to the hospital.  Discussed with Infectious Disease and amphotericin was initiated.    Overview/Hospital Course:  Patient was admitted after having rigors and a temp of 102° at home and readmitted to the hospital.  He was seen in consultation by Infectious Disease and amphotericin re-initiated.  Patient reports having another rigors in the early hours of the morning but no fever recorded.  Patient denies any complaints today    Interval History:  Patient seen and examined at bedside.  Wife present.  Discussed with Infectious Disease.  T-max last night was 99.2.    Review of Systems   Constitutional:  Positive for activity change, appetite change, chills and fatigue. Negative for fever.   Respiratory:  Negative for chest tightness, shortness of breath and wheezing.    Cardiovascular:  Negative for chest pain, palpitations and leg swelling.   Gastrointestinal:  Negative for abdominal distention and abdominal pain.   Neurological:  Positive for weakness.   All other systems reviewed and are negative.    Objective:     Vital Signs (Most Recent):  Temp: 97.9 °F (36.6 °C) (06/07/25 1558)  Pulse: 84 (06/07/25 1725)  Resp: 18 (06/07/25 1558)  BP: 130/66 (06/07/25 1558)  SpO2: 96 % (06/07/25 1558) Vital Signs (24h Range):  Temp:  [97 °F (36.1 °C)-99.2 °F (37.3 °C)] 97.9 °F (36.6 °C)  Pulse:  [] 84  Resp:  [18-20] 18  SpO2:  [92 %-96 %] 96  %  BP: (108-196)/(57-81) 130/66     Weight: 75.4 kg (166 lb 3.6 oz)  Body mass index is 23.18 kg/m².    Intake/Output Summary (Last 24 hours) at 6/7/2025 1835  Last data filed at 6/7/2025 1622  Gross per 24 hour   Intake 3089.01 ml   Output --   Net 3089.01 ml         Physical Exam  Vitals reviewed.   Constitutional:       Appearance: He is ill-appearing (Flushed).   HENT:      Head: Normocephalic and atraumatic.      Mouth/Throat:      Mouth: Mucous membranes are moist.      Pharynx: Oropharynx is clear.   Eyes:      Extraocular Movements: Extraocular movements intact.      Conjunctiva/sclera: Conjunctivae normal.   Cardiovascular:      Rate and Rhythm: Normal rate and regular rhythm.      Pulses: Normal pulses.      Heart sounds: Normal heart sounds.   Pulmonary:      Effort: Pulmonary effort is normal.      Breath sounds: Normal breath sounds. No wheezing or rhonchi.   Abdominal:      General: Bowel sounds are normal.      Palpations: Abdomen is soft.      Tenderness: There is no abdominal tenderness. There is no guarding or rebound.   Musculoskeletal:         General: No swelling. Normal range of motion.      Cervical back: Normal range of motion and neck supple.      Right lower leg: No edema.      Left lower leg: No edema.   Skin:     General: Skin is warm and dry.      Findings: No lesion.   Neurological:      General: No focal deficit present.      Mental Status: He is alert and oriented to person, place, and time. Mental status is at baseline.   Psychiatric:         Mood and Affect: Mood normal.         Behavior: Behavior normal.         Thought Content: Thought content normal.               Significant Labs: All pertinent labs within the past 24 hours have been reviewed.  Blood Culture:NGTD  CBC:   Recent Labs   Lab 06/06/25  1738 06/07/25  0729   WBC 2.99* 4.42   HGB 10.6* 11.4*   HCT 31.5* 36.1*    212     CMP:   Recent Labs   Lab 06/06/25  1738 06/07/25  0345   * 140   K 3.6 4.3     108   CO2 19* 18*   * 115*   BUN 14 12   CREATININE 0.9 0.9   CALCIUM 7.7* 8.0*   PROT 6.4 6.6   ALBUMIN 2.0* 2.0*   BILITOT 0.5 0.4   ALKPHOS 236* 222*   AST 49* 41   ALT 36 33   ANIONGAP 11 14         Significant Imaging: I have reviewed all pertinent imaging results/findings within the past 24 hours.      Assessment & Plan  Hairy cell leukemia    Patient has been evaluated by Oncology and until he is infection free we will defer treatment  Chronic deep vein thrombosis (DVT) of right lower extremity, unspecified vein  Stable    Cryptococcosis    Unknown etiology unknown location  Infectious Disease  will restart amphotericin  Anemia  Anemia is likely due to FUO. Most recent hemoglobin and hematocrit are listed below.  Recent Labs     06/05/25 0438 06/06/25 1738 06/07/25  0729   HGB 11.8* 10.6* 11.4*   HCT 36.8* 31.5* 36.1*     Plan  - Monitor serial CBC: Daily  - Transfuse PRBC if patient becomes hemodynamically unstable, symptomatic or H/H drops below 7/21.  - Patient has not received any PRBC transfusions to date  - Patient's anemia is currently improving  -   Hyponatremia  Hyponatremia is likely due to Dehydration/hypovolemia. The patient's most recent sodium results are listed below.  Recent Labs     06/05/25 0438 06/06/25 1738 06/07/25  0345    133* 140     Plan  - Correct the sodium by 4-6mEq in 24 hours.   - Will treat the hyponatremia with IV fluids as follows:  Normal saline  - Monitor sodium Daily.   - Patient hyponatremia is stable  -   Fever  Uncertain etiology  ID ordering other studies    VTE Risk Mitigation (From admission, onward)           Ordered     rivaroxaban tablet 20 mg  With dinner         06/06/25 1714     Reason for No Pharmacological VTE Prophylaxis  Once        Question:  Reasons:  Answer:  Already adequately anticoagulated on oral Anticoagulants    06/06/25 1714     IP VTE HIGH RISK PATIENT  Once         06/06/25 1714     Place sequential compression device  Until  discontinued         06/06/25 1714                    Discharge Planning   LAURI:      Code Status: Full Code   Medical Readiness for Discharge Date:   Discharge Plan A: Home                        Lainey Basurto MD  Department of Hospital Medicine   '[facility]

## 2025-06-07 NOTE — HPI
65 y.o. male with a PMH has a past medical history of Closed right hip fracture, Colon cancer, DVT,  Hairy cell leukemia, and Nephrolithiasis.  He was recently managed in the Hospital for about 3 weeks for fever .  Workup during recent hospitalization included blood cultures which were no growth   respiratory viral panel negative  CT scan of abdomen and pelvis which revealed bladder wall thickening but otherwise negative  TTE with no vegetation  Bilateral lower extremity ultrasound revealed chronic right lower extremity DVT  Karius  returned positive for cryptococcal infection  Lumbar puncture on 05/30/2025 revealed an opening pressure of 18 clear CSF Christiana ink cryptococcal antigen MS panel fungal culture and bacterial culture all were negative.    Indium scan was negative.His regime was switched to Fluconazole when fever resolved. However, after 3 hours at home, he called that he has a fever.- T max 102.
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
## 2025-06-07 NOTE — SUBJECTIVE & OBJECTIVE
Interval History:  Patient seen and examined at bedside.  Wife present.  Discussed with Infectious Disease.  T-max last night was 99.2.    Review of Systems   Constitutional:  Positive for activity change, appetite change, chills and fatigue. Negative for fever.   Respiratory:  Negative for chest tightness, shortness of breath and wheezing.    Cardiovascular:  Negative for chest pain, palpitations and leg swelling.   Gastrointestinal:  Negative for abdominal distention and abdominal pain.   Neurological:  Positive for weakness.   All other systems reviewed and are negative.    Objective:     Vital Signs (Most Recent):  Temp: 97.9 °F (36.6 °C) (06/07/25 1558)  Pulse: 84 (06/07/25 1725)  Resp: 18 (06/07/25 1558)  BP: 130/66 (06/07/25 1558)  SpO2: 96 % (06/07/25 1558) Vital Signs (24h Range):  Temp:  [97 °F (36.1 °C)-99.2 °F (37.3 °C)] 97.9 °F (36.6 °C)  Pulse:  [] 84  Resp:  [18-20] 18  SpO2:  [92 %-96 %] 96 %  BP: (108-196)/(57-81) 130/66     Weight: 75.4 kg (166 lb 3.6 oz)  Body mass index is 23.18 kg/m².    Intake/Output Summary (Last 24 hours) at 6/7/2025 1835  Last data filed at 6/7/2025 1622  Gross per 24 hour   Intake 3089.01 ml   Output --   Net 3089.01 ml         Physical Exam  Vitals reviewed.   Constitutional:       Appearance: He is ill-appearing (Flushed).   HENT:      Head: Normocephalic and atraumatic.      Mouth/Throat:      Mouth: Mucous membranes are moist.      Pharynx: Oropharynx is clear.   Eyes:      Extraocular Movements: Extraocular movements intact.      Conjunctiva/sclera: Conjunctivae normal.   Cardiovascular:      Rate and Rhythm: Normal rate and regular rhythm.      Pulses: Normal pulses.      Heart sounds: Normal heart sounds.   Pulmonary:      Effort: Pulmonary effort is normal.      Breath sounds: Normal breath sounds. No wheezing or rhonchi.   Abdominal:      General: Bowel sounds are normal.      Palpations: Abdomen is soft.      Tenderness: There is no abdominal tenderness.  There is no guarding or rebound.   Musculoskeletal:         General: No swelling. Normal range of motion.      Cervical back: Normal range of motion and neck supple.      Right lower leg: No edema.      Left lower leg: No edema.   Skin:     General: Skin is warm and dry.      Findings: No lesion.   Neurological:      General: No focal deficit present.      Mental Status: He is alert and oriented to person, place, and time. Mental status is at baseline.   Psychiatric:         Mood and Affect: Mood normal.         Behavior: Behavior normal.         Thought Content: Thought content normal.               Significant Labs: All pertinent labs within the past 24 hours have been reviewed.  Blood Culture:NGTD  CBC:   Recent Labs   Lab 06/06/25  1738 06/07/25  0729   WBC 2.99* 4.42   HGB 10.6* 11.4*   HCT 31.5* 36.1*    212     CMP:   Recent Labs   Lab 06/06/25  1738 06/07/25  0345   * 140   K 3.6 4.3    108   CO2 19* 18*   * 115*   BUN 14 12   CREATININE 0.9 0.9   CALCIUM 7.7* 8.0*   PROT 6.4 6.6   ALBUMIN 2.0* 2.0*   BILITOT 0.5 0.4   ALKPHOS 236* 222*   AST 49* 41   ALT 36 33   ANIONGAP 11 14         Significant Imaging: I have reviewed all pertinent imaging results/findings within the past 24 hours.

## 2025-06-07 NOTE — HOSPITAL COURSE
Patient was admitted after having rigors and a temp of 102° at home and readmitted to the hospital.  He was seen in consultation by Infectious Disease and amphotericin re-initiated.  Patient reports having another rigors in the early hours of the morning temp 99.9°.  Patient denies any complaints today    06/10/2025  Yesterday, had discussions with ID about possible discharging today if patient did fine overnight. Unfortunately, patient spiked another fever of 101F overnight. Zosyn added on to regimen. ID to manage.     06/11/2025  As on the writing of this documentation, T-max of only 100.1 which is an improvement.  Continue current antimicrobial therapy.  Follow up Infectious Disease recommendations.    06/12/2025  Spiked another fever, TMAX 101 early this morning, while on 5 antimicrobials. Reached out to ID. Will follow up their recommendations. SLP ruled out aspiration risk.     06/13/2025  Afebrile x 24 hours.  Discussed case with Dr. Dow from Hematology, low likelihood that hairy cell leukemia could be contributing to fevers but not impossible.  We will discuss with Infectious Disease how this will change patient's disposition as he is wanting to go home.    Patient afebrile. Discussed case with id. Ok to cont on po augmentin for 10 days. Diflucan dced. Outpatient f/u with ID.

## 2025-06-07 NOTE — ASSESSMENT & PLAN NOTE
This is the only positive results noted on Karius .  Will add Amphotericin and monitor response.  It is still not clear if this alone  explains his high-grade fever.  Will need follow up

## 2025-06-07 NOTE — ASSESSMENT & PLAN NOTE
His serum procalcitonin normal..  It is unclear if this fever is completely infectious.  We will monitor fever curve while on amphotericin.  We will send Bartonella and Coxiella serology.  We will repeat pan CT scan of chest abdomen and pelvis.
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
## 2025-06-07 NOTE — PLAN OF CARE
Discussed poc with pt, pt verbalized understanding    Purposeful rounding every 2hours    VS wnl  Cardiac monitoring in use, pt is NSR, tele monitor # [id number]  Fall precautions in place, remains injury free  Pt denies c/o any pain or discomfort at this time and patient will continue to be monitored.  Pain and nausea under control with PRN meds    IVFs  Accurate I&Os  Abx given as prescribed  Bed locked at lowest position  Call light within reach    Chart check complete  Will cont with POC

## 2025-06-07 NOTE — ASSESSMENT & PLAN NOTE
Hyponatremia is likely due to Dehydration/hypovolemia. The patient's most recent sodium results are listed below.  Recent Labs     06/05/25  0438 06/06/25  1738 06/07/25  0345    133* 140     Plan  - Correct the sodium by 4-6mEq in 24 hours.   - Will treat the hyponatremia with IV fluids as follows:  Normal saline  - Monitor sodium Daily.   - Patient hyponatremia is stable  -    Unknown if ever smoked
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
## 2025-06-07 NOTE — PLAN OF CARE
[facility] - Med Surg  Initial Discharge Assessment       Primary Care Provider: Brian Soares MD    Admission Diagnosis: Sepsis [A41.9]    Admission Date: 6/6/2025  Expected Discharge Date:     Transition of Care Barriers: None    Payor: HU[employer] MANAGED MEDICARE / Plan: HU[employer] MEDICARE HMO / Product Type: Capitation /     Extended Emergency Contact Information  Primary Emergency Contact: Richie Ingram  Address: [address] [place] [address], [place] [address] [place]  Mobile Phone: [phone]  Relation: Spouse  Secondary Emergency Contact: Juan JoseMervin   [place]  Mobile Phone: [phone]  Relation: Son    Discharge Plan A: Home         [facility] [address] [facility] [address] [place] [address]  [address]  [facility] [address]  Phone: [phone] Fax: [phone]      Initial Assessment (most recent)       Adult Discharge Assessment - 06/07/25 0913          Discharge Assessment    Assessment Type Discharge Planning Assessment     Confirmed/corrected address, phone number and insurance Yes     Confirmed Demographics Correct on Facesheet     Source of Information patient     Communicated LAURI with patient/caregiver Date not available/Unable to determine     People in Home spouse     Name(s) of People in Home Richie Ingram, spouse, [phone]     Do you expect to return to your current living situation? Yes     Do you have help at home or someone to help you manage your care at home? No     Prior to hospitilization cognitive status: Alert/Oriented     Current cognitive status: Alert/Oriented     Walking or Climbing Stairs Difficulty no     Dressing/Bathing Difficulty no     Equipment Currently Used at Home none     Readmission within 30 days? No     Patient currently being followed by outpatient case management? No     Do you currently have service(s) that help you manage your care at home? No     Do you take prescription medications? Yes     Do you have  prescription coverage? Yes     Do you have any problems affording any of your prescribed medications? No     Is the patient taking medications as prescribed? yes     Who is going to help you get home at discharge? family     How do you get to doctors appointments? car, drives self     Are you on dialysis? No     Do you take coumadin? No     Discharge Plan A Home     DME Needed Upon Discharge  none     Discharge Plan discussed with: Patient     Transition of Care Barriers None

## 2025-06-07 NOTE — ASSESSMENT & PLAN NOTE
Anemia is likely due to FUO. Most recent hemoglobin and hematocrit are listed below.  Recent Labs     06/05/25  0438 06/06/25  1738 06/07/25  0729   HGB 11.8* 10.6* 11.4*   HCT 36.8* 31.5* 36.1*     Plan  - Monitor serial CBC: Daily  - Transfuse PRBC if patient becomes hemodynamically unstable, symptomatic or H/H drops below 7/21.  - Patient has not received any PRBC transfusions to date  - Patient's anemia is currently improving  -

## 2025-06-07 NOTE — ASSESSMENT & PLAN NOTE
Anemia is likely due to FUO. Most recent hemoglobin and hematocrit are listed below.  Recent Labs     06/05/25  0438 06/06/25  1738   HGB 11.8* 10.6*   HCT 36.8* 31.5*     Plan  - Monitor serial CBC: Daily  - Transfuse PRBC if patient becomes hemodynamically unstable, symptomatic or H/H drops below 7/21.  - Patient has not received any PRBC transfusions to date  - Patient's anemia is currently improving  -

## 2025-06-07 NOTE — ASSESSMENT & PLAN NOTE
Hyponatremia is likely due to Dehydration/hypovolemia. The patient's most recent sodium results are listed below.  Recent Labs     06/05/25  0438 06/06/25  1738    133*     Plan  - Correct the sodium by 4-6mEq in 24 hours.   - Will treat the hyponatremia with IV fluids as follows:  Normal saline  - Monitor sodium Daily.   - Patient hyponatremia is stable  -

## 2025-06-08 LAB
ABSOLUTE EOSINOPHIL (OHS): 0.02 K/UL
ABSOLUTE MONOCYTE (OHS): 0.03 K/UL (ref 0.3–1)
ABSOLUTE NEUTROPHIL COUNT (OHS): 1.87 K/UL (ref 1.8–7.7)
ALBUMIN SERPL BCP-MCNC: 1.7 G/DL (ref 3.5–5.2)
ALP SERPL-CCNC: 190 UNIT/L (ref 40–150)
ALT SERPL W/O P-5'-P-CCNC: 26 UNIT/L (ref 10–44)
ANION GAP (OHS): 6 MMOL/L (ref 8–16)
AST SERPL-CCNC: 33 UNIT/L (ref 11–45)
BASOPHILS # BLD AUTO: 0.01 K/UL
BASOPHILS NFR BLD AUTO: 0.4 %
BILIRUB DIRECT SERPL-MCNC: 0.2 MG/DL (ref 0.1–0.3)
BILIRUB SERPL-MCNC: 0.3 MG/DL (ref 0.1–1)
BUN SERPL-MCNC: 12 MG/DL (ref 8–23)
CALCIUM SERPL-MCNC: 7.5 MG/DL (ref 8.7–10.5)
CHLORIDE SERPL-SCNC: 107 MMOL/L (ref 95–110)
CO2 SERPL-SCNC: 24 MMOL/L (ref 23–29)
CREAT SERPL-MCNC: 0.8 MG/DL (ref 0.5–1.4)
CRYPTOC AG SER QL IA.RAPID: NEGATIVE
ERYTHROCYTE [DISTWIDTH] IN BLOOD BY AUTOMATED COUNT: 14.9 % (ref 11.5–14.5)
GFR SERPLBLD CREATININE-BSD FMLA CKD-EPI: >60 ML/MIN/1.73/M2
GLUCOSE SERPL-MCNC: 103 MG/DL (ref 70–110)
HCT VFR BLD AUTO: 30.9 % (ref 40–54)
HGB BLD-MCNC: 9.8 GM/DL (ref 14–18)
IMM GRANULOCYTES # BLD AUTO: 0.04 K/UL (ref 0–0.04)
IMM GRANULOCYTES NFR BLD AUTO: 1.4 % (ref 0–0.5)
LYMPHOCYTES # BLD AUTO: 0.84 K/UL (ref 1–4.8)
MCH RBC QN AUTO: 30.2 PG (ref 27–31)
MCHC RBC AUTO-ENTMCNC: 31.7 G/DL (ref 32–36)
MCV RBC AUTO: 95 FL (ref 82–98)
NUCLEATED RBC (/100WBC) (OHS): 0 /100 WBC
PLATELET # BLD AUTO: 177 K/UL (ref 150–450)
PMV BLD AUTO: 9.4 FL (ref 9.2–12.9)
POTASSIUM SERPL-SCNC: 3.6 MMOL/L (ref 3.5–5.1)
PROT SERPL-MCNC: 5.5 GM/DL (ref 6–8.4)
RBC # BLD AUTO: 3.25 M/UL (ref 4.6–6.2)
RELATIVE EOSINOPHIL (OHS): 0.7 %
RELATIVE LYMPHOCYTE (OHS): 29.9 % (ref 18–48)
RELATIVE MONOCYTE (OHS): 1.1 % (ref 4–15)
RELATIVE NEUTROPHIL (OHS): 66.5 % (ref 38–73)
SODIUM SERPL-SCNC: 137 MMOL/L (ref 136–145)
WBC # BLD AUTO: 2.81 K/UL (ref 3.9–12.7)

## 2025-06-08 PROCEDURE — 80053 COMPREHEN METABOLIC PANEL: CPT | Mod: HCNC | Performed by: INTERNAL MEDICINE

## 2025-06-08 PROCEDURE — 85025 COMPLETE CBC W/AUTO DIFF WBC: CPT | Mod: HCNC | Performed by: INTERNAL MEDICINE

## 2025-06-08 PROCEDURE — 36415 COLL VENOUS BLD VENIPUNCTURE: CPT | Mod: HCNC | Performed by: INTERNAL MEDICINE

## 2025-06-08 PROCEDURE — 63600175 PHARM REV CODE 636 W HCPCS: Mod: HCNC | Performed by: INTERNAL MEDICINE

## 2025-06-08 PROCEDURE — 25000003 PHARM REV CODE 250: Mod: HCNC | Performed by: INTERNAL MEDICINE

## 2025-06-08 PROCEDURE — 82248 BILIRUBIN DIRECT: CPT | Mod: HCNC | Performed by: INTERNAL MEDICINE

## 2025-06-08 PROCEDURE — 21400001 HC TELEMETRY ROOM: Mod: HCNC

## 2025-06-08 PROCEDURE — 63700000 PHARM REV CODE 250 ALT 637 W/O HCPCS: Mod: HCNC | Performed by: INTERNAL MEDICINE

## 2025-06-08 PROCEDURE — 87449 NOS EACH ORGANISM AG IA: CPT | Mod: HCNC | Performed by: INTERNAL MEDICINE

## 2025-06-08 RX ORDER — CEFTRIAXONE 2 G/1
2 INJECTION, POWDER, FOR SOLUTION INTRAMUSCULAR; INTRAVENOUS
Status: DISCONTINUED | OUTPATIENT
Start: 2025-06-08 | End: 2025-06-09

## 2025-06-08 RX ADMIN — RIVAROXABAN 20 MG: 20 TABLET, FILM COATED ORAL at 04:06

## 2025-06-08 RX ADMIN — DIPHENHYDRAMINE HYDROCHLORIDE 25 MG: 25 CAPSULE ORAL at 08:06

## 2025-06-08 RX ADMIN — GUAIFENESIN 600 MG: 600 TABLET, EXTENDED RELEASE ORAL at 08:06

## 2025-06-08 RX ADMIN — PANTOPRAZOLE SODIUM 40 MG: 40 TABLET, DELAYED RELEASE ORAL at 09:06

## 2025-06-08 RX ADMIN — SODIUM CHLORIDE 500 ML: 9 INJECTION, SOLUTION INTRAVENOUS at 08:06

## 2025-06-08 RX ADMIN — GUAIFENESIN 600 MG: 600 TABLET, EXTENDED RELEASE ORAL at 09:06

## 2025-06-08 RX ADMIN — ACETAMINOPHEN 500 MG: 500 TABLET ORAL at 08:06

## 2025-06-08 RX ADMIN — CETIRIZINE HYDROCHLORIDE 5 MG: 5 TABLET ORAL at 09:06

## 2025-06-08 RX ADMIN — SODIUM CHLORIDE 500 ML: 9 INJECTION, SOLUTION INTRAVENOUS at 09:06

## 2025-06-08 RX ADMIN — TAMSULOSIN HYDROCHLORIDE 0.4 MG: 0.4 CAPSULE ORAL at 09:06

## 2025-06-08 RX ADMIN — FLUCONAZOLE 400 MG: 150 TABLET ORAL at 04:06

## 2025-06-08 RX ADMIN — CEFTRIAXONE SODIUM 2 G: 2 INJECTION, POWDER, FOR SOLUTION INTRAMUSCULAR; INTRAVENOUS at 09:06

## 2025-06-08 NOTE — ASSESSMENT & PLAN NOTE
Lab Results   Component Value Date    AST 33 06/08/2025    AST 41 06/07/2025    ALT 26 06/08/2025    ALT 33 06/07/2025    ALKPHOS 190 (H) 06/08/2025    ALKPHOS 222 (H) 06/07/2025

## 2025-06-08 NOTE — ASSESSMENT & PLAN NOTE
Hyponatremia is likely due to Dehydration/hypovolemia. The patient's most recent sodium results are listed below.  Recent Labs     06/06/25  1738 06/07/25  0345 06/08/25  0558   * 140 137     Plan  - Correct the sodium by 4-6mEq in 24 hours.   - Will treat the hyponatremia with IV fluids as follows:  Normal saline  - Monitor sodium Daily.   - Patient hyponatremia is stable  -

## 2025-06-08 NOTE — PLAN OF CARE
Discussed poc with pt, pt verbalized understanding    Purposeful rounding every 2hours    VS wnl  Cardiac monitoring in use, pt is NSR, tele monitor # ____  Blood glucose monitoring   Fall precautions in place, remains injury free  Pt denies c/o pain    IVFs--NS bolus x2, D5W flush, and amphotericin  Accurate I&Os  Abx given as prescribed  Bed locked at lowest position  Call light within reach    Chart check complete  Will cont with POC

## 2025-06-08 NOTE — ASSESSMENT & PLAN NOTE
Anemia is likely due to FUO. Most recent hemoglobin and hematocrit are listed below.  Recent Labs     06/06/25  1738 06/07/25  0729 06/08/25  0558   HGB 10.6* 11.4* 9.8*   HCT 31.5* 36.1* 30.9*     Plan  - Monitor serial CBC: Daily  - Transfuse PRBC if patient becomes hemodynamically unstable, symptomatic or H/H drops below 7/21.  - Patient has not received any PRBC transfusions to date  - Patient's anemia is currently stable  -

## 2025-06-08 NOTE — PROGRESS NOTES
[facility]  Progress Note    Patient Name: Armando Ingram Jr.  MRN: [id number]  Patient Class: IP- Inpatient   Admission Date: 6/6/2025  Length of Stay: 2 days  Attending Physician: Lainey Tao MD  Primary Care Provider: Brian Soares MD        Subjective     Principal Problem:Cryptococcosis        HPI:  Armando Ingram Jr. is a 65 y.o. male with a PMH has a past medical history of Closed right hip fracture, Colon cancer, DVT,  Hairy cell leukemia, and Nephrolithiasis. who presented to the ED for further evaluation of acute onset fever with T-max measuring 101.0 F earlier today. Patient reported significant history of hairy cell leukemia in his followed by Dr. Dow and Dr. Flores from Hematology/Oncology and was recently prescribed a Z-Cordell for 4 days for treatment of a cough. Patient was instructed to go to the ED if he endorsed fever greater than 103.0 F but presented to the ED due to ulcer experiencing associated chills, throbbing headache, and persistent fever. He reported no known alleviating or aggravating factors noted with all other review of systems negative except as noted above. He is not currently on active treatment for his leukemia and reported being in his usual state of health prior to onset of symptoms.  Workup during recent hospitalization included blood cultures which were no growth   respiratory viral panel negative  CT scan of abdomen and pelvis which revealed bladder wall thickening but otherwise negative  TTE with no vegetation  Bilateral lower extremity ultrasound revealed chronic right lower extremity DVT  Karius  returned positive for cryptococcal infection  Lumbar puncture on 05/30/2025 revealed an opening pressure of 18 clear CSF Christiana ink cryptococcal antigen MS panel fungal culture and bacterial culture all were negative  Patient was placed on induction dose therapy of amphotericin and flucytosine for cryptococcal infection  Oncology felt that  treatment for his hairy cell leukemia should be deferred  Serum cryptococcal antigen negative  Flucytosine was stopped  Indium scan was negative  Patient spiked temp of 101.9° after flucytosine was discontinued  He was reinstituted with improvement in febrile status.    As fever and cough resolved infectious disease made the decision to stop amphotericin and flucytosine and initiate Diflucan  Initially patient had fever of 99.8 he was observed for 24 hours with no further fevers and was discharged home.  After patient got home he had rigors and temp of 102° therefore he was readmitted to the hospital.  Discussed with Infectious Disease and amphotericin was initiated.  Respiratory infection panel negative  Patient continues to run low-grade temp 99.9°, 99.2, he has rigors associated with these.  Repeat blood cultures remain negative.  Repeat serum cryptococcal antigen is negative  CT scan chest abdomen and pelvis pending    Infections disease to determine antibiotic/antifungal, course of treatment and follow up.      Overview/Hospital Course:  Patient was admitted after having rigors and a temp of 102° at home and readmitted to the hospital.  He was seen in consultation by Infectious Disease and amphotericin re-initiated.  Patient reports having another rigors in the early hours of the morning temp 99.9°.  Patient denies any complaints today    Interval History:  Patient awake and alert.  Seen with wife at bedside.  Reports having 2 episode of rigors last night associated with temperature of 99.2° and 99.9.  He also has    Review of Systems   Constitutional:  Positive for activity change, appetite change, chills and fatigue. Negative for fever.   Respiratory:  Negative for chest tightness, shortness of breath and wheezing.    Cardiovascular:  Negative for chest pain, palpitations and leg swelling.   Gastrointestinal:  Negative for abdominal distention and abdominal pain.   Skin:  Positive for color change.    Neurological:  Positive for weakness.   All other systems reviewed and are negative.    Objective:     Vital Signs (Most Recent):  Temp: 98.7 °F (37.1 °C) (06/08/25 0827)  Pulse: 100 (06/08/25 0928)  Resp: 16 (06/08/25 0827)  BP: (!) 115/58 (06/08/25 0827)  SpO2: (!) 92 % (06/08/25 0827) Vital Signs (24h Range):  Temp:  [97.9 °F (36.6 °C)-99.9 °F (37.7 °C)] 98.7 °F (37.1 °C)  Pulse:  [] 100  Resp:  [16-20] 16  SpO2:  [92 %-96 %] 92 %  BP: (115-141)/(58-68) 115/58     Weight: 75.4 kg (166 lb 3.6 oz)  Body mass index is 23.18 kg/m².    Intake/Output Summary (Last 24 hours) at 6/8/2025 1128  Last data filed at 6/7/2025 1622  Gross per 24 hour   Intake 3089.01 ml   Output --   Net 3089.01 ml         Physical Exam  Vitals reviewed.   Constitutional:       Appearance: He is ill-appearing (Flushed).   HENT:      Head: Normocephalic and atraumatic.      Mouth/Throat:      Mouth: Mucous membranes are moist.      Pharynx: Oropharynx is clear.   Eyes:      Extraocular Movements: Extraocular movements intact.      Conjunctiva/sclera: Conjunctivae normal.   Cardiovascular:      Rate and Rhythm: Normal rate and regular rhythm.      Pulses: Normal pulses.      Heart sounds: Normal heart sounds.   Pulmonary:      Effort: Pulmonary effort is normal.      Breath sounds: Normal breath sounds. No wheezing or rhonchi.   Abdominal:      General: Bowel sounds are normal.      Palpations: Abdomen is soft.      Tenderness: There is no abdominal tenderness. There is no guarding or rebound.   Musculoskeletal:         General: No swelling. Normal range of motion.      Cervical back: Normal range of motion and neck supple.      Right lower leg: No edema.      Left lower leg: No edema.   Skin:     General: Skin is warm and dry.      Findings: Bruising present. No lesion.   Neurological:      General: No focal deficit present.      Mental Status: He is alert and oriented to person, place, and time. Mental status is at baseline.    Psychiatric:         Mood and Affect: Mood normal.         Behavior: Behavior normal.         Thought Content: Thought content normal.               Significant Labs: All pertinent labs within the past 24 hours have been reviewed.  Blood Culture: NGTD  CBC:   Recent Labs   Lab 06/06/25  1738 06/07/25  0729 06/08/25  0558   WBC 2.99* 4.42 2.81*   HGB 10.6* 11.4* 9.8*   HCT 31.5* 36.1* 30.9*    212 177     CMP:   Recent Labs   Lab 06/06/25  1738 06/07/25  0345 06/08/25  0558   * 140 137   K 3.6 4.3 3.6    108 107   CO2 19* 18* 24   * 115* 103   BUN 14 12 12   CREATININE 0.9 0.9 0.8   CALCIUM 7.7* 8.0* 7.5*   PROT 6.4 6.6 5.5*   ALBUMIN 2.0* 2.0* 1.7*   BILITOT 0.5 0.4 0.3   ALKPHOS 236* 222* 190*   AST 49* 41 33   ALT 36 33 26   ANIONGAP 11 14 6*     Lactic Acid:   Recent Labs   Lab 06/06/25  1738   LACTATE 1.4     Magnesium:   Recent Labs   Lab 06/07/25  0345   MG 2.1   Cryptococcal Ag (serum) negative    Significant Imaging: I have reviewed all pertinent imaging results/findings within the past 24 hours.      Assessment & Plan  Hairy cell leukemia    Patient has been evaluated by Oncology and until he is infection free we will defer treatment  Chronic deep vein thrombosis (DVT) of right lower extremity, unspecified vein  Stable  On chronic Xarelto    Cryptococcosis    Unknown etiology unknown location  Infectious Disease  will restart amphotericin    During prior hospitalization patient had Karius test positive for cryptococcus  Serum cryptococcal antigen negative blood cultures negative  Lumbar puncture negative for cryptococcal antigen or any other infection  Anemia  Anemia is likely due to FUO. Most recent hemoglobin and hematocrit are listed below.  Recent Labs     06/06/25 1738 06/07/25  0729 06/08/25  0558   HGB 10.6* 11.4* 9.8*   HCT 31.5* 36.1* 30.9*     Plan  - Monitor serial CBC: Daily  - Transfuse PRBC if patient becomes hemodynamically unstable, symptomatic or H/H drops below  7/21.  - Patient has not received any PRBC transfusions to date  - Patient's anemia is currently stable  -   Hyponatremia  Hyponatremia is likely due to Dehydration/hypovolemia. The patient's most recent sodium results are listed below.  Recent Labs     06/06/25  1738 06/07/25  0345 06/08/25  0558   * 140 137     Plan  - Correct the sodium by 4-6mEq in 24 hours.   - Will treat the hyponatremia with IV fluids as follows:  Normal saline  - Monitor sodium Daily.   - Patient hyponatremia is stable  -   Fever  Uncertain etiology/remains low-grade but is associated with rigors  ID ordering other studies  CT chest abdomen and pelvis pending  Repeat blood cultures no growth to date  Respiratory infection panel negative    VTE Risk Mitigation (From admission, onward)           Ordered     rivaroxaban tablet 20 mg  With dinner         06/06/25 1714     Reason for No Pharmacological VTE Prophylaxis  Once        Question:  Reasons:  Answer:  Already adequately anticoagulated on oral Anticoagulants    06/06/25 1714     IP VTE HIGH RISK PATIENT  Once         06/06/25 1714     Place sequential compression device  Until discontinued         06/06/25 1714                    Discharge Planning   LAURI:      Code Status: Full Code   Medical Readiness for Discharge Date:   Discharge Plan A: Home                        Lainey Basurto MD  Department of Hospital Medicine   [facility] - Med Surg

## 2025-06-08 NOTE — PLAN OF CARE
Discussed poc with pt, pt verbalized understanding    Purposeful rounding every 2hours    VS wnl  Cardiac monitoring in use, pt is NSR, tele monitor # [id number]  Fall precautions in place, remains injury free  Pt denies c/o pain  Abx given as prescribed  Bed locked at lowest position  Call light within reach    Chart check complete  Will cont with POC

## 2025-06-08 NOTE — DISCHARGE SUMMARY
[facility]  Hospital Medicine  Discharge Summary      Patient Name: Armando Ingram Jr.  MRN: [id number]  [facility]: [id number]  Patient Class: IP- Inpatient  Admission Date: 5/16/2025  Hospital Length of Stay: 20 days  Discharge Date and Time: 6/6/2025 10:08 AM  Attending Physician: Arpita att. providers found   Discharging Provider: Lainey Basurto MD  Primary Care Provider: Brian Soares MD    Primary Care Team: Networked reference to record PCT     HPI:   Armando Ingram Jr. is a 65 y.o. male with a PMH  has a past medical history of Closed right hip fracture, Colon cancer, DVT (deep venous thrombosis) (2002), Hairy cell leukemia (06/06/2024), and Nephrolithiasis. who presented to the ED for further evaluation of acute onset fever with T-max measuring 101.0 F earlier today.  Patient reported significant history of hairy cell leukemia in his followed by Dr. Dow and Dr. Flores from Hematology/Oncology and was recently prescribed a Z-Cordell for 4 days for treatment of a cough.  Patient was instructed to go to the ED if he endorsed fever greater than 103.0 F but presented to the ED due to ulcer experiencing associated chills, throbbing headache, and persistent fever.  He reported no known alleviating or aggravating factors noted with all other review of systems negative except as noted above.  He is not currently on active treatment for his leukemia and reported being in his usual state of health prior to onset of symptoms.  Initial workup in the ED revealed patient to be afebrile with T-max measuring 100.4 F, pancytopenic, lactic acid/procalcitonin within normal limits, flu/COVID negative, chest x-ray negative for acute findings, UA positive for 2+ LE, 11 RBCs, free found WBCs.  Patient initiated on cefepime with cultures obtained and pending and admitted to Hospital Medicine under observation for continued medical management and treatment of neutropenic fever.    PCP: Brian Soares      * No surgery found *       Hospital Course:   Continued on IV Cefepime. Zyvoz added 05/18 due to persistent fever. Hematology consulted. Cultures remain NGTD   Resp viral panel negative. ID consulted to eval due to fevers  Cefepime changed to IV Merrem per ID recs on 05/21, unclear source of fevers at this time. Zyvox discontinued per ID.   CT Abd/Pelvis showed bladder thickening but otherwise negative.   ID recommend TTE, BLE duplex and possible Indium scan for source of infection and de-escalate abx to Cefepime on 05/23.   TTE with no vegetations. BLE duplex showed chronic RLE DVT. Indium scan pending tentatively planned for 05/26-05/27 as radiology does not have required materials at this time and scan will take 2 days to complete per RT.   Persistent fevers, abx changed back to Merrem 05/25 per ID recs. Karius pending   5/26 fever curve trending down. Respiratory infection panel negative. Asymptomatic. Continue intravenous antibiotic(s)   5/27 neutropenic fever persists despite scheduled tylenol. Awaiting tagged wbc scan per infectious disease recommendations.   5/28 febrile overnight. Infectious disease recommending addition of doxy. Hem/onc following. Awaiting karius and imaging. Refused lumbar puncture. If afebrile x 48h, discharge   5/29 febrile. Positive cryptococcal infection. Infectious disease adjusting antimicrobials. Plans for lumbar puncture, mri brain, and indium scan.  5/30/2025: s/p LP today.  Opening pressure within normal range at 18. clear CSF fluid noted. Cell count, glucose, Christiana Ink, Cryptococcal antigen, MS panel, Fungal Culture, and Bacterial culture obtained. Patient received first dose of induction therapy for cryptococcal infection and ding well. Indium Scan in progress. Given toxic nature of amphotericin and flucytosine will closely monitor chemistries. Oncology consulted with partners and decision made to defer treatment for hair cell leukemia until infection has been treated.   5/31/25: Patient has  remained afebrile today. ALP and AST elevated after 48 hours of treatment. Serum cryptococcus antigen negative. Christiana ink negative. Indium scan also negative today. Discussed with ID who recommends stopping flucytosine. Continue Amphotericin until CSF Crypto Ag is known. If negative and patient remains afebrile, patient will likely discharge on fluconazole treatment for non meningitis/non-pulmonary cryptococcus which can be treated as antigenemia with extended course of fluconazole.   6/1/25: Xarelto resumed yesterday and H/H unchanged from previous. Febrile overnight (Tmax 101.9) in absence of flucytosine. Although both serum and CSF were negative for Cryptotoccus Antigen it was decided proceed with complete induction therapy with Amphotericin and Flucytosine. Will monitor closely liver enzymes and fever curve. ALP today 194, AST 55.     Discussed with Dr. Villanueva.  He continued amphotericin and flucytosine 6/3 after dose and monitor fever curve.    Patient had temp of 99.8°.  P.o. Diflucan begun at 400 mg a day.  We will observe for 24 hours and if he remains afebrile we will plan on discharging  After long discussion with Dr. Villanueva  he felt that patient should be discharged home on Diflucan 400 mg daily.  He should return to the hospital should his temperature be greater than 101.  He will follow up with Infectious Disease with thin 10 days.  Patient seen and examined on day of discharge after discussion with Dr. Lopez of Infectious Disease it was determined he was stable for discharge home.  Discussed discharge plans with patient's wife and they agree     Goals of Care Treatment Preferences:  Code Status: Full Code      SDOH Screening:  The patient declined to be screened for utility difficulties, food insecurity, transport difficulties, housing insecurity, and interpersonal safety, so no concerns could be identified this admission.     Consults:   Consults (From admission, onward)          Status Ordering  Provider     Inpatient consult to Hematology/Oncology  Once        Provider:  Samuel Dow MD    Completed FLO CHAVEZ     Inpatient consult to Hematology  Once        Provider:  Flo Horan MD    Completed CELIA LOCK.            Assessment & Plan  Neutropenic fever  Cryptococcosis  5/31/25   Source of infection is unclear. Serum cryptococcus antigen negative. Christiana ink negative. Indium scan also negative today. Discussed with ID who recommends stopping flucytosine. Continue Amphotericin until CSF Crypto Ag is known. If negative and patient remains afebrile, patient will likely discharge on fluconazole treatment for non meningitis/non-pulmonary cryptococcus which can be treated as antigenemia with extended course of fluconazole.     6/1/25  Febrile overnight (Tmax 101.9) in absence of flucytosine. Although both serum and CSF were negative for Cryptotoccus Antigen. It was decided proceed with complete induction therapy with Amphotericin and Flucytosine. Will monitor closely liver enzymes and fever curve. ALP today 194, AST 55.   CXR with NAF, Flu/Covid neg   Resp viral panel negative   Urine and blood cultures NGTD   Monitor VS   CT abd pelvis with bladder thickening and IVC filter but no other source of infection. TTE with no valvular abnormalities. BLE duplex showed chronic RLE DVT   Continue current treatment further recs per ID are pending  5/31/25   Per ID patient changed to p.o. Diflucan 400 mg daily.  Temp last p.m. 99.8.  We will observe for 24 hours and if he remains afebrile discharged home and follow up with outpatient ID  Hairy cell leukemia  Patient with known history of hairy cell leukemia and continues to follow Dr. Dow and Dr. Flores from Hematology/Oncology. Patient not currently on active treatment and currently undergoing treatment for neutropenic fever with broad-spectrum antibiotics as noted above.  Hematology consulted and following     5/30/25  Hematology discussed with  other partners and it was decided not to proceed with treatment for leukemia. This was discussed with family.   Pancytopenia  This patient is found to have pancytopenia, the likely etiology is , will monitor CBC . Will transfuse red blood cells if the hemoglobin is <7g/dL (or <8 in the setting of ACS). Hold DVT prophylaxis if platelets are <50k. The patient's hemoglobin, white blood cell count, and platelet count results have been reviewed.      Remains stable  History of DVT (deep vein thrombosis)  - BLE duplex with chronic RLE DVT    -prior hx of IVC filter in 2002   Hold xarelto for lumbar puncture     6/1/25  Xarelto resumed   Transaminitis  Lab Results   Component Value Date    AST 33 06/08/2025    AST 41 06/07/2025    ALT 26 06/08/2025    ALT 33 06/07/2025    ALKPHOS 190 (H) 06/08/2025    ALKPHOS 222 (H) 06/07/2025        Fever      5/30/25  MRI brain negative.   S/p LP; analysis so far unremarkable; awaiting Christiana Ink, Cryptococcal antigen, MS panel, Fungal Culture, and Bacterial culture   Per ID patient will need to stay hospitalized 14 days for induction therapy(agents are toxic in nature) followed by consolidation therapy    5/31/25   Source of infection is unclear. Serum cryptococcus antigen negative. Christiana ink negative. Indium scan also negative today. Discussed with ID who recommends stopping flucytosine. Continue Amphotericin until CSF Crypto Ag is known. If negative and patient remains afebrile, patient will likely discharge on fluconazole treatment for non meningitis/non-pulmonary cryptococcus which can be treated as antigenemia with extended course of fluconazole.     6/1/25  Febrile overnight (Tmax 101.9) in absence of flucytosine. Although both serum and CSF were negative for Cryptotoccus Antigen. It was decided proceed with complete induction therapy with Amphotericin and Flucytosine. Will monitor closely liver enzymes and fever curve. ALP today 194, AST 55.   CXR with NAF, Flu/Covid neg    Possible UTI   Resp viral panel negative   Urine and blood cultures NGTD   Monitor VS   Hematology/Oncology consulted and following   ID consult due to persistent fever   Cefepime changed to IV Merrem, Zyvox discontinued   CT abd pelvis with bladder thickening and IVC filter but no other source of infection. TTE with no valvular abnormalities. BLE duplex showed chronic RLE DVT   Indium scan ordered by ID but radiology unable to obtain needed material for scan until 05/26 and scan cannot be completed until 5/27    Karius testing pending per Dr. Laguna  Discussed with Dr. Laguna 05/25- he recommends repeat blood cultures, change IV Cefepime to Merrem   Encourage IS and Oob as able     Blood cultures negative growth to date   Positive for cryptococcal infection  Mri brain pending  Lumbar puncture pending  Hold xarelto  Indium scan pending  Antimicrobials per infectious disease         Continue current treatment further recs per ID are pending  Final Active Diagnoses:    Diagnosis Date Noted POA    PRINCIPAL PROBLEM:  Neutropenic fever [D70.9, R50.81] 05/17/2025 Yes    Transaminitis [R74.01] 05/31/2025 No    Cryptococcosis [B45.9] 05/29/2025 No    Fever [R50.9] 05/24/2025 Yes    History of DVT (deep vein thrombosis) [Z86.718] 05/17/2025 Not Applicable     Chronic    Pancytopenia [D61.818] 02/17/2025 Yes    Hairy cell leukemia [C91.40] 06/06/2024 Yes      Problems Resolved During this Admission:       Discharged Condition: fair    Disposition: Home or Self Care    Follow Up:   Follow-up Information       Brian Soares MD Follow up in 3 day(s).    Specialty: Family Medicine  Contact information:  [address] [address] [address] [phone]               Flo Laguna DO Follow up in 2 week(s).    Specialty: Infectious Diseases  Contact information:  [address] [facility] [address] [phone]                           Patient Instructions:      Diet Adult Regular     Notify your  health care provider if you experience any of the following:  temperature >100.4     Notify your health care provider if you experience any of the following:  persistent nausea and vomiting or diarrhea     Notify your health care provider if you experience any of the following:  difficulty breathing or increased cough     Notify your health care provider if you experience any of the following:  severe persistent headache     Notify your health care provider if you experience any of the following:  persistent dizziness, light-headedness, or visual disturbances     Notify your health care provider if you experience any of the following:  increased confusion or weakness     Activity as tolerated       Significant Diagnostic Studies: Labs: All labs within the past 24 hours have been reviewed    Pending Diagnostic Studies:       Procedure Component Value Units Date/Time    Encephalopathy Autoimmune Evaluation, CSF [[id number]] Collected: 05/30/25 0909    Order Status: Sent Lab Status: In process Updated: 05/30/25 0920    Specimen: CSF (Spinal Fluid) from Cerebrospinal Fluid            Medications:  Reconciled Home Medications:      Medication List        START taking these medications      fluconazole 200 MG Tab  Commonly known as: DIFLUCAN  Take 2 tablets (400 mg total) by mouth once daily. for 14 days     tamsulosin 0.4 mg Cap  Commonly known as: FLOMAX  Take 1 capsule (0.4 mg total) by mouth once daily.            CONTINUE taking these medications      multivitamin per tablet  Commonly known as: THERAGRAN  Take 1 tablet by mouth once daily.     omeprazole 20 MG capsule  Commonly known as: PRILOSEC  Take 20 mg by mouth once daily.     XARELTO 20 mg Tab  Generic drug: rivaroxaban  Take 1 tablet (20 mg total) by mouth once daily.            STOP taking these medications      azithromycin 250 MG tablet  Commonly known as: Z-CHANEL              Indwelling Lines/Drains at time of discharge:   Lines/Drains/Airways        None                   Time spent on the discharge of patient: 49 minutes         Lainey Basurto MD  Department of Hospital Medicine  [facility]

## 2025-06-08 NOTE — ASSESSMENT & PLAN NOTE
Uncertain etiology/remains low-grade but is associated with rigors  ID ordering other studies  CT chest abdomen and pelvis pending  Repeat blood cultures no growth to date  Respiratory infection panel negative

## 2025-06-08 NOTE — ASSESSMENT & PLAN NOTE
Unknown etiology unknown location  Infectious Disease  will restart amphotericin    During prior hospitalization patient had Karius test positive for cryptococcus  Serum cryptococcal antigen negative blood cultures negative  Lumbar puncture negative for cryptococcal antigen or any other infection

## 2025-06-08 NOTE — SUBJECTIVE & OBJECTIVE
Interval History:  Patient awake and alert.  Seen with wife at bedside.  Reports having 2 episode of rigors last night associated with temperature of 99.2° and 99.9.  He also has    Review of Systems   Constitutional:  Positive for activity change, appetite change, chills and fatigue. Negative for fever.   Respiratory:  Negative for chest tightness, shortness of breath and wheezing.    Cardiovascular:  Negative for chest pain, palpitations and leg swelling.   Gastrointestinal:  Negative for abdominal distention and abdominal pain.   Skin:  Positive for color change.   Neurological:  Positive for weakness.   All other systems reviewed and are negative.    Objective:     Vital Signs (Most Recent):  Temp: 98.7 °F (37.1 °C) (06/08/25 0827)  Pulse: 100 (06/08/25 0928)  Resp: 16 (06/08/25 0827)  BP: (!) 115/58 (06/08/25 0827)  SpO2: (!) 92 % (06/08/25 0827) Vital Signs (24h Range):  Temp:  [97.9 °F (36.6 °C)-99.9 °F (37.7 °C)] 98.7 °F (37.1 °C)  Pulse:  [] 100  Resp:  [16-20] 16  SpO2:  [92 %-96 %] 92 %  BP: (115-141)/(58-68) 115/58     Weight: 75.4 kg (166 lb 3.6 oz)  Body mass index is 23.18 kg/m².    Intake/Output Summary (Last 24 hours) at 6/8/2025 1128  Last data filed at 6/7/2025 1622  Gross per 24 hour   Intake 3089.01 ml   Output --   Net 3089.01 ml         Physical Exam  Vitals reviewed.   Constitutional:       Appearance: He is ill-appearing (Flushed).   HENT:      Head: Normocephalic and atraumatic.      Mouth/Throat:      Mouth: Mucous membranes are moist.      Pharynx: Oropharynx is clear.   Eyes:      Extraocular Movements: Extraocular movements intact.      Conjunctiva/sclera: Conjunctivae normal.   Cardiovascular:      Rate and Rhythm: Normal rate and regular rhythm.      Pulses: Normal pulses.      Heart sounds: Normal heart sounds.   Pulmonary:      Effort: Pulmonary effort is normal.      Breath sounds: Normal breath sounds. No wheezing or rhonchi.   Abdominal:      General: Bowel sounds are  normal.      Palpations: Abdomen is soft.      Tenderness: There is no abdominal tenderness. There is no guarding or rebound.   Musculoskeletal:         General: No swelling. Normal range of motion.      Cervical back: Normal range of motion and neck supple.      Right lower leg: No edema.      Left lower leg: No edema.   Skin:     General: Skin is warm and dry.      Findings: Bruising present. No lesion.   Neurological:      General: No focal deficit present.      Mental Status: He is alert and oriented to person, place, and time. Mental status is at baseline.   Psychiatric:         Mood and Affect: Mood normal.         Behavior: Behavior normal.         Thought Content: Thought content normal.               Significant Labs: All pertinent labs within the past 24 hours have been reviewed.  Blood Culture: NGTD  CBC:   Recent Labs   Lab 06/06/25  1738 06/07/25  0729 06/08/25  0558   WBC 2.99* 4.42 2.81*   HGB 10.6* 11.4* 9.8*   HCT 31.5* 36.1* 30.9*    212 177     CMP:   Recent Labs   Lab 06/06/25  1738 06/07/25  0345 06/08/25  0558   * 140 137   K 3.6 4.3 3.6    108 107   CO2 19* 18* 24   * 115* 103   BUN 14 12 12   CREATININE 0.9 0.9 0.8   CALCIUM 7.7* 8.0* 7.5*   PROT 6.4 6.6 5.5*   ALBUMIN 2.0* 2.0* 1.7*   BILITOT 0.5 0.4 0.3   ALKPHOS 236* 222* 190*   AST 49* 41 33   ALT 36 33 26   ANIONGAP 11 14 6*     Lactic Acid:   Recent Labs   Lab 06/06/25  1738   LACTATE 1.4     Magnesium:   Recent Labs   Lab 06/07/25  0345   MG 2.1   Cryptococcal Ag (serum) negative    Significant Imaging: I have reviewed all pertinent imaging results/findings within the past 24 hours.

## 2025-06-09 PROBLEM — R13.10 DYSPHAGIA: Status: ACTIVE | Noted: 2025-06-09

## 2025-06-09 LAB
ABSOLUTE EOSINOPHIL (OHS): 0.02 K/UL
ABSOLUTE MONOCYTE (OHS): 0.02 K/UL (ref 0.3–1)
ABSOLUTE NEUTROPHIL COUNT (OHS): 1.82 K/UL (ref 1.8–7.7)
ALBUMIN SERPL BCP-MCNC: 1.8 G/DL (ref 3.5–5.2)
ALP SERPL-CCNC: 229 UNIT/L (ref 40–150)
ALT SERPL W/O P-5'-P-CCNC: 31 UNIT/L (ref 10–44)
AMPAR2 IGG CSF QL CBA IFA: NEGATIVE
AMPHIPHYSIN AB CSF QL IF: NEGATIVE
ANION GAP (OHS): 8 MMOL/L (ref 8–16)
ANNOTATION COMMENT IMP: NORMAL
AST SERPL-CCNC: 48 UNIT/L (ref 11–45)
BASOPHILS # BLD AUTO: 0.01 K/UL
BASOPHILS NFR BLD AUTO: 0.3 %
BILIRUB DIRECT SERPL-MCNC: 0.2 MG/DL (ref 0.1–0.3)
BILIRUB SERPL-MCNC: 0.4 MG/DL (ref 0.1–1)
BSA FOR ECHO PROCEDURE: 1.94 M2
BUN SERPL-MCNC: 12 MG/DL (ref 8–23)
CALCIUM SERPL-MCNC: 7.7 MG/DL (ref 8.7–10.5)
CASPR2 IGG CSF QL CBA IFA: NEGATIVE
CHLORIDE SERPL-SCNC: 110 MMOL/L (ref 95–110)
CO2 SERPL-SCNC: 21 MMOL/L (ref 23–29)
CREAT SERPL-MCNC: 0.8 MG/DL (ref 0.5–1.4)
CRP SERPL-MCNC: 106 MG/L
CV ECHO LV RWT: 0.43 CM
CV2 AB CSF QL IF: NEGATIVE
DPPX IGG CSF QL CBA IFA: NEGATIVE
ECHO LV POSTERIOR WALL: 0.8 CM (ref 0.6–1.1)
EJECTION FRACTION: 60 %
ERYTHROCYTE [DISTWIDTH] IN BLOOD BY AUTOMATED COUNT: 15 % (ref 11.5–14.5)
ERYTHROCYTE [SEDIMENTATION RATE] IN BLOOD: >120 MM/HR
FRACTIONAL SHORTENING: 32.4 % (ref 28–44)
GABABR IGG CSF QL CBA IFA: NEGATIVE
GAD65 AB CSF-SCNC: 0 NMOL/L
GFAP ALPHA IGG CSF QL IF: NEGATIVE
GFR SERPLBLD CREATININE-BSD FMLA CKD-EPI: >60 ML/MIN/1.73/M2
GLIAL NUC TYPE 1 AB CSF QL IF: NEGATIVE
GLUCOSE SERPL-MCNC: 93 MG/DL (ref 70–110)
HCT VFR BLD AUTO: 32.5 % (ref 40–54)
HGB BLD-MCNC: 10.6 GM/DL (ref 14–18)
HU1 AB CSF QL IF: NEGATIVE
HU2 AB CSF QL IF: NEGATIVE
HU3 AB CSF QL IF: NEGATIVE
IGLON5 IGG CSF QL CBA IFA: NEGATIVE
IMM GRANULOCYTES # BLD AUTO: 0.05 K/UL (ref 0–0.04)
IMM GRANULOCYTES NFR BLD AUTO: 1.7 % (ref 0–0.5)
IMMUNOLOGIST REVIEW: NORMAL
INTERVENTRICULAR SEPTUM: 1.1 CM (ref 0.6–1.1)
LEFT INTERNAL DIMENSION IN SYSTOLE: 2.5 CM (ref 2.1–4)
LEFT VENTRICLE DIASTOLIC VOLUME INDEX: 30.77 ML/M2
LEFT VENTRICLE DIASTOLIC VOLUME: 60 ML
LEFT VENTRICLE MASS INDEX: 53.6 G/M2
LEFT VENTRICLE SYSTOLIC VOLUME INDEX: 11.3 ML/M2
LEFT VENTRICLE SYSTOLIC VOLUME: 22 ML
LEFT VENTRICULAR INTERNAL DIMENSION IN DIASTOLE: 3.7 CM (ref 3.5–6)
LEFT VENTRICULAR MASS: 104.6 G
LGI1 IGG CSF QL CBA IFA: NEGATIVE
LVED V (TEICH): 59.54 ML
LVES V (TEICH): 21.66 ML
LYMPHOCYTES # BLD AUTO: 1.06 K/UL (ref 1–4.8)
M MGLUR1 AB IFA, CSF: NEGATIVE
M NEUROCHONDRIN IFA, CSF: NEGATIVE
M SEPTIN-7 IFA, CSF: NEGATIVE
MCH RBC QN AUTO: 30.8 PG (ref 27–31)
MCHC RBC AUTO-ENTMCNC: 32.6 G/DL (ref 32–36)
MCV RBC AUTO: 95 FL (ref 82–98)
NIF IGG CSF QL IF: NEGATIVE
NMDAR1 IGG CSF QL CBA IFA: NEGATIVE
NUCLEATED RBC (/100WBC) (OHS): 0 /100 WBC
PCA-1 AB CSF QL IF: NEGATIVE
PCA-2 AB CSF QL IF: NEGATIVE
PCA-TR AB CSF QL IF: NEGATIVE
PDE10A AB IFA, CSF: NEGATIVE
PLATELET # BLD AUTO: 158 K/UL (ref 150–450)
PMV BLD AUTO: 9.2 FL (ref 9.2–12.9)
POTASSIUM SERPL-SCNC: 3.6 MMOL/L (ref 3.5–5.1)
PROT SERPL-MCNC: 6 GM/DL (ref 6–8.4)
RA PRESSURE ESTIMATED: 3 MMHG
RBC # BLD AUTO: 3.44 M/UL (ref 4.6–6.2)
RELATIVE EOSINOPHIL (OHS): 0.7 %
RELATIVE LYMPHOCYTE (OHS): 35.6 % (ref 18–48)
RELATIVE MONOCYTE (OHS): 0.7 % (ref 4–15)
RELATIVE NEUTROPHIL (OHS): 61 % (ref 38–73)
SODIUM SERPL-SCNC: 139 MMOL/L (ref 136–145)
TRIM46 AB IFA, CSF: NEGATIVE
WBC # BLD AUTO: 2.98 K/UL (ref 3.9–12.7)
Z-SCORE OF LEFT VENTRICULAR DIMENSION IN END DIASTOLE: -4.07
Z-SCORE OF LEFT VENTRICULAR DIMENSION IN END SYSTOLE: -2.48

## 2025-06-09 PROCEDURE — 25000003 PHARM REV CODE 250: Mod: HCNC | Performed by: INTERNAL MEDICINE

## 2025-06-09 PROCEDURE — 36415 COLL VENOUS BLD VENIPUNCTURE: CPT | Mod: HCNC | Performed by: INTERNAL MEDICINE

## 2025-06-09 PROCEDURE — 92610 EVALUATE SWALLOWING FUNCTION: CPT | Mod: HCNC

## 2025-06-09 PROCEDURE — 80053 COMPREHEN METABOLIC PANEL: CPT | Mod: HCNC | Performed by: INTERNAL MEDICINE

## 2025-06-09 PROCEDURE — 85025 COMPLETE CBC W/AUTO DIFF WBC: CPT | Mod: HCNC | Performed by: INTERNAL MEDICINE

## 2025-06-09 PROCEDURE — 82248 BILIRUBIN DIRECT: CPT | Mod: HCNC | Performed by: INTERNAL MEDICINE

## 2025-06-09 PROCEDURE — 63700000 PHARM REV CODE 250 ALT 637 W/O HCPCS: Mod: HCNC | Performed by: INTERNAL MEDICINE

## 2025-06-09 PROCEDURE — 85652 RBC SED RATE AUTOMATED: CPT | Mod: HCNC | Performed by: INTERNAL MEDICINE

## 2025-06-09 PROCEDURE — 21400001 HC TELEMETRY ROOM: Mod: HCNC

## 2025-06-09 PROCEDURE — 86140 C-REACTIVE PROTEIN: CPT | Mod: HCNC | Performed by: INTERNAL MEDICINE

## 2025-06-09 PROCEDURE — 97535 SELF CARE MNGMENT TRAINING: CPT | Mod: HCNC

## 2025-06-09 RX ORDER — AMOXICILLIN AND CLAVULANATE POTASSIUM 875; 125 MG/1; MG/1
1 TABLET, FILM COATED ORAL EVERY 12 HOURS
Status: DISCONTINUED | OUTPATIENT
Start: 2025-06-09 | End: 2025-06-10

## 2025-06-09 RX ADMIN — FLUCONAZOLE 400 MG: 150 TABLET ORAL at 09:06

## 2025-06-09 RX ADMIN — CETIRIZINE HYDROCHLORIDE 5 MG: 5 TABLET ORAL at 09:06

## 2025-06-09 RX ADMIN — AMOXICILLIN AND CLAVULANATE POTASSIUM 1 TABLET: 875; 125 TABLET, FILM COATED ORAL at 09:06

## 2025-06-09 RX ADMIN — RIVAROXABAN 20 MG: 20 TABLET, FILM COATED ORAL at 04:06

## 2025-06-09 RX ADMIN — ACETAMINOPHEN 500 MG: 500 TABLET ORAL at 09:06

## 2025-06-09 RX ADMIN — TAMSULOSIN HYDROCHLORIDE 0.4 MG: 0.4 CAPSULE ORAL at 09:06

## 2025-06-09 RX ADMIN — GUAIFENESIN 600 MG: 600 TABLET, EXTENDED RELEASE ORAL at 09:06

## 2025-06-09 RX ADMIN — DIPHENHYDRAMINE HYDROCHLORIDE 25 MG: 25 CAPSULE ORAL at 09:06

## 2025-06-09 RX ADMIN — PANTOPRAZOLE SODIUM 40 MG: 40 TABLET, DELAYED RELEASE ORAL at 09:06

## 2025-06-09 RX ADMIN — ACETAMINOPHEN 650 MG: 325 TABLET ORAL at 04:06

## 2025-06-09 NOTE — PLAN OF CARE
Discussed poc with pt, pt verbalized understanding   Purposeful rounding every 2hours     IV abx changed to Oral abx.     VS wnl  Cardiac monitoring in use,  tele monitor #[id number]  Fall precautions in place, remains injury free        Accurate I&Os  Bed locked at lowest position  Call light within reach     Chart check complete  Will cont with POC

## 2025-06-09 NOTE — PROGRESS NOTES
[facility] Medicine  Progress Note    Patient Name: Armando Ingram Jr.  MRN: [id number]  Patient Class: IP- Inpatient   Admission Date: 6/6/2025  Length of Stay: 3 days  Attending Physician: Yoseph Baez MD  Primary Care Provider: Brian Soares MD        Subjective     Principal Problem:Cryptococcosis    Chief complaint: follow up of Fever (Patient with recent 3 week stay in the hospital due to FUO.  Several hours after discharge patient had temperature of 101° and returned to the hospital at the advice of his Infectious Disease.)      HPI:  Armando Ingram Jr. is a 65 y.o. male with a PMH has a past medical history of Closed right hip fracture, Colon cancer, DVT,  Hairy cell leukemia, and Nephrolithiasis. who presented to the ED for further evaluation of acute onset fever with T-max measuring 101.0 F earlier today. Patient reported significant history of hairy cell leukemia in his followed by Dr. Dow and Dr. Flores from Hematology/Oncology and was recently prescribed a Z-Cordell for 4 days for treatment of a cough. Patient was instructed to go to the ED if he endorsed fever greater than 103.0 F but presented to the ED due to ulcer experiencing associated chills, throbbing headache, and persistent fever. He reported no known alleviating or aggravating factors noted with all other review of systems negative except as noted above. He is not currently on active treatment for his leukemia and reported being in his usual state of health prior to onset of symptoms.  Workup during recent hospitalization included blood cultures which were no growth   respiratory viral panel negative  CT scan of abdomen and pelvis which revealed bladder wall thickening but otherwise negative  TTE with no vegetation  Bilateral lower extremity ultrasound revealed chronic right lower extremity DVT  Karius  returned positive for cryptococcal infection  Lumbar puncture on 05/30/2025 revealed an opening pressure of 18  "clear CSF Christiana ink cryptococcal antigen MS panel fungal culture and bacterial culture all were negative  Patient was placed on induction dose therapy of amphotericin and flucytosine for cryptococcal infection  Oncology felt that treatment for his hairy cell leukemia should be deferred  Serum cryptococcal antigen negative  Flucytosine was stopped  Indium scan was negative  Patient spiked temp of 101.9° after flucytosine was discontinued  He was reinstituted with improvement in febrile status.    As fever and cough resolved infectious disease made the decision to stop amphotericin and flucytosine and initiate Diflucan  Initially patient had fever of 99.8 he was observed for 24 hours with no further fevers and was discharged home.  After patient got home he had rigors and temp of 102° therefore he was readmitted to the hospital.  Discussed with Infectious Disease and amphotericin was initiated.  Respiratory infection panel negative  Patient continues to run low-grade temp 99.9°, 99.2, he has rigors associated with these.  Repeat blood cultures remain negative.  Repeat serum cryptococcal antigen is negative  CT scan chest abdomen and pelvis pending    Infections disease to determine antibiotic/antifungal, course of treatment and follow up.      Overview/Hospital Course:  Patient was admitted after having rigors and a temp of 102° at home and readmitted to the hospital.  He was seen in consultation by Infectious Disease and amphotericin re-initiated.  Patient reports having another rigors in the early hours of the morning temp 99.9°.  Patient denies any complaints today    Interval history:  See hospital course    Objective:   /64   Pulse 88   Temp 97.9 °F (36.6 °C) (Oral)   Resp 16   Ht 5' 11" (1.803 m)   Wt 75.4 kg (166 lb 3.6 oz)   SpO2 (!) 93%   BMI 23.18 kg/m²   No intake or output data in the 24 hours ending 06/09/25 0843    PHYSICAL EXAM  Vitals reviewed  Constitutional:       Appearance: He is " "ill-appearing (Flushed).   Cardiovascular:      Rate and Rhythm: Normal rate and regular rhythm.      Pulses: Normal pulses.      Heart sounds: Normal heart sounds.   Pulmonary:      Effort: Pulmonary effort is normal.      Breath sounds: Normal breath sounds. No wheezing or rhonchi.   Abdominal:      General: Bowel sounds are normal.      Palpations: Abdomen is soft.      Tenderness: There is no abdominal tenderness. There is no guarding or rebound.   Musculoskeletal:         General: No swelling. Normal range of motion.      Cervical back: Normal range of motion and neck supple.      Right lower leg: No edema.      Left lower leg: No edema.   Skin:     General: Skin is warm and dry.      Findings: Bruising present. No lesion.   Neurological:      General: No focal deficit present.      Mental Status: He is alert and oriented to person, place, and time. Mental status is at baseline.   Psychiatric:         Mood and Affect: Mood normal.         Behavior: Behavior normal.         Thought Content: Thought content normal.     LABS  All labs from the past 24 hours were reviewed.     BMP:   Recent Labs   Lab 06/09/25  0518   GLU 93      K 3.6      CO2 21*   BUN 12   CREATININE 0.8   CALCIUM 7.7*     CBC:   Recent Labs   Lab 06/08/25  0558   WBC 2.81*   HGB 9.8*   HCT 30.9*        CMP:   Recent Labs   Lab 06/08/25  0558 06/09/25  0518    139   K 3.6 3.6    110   CO2 24 21*    93   BUN 12 12   CREATININE 0.8 0.8   CALCIUM 7.5* 7.7*   PROT 5.5* 6.0   ALBUMIN 1.7* 1.8*   BILITOT 0.3 0.4   ALKPHOS 190* 229*   AST 33 48*   ALT 26 31   ANIONGAP 6* 8     Cardiac Markers: No results for input(s): "CKMB", "MYOGLOBIN", "BNP", "TROPISTAT" in the last 48 hours.  Coagulation: No results for input(s): "PT", "INR", "APTT" in the last 48 hours.  Lactic Acid: No results for input(s): "LACTATE" in the last 48 hours.  Magnesium: No results for input(s): "MG" in the last 48 hours.  Troponin: No results " "for input(s): "TROPONINI", "TROPONINIHS" in the last 48 hours.  TSH:   Recent Labs   Lab 04/17/25  1148   TSH 1.776     Urine Studies:   No results for input(s): "COLORU", "APPEARANCEUA", "PHUR", "SPECGRAV", "PROTEINUA", "GLUCUA", "KETONESU", "BILIRUBINUA", "OCCULTUA", "NITRITE", "UROBILINOGEN", "LEUKOCYTESUR", "RBCUA", "WBCUA", "BACTERIA", "SQUAMEPITHEL", "HYALINECASTS" in the last 48 hours.    Invalid input(s): "WRIGHTSUR"    IMAGING  All imaging from the past 24 hours were reviewed.             Assessment & Plan  Hairy cell leukemia  Patient has been evaluated by Oncology and until he is infection free we will defer treatment    Chronic deep vein thrombosis (DVT) of right lower extremity, unspecified vein  Stable  On chronic Xarelto    Cryptococcosis    Unknown etiology unknown location  Infectious Disease  will restart amphotericin    During prior hospitalization patient had Karius test positive for cryptococcus  Serum cryptococcal antigen negative blood cultures negative  Lumbar puncture negative for cryptococcal antigen or any other infection  Anemia  Anemia is likely due to FUO. Most recent hemoglobin and hematocrit are listed below.  Recent Labs     06/06/25  1738 06/07/25  0729 06/08/25  0558   HGB 10.6* 11.4* 9.8*   HCT 31.5* 36.1* 30.9*     Plan  - Monitor serial CBC: Daily  - Transfuse PRBC if patient becomes hemodynamically unstable, symptomatic or H/H drops below 7/21.  - Patient has not received any PRBC transfusions to date  - Patient's anemia is currently stable  -   Dysphagia  06/09/2025  Speech consulted, will f/u with their eval    Fever  Uncertain etiology/remains low-grade but is associated with rigors  ID ordering other studies  CT chest abdomen and pelvis pending  Repeat blood cultures no growth to date  Respiratory infection panel negative    06/09/2025  IMPROVED  Afebrile x 48 hours  Continue to trend fever curve    Hyponatremia  Recent Labs     06/07/25  0345 06/08/25  0558 06/09/25  0518 "    137 139   RESOLVED  -    VTE Risk Mitigation (From admission, onward)           Ordered     rivaroxaban tablet 20 mg  With dinner         06/06/25 1714     Reason for No Pharmacological VTE Prophylaxis  Once        Question:  Reasons:  Answer:  Already adequately anticoagulated on oral Anticoagulants    06/06/25 1714     IP VTE HIGH RISK PATIENT  Once         06/06/25 1714     Place sequential compression device  Until discontinued         06/06/25 1714                    Discharge Planning   LAURI:      Code Status: Full Code   Medical Readiness for Discharge Date:   Discharge Plan A: Home                        Yoseph Baez MD  Department of Hospital Medicine   [facility] - Med Surg

## 2025-06-09 NOTE — PLAN OF CARE
Discussed poc with pt, pt verbalized understanding     Purposeful rounding every 2hours     VS wnl  Cardiac monitoring in use, pt is NSR, tele monitor # [id number]  Blood glucose monitoring   Fall precautions in place, remains injury free  Pt denies c/o pain     IVFs--NS bolus x2, D5W flush, and amphotericin  Accurate I&Os  Abx given as prescribed  Bed locked at lowest position  Call light within reach     Chart check complete  Will cont with POC

## 2025-06-09 NOTE — ASSESSMENT & PLAN NOTE
Uncertain etiology/remains low-grade but is associated with rigors  ID ordering other studies  CT chest abdomen and pelvis pending  Repeat blood cultures no growth to date  Respiratory infection panel negative    06/09/2025  IMPROVED  Afebrile x 48 hours  Continue to trend fever curve
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
## 2025-06-10 LAB
B HENSELAE IGG TITR SER IF: NORMAL TITER
B HENSELAE IGM TITR SER IF: NORMAL TITER
B QUINTANA IGG TITR SER IF: NORMAL TITER
B QUINTANA IGM TITR SER IF: NORMAL TITER

## 2025-06-10 PROCEDURE — 63700000 PHARM REV CODE 250 ALT 637 W/O HCPCS: Mod: HCNC | Performed by: INTERNAL MEDICINE

## 2025-06-10 PROCEDURE — 99233 SBSQ HOSP IP/OBS HIGH 50: CPT | Mod: NSCH,HCNC,, | Performed by: INTERNAL MEDICINE

## 2025-06-10 PROCEDURE — 92526 ORAL FUNCTION THERAPY: CPT | Mod: HCNC

## 2025-06-10 PROCEDURE — 25000003 PHARM REV CODE 250: Mod: HCNC | Performed by: INTERNAL MEDICINE

## 2025-06-10 PROCEDURE — 87081 CULTURE SCREEN ONLY: CPT | Mod: HCNC | Performed by: INTERNAL MEDICINE

## 2025-06-10 PROCEDURE — 21400001 HC TELEMETRY ROOM: Mod: HCNC

## 2025-06-10 PROCEDURE — 63600175 PHARM REV CODE 636 W HCPCS: Mod: HCNC | Performed by: INTERNAL MEDICINE

## 2025-06-10 RX ORDER — LINEZOLID 600 MG/1
600 TABLET, FILM COATED ORAL EVERY 12 HOURS
Status: DISCONTINUED | OUTPATIENT
Start: 2025-06-10 | End: 2025-06-11

## 2025-06-10 RX ADMIN — PANTOPRAZOLE SODIUM 40 MG: 40 TABLET, DELAYED RELEASE ORAL at 08:06

## 2025-06-10 RX ADMIN — LINEZOLID 600 MG: 600 TABLET, FILM COATED ORAL at 08:06

## 2025-06-10 RX ADMIN — ACETAMINOPHEN 650 MG: 325 TABLET ORAL at 02:06

## 2025-06-10 RX ADMIN — GUAIFENESIN 600 MG: 600 TABLET, EXTENDED RELEASE ORAL at 09:06

## 2025-06-10 RX ADMIN — PIPERACILLIN SODIUM AND TAZOBACTAM SODIUM 4.5 G: 4; .5 INJECTION, POWDER, FOR SOLUTION INTRAVENOUS at 06:06

## 2025-06-10 RX ADMIN — PIPERACILLIN SODIUM AND TAZOBACTAM SODIUM 4.5 G: 4; .5 INJECTION, POWDER, FOR SOLUTION INTRAVENOUS at 08:06

## 2025-06-10 RX ADMIN — CETIRIZINE HYDROCHLORIDE 5 MG: 5 TABLET ORAL at 08:06

## 2025-06-10 RX ADMIN — LINEZOLID 600 MG: 600 TABLET, FILM COATED ORAL at 09:06

## 2025-06-10 RX ADMIN — ACETAMINOPHEN 500 MG: 500 TABLET ORAL at 09:06

## 2025-06-10 RX ADMIN — GUAIFENESIN 600 MG: 600 TABLET, EXTENDED RELEASE ORAL at 08:06

## 2025-06-10 RX ADMIN — TAMSULOSIN HYDROCHLORIDE 0.4 MG: 0.4 CAPSULE ORAL at 08:06

## 2025-06-10 RX ADMIN — RIVAROXABAN 20 MG: 20 TABLET, FILM COATED ORAL at 06:06

## 2025-06-10 RX ADMIN — FLUCONAZOLE 400 MG: 150 TABLET ORAL at 08:06

## 2025-06-10 RX ADMIN — DIPHENHYDRAMINE HYDROCHLORIDE 25 MG: 25 CAPSULE ORAL at 09:06

## 2025-06-10 NOTE — ASSESSMENT & PLAN NOTE
Anemia is likely due to FUO. Most recent hemoglobin and hematocrit are listed below.  Recent Labs     06/08/25  0558 06/09/25  0518   HGB 9.8* 10.6*   HCT 30.9* 32.5*     Plan  - Monitor serial CBC: Daily  - Transfuse PRBC if patient becomes hemodynamically unstable, symptomatic or H/H drops below 7/21.  - Patient has not received any PRBC transfusions to date  - Patient's anemia is currently stable  -

## 2025-06-10 NOTE — PLAN OF CARE
Discussed poc with pt, pt verbalized understanding  Purposeful rounding every 2hours  VS wnl  Cardiac monitoring in use, pt is NSR, tele monitor #[id number]  Fall precautions in place, remains injury free  Pain and nausea under control with PRN meds  Abx given as prescribed  Bed locked at lowest position  Call light within reach  Chart check complete  Will cont with POC

## 2025-06-10 NOTE — PT/OT/SLP PROGRESS
"Speech Language Pathology Treatment    Patient Name:  Armando Ingram Jr.   MRN:  [id number]  Admitting Diagnosis: Cryptococcosis    Recommendations:                 General Recommendations:  Modified barium swallow study as comprehensive assessment and r/o aspiration  Diet recommendations:  Regular Diet - IDDSI Level 7, Liquid Diet Level: Thin liquids - IDDSI Level 0   Aspiration Precautions: GERD, Feed only when awake/alert, Frequent oral care, HOB to 90 degrees, Monitor for s/s of aspiration, Remain upright 30 minutes post meal, Small bites/sips, and Standard aspiration precautions   General Precautions: Standard, aspiration  Communication strategies:  none    Assessment:     Armando Ingram Jr. is a 65 y.o. male re-admitted to [facility] with recent dx cryptococcosis; however, after patient got home he had rigors and temp of 102°.  He presents with adequate TAMIE and functional cognitive-communicative ability.  He is remains on RA and ambulating around the room/hallway independently, although notes some weakness given prolonged hospitalization. Previous CT chest/abdomen/pelvis 5/22/25 revealed small hiatal hernia.  CT chest/abdomen/pelvis 6/7/2025 revealed "multifocal bilateral pulmonary opacities concerning for pneumonia or aspiration."  Pt reported intermittent "heartburn" symptoms during/after po consumption and takes OTC PPI as needed. He mentioned increased sputum production with occasional throat clear present +/- po intake and coughing upon awakening each morning. He is consuming a regular diet without incident; however, fever spiked again overnight (unknown source).  ST will proceed with comprehensive swallow assessment, including MBSS to r/o aspiration component. Re-educated on aspiration precautions, aggressive oral care/hygiene and behavioral reflux strategies.     Subjective     Pt seen bedside for ST.  Reported increased weakness, fatigue and chills today. No family in room. No c/o pain.  Patient " goals: Improve medical status, Go home     Pain/Comfort:  Pain Rating 1: 0/10  Pain Rating Post-Intervention 1: 0/10  Pain Rating 2: 0/10  Pain Rating Post-Intervention 2: 0/10    Respiratory Status: Room air    Objective:     Has the patient been evaluated by SLP for swallowing?   Yes  Keep patient NPO? No   Current Respiratory Status: RA    Pt is consuming a regular diet without overt s/s of aspiration and denies dysphagia or GI complaints.  States not regularly completing oral care/hygiene.    Goals:   Multidisciplinary Problems       SLP Goals          Problem: SLP    Goal Priority Disciplines Outcome   SLP Goal     SLP Progressing   Description: 1.  Pt will consume a regular consistency diet without overt s/s of aspiration.                       Plan:     Patient to be seen:  2 x/week, 1 x/week   Plan of Care expires:  06/16/25  Plan of Care reviewed with:  patient   SLP Follow-Up:  Yes       Discharge recommendations:   (TBD)   Barriers to Discharge:  None    Time Tracking:     SLP Treatment Date:   06/10/25  Speech Start Time:  1415  Speech Stop Time:  1430     Speech Total Time (min):  15 min    Billable Minutes: Treatment Swallowing Dysfunction 15 minutes    06/10/2025

## 2025-06-10 NOTE — TELEPHONE ENCOUNTER
Called patient to get an appointment. Pt is still in the hospital. Told him to call us when he gets out to schedule hospital follow up.

## 2025-06-10 NOTE — PLAN OF CARE
Discussed poc with pt, pt verbalized understanding    Purposeful rounding every 2hours    VS wnl  Cardiac monitoring in use, pt is NSR, tele monitor # [id number]  Fall precautions in place, remains injury free  Pain under control with PRN meds  Abx given as prescribed  Bed locked at lowest position  Call light within reach    Chart check complete  Will cont with POC

## 2025-06-10 NOTE — PROGRESS NOTES
[facility] Medicine  Progress Note    Patient Name: Armando Ingram Jr.  MRN: [id number]  Patient Class: IP- Inpatient   Admission Date: 6/6/2025  Length of Stay: 4 days  Attending Physician: Yoseph Baez MD  Primary Care Provider: Brian Soares MD        Subjective     Principal Problem:Cryptococcosis    Chief complaint: follow up of Fever (Patient with recent 3 week stay in the hospital due to FUO.  Several hours after discharge patient had temperature of 101° and returned to the hospital at the advice of his Infectious Disease.)      HPI:  Armando Ingram Jr. is a 65 y.o. male with a PMH has a past medical history of Closed right hip fracture, Colon cancer, DVT,  Hairy cell leukemia, and Nephrolithiasis. who presented to the ED for further evaluation of acute onset fever with T-max measuring 101.0 F earlier today. Patient reported significant history of hairy cell leukemia in his followed by Dr. Dow and Dr. Flores from Hematology/Oncology and was recently prescribed a Z-Cordell for 4 days for treatment of a cough. Patient was instructed to go to the ED if he endorsed fever greater than 103.0 F but presented to the ED due to ulcer experiencing associated chills, throbbing headache, and persistent fever. He reported no known alleviating or aggravating factors noted with all other review of systems negative except as noted above. He is not currently on active treatment for his leukemia and reported being in his usual state of health prior to onset of symptoms.  Workup during recent hospitalization included blood cultures which were no growth   respiratory viral panel negative  CT scan of abdomen and pelvis which revealed bladder wall thickening but otherwise negative  TTE with no vegetation  Bilateral lower extremity ultrasound revealed chronic right lower extremity DVT  Karius  returned positive for cryptococcal infection  Lumbar puncture on 05/30/2025 revealed an opening pressure of 18  clear CSF Christiana ink cryptococcal antigen MS panel fungal culture and bacterial culture all were negative  Patient was placed on induction dose therapy of amphotericin and flucytosine for cryptococcal infection  Oncology felt that treatment for his hairy cell leukemia should be deferred  Serum cryptococcal antigen negative  Flucytosine was stopped  Indium scan was negative  Patient spiked temp of 101.9° after flucytosine was discontinued  He was reinstituted with improvement in febrile status.    As fever and cough resolved infectious disease made the decision to stop amphotericin and flucytosine and initiate Diflucan  Initially patient had fever of 99.8 he was observed for 24 hours with no further fevers and was discharged home.  After patient got home he had rigors and temp of 102° therefore he was readmitted to the hospital.  Discussed with Infectious Disease and amphotericin was initiated.  Respiratory infection panel negative  Patient continues to run low-grade temp 99.9°, 99.2, he has rigors associated with these.  Repeat blood cultures remain negative.  Repeat serum cryptococcal antigen is negative  CT scan chest abdomen and pelvis pending    Infections disease to determine antibiotic/antifungal, course of treatment and follow up.      Overview/Hospital Course:  Patient was admitted after having rigors and a temp of 102° at home and readmitted to the hospital.  He was seen in consultation by Infectious Disease and amphotericin re-initiated.  Patient reports having another rigors in the early hours of the morning temp 99.9°.  Patient denies any complaints today    06/10/2025  Yesterday, had discussions with ID about possible discharging today if patient did fine overnight. Unfortunately, patient spiked another fever of 101F overnight. Zosyn added on to regimen. ID to manage.     Interval history:  Fever overnight. But otherwise, no other complaints.    Objective:   /72 (BP Location: Right arm, Patient  "Position: Lying)   Pulse 82   Temp 98.2 °F (36.8 °C) (Oral)   Resp 18   Ht 5' 11" (1.803 m)   Wt 75.4 kg (166 lb 3.6 oz)   SpO2 (!) 91%   BMI 23.18 kg/m²   No intake or output data in the 24 hours ending 06/10/25 0911    PHYSICAL EXAM  Vitals reviewed  Constitutional:       Appearance: He is ill-appearing (Flushed).   Cardiovascular:      Rate and Rhythm: Normal rate and regular rhythm.      Pulses: Normal pulses.      Heart sounds: Normal heart sounds.   Pulmonary:      Effort: Pulmonary effort is normal.      Breath sounds: Normal breath sounds. No wheezing or rhonchi.   Abdominal:      General: Bowel sounds are normal.      Palpations: Abdomen is soft.      Tenderness: There is no abdominal tenderness. There is no guarding or rebound.   Musculoskeletal:         General: No swelling. Normal range of motion.      Cervical back: Normal range of motion and neck supple.      Right lower leg: No edema.      Left lower leg: No edema.   Skin:     General: Skin is warm and dry.      Findings: Bruising present. No lesion.   Neurological:      General: No focal deficit present.      Mental Status: He is alert and oriented to person, place, and time. Mental status is at baseline.   Psychiatric:         Mood and Affect: Mood normal.         Behavior: Behavior normal.         Thought Content: Thought content normal.     LABS  All labs from the past 24 hours were reviewed.     BMP:   Recent Labs   Lab 06/09/25  0518   GLU 93      K 3.6      CO2 21*   BUN 12   CREATININE 0.8   CALCIUM 7.7*     CBC:   Recent Labs   Lab 06/09/25  0518   WBC 2.98*   HGB 10.6*   HCT 32.5*        CMP:   Recent Labs   Lab 06/09/25  0518      K 3.6      CO2 21*   GLU 93   BUN 12   CREATININE 0.8   CALCIUM 7.7*   PROT 6.0   ALBUMIN 1.8*   BILITOT 0.4   ALKPHOS 229*   AST 48*   ALT 31   ANIONGAP 8     Cardiac Markers: No results for input(s): "CKMB", "MYOGLOBIN", "BNP", "TROPISTAT" in the last 48 hours.  Coagulation: " "No results for input(s): "PT", "INR", "APTT" in the last 48 hours.  Lactic Acid: No results for input(s): "LACTATE" in the last 48 hours.  Magnesium: No results for input(s): "MG" in the last 48 hours.  Troponin: No results for input(s): "TROPONINI", "TROPONINIHS" in the last 48 hours.  TSH:   Recent Labs   Lab 04/17/25  1148   TSH 1.776     Urine Studies:   No results for input(s): "COLORU", "APPEARANCEUA", "PHUR", "SPECGRAV", "PROTEINUA", "GLUCUA", "KETONESU", "BILIRUBINUA", "OCCULTUA", "NITRITE", "UROBILINOGEN", "LEUKOCYTESUR", "RBCUA", "WBCUA", "BACTERIA", "SQUAMEPITHEL", "HYALINECASTS" in the last 48 hours.    Invalid input(s): "WRIGHTSUR"    IMAGING  All imaging from the past 24 hours were reviewed.             Assessment & Plan  Hairy cell leukemia  Patient has been evaluated by Oncology and until he is infection free we will defer treatment    Chronic deep vein thrombosis (DVT) of right lower extremity, unspecified vein  Stable  On chronic Xarelto    Cryptococcosis    Unknown etiology unknown location  Infectious Disease  will restart amphotericin    During prior hospitalization patient had Karius test positive for cryptococcus  Serum cryptococcal antigen negative blood cultures negative  Lumbar puncture negative for cryptococcal antigen or any other infection  Anemia  Anemia is likely due to FUO. Most recent hemoglobin and hematocrit are listed below.  Recent Labs     06/08/25  0558 06/09/25  0518   HGB 9.8* 10.6*   HCT 30.9* 32.5*     Plan  - Monitor serial CBC: Daily  - Transfuse PRBC if patient becomes hemodynamically unstable, symptomatic or H/H drops below 7/21.  - Patient has not received any PRBC transfusions to date  - Patient's anemia is currently stable  -   Dysphagia  06/09/2025  Speech consulted, will f/u with their eval    Fever  Uncertain etiology/remains low-grade but is associated with rigors  ID ordering other studies  CT chest abdomen and pelvis pending  Repeat blood cultures no growth to " date  Respiratory infection panel negative    06/09/2025  IMPROVED  Afebrile x 48 hours  Continue to trend fever curve    Hyponatremia  Recent Labs     06/08/25  0558 06/09/25  0518    139   RESOLVED  -    VTE Risk Mitigation (From admission, onward)           Ordered     rivaroxaban tablet 20 mg  With dinner         06/06/25 1714     Reason for No Pharmacological VTE Prophylaxis  Once        Question:  Reasons:  Answer:  Already adequately anticoagulated on oral Anticoagulants    06/06/25 1714     IP VTE HIGH RISK PATIENT  Once         06/06/25 1714     Place sequential compression device  Until discontinued         06/06/25 1714                    Discharge Planning   LAURI: 6/10/2025     Code Status: Full Code   Medical Readiness for Discharge Date:   Discharge Plan A: Home                        Yoseph Baez MD  Department of Hospital Medicine   [place] - Med Surg

## 2025-06-11 LAB
BEAKER SEE SCANNED REPORT: NORMAL
W LEGIONELLA URINARY ANTIGEN: NEGATIVE

## 2025-06-11 PROCEDURE — 25500020 PHARM REV CODE 255: Mod: HCNC | Performed by: STUDENT IN AN ORGANIZED HEALTH CARE EDUCATION/TRAINING PROGRAM

## 2025-06-11 PROCEDURE — 63700000 PHARM REV CODE 250 ALT 637 W/O HCPCS: Mod: HCNC | Performed by: INTERNAL MEDICINE

## 2025-06-11 PROCEDURE — 21400001 HC TELEMETRY ROOM: Mod: HCNC

## 2025-06-11 PROCEDURE — 92526 ORAL FUNCTION THERAPY: CPT | Mod: HCNC

## 2025-06-11 PROCEDURE — 97535 SELF CARE MNGMENT TRAINING: CPT | Mod: HCNC

## 2025-06-11 PROCEDURE — A9698 NON-RAD CONTRAST MATERIALNOC: HCPCS | Mod: HCNC | Performed by: STUDENT IN AN ORGANIZED HEALTH CARE EDUCATION/TRAINING PROGRAM

## 2025-06-11 PROCEDURE — 25000003 PHARM REV CODE 250: Mod: HCNC | Performed by: INTERNAL MEDICINE

## 2025-06-11 PROCEDURE — 92611 MOTION FLUOROSCOPY/SWALLOW: CPT | Mod: HCNC

## 2025-06-11 PROCEDURE — 63600175 PHARM REV CODE 636 W HCPCS: Mod: HCNC | Performed by: INTERNAL MEDICINE

## 2025-06-11 RX ORDER — LINEZOLID 600 MG/1
600 TABLET, FILM COATED ORAL EVERY 12 HOURS
Status: DISCONTINUED | OUTPATIENT
Start: 2025-06-11 | End: 2025-06-14

## 2025-06-11 RX ADMIN — ACETAMINOPHEN 650 MG: 325 TABLET ORAL at 04:06

## 2025-06-11 RX ADMIN — TAMSULOSIN HYDROCHLORIDE 0.4 MG: 0.4 CAPSULE ORAL at 08:06

## 2025-06-11 RX ADMIN — BARIUM SULFATE 30 ML: 0.81 POWDER, FOR SUSPENSION ORAL at 01:06

## 2025-06-11 RX ADMIN — AZITHROMYCIN MONOHYDRATE 500 MG: 500 INJECTION, POWDER, LYOPHILIZED, FOR SOLUTION INTRAVENOUS at 08:06

## 2025-06-11 RX ADMIN — ACETAMINOPHEN 500 MG: 500 TABLET ORAL at 08:06

## 2025-06-11 RX ADMIN — GUAIFENESIN 600 MG: 600 TABLET, EXTENDED RELEASE ORAL at 08:06

## 2025-06-11 RX ADMIN — FLUCONAZOLE 400 MG: 150 TABLET ORAL at 08:06

## 2025-06-11 RX ADMIN — PIPERACILLIN SODIUM AND TAZOBACTAM SODIUM 4.5 G: 4; .5 INJECTION, POWDER, FOR SOLUTION INTRAVENOUS at 04:06

## 2025-06-11 RX ADMIN — DIPHENHYDRAMINE HYDROCHLORIDE 25 MG: 25 CAPSULE ORAL at 08:06

## 2025-06-11 RX ADMIN — LINEZOLID 600 MG: 600 TABLET, FILM COATED ORAL at 08:06

## 2025-06-11 RX ADMIN — CETIRIZINE HYDROCHLORIDE 5 MG: 5 TABLET ORAL at 08:06

## 2025-06-11 RX ADMIN — ONDANSETRON 4 MG: 2 INJECTION INTRAMUSCULAR; INTRAVENOUS at 02:06

## 2025-06-11 RX ADMIN — PIPERACILLIN SODIUM AND TAZOBACTAM SODIUM 4.5 G: 4; .5 INJECTION, POWDER, FOR SOLUTION INTRAVENOUS at 02:06

## 2025-06-11 RX ADMIN — RIVAROXABAN 20 MG: 20 TABLET, FILM COATED ORAL at 04:06

## 2025-06-11 RX ADMIN — PANTOPRAZOLE SODIUM 40 MG: 40 TABLET, DELAYED RELEASE ORAL at 08:06

## 2025-06-11 RX ADMIN — SODIUM CHLORIDE 500 ML: 9 INJECTION, SOLUTION INTRAVENOUS at 08:06

## 2025-06-11 RX ADMIN — PIPERACILLIN SODIUM AND TAZOBACTAM SODIUM 4.5 G: 4; .5 INJECTION, POWDER, FOR SOLUTION INTRAVENOUS at 08:06
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
## 2025-06-11 NOTE — ASSESSMENT & PLAN NOTE
Anemia is likely due to FUO. Most recent hemoglobin and hematocrit are listed below.  Recent Labs     06/09/25  0518   HGB 10.6*   HCT 32.5*     Plan  - Monitor serial CBC: Daily  - Transfuse PRBC if patient becomes hemodynamically unstable, symptomatic or H/H drops below 7/21.  - Patient has not received any PRBC transfusions to date  - Patient's anemia is currently stable  -

## 2025-06-11 NOTE — PROGRESS NOTES
[facility]  Infectious Disease  Progress Note    Patient Name: Armando Ingram Jr.  MRN: [id number]  Admission Date: 6/6/2025  Length of Stay: 5 days  Attending Physician: Yoseph Baez MD  Primary Care Provider: Brian Soares MD    Isolation Status: No active isolations  Assessment/Plan:      ID  * Cryptococcosis  This is the only positive results noted on Karius .  Will add Amphotericin and monitor response.  It is still not clear if this alone  explains his high-grade fever.  Will need follow up    06/10-  On empiric fluconazole.  We will monitor clinical response.    Oncology  Hairy cell leukemia  Follow oncology    Other  Fever  His serum procalcitonin normal..  It is unclear if this fever is completely infectious.  We will monitor fever curve while on amphotericin.  We will send Bartonella and Coxiella serology.  We will repeat pan CT scan of chest abdomen and pelvis.    06/10-  CT scan of the chest showed pneumonia.  We will continue Zosyn.  Follow Legionella antigen.  We will add Zithromax.  On empiric Zyvox.  We will follow MRSA nares  We will send ESR CRP        Anticipated Disposition:     Thank you for your consult. I will follow-up with patient. Please contact us if you have any additional questions.    Armando Villanueva MD, [facility]  Infectious Disease  [facility]    Subjective:     Principal Problem:Cryptococcosis    HPI:  65 y.o. male with a PMH has a past medical history of Closed right hip fracture, Colon cancer, DVT,  Hairy cell leukemia, and Nephrolithiasis.  He was recently managed in the Hospital for about 3 weeks for fever .  Workup during recent hospitalization included blood cultures which were no growth   respiratory viral panel negative  CT scan of abdomen and pelvis which revealed bladder wall thickening but otherwise negative  TTE with no vegetation  Bilateral lower extremity ultrasound revealed chronic right lower extremity DVT  Karius  returned positive for cryptococcal  "infection  Lumbar puncture on 05/30/2025 revealed an opening pressure of 18 clear CSF Christiana ink cryptococcal antigen MS panel fungal culture and bacterial culture all were negative.    Indium scan was negative.His regime was switched to Fluconazole when fever resolved. However, after 3 hours at home, he called that he has a fever.- T max 102.       Interval History:   He has intermittent low-grade fever.  .  All other cultures are negative till date  CT scan of the chest showed pneumonia    Review of Systems   Constitutional:  Negative for activity change, appetite change, chills, diaphoresis, fatigue and fever.   HENT:  Negative for congestion.    Neurological:  Negative for dizziness and facial asymmetry.     Objective:     Vital Signs (Most Recent):  Temp: 99.3 °F (37.4 °C) (06/11/25 0600)  Pulse: 97 (06/11/25 0545)  Resp: 18 (06/11/25 0437)  BP: 132/78 (06/11/25 0437)  SpO2: (!) 90 % (06/11/25 0437) Vital Signs (24h Range):  Temp:  [98.2 °F (36.8 °C)-100.1 °F (37.8 °C)] 99.3 °F (37.4 °C)  Pulse:  [] 97  Resp:  [18] 18  SpO2:  [90 %-95 %] 90 %  BP: (104-132)/(57-78) 132/78     Weight: 75.4 kg (166 lb 3.6 oz)  Body mass index is 23.18 kg/m².    Estimated Creatinine Clearance: 96.7 mL/min (based on SCr of 0.8 mg/dL).     Physical Exam  Vitals and nursing note reviewed.   HENT:      Head: Normocephalic.   Eyes:      Pupils: Pupils are equal, round, and reactive to light.   Pulmonary:      Effort: Pulmonary effort is normal.   Abdominal:      General: Abdomen is flat.   Musculoskeletal:         General: Normal range of motion.   Skin:     General: Skin is warm.   Neurological:      Mental Status: He is alert.          Significant Labs: Blood Culture: No results for input(s): "LABBLOO" in the last 4320 hours.  BMP: No results for input(s): "GLU", "NA", "K", "CL", "CO2", "BUN", "CREATININE", "CALCIUM", "MG" in the last 48 hours.  CBC: No results for input(s): "WBC", "HGB", "HCT", "PLT" in the last 48 hours.  CMP: " "No results for input(s): "NA", "K", "CL", "CO2", "GLU", "BUN", "CREATININE", "CALCIUM", "PROT", "ALBUMIN", "BILITOT", "ALKPHOS", "AST", "ALT", "ANIONGAP", "EGFRNONAA" in the last 48 hours.    Invalid input(s): "ESTGFAFRICA"  All pertinent labs within the past 24 hours have been reviewed.    Significant Imaging: I have reviewed all pertinent imaging results/findings within the past 24 hours.  "

## 2025-06-11 NOTE — PLAN OF CARE
Discussed poc with pt, pt verbalized understanding  Purposeful rounding every 2hours  VS wnl  Cardiac monitoring in use, pt is NSR, tele monitor #[id number]  Fall precautions in place, remains injury free  Pain and nausea under control with PRN meds  Abx given as prescribed  Bed locked at lowest position  Call light within reach  Chart check complete  Will cont with POC

## 2025-06-11 NOTE — PLAN OF CARE
Discussed poc with pt, pt verbalized understanding    Purposeful rounding every 2hours    VS wnl  Cardiac monitoring in use, pt is NSR, tele monitor # [id number]  Fall precautions in place, remains injury free  Pain under control with PRN meds  Abx given as prescribed  Bed locked at lowest position  Call light within reach    Chart check complete  Will cont with POC

## 2025-06-11 NOTE — ASSESSMENT & PLAN NOTE
This is the only positive results noted on Karius .  Will add Amphotericin and monitor response.  It is still not clear if this alone  explains his high-grade fever.  Will need follow up    06/10-  On empiric fluconazole.  We will monitor clinical response.

## 2025-06-11 NOTE — SUBJECTIVE & OBJECTIVE
"Interval History:   He has intermittent low-grade fever.  .  All other cultures are negative till date  CT scan of the chest showed pneumonia    Review of Systems   Constitutional:  Negative for activity change, appetite change, chills, diaphoresis, fatigue and fever.   HENT:  Negative for congestion.    Neurological:  Negative for dizziness and facial asymmetry.     Objective:     Vital Signs (Most Recent):  Temp: 99.3 °F (37.4 °C) (06/11/25 0600)  Pulse: 97 (06/11/25 0545)  Resp: 18 (06/11/25 0437)  BP: 132/78 (06/11/25 0437)  SpO2: (!) 90 % (06/11/25 0437) Vital Signs (24h Range):  Temp:  [98.2 °F (36.8 °C)-100.1 °F (37.8 °C)] 99.3 °F (37.4 °C)  Pulse:  [] 97  Resp:  [18] 18  SpO2:  [90 %-95 %] 90 %  BP: (104-132)/(57-78) 132/78     Weight: 75.4 kg (166 lb 3.6 oz)  Body mass index is 23.18 kg/m².    Estimated Creatinine Clearance: 96.7 mL/min (based on SCr of 0.8 mg/dL).     Physical Exam  Vitals and nursing note reviewed.   HENT:      Head: Normocephalic.   Eyes:      Pupils: Pupils are equal, round, and reactive to light.   Pulmonary:      Effort: Pulmonary effort is normal.   Abdominal:      General: Abdomen is flat.   Musculoskeletal:         General: Normal range of motion.   Skin:     General: Skin is warm.   Neurological:      Mental Status: He is alert.          Significant Labs: Blood Culture: No results for input(s): "LABBLOO" in the last 4320 hours.  BMP: No results for input(s): "GLU", "NA", "K", "CL", "CO2", "BUN", "CREATININE", "CALCIUM", "MG" in the last 48 hours.  CBC: No results for input(s): "WBC", "HGB", "HCT", "PLT" in the last 48 hours.  CMP: No results for input(s): "NA", "K", "CL", "CO2", "GLU", "BUN", "CREATININE", "CALCIUM", "PROT", "ALBUMIN", "BILITOT", "ALKPHOS", "AST", "ALT", "ANIONGAP", "EGFRNONAA" in the last 48 hours.    Invalid input(s): "ESTGFAFRICA"  All pertinent labs within the past 24 hours have been reviewed.    Significant Imaging: I have reviewed all pertinent imaging " results/findings within the past 24 hours.

## 2025-06-11 NOTE — ASSESSMENT & PLAN NOTE
His serum procalcitonin normal..  It is unclear if this fever is completely infectious.  We will monitor fever curve while on amphotericin.  We will send Bartonella and Coxiella serology.  We will repeat pan CT scan of chest abdomen and pelvis.    06/10-  CT scan of the chest showed pneumonia.  We will continue Zosyn.  Follow Legionella antigen.  We will add Zithromax.  On empiric Zyvox.  We will follow MRSA nares  We will send ESR CRP

## 2025-06-11 NOTE — PROGRESS NOTES
[facility] Medicine  Progress Note    Patient Name: Armando Ingram Jr.  MRN: [id number]  Patient Class: IP- Inpatient   Admission Date: 6/6/2025  Length of Stay: 5 days  Attending Physician: Yoseph Baez MD  Primary Care Provider: Brian Soares MD        Subjective     Principal Problem:Cryptococcosis    Chief complaint: follow up of Fever (Patient with recent 3 week stay in the hospital due to FUO.  Several hours after discharge patient had temperature of 101° and returned to the hospital at the advice of his Infectious Disease.)      HPI:  Armando Ingram Jr. is a 65 y.o. male with a PMH has a past medical history of Closed right hip fracture, Colon cancer, DVT,  Hairy cell leukemia, and Nephrolithiasis. who presented to the ED for further evaluation of acute onset fever with T-max measuring 101.0 F earlier today. Patient reported significant history of hairy cell leukemia in his followed by Dr. Dow and Dr. Flores from Hematology/Oncology and was recently prescribed a Z-Cordell for 4 days for treatment of a cough. Patient was instructed to go to the ED if he endorsed fever greater than 103.0 F but presented to the ED due to ulcer experiencing associated chills, throbbing headache, and persistent fever. He reported no known alleviating or aggravating factors noted with all other review of systems negative except as noted above. He is not currently on active treatment for his leukemia and reported being in his usual state of health prior to onset of symptoms.  Workup during recent hospitalization included blood cultures which were no growth   respiratory viral panel negative  CT scan of abdomen and pelvis which revealed bladder wall thickening but otherwise negative  TTE with no vegetation  Bilateral lower extremity ultrasound revealed chronic right lower extremity DVT  Karius  returned positive for cryptococcal infection  Lumbar puncture on 05/30/2025 revealed an opening pressure of 18  clear CSF Christiana ink cryptococcal antigen MS panel fungal culture and bacterial culture all were negative  Patient was placed on induction dose therapy of amphotericin and flucytosine for cryptococcal infection  Oncology felt that treatment for his hairy cell leukemia should be deferred  Serum cryptococcal antigen negative  Flucytosine was stopped  Indium scan was negative  Patient spiked temp of 101.9° after flucytosine was discontinued  He was reinstituted with improvement in febrile status.    As fever and cough resolved infectious disease made the decision to stop amphotericin and flucytosine and initiate Diflucan  Initially patient had fever of 99.8 he was observed for 24 hours with no further fevers and was discharged home.  After patient got home he had rigors and temp of 102° therefore he was readmitted to the hospital.  Discussed with Infectious Disease and amphotericin was initiated.  Respiratory infection panel negative  Patient continues to run low-grade temp 99.9°, 99.2, he has rigors associated with these.  Repeat blood cultures remain negative.  Repeat serum cryptococcal antigen is negative  CT scan chest abdomen and pelvis pending    Infections disease to determine antibiotic/antifungal, course of treatment and follow up.      Overview/Hospital Course:  Patient was admitted after having rigors and a temp of 102° at home and readmitted to the hospital.  He was seen in consultation by Infectious Disease and amphotericin re-initiated.  Patient reports having another rigors in the early hours of the morning temp 99.9°.  Patient denies any complaints today    06/10/2025  Yesterday, had discussions with ID about possible discharging today if patient did fine overnight. Unfortunately, patient spiked another fever of 101F overnight. Zosyn added on to regimen. ID to manage.     06/11/2025  As on the writing of this documentation, T-max of only 100.1 which is an improvement.  Continue current antimicrobial  "therapy.  Follow up Infectious Disease recommendations.    Interval history:  J LUISON. SLP to evaluated today for possible aspiration    Objective:   /78   Pulse 97   Temp 99.3 °F (37.4 °C)   Resp 18   Ht 5' 11" (1.803 m)   Wt 75.4 kg (166 lb 3.6 oz)   SpO2 (!) 90%   BMI 23.18 kg/m²     Intake/Output Summary (Last 24 hours) at 6/11/2025 0751  Last data filed at 6/10/2025 1514  Gross per 24 hour   Intake 150 ml   Output --   Net 150 ml       PHYSICAL EXAM  Vitals reviewed  Constitutional:       Appearance: He is ill-appearing (Flushed).   Cardiovascular:      Rate and Rhythm: Normal rate and regular rhythm.      Pulses: Normal pulses.      Heart sounds: Normal heart sounds.   Pulmonary:      Effort: Pulmonary effort is normal.      Breath sounds: Normal breath sounds. No wheezing or rhonchi.   Abdominal:      General: Bowel sounds are normal.      Palpations: Abdomen is soft.      Tenderness: There is no abdominal tenderness. There is no guarding or rebound.   Musculoskeletal:         General: No swelling. Normal range of motion.      Cervical back: Normal range of motion and neck supple.      Right lower leg: No edema.      Left lower leg: No edema.   Skin:     General: Skin is warm and dry.      Findings: Bruising present. No lesion.   Neurological:      General: No focal deficit present.      Mental Status: He is alert and oriented to person, place, and time. Mental status is at baseline.   Psychiatric:         Mood and Affect: Mood normal.         Behavior: Behavior normal.         Thought Content: Thought content normal.     LABS  All labs from the past 24 hours were reviewed.     TSH:   Recent Labs   Lab 04/17/25  1148   TSH 1.776         IMAGING  All imaging from the past 24 hours were reviewed.             Assessment & Plan  Hairy cell leukemia  Patient has been evaluated by Oncology and until he is infection free we will defer treatment    Chronic deep vein thrombosis (DVT) of right lower " extremity, unspecified vein  Stable  On chronic Xarelto    Cryptococcosis    Unknown etiology unknown location  Infectious Disease  will restart amphotericin    During prior hospitalization patient had Karius test positive for cryptococcus  Serum cryptococcal antigen negative blood cultures negative  Lumbar puncture negative for cryptococcal antigen or any other infection  Anemia  Anemia is likely due to FUO. Most recent hemoglobin and hematocrit are listed below.  Recent Labs     06/09/25  0518   HGB 10.6*   HCT 32.5*     Plan  - Monitor serial CBC: Daily  - Transfuse PRBC if patient becomes hemodynamically unstable, symptomatic or H/H drops below 7/21.  - Patient has not received any PRBC transfusions to date  - Patient's anemia is currently stable  -   Dysphagia  06/09/2025  Speech consulted, will f/u with their eval    Fever  Uncertain etiology/remains low-grade but is associated with rigors  ID ordering other studies  CT chest abdomen and pelvis pending  Repeat blood cultures no growth to date  Respiratory infection panel negative    06/09/2025  IMPROVED  Afebrile x 48 hours  Continue to trend fever curve    Hyponatremia  Recent Labs     06/09/25  0518      RESOLVED  -    VTE Risk Mitigation (From admission, onward)           Ordered     rivaroxaban tablet 20 mg  With dinner         06/06/25 1714     Reason for No Pharmacological VTE Prophylaxis  Once        Question:  Reasons:  Answer:  Already adequately anticoagulated on oral Anticoagulants    06/06/25 1714     IP VTE HIGH RISK PATIENT  Once         06/06/25 1714     Place sequential compression device  Until discontinued         06/06/25 1714                    Discharge Planning   LAURI: 6/11/2025     Code Status: Full Code   Medical Readiness for Discharge Date:   Discharge Plan A: Home                        Yoseph Baez MD  Department of Hospital Medicine   [facility]

## 2025-06-11 NOTE — PLAN OF CARE
06/11/25 0853   Rounds   Attendance Provider;;Charge nurse;Physical therapist;Occupational therapist   Discharge Plan A Home   Why the patient remains in the hospital Requires continued medical care   Transition of Care Barriers None

## 2025-06-12 LAB
ABSOLUTE EOSINOPHIL (OHS): 0.02 K/UL
ABSOLUTE MONOCYTE (OHS): 0.03 K/UL (ref 0.3–1)
ABSOLUTE NEUTROPHIL COUNT (OHS): 1.57 K/UL (ref 1.8–7.7)
ALBUMIN SERPL BCP-MCNC: 1.6 G/DL (ref 3.5–5.2)
ALBUMIN SERPL BCP-MCNC: ABNORMAL G/DL
ALP SERPL-CCNC: 225 UNIT/L (ref 40–150)
ALT SERPL W/O P-5'-P-CCNC: 26 UNIT/L (ref 10–44)
ANION GAP (OHS): 8 MMOL/L (ref 8–16)
ANION GAP (OHS): ABNORMAL
AST SERPL-CCNC: 32 UNIT/L (ref 11–45)
BACTERIA BLD CULT: NORMAL
BACTERIA BLD CULT: NORMAL
BASOPHILS # BLD AUTO: 0.01 K/UL
BASOPHILS NFR BLD AUTO: 0.4 %
BILIRUB SERPL-MCNC: 0.5 MG/DL (ref 0.1–1)
BUN SERPL-MCNC: 10 MG/DL (ref 8–23)
BUN SERPL-MCNC: ABNORMAL MG/DL
CALCIUM SERPL-MCNC: 7.5 MG/DL (ref 8.7–10.5)
CALCIUM SERPL-MCNC: ABNORMAL MG/DL
CHLORIDE SERPL-SCNC: 106 MMOL/L (ref 95–110)
CHLORIDE SERPL-SCNC: ABNORMAL MMOL/L
CO2 SERPL-SCNC: 21 MMOL/L (ref 23–29)
CO2 SERPL-SCNC: ABNORMAL MMOL/L
CREAT SERPL-MCNC: 0.8 MG/DL (ref 0.5–1.4)
CREAT SERPL-MCNC: ABNORMAL MG/DL
CRP SERPL-MCNC: 118.61 MG/L
ERYTHROCYTE [DISTWIDTH] IN BLOOD BY AUTOMATED COUNT: 15 % (ref 11.5–14.5)
GFR SERPLBLD CREATININE-BSD FMLA CKD-EPI: >60 ML/MIN/1.73/M2
GFR SERPLBLD CREATININE-BSD FMLA CKD-EPI: ABNORMAL ML/MIN/{1.73_M2}
GGT SERPL-CCNC: 341 U/L (ref 8–55)
GLUCOSE SERPL-MCNC: 121 MG/DL (ref 70–110)
GLUCOSE SERPL-MCNC: ABNORMAL MG/DL
HCT VFR BLD AUTO: 29.3 % (ref 40–54)
HGB BLD-MCNC: 9.6 GM/DL (ref 14–18)
IMM GRANULOCYTES # BLD AUTO: 0.06 K/UL (ref 0–0.04)
IMM GRANULOCYTES NFR BLD AUTO: 2.6 % (ref 0–0.5)
LYMPHOCYTES # BLD AUTO: 0.62 K/UL (ref 1–4.8)
MCH RBC QN AUTO: 30.7 PG (ref 27–31)
MCHC RBC AUTO-ENTMCNC: 32.8 G/DL (ref 32–36)
MCV RBC AUTO: 94 FL (ref 82–98)
NUCLEATED RBC (/100WBC) (OHS): 0 /100 WBC
PHOSPHATE SERPL-MCNC: 2.6 MG/DL (ref 2.7–4.5)
PLATELET # BLD AUTO: 126 K/UL (ref 150–450)
PMV BLD AUTO: 8.9 FL (ref 9.2–12.9)
POTASSIUM SERPL-SCNC: 3.4 MMOL/L (ref 3.5–5.1)
POTASSIUM SERPL-SCNC: ABNORMAL MMOL/L
PROCALCITONIN SERPL-MCNC: 0.33 NG/ML
PROT SERPL-MCNC: 5.7 GM/DL (ref 6–8.4)
RBC # BLD AUTO: 3.13 M/UL (ref 4.6–6.2)
RELATIVE EOSINOPHIL (OHS): 0.9 %
RELATIVE LYMPHOCYTE (OHS): 26.8 % (ref 18–48)
RELATIVE MONOCYTE (OHS): 1.3 % (ref 4–15)
RELATIVE NEUTROPHIL (OHS): 68 % (ref 38–73)
SODIUM SERPL-SCNC: 135 MMOL/L (ref 136–145)
SODIUM SERPL-SCNC: ABNORMAL MMOL/L
WBC # BLD AUTO: 2.31 K/UL (ref 3.9–12.7)

## 2025-06-12 PROCEDURE — 82977 ASSAY OF GGT: CPT | Mod: HCNC | Performed by: STUDENT IN AN ORGANIZED HEALTH CARE EDUCATION/TRAINING PROGRAM

## 2025-06-12 PROCEDURE — 86141 C-REACTIVE PROTEIN HS: CPT | Mod: HCNC | Performed by: STUDENT IN AN ORGANIZED HEALTH CARE EDUCATION/TRAINING PROGRAM

## 2025-06-12 PROCEDURE — 63600175 PHARM REV CODE 636 W HCPCS: Mod: HCNC | Performed by: INTERNAL MEDICINE

## 2025-06-12 PROCEDURE — 84100 ASSAY OF PHOSPHORUS: CPT | Mod: HCNC | Performed by: STUDENT IN AN ORGANIZED HEALTH CARE EDUCATION/TRAINING PROGRAM

## 2025-06-12 PROCEDURE — 82040 ASSAY OF SERUM ALBUMIN: CPT | Mod: HCNC | Performed by: STUDENT IN AN ORGANIZED HEALTH CARE EDUCATION/TRAINING PROGRAM

## 2025-06-12 PROCEDURE — 63700000 PHARM REV CODE 250 ALT 637 W/O HCPCS: Mod: HCNC | Performed by: INTERNAL MEDICINE

## 2025-06-12 PROCEDURE — 84145 PROCALCITONIN (PCT): CPT | Mod: HCNC | Performed by: STUDENT IN AN ORGANIZED HEALTH CARE EDUCATION/TRAINING PROGRAM

## 2025-06-12 PROCEDURE — 85025 COMPLETE CBC W/AUTO DIFF WBC: CPT | Mod: HCNC | Performed by: STUDENT IN AN ORGANIZED HEALTH CARE EDUCATION/TRAINING PROGRAM

## 2025-06-12 PROCEDURE — 21400001 HC TELEMETRY ROOM: Mod: HCNC

## 2025-06-12 PROCEDURE — 25000003 PHARM REV CODE 250: Mod: HCNC | Performed by: INTERNAL MEDICINE

## 2025-06-12 PROCEDURE — 36415 COLL VENOUS BLD VENIPUNCTURE: CPT | Mod: HCNC | Performed by: STUDENT IN AN ORGANIZED HEALTH CARE EDUCATION/TRAINING PROGRAM

## 2025-06-12 RX ADMIN — RIVAROXABAN 20 MG: 20 TABLET, FILM COATED ORAL at 05:06

## 2025-06-12 RX ADMIN — PIPERACILLIN SODIUM AND TAZOBACTAM SODIUM 4.5 G: 4; .5 INJECTION, POWDER, FOR SOLUTION INTRAVENOUS at 11:06

## 2025-06-12 RX ADMIN — AZITHROMYCIN MONOHYDRATE 500 MG: 500 INJECTION, POWDER, LYOPHILIZED, FOR SOLUTION INTRAVENOUS at 09:06

## 2025-06-12 RX ADMIN — PANTOPRAZOLE SODIUM 40 MG: 40 TABLET, DELAYED RELEASE ORAL at 09:06

## 2025-06-12 RX ADMIN — SODIUM CHLORIDE 500 ML: 9 INJECTION, SOLUTION INTRAVENOUS at 11:06

## 2025-06-12 RX ADMIN — LINEZOLID 600 MG: 600 TABLET, FILM COATED ORAL at 09:06

## 2025-06-12 RX ADMIN — LINEZOLID 600 MG: 600 TABLET, FILM COATED ORAL at 11:06

## 2025-06-12 RX ADMIN — GUAIFENESIN 600 MG: 600 TABLET, EXTENDED RELEASE ORAL at 09:06

## 2025-06-12 RX ADMIN — SODIUM CHLORIDE 500 ML: 9 INJECTION, SOLUTION INTRAVENOUS at 12:06

## 2025-06-12 RX ADMIN — GUAIFENESIN 600 MG: 600 TABLET, EXTENDED RELEASE ORAL at 11:06

## 2025-06-12 RX ADMIN — ACETAMINOPHEN 650 MG: 325 TABLET ORAL at 05:06

## 2025-06-12 RX ADMIN — PIPERACILLIN SODIUM AND TAZOBACTAM SODIUM 4.5 G: 4; .5 INJECTION, POWDER, FOR SOLUTION INTRAVENOUS at 05:06

## 2025-06-12 RX ADMIN — DIPHENHYDRAMINE HYDROCHLORIDE 25 MG: 25 CAPSULE ORAL at 11:06

## 2025-06-12 RX ADMIN — TAMSULOSIN HYDROCHLORIDE 0.4 MG: 0.4 CAPSULE ORAL at 09:06

## 2025-06-12 RX ADMIN — ACETAMINOPHEN 500 MG: 500 TABLET ORAL at 11:06

## 2025-06-12 RX ADMIN — PIPERACILLIN SODIUM AND TAZOBACTAM SODIUM 4.5 G: 4; .5 INJECTION, POWDER, FOR SOLUTION INTRAVENOUS at 01:06

## 2025-06-12 RX ADMIN — PIPERACILLIN SODIUM AND TAZOBACTAM SODIUM 4.5 G: 4; .5 INJECTION, POWDER, FOR SOLUTION INTRAVENOUS at 09:06

## 2025-06-12 RX ADMIN — CETIRIZINE HYDROCHLORIDE 5 MG: 5 TABLET ORAL at 09:06

## 2025-06-12 RX ADMIN — FLUCONAZOLE 400 MG: 150 TABLET ORAL at 09:06

## 2025-06-12 NOTE — NURSING
PT GIVEN AN ICE PACK FOR FEBRILE EPISODE. PT EDUCATED ON NOT KEEPING THE ICE PACK ON SKIN OVER TWENTY MINUTES TO AVOID FREEZE BURN/TISSUE NECROSIS. PT VERBALIZED UNDERSTANDING.

## 2025-06-12 NOTE — PROGRESS NOTES
[facility] [place] Medicine  Progress Note    Patient Name: Armando Ingram Jr.  MRN: [id number]  Patient Class: IP- Inpatient   Admission Date: 6/6/2025  Length of Stay: 6 days  Attending Physician: Yoseph Baez MD  Primary Care Provider: Brian Soares MD        Subjective     Principal Problem:Cryptococcosis    Chief complaint: follow up of Fever (Patient with recent 3 week stay in the hospital due to FUO.  Several hours after discharge patient had temperature of 101° and returned to the hospital at the advice of his Infectious Disease.)      HPI:  Armando Ingram Jr. is a 65 y.o. male with a PMH has a past medical history of Closed right hip fracture, Colon cancer, DVT,  Hairy cell leukemia, and Nephrolithiasis. who presented to the ED for further evaluation of acute onset fever with T-max measuring 101.0 F earlier today. Patient reported significant history of hairy cell leukemia in his followed by Dr. Dow and Dr. Flores from Hematology/Oncology and was recently prescribed a Z-Cordell for 4 days for treatment of a cough. Patient was instructed to go to the ED if he endorsed fever greater than 103.0 F but presented to the ED due to ulcer experiencing associated chills, throbbing headache, and persistent fever. He reported no known alleviating or aggravating factors noted with all other review of systems negative except as noted above. He is not currently on active treatment for his leukemia and reported being in his usual state of health prior to onset of symptoms.  Workup during recent hospitalization included blood cultures which were no growth   respiratory viral panel negative  CT scan of abdomen and pelvis which revealed bladder wall thickening but otherwise negative  TTE with no vegetation  Bilateral lower extremity ultrasound revealed chronic right lower extremity DVT  Karius  returned positive for cryptococcal infection  Lumbar puncture on 05/30/2025 revealed an opening pressure of 18  clear CSF Christiana ink cryptococcal antigen MS panel fungal culture and bacterial culture all were negative  Patient was placed on induction dose therapy of amphotericin and flucytosine for cryptococcal infection  Oncology felt that treatment for his hairy cell leukemia should be deferred  Serum cryptococcal antigen negative  Flucytosine was stopped  Indium scan was negative  Patient spiked temp of 101.9° after flucytosine was discontinued  He was reinstituted with improvement in febrile status.    As fever and cough resolved infectious disease made the decision to stop amphotericin and flucytosine and initiate Diflucan  Initially patient had fever of 99.8 he was observed for 24 hours with no further fevers and was discharged home.  After patient got home he had rigors and temp of 102° therefore he was readmitted to the hospital.  Discussed with Infectious Disease and amphotericin was initiated.  Respiratory infection panel negative  Patient continues to run low-grade temp 99.9°, 99.2, he has rigors associated with these.  Repeat blood cultures remain negative.  Repeat serum cryptococcal antigen is negative  CT scan chest abdomen and pelvis pending    Infections disease to determine antibiotic/antifungal, course of treatment and follow up.      Overview/Hospital Course:  Patient was admitted after having rigors and a temp of 102° at home and readmitted to the hospital.  He was seen in consultation by Infectious Disease and amphotericin re-initiated.  Patient reports having another rigors in the early hours of the morning temp 99.9°.  Patient denies any complaints today    06/10/2025  Yesterday, had discussions with ID about possible discharging today if patient did fine overnight. Unfortunately, patient spiked another fever of 101F overnight. Zosyn added on to regimen. ID to manage.     06/11/2025  As on the writing of this documentation, T-max of only 100.1 which is an improvement.  Continue current antimicrobial  "therapy.  Follow up Infectious Disease recommendations.    06/12/2025  Spiked another fever, TMAX 101 early this morning, while on 5 antimicrobials. Reached out to ID. Will follow up their recommendations.     Interval history:  J LUISON. SLP to evaluated today for possible aspiration    Objective:   BP (!) 100/59 (BP Location: Right arm, Patient Position: Lying)   Pulse 77   Temp 98.4 °F (36.9 °C) (Oral)   Resp 18   Ht 5' 11" (1.803 m)   Wt 75.4 kg (166 lb 3.6 oz)   SpO2 (!) 92%   BMI 23.18 kg/m²     Intake/Output Summary (Last 24 hours) at 6/12/2025 0939  Last data filed at 6/12/2025 0541  Gross per 24 hour   Intake 3636.86 ml   Output --   Net 3636.86 ml       PHYSICAL EXAM  Vitals reviewed  Constitutional:       Appearance: He is ill-appearing (Flushed).   Cardiovascular:      Rate and Rhythm: Normal rate and regular rhythm.      Pulses: Normal pulses.      Heart sounds: Normal heart sounds.   Pulmonary:      Effort: Pulmonary effort is normal.      Breath sounds: Normal breath sounds. No wheezing or rhonchi.   Abdominal:      General: Bowel sounds are normal.      Palpations: Abdomen is soft.      Tenderness: There is no abdominal tenderness. There is no guarding or rebound.   Musculoskeletal:         General: No swelling. Normal range of motion.      Cervical back: Normal range of motion and neck supple.      Right lower leg: No edema.      Left lower leg: No edema.   Skin:     General: Skin is warm and dry.      Findings: Bruising present. No lesion.   Neurological:      General: No focal deficit present.      Mental Status: He is alert and oriented to person, place, and time. Mental status is at baseline.   Psychiatric:         Mood and Affect: Mood normal.         Behavior: Behavior normal.         Thought Content: Thought content normal.     LABS  All labs from the past 24 hours were reviewed.     TSH:   Recent Labs   Lab 04/17/25  1148   TSH 1.776         IMAGING  All imaging from the past 24 hours " were reviewed.             Assessment & Plan  Hairy cell leukemia  Patient has been evaluated by Oncology and until he is infection free we will defer treatment    Chronic deep vein thrombosis (DVT) of right lower extremity, unspecified vein  Stable  On chronic Xarelto    Cryptococcosis    Unknown etiology unknown location  Infectious Disease  will restart amphotericin    During prior hospitalization patient had Karius test positive for cryptococcus  Serum cryptococcal antigen negative blood cultures negative  Lumbar puncture negative for cryptococcal antigen or any other infection  Anemia  Anemia is likely due to FUO. Most recent hemoglobin and hematocrit are listed below.  Recent Labs     06/12/25  0550   HGB 9.6*   HCT 29.3*     Plan  - Monitor serial CBC: Daily  - Transfuse PRBC if patient becomes hemodynamically unstable, symptomatic or H/H drops below 7/21.  - Patient has not received any PRBC transfusions to date  - Patient's anemia is currently stable  -   Dysphagia  06/09/2025  Speech consulted, will f/u with their eval    Fever  Uncertain etiology/remains low-grade but is associated with rigors  ID ordering other studies  CT chest abdomen and pelvis pending  Repeat blood cultures no growth to date  Respiratory infection panel negative    06/09/2025  IMPROVED  Afebrile x 48 hours  Continue to trend fever curve    Hyponatremia  Recent Labs     06/12/25  0550   *   RESOLVED  -    VTE Risk Mitigation (From admission, onward)           Ordered     rivaroxaban tablet 20 mg  With dinner         06/06/25 1714     Reason for No Pharmacological VTE Prophylaxis  Once        Question:  Reasons:  Answer:  Already adequately anticoagulated on oral Anticoagulants    06/06/25 1714     IP VTE HIGH RISK PATIENT  Once         06/06/25 1714     Place sequential compression device  Until discontinued         06/06/25 1714                    Discharge Planning   LAURI: 6/12/2025     Code Status: Full Code   Medical  Readiness for Discharge Date:   Discharge Plan A: Home                        Yoseph Baez MD  Department of Hospital Medicine   [facility] Surg

## 2025-06-12 NOTE — ASSESSMENT & PLAN NOTE
Anemia is likely due to FUO. Most recent hemoglobin and hematocrit are listed below.  Recent Labs     06/12/25  0550   HGB 9.6*   HCT 29.3*     Plan  - Monitor serial CBC: Daily  - Transfuse PRBC if patient becomes hemodynamically unstable, symptomatic or H/H drops below 7/21.  - Patient has not received any PRBC transfusions to date  - Patient's anemia is currently stable  -

## 2025-06-13 LAB
ABSOLUTE EOSINOPHIL (OHS): 0.03 K/UL
ABSOLUTE MONOCYTE (OHS): 0.03 K/UL (ref 0.3–1)
ABSOLUTE NEUTROPHIL COUNT (OHS): 1.18 K/UL (ref 1.8–7.7)
ALBUMIN SERPL BCP-MCNC: 1.7 G/DL (ref 3.5–5.2)
ALP SERPL-CCNC: 211 UNIT/L (ref 40–150)
ALT SERPL W/O P-5'-P-CCNC: 22 UNIT/L (ref 10–44)
ANION GAP (OHS): 9 MMOL/L (ref 8–16)
AST SERPL-CCNC: 34 UNIT/L (ref 11–45)
BASOPHILS # BLD AUTO: 0.01 K/UL
BASOPHILS NFR BLD AUTO: 0.4 %
BILIRUB SERPL-MCNC: 0.3 MG/DL (ref 0.1–1)
BUN SERPL-MCNC: 9 MG/DL (ref 8–23)
CALCIUM SERPL-MCNC: 7.7 MG/DL (ref 8.7–10.5)
CHLORIDE SERPL-SCNC: 108 MMOL/L (ref 95–110)
CO2 SERPL-SCNC: 22 MMOL/L (ref 23–29)
CREAT SERPL-MCNC: 0.8 MG/DL (ref 0.5–1.4)
ERYTHROCYTE [DISTWIDTH] IN BLOOD BY AUTOMATED COUNT: 15 % (ref 11.5–14.5)
GFR SERPLBLD CREATININE-BSD FMLA CKD-EPI: >60 ML/MIN/1.73/M2
GLUCOSE SERPL-MCNC: 101 MG/DL (ref 70–110)
HCT VFR BLD AUTO: 30 % (ref 40–54)
HGB BLD-MCNC: 9.6 GM/DL (ref 14–18)
IMM GRANULOCYTES # BLD AUTO: 0.04 K/UL (ref 0–0.04)
IMM GRANULOCYTES NFR BLD AUTO: 1.7 % (ref 0–0.5)
LYMPHOCYTES # BLD AUTO: 1.11 K/UL (ref 1–4.8)
MCH RBC QN AUTO: 30.1 PG (ref 27–31)
MCHC RBC AUTO-ENTMCNC: 32 G/DL (ref 32–36)
MCV RBC AUTO: 94 FL (ref 82–98)
MRSA SPEC QL CULT: NORMAL
NUCLEATED RBC (/100WBC) (OHS): 1 /100 WBC
PLATELET # BLD AUTO: 128 K/UL (ref 150–450)
PMV BLD AUTO: 9.1 FL (ref 9.2–12.9)
POTASSIUM SERPL-SCNC: 4 MMOL/L (ref 3.5–5.1)
PROCALCITONIN SERPL-MCNC: 0.14 NG/ML
PROT SERPL-MCNC: 5.5 GM/DL (ref 6–8.4)
RBC # BLD AUTO: 3.19 M/UL (ref 4.6–6.2)
RELATIVE EOSINOPHIL (OHS): 1.3 %
RELATIVE LYMPHOCYTE (OHS): 46.3 % (ref 18–48)
RELATIVE MONOCYTE (OHS): 1.3 % (ref 4–15)
RELATIVE NEUTROPHIL (OHS): 49 % (ref 38–73)
SODIUM SERPL-SCNC: 139 MMOL/L (ref 136–145)
WBC # BLD AUTO: 2.4 K/UL (ref 3.9–12.7)

## 2025-06-13 PROCEDURE — 84145 PROCALCITONIN (PCT): CPT | Mod: HCNC | Performed by: STUDENT IN AN ORGANIZED HEALTH CARE EDUCATION/TRAINING PROGRAM

## 2025-06-13 PROCEDURE — 25000003 PHARM REV CODE 250: Mod: HCNC | Performed by: INTERNAL MEDICINE

## 2025-06-13 PROCEDURE — 36415 COLL VENOUS BLD VENIPUNCTURE: CPT | Mod: HCNC | Performed by: STUDENT IN AN ORGANIZED HEALTH CARE EDUCATION/TRAINING PROGRAM

## 2025-06-13 PROCEDURE — 21400001 HC TELEMETRY ROOM: Mod: HCNC

## 2025-06-13 PROCEDURE — 99232 SBSQ HOSP IP/OBS MODERATE 35: CPT | Mod: NSCH,HCNC,, | Performed by: INTERNAL MEDICINE

## 2025-06-13 PROCEDURE — 63600175 PHARM REV CODE 636 W HCPCS: Mod: HCNC | Performed by: INTERNAL MEDICINE

## 2025-06-13 PROCEDURE — 85025 COMPLETE CBC W/AUTO DIFF WBC: CPT | Mod: HCNC | Performed by: STUDENT IN AN ORGANIZED HEALTH CARE EDUCATION/TRAINING PROGRAM

## 2025-06-13 PROCEDURE — 82040 ASSAY OF SERUM ALBUMIN: CPT | Mod: HCNC | Performed by: STUDENT IN AN ORGANIZED HEALTH CARE EDUCATION/TRAINING PROGRAM

## 2025-06-13 PROCEDURE — 63700000 PHARM REV CODE 250 ALT 637 W/O HCPCS: Mod: HCNC | Performed by: INTERNAL MEDICINE

## 2025-06-13 RX ADMIN — PIPERACILLIN SODIUM AND TAZOBACTAM SODIUM 4.5 G: 4; .5 INJECTION, POWDER, FOR SOLUTION INTRAVENOUS at 06:06

## 2025-06-13 RX ADMIN — PIPERACILLIN SODIUM AND TAZOBACTAM SODIUM 4.5 G: 4; .5 INJECTION, POWDER, FOR SOLUTION INTRAVENOUS at 09:06

## 2025-06-13 RX ADMIN — RIVAROXABAN 20 MG: 20 TABLET, FILM COATED ORAL at 06:06

## 2025-06-13 RX ADMIN — DIPHENHYDRAMINE HYDROCHLORIDE 25 MG: 25 CAPSULE ORAL at 09:06

## 2025-06-13 RX ADMIN — LINEZOLID 600 MG: 600 TABLET, FILM COATED ORAL at 09:06

## 2025-06-13 RX ADMIN — TAMSULOSIN HYDROCHLORIDE 0.4 MG: 0.4 CAPSULE ORAL at 09:06

## 2025-06-13 RX ADMIN — GUAIFENESIN 600 MG: 600 TABLET, EXTENDED RELEASE ORAL at 09:06

## 2025-06-13 RX ADMIN — ACETAMINOPHEN 500 MG: 500 TABLET ORAL at 09:06

## 2025-06-13 RX ADMIN — CETIRIZINE HYDROCHLORIDE 5 MG: 5 TABLET ORAL at 09:06

## 2025-06-13 RX ADMIN — AZITHROMYCIN MONOHYDRATE 500 MG: 500 INJECTION, POWDER, LYOPHILIZED, FOR SOLUTION INTRAVENOUS at 09:06

## 2025-06-13 RX ADMIN — PANTOPRAZOLE SODIUM 40 MG: 40 TABLET, DELAYED RELEASE ORAL at 09:06

## 2025-06-13 RX ADMIN — FLUCONAZOLE 400 MG: 150 TABLET ORAL at 09:06

## 2025-06-13 NOTE — ASSESSMENT & PLAN NOTE
Anemia is likely due to FUO. Most recent hemoglobin and hematocrit are listed below.  Recent Labs     06/12/25  0550 06/13/25  0525   HGB 9.6* 9.6*   HCT 29.3* 30.0*     Plan  - Monitor serial CBC: Daily  - Transfuse PRBC if patient becomes hemodynamically unstable, symptomatic or H/H drops below 7/21.  - Patient has not received any PRBC transfusions to date  - Patient's anemia is currently stable  -

## 2025-06-13 NOTE — PROGRESS NOTES
[facility] Medicine  Progress Note    Patient Name: Armando Ingram Jr.  MRN: [id number]  Patient Class: IP- Inpatient   Admission Date: 6/6/2025  Length of Stay: 7 days  Attending Physician: Yoseph Baez MD  Primary Care Provider: Brian Soares MD        Subjective     Principal Problem:Cryptococcosis    Chief complaint: follow up of Fever (Patient with recent 3 week stay in the hospital due to FUO.  Several hours after discharge patient had temperature of 101° and returned to the hospital at the advice of his Infectious Disease.)      HPI:  Armando Ingram Jr. is a 65 y.o. male with a PMH has a past medical history of Closed right hip fracture, Colon cancer, DVT,  Hairy cell leukemia, and Nephrolithiasis. who presented to the ED for further evaluation of acute onset fever with T-max measuring 101.0 F earlier today. Patient reported significant history of hairy cell leukemia in his followed by Dr. Dow and Dr. Flores from Hematology/Oncology and was recently prescribed a Z-Cordell for 4 days for treatment of a cough. Patient was instructed to go to the ED if he endorsed fever greater than 103.0 F but presented to the ED due to ulcer experiencing associated chills, throbbing headache, and persistent fever. He reported no known alleviating or aggravating factors noted with all other review of systems negative except as noted above. He is not currently on active treatment for his leukemia and reported being in his usual state of health prior to onset of symptoms.  Workup during recent hospitalization included blood cultures which were no growth   respiratory viral panel negative  CT scan of abdomen and pelvis which revealed bladder wall thickening but otherwise negative  TTE with no vegetation  Bilateral lower extremity ultrasound revealed chronic right lower extremity DVT  Karius  returned positive for cryptococcal infection  Lumbar puncture on 05/30/2025 revealed an opening pressure of 18  clear CSF Christiana ink cryptococcal antigen MS panel fungal culture and bacterial culture all were negative  Patient was placed on induction dose therapy of amphotericin and flucytosine for cryptococcal infection  Oncology felt that treatment for his hairy cell leukemia should be deferred  Serum cryptococcal antigen negative  Flucytosine was stopped  Indium scan was negative  Patient spiked temp of 101.9° after flucytosine was discontinued  He was reinstituted with improvement in febrile status.    As fever and cough resolved infectious disease made the decision to stop amphotericin and flucytosine and initiate Diflucan  Initially patient had fever of 99.8 he was observed for 24 hours with no further fevers and was discharged home.  After patient got home he had rigors and temp of 102° therefore he was readmitted to the hospital.  Discussed with Infectious Disease and amphotericin was initiated.  Respiratory infection panel negative  Patient continues to run low-grade temp 99.9°, 99.2, he has rigors associated with these.  Repeat blood cultures remain negative.  Repeat serum cryptococcal antigen is negative  CT scan chest abdomen and pelvis pending    Infections disease to determine antibiotic/antifungal, course of treatment and follow up.      Overview/Hospital Course:  Patient was admitted after having rigors and a temp of 102° at home and readmitted to the hospital.  He was seen in consultation by Infectious Disease and amphotericin re-initiated.  Patient reports having another rigors in the early hours of the morning temp 99.9°.  Patient denies any complaints today    06/10/2025  Yesterday, had discussions with ID about possible discharging today if patient did fine overnight. Unfortunately, patient spiked another fever of 101F overnight. Zosyn added on to regimen. ID to manage.     06/11/2025  As on the writing of this documentation, T-max of only 100.1 which is an improvement.  Continue current antimicrobial  "therapy.  Follow up Infectious Disease recommendations.    06/12/2025  Spiked another fever, TMAX 101 early this morning, while on 5 antimicrobials. Reached out to ID. Will follow up their recommendations.     06/13/2025  Afebrile x 24 hours.  Discussed case with Dr. Dow from Hematology, low likelihood that hairy cell leukemia could be contributing to fevers but not impossible.  We will discuss with Infectious Disease how this will change patient's disposition as he is wanting to go home.    Interval history:  NAEON.     Objective:   BP (!) 114/56 (BP Location: Right arm)   Pulse 81   Temp 97.7 °F (36.5 °C) (Oral)   Resp 18   Ht 5' 11" (1.803 m)   Wt 75.4 kg (166 lb 3.6 oz)   SpO2 (!) 92%   BMI 23.18 kg/m²   No intake or output data in the 24 hours ending 06/13/25 0947      PHYSICAL EXAM  Vitals reviewed  Constitutional:       Appearance: He is ill-appearing (Flushed).   Cardiovascular:      Rate and Rhythm: Normal rate and regular rhythm.      Pulses: Normal pulses.      Heart sounds: Normal heart sounds.   Pulmonary:      Effort: Pulmonary effort is normal.      Breath sounds: Normal breath sounds. No wheezing or rhonchi.   Abdominal:      General: Bowel sounds are normal.      Palpations: Abdomen is soft.      Tenderness: There is no abdominal tenderness. There is no guarding or rebound.   Musculoskeletal:         General: No swelling. Normal range of motion.      Cervical back: Normal range of motion and neck supple.      Right lower leg: No edema.      Left lower leg: No edema.   Skin:     General: Skin is warm and dry.      Findings: Bruising present. No lesion.   Neurological:      General: No focal deficit present.      Mental Status: He is alert and oriented to person, place, and time. Mental status is at baseline.   Psychiatric:         Mood and Affect: Mood normal.         Behavior: Behavior normal.         Thought Content: Thought content normal.     LABS  All labs from the past 24 hours were " reviewed.     TSH:   Recent Labs   Lab 04/17/25  1148   TSH 1.776         IMAGING  All imaging from the past 24 hours were reviewed.             Assessment & Plan  Hairy cell leukemia  Patient has been evaluated by Oncology and until he is infection free we will defer treatment    Chronic deep vein thrombosis (DVT) of right lower extremity, unspecified vein  Stable  On chronic Xarelto    Cryptococcosis    Unknown etiology unknown location  Infectious Disease  will restart amphotericin    During prior hospitalization patient had Karius test positive for cryptococcus  Serum cryptococcal antigen negative blood cultures negative  Lumbar puncture negative for cryptococcal antigen or any other infection  Anemia  Anemia is likely due to FUO. Most recent hemoglobin and hematocrit are listed below.  Recent Labs     06/12/25  0550 06/13/25  0525   HGB 9.6* 9.6*   HCT 29.3* 30.0*     Plan  - Monitor serial CBC: Daily  - Transfuse PRBC if patient becomes hemodynamically unstable, symptomatic or H/H drops below 7/21.  - Patient has not received any PRBC transfusions to date  - Patient's anemia is currently stable  -   Dysphagia  06/09/2025  Speech consulted, will f/u with their eval    Fever  Uncertain etiology/remains low-grade but is associated with rigors  ID ordering other studies  CT chest abdomen and pelvis pending  Repeat blood cultures no growth to date  Respiratory infection panel negative    06/09/2025  IMPROVED  Afebrile x 48 hours  Continue to trend fever curve    Hyponatremia  Recent Labs     06/12/25  0550 06/13/25  0525   * 139   RESOLVED  -    VTE Risk Mitigation (From admission, onward)           Ordered     rivaroxaban tablet 20 mg  With dinner         06/06/25 1714     Reason for No Pharmacological VTE Prophylaxis  Once        Question:  Reasons:  Answer:  Already adequately anticoagulated on oral Anticoagulants    06/06/25 1714     IP VTE HIGH RISK PATIENT  Once         06/06/25 1714     Place  sequential compression device  Until discontinued         06/06/25 1714                    Discharge Planning   LAURI: 6/15/2025     Code Status: Full Code   Medical Readiness for Discharge Date:   Discharge Plan A: Home                        Yoseph Baez MD  Department of Hospital Medicine   '[facility] Surg

## 2025-06-14 ENCOUNTER — HOSPITAL ENCOUNTER (INPATIENT)
Facility: HOSPITAL | Age: 65
LOS: 2 days | Discharge: HOME | DRG: 178 | End: 2025-06-17
Attending: EMERGENCY MEDICINE | Admitting: HOSPITALIST
Payer: MEDICARE

## 2025-06-14 ENCOUNTER — NURSE TRIAGE (OUTPATIENT)
Dept: ADMINISTRATIVE | Facility: CLINIC | Age: 65
End: 2025-06-14
Payer: MEDICARE

## 2025-06-14 VITALS
DIASTOLIC BLOOD PRESSURE: 64 MMHG | RESPIRATION RATE: 18 BRPM | TEMPERATURE: 99 F | OXYGEN SATURATION: 93 % | HEART RATE: 83 BPM | SYSTOLIC BLOOD PRESSURE: 121 MMHG | HEIGHT: 71 IN | BODY MASS INDEX: 23.27 KG/M2 | WEIGHT: 166.25 LBS

## 2025-06-14 DIAGNOSIS — R09.02 HYPOXEMIA: Primary | ICD-10-CM

## 2025-06-14 DIAGNOSIS — Z13.6 SCREENING FOR CARDIOVASCULAR CONDITION: ICD-10-CM

## 2025-06-14 DIAGNOSIS — J18.9 PNEUMONIA: ICD-10-CM

## 2025-06-14 DIAGNOSIS — R07.9 CHEST PAIN: ICD-10-CM

## 2025-06-14 DIAGNOSIS — R50.9 FEVER: ICD-10-CM

## 2025-06-14 LAB
ABSOLUTE EOSINOPHIL (OHS): 0.02 K/UL
ABSOLUTE EOSINOPHIL (OHS): 0.02 K/UL
ABSOLUTE MONOCYTE (OHS): 0.03 K/UL (ref 0.3–1)
ABSOLUTE MONOCYTE (OHS): 0.03 K/UL (ref 0.3–1)
ABSOLUTE NEUTROPHIL COUNT (OHS): 1.72 K/UL (ref 1.8–7.7)
ABSOLUTE NEUTROPHIL COUNT (OHS): 1.77 K/UL (ref 1.8–7.7)
ALBUMIN SERPL BCP-MCNC: 1.8 G/DL (ref 3.5–5.2)
ALBUMIN SERPL BCP-MCNC: 2.1 G/DL (ref 3.5–5.2)
ALP SERPL-CCNC: 219 UNIT/L (ref 40–150)
ALP SERPL-CCNC: 241 UNIT/L (ref 40–150)
ALT SERPL W/O P-5'-P-CCNC: 24 UNIT/L (ref 10–44)
ALT SERPL W/O P-5'-P-CCNC: 29 UNIT/L (ref 10–44)
ANION GAP (OHS): 8 MMOL/L (ref 8–16)
ANION GAP (OHS): 9 MMOL/L (ref 8–16)
AST SERPL-CCNC: 35 UNIT/L (ref 11–45)
AST SERPL-CCNC: 44 UNIT/L (ref 11–45)
BASOPHILS # BLD AUTO: 0 K/UL
BASOPHILS # BLD AUTO: 0.01 K/UL
BASOPHILS NFR BLD AUTO: 0 %
BASOPHILS NFR BLD AUTO: 0.3 %
BILIRUB SERPL-MCNC: 0.3 MG/DL (ref 0.1–1)
BILIRUB SERPL-MCNC: 0.4 MG/DL (ref 0.1–1)
BILIRUB UR QL STRIP.AUTO: NEGATIVE
BNP SERPL-MCNC: 61 PG/ML (ref 0–99)
BUN SERPL-MCNC: 7 MG/DL (ref 8–23)
BUN SERPL-MCNC: 7 MG/DL (ref 8–23)
CALCIUM SERPL-MCNC: 7.8 MG/DL (ref 8.7–10.5)
CALCIUM SERPL-MCNC: 8.1 MG/DL (ref 8.7–10.5)
CHLORIDE SERPL-SCNC: 106 MMOL/L (ref 95–110)
CHLORIDE SERPL-SCNC: 106 MMOL/L (ref 95–110)
CLARITY UR: CLEAR
CO2 SERPL-SCNC: 21 MMOL/L (ref 23–29)
CO2 SERPL-SCNC: 23 MMOL/L (ref 23–29)
COLOR UR AUTO: YELLOW
CREAT SERPL-MCNC: 0.8 MG/DL (ref 0.5–1.4)
CREAT SERPL-MCNC: 0.8 MG/DL (ref 0.5–1.4)
ERYTHROCYTE [DISTWIDTH] IN BLOOD BY AUTOMATED COUNT: 15 % (ref 11.5–14.5)
ERYTHROCYTE [DISTWIDTH] IN BLOOD BY AUTOMATED COUNT: 15 % (ref 11.5–14.5)
GFR SERPLBLD CREATININE-BSD FMLA CKD-EPI: >60 ML/MIN/1.73/M2
GFR SERPLBLD CREATININE-BSD FMLA CKD-EPI: >60 ML/MIN/1.73/M2
GLUCOSE SERPL-MCNC: 103 MG/DL (ref 70–110)
GLUCOSE SERPL-MCNC: 93 MG/DL (ref 70–110)
GLUCOSE UR QL STRIP: NEGATIVE
HCT VFR BLD AUTO: 31.3 % (ref 40–54)
HCT VFR BLD AUTO: 32.1 % (ref 40–54)
HCV AB SERPL QL IA: NEGATIVE
HGB BLD-MCNC: 10.2 GM/DL (ref 14–18)
HGB BLD-MCNC: 10.5 GM/DL (ref 14–18)
HGB UR QL STRIP: NEGATIVE
HIV 1+2 AB+HIV1 P24 AG SERPL QL IA: NEGATIVE
HOLD SPECIMEN: NORMAL
HOLD SPECIMEN: NORMAL
IMM GRANULOCYTES # BLD AUTO: 0.04 K/UL (ref 0–0.04)
IMM GRANULOCYTES # BLD AUTO: 0.04 K/UL (ref 0–0.04)
IMM GRANULOCYTES NFR BLD AUTO: 1.4 % (ref 0–0.5)
IMM GRANULOCYTES NFR BLD AUTO: 1.4 % (ref 0–0.5)
INFLUENZA A MOLECULAR (OHS): NEGATIVE
INFLUENZA B MOLECULAR (OHS): NEGATIVE
INR PPP: 1.6 (ref 0.8–1.2)
KETONES UR QL STRIP: NEGATIVE
LACTATE SERPL-SCNC: 1.6 MMOL/L (ref 0.5–2.2)
LEUKOCYTE ESTERASE UR QL STRIP: NEGATIVE
LIPASE SERPL-CCNC: 40 U/L (ref 4–60)
LYMPHOCYTES # BLD AUTO: 1.05 K/UL (ref 1–4.8)
LYMPHOCYTES # BLD AUTO: 1.06 K/UL (ref 1–4.8)
MAGNESIUM SERPL-MCNC: 1.9 MG/DL (ref 1.6–2.6)
MCH RBC QN AUTO: 30.3 PG (ref 27–31)
MCH RBC QN AUTO: 30.8 PG (ref 27–31)
MCHC RBC AUTO-ENTMCNC: 32.6 G/DL (ref 32–36)
MCHC RBC AUTO-ENTMCNC: 32.7 G/DL (ref 32–36)
MCV RBC AUTO: 93 FL (ref 82–98)
MCV RBC AUTO: 95 FL (ref 82–98)
NITRITE UR QL STRIP: NEGATIVE
NUCLEATED RBC (/100WBC) (OHS): 0 /100 WBC
NUCLEATED RBC (/100WBC) (OHS): 0 /100 WBC
PH UR STRIP: 7 [PH]
PLATELET # BLD AUTO: 129 K/UL (ref 150–450)
PLATELET # BLD AUTO: 136 K/UL (ref 150–450)
PMV BLD AUTO: 9 FL (ref 9.2–12.9)
PMV BLD AUTO: 9 FL (ref 9.2–12.9)
POTASSIUM SERPL-SCNC: 3.7 MMOL/L (ref 3.5–5.1)
POTASSIUM SERPL-SCNC: 3.9 MMOL/L (ref 3.5–5.1)
PROCALCITONIN SERPL-MCNC: 0.27 NG/ML
PROT SERPL-MCNC: 6 GM/DL (ref 6–8.4)
PROT SERPL-MCNC: 6.6 GM/DL (ref 6–8.4)
PROT UR QL STRIP: NEGATIVE
PROTHROMBIN TIME: 17.4 SECONDS (ref 9–12.5)
RBC # BLD AUTO: 3.31 M/UL (ref 4.6–6.2)
RBC # BLD AUTO: 3.46 M/UL (ref 4.6–6.2)
RELATIVE EOSINOPHIL (OHS): 0.7 %
RELATIVE EOSINOPHIL (OHS): 0.7 %
RELATIVE LYMPHOCYTE (OHS): 36.1 % (ref 18–48)
RELATIVE LYMPHOCYTE (OHS): 36.8 % (ref 18–48)
RELATIVE MONOCYTE (OHS): 1 % (ref 4–15)
RELATIVE MONOCYTE (OHS): 1 % (ref 4–15)
RELATIVE NEUTROPHIL (OHS): 59.8 % (ref 38–73)
RELATIVE NEUTROPHIL (OHS): 60.8 % (ref 38–73)
SARS-COV-2 RDRP RESP QL NAA+PROBE: NEGATIVE
SODIUM SERPL-SCNC: 136 MMOL/L (ref 136–145)
SODIUM SERPL-SCNC: 137 MMOL/L (ref 136–145)
SP GR UR STRIP: 1
TROPONIN I SERPL DL<=0.01 NG/ML-MCNC: <0.006 NG/ML
UROBILINOGEN UR STRIP-ACNC: NEGATIVE EU/DL
WBC # BLD AUTO: 2.88 K/UL (ref 3.9–12.7)
WBC # BLD AUTO: 2.91 K/UL (ref 3.9–12.7)

## 2025-06-14 PROCEDURE — 85610 PROTHROMBIN TIME: CPT | Mod: HCNC | Performed by: EMERGENCY MEDICINE

## 2025-06-14 PROCEDURE — 84484 ASSAY OF TROPONIN QUANT: CPT | Mod: HCNC | Performed by: EMERGENCY MEDICINE

## 2025-06-14 PROCEDURE — 83880 ASSAY OF NATRIURETIC PEPTIDE: CPT | Mod: HCNC | Performed by: EMERGENCY MEDICINE

## 2025-06-14 PROCEDURE — 63600175 PHARM REV CODE 636 W HCPCS: Mod: HCNC | Performed by: INTERNAL MEDICINE

## 2025-06-14 PROCEDURE — 25000003 PHARM REV CODE 250: Mod: HCNC | Performed by: INTERNAL MEDICINE

## 2025-06-14 PROCEDURE — 83605 ASSAY OF LACTIC ACID: CPT | Mod: HCNC | Performed by: EMERGENCY MEDICINE

## 2025-06-14 PROCEDURE — 87502 INFLUENZA DNA AMP PROBE: CPT | Mod: HCNC | Performed by: EMERGENCY MEDICINE

## 2025-06-14 PROCEDURE — 85025 COMPLETE CBC W/AUTO DIFF WBC: CPT | Mod: HCNC | Performed by: STUDENT IN AN ORGANIZED HEALTH CARE EDUCATION/TRAINING PROGRAM

## 2025-06-14 PROCEDURE — 93005 ELECTROCARDIOGRAM TRACING: CPT | Mod: HCNC

## 2025-06-14 PROCEDURE — 87040 BLOOD CULTURE FOR BACTERIA: CPT | Mod: 91,HCNC | Performed by: EMERGENCY MEDICINE

## 2025-06-14 PROCEDURE — 84145 PROCALCITONIN (PCT): CPT | Mod: HCNC | Performed by: EMERGENCY MEDICINE

## 2025-06-14 PROCEDURE — 87389 HIV-1 AG W/HIV-1&-2 AB AG IA: CPT | Mod: HCNC | Performed by: EMERGENCY MEDICINE

## 2025-06-14 PROCEDURE — U0002 COVID-19 LAB TEST NON-CDC: HCPCS | Mod: HCNC | Performed by: EMERGENCY MEDICINE

## 2025-06-14 PROCEDURE — 25000003 PHARM REV CODE 250: Mod: HCNC | Performed by: FAMILY MEDICINE

## 2025-06-14 PROCEDURE — 25000003 PHARM REV CODE 250: Mod: HCNC | Performed by: EMERGENCY MEDICINE

## 2025-06-14 PROCEDURE — 36415 COLL VENOUS BLD VENIPUNCTURE: CPT | Mod: HCNC | Performed by: STUDENT IN AN ORGANIZED HEALTH CARE EDUCATION/TRAINING PROGRAM

## 2025-06-14 PROCEDURE — 81003 URINALYSIS AUTO W/O SCOPE: CPT | Mod: HCNC | Performed by: EMERGENCY MEDICINE

## 2025-06-14 PROCEDURE — 85025 COMPLETE CBC W/AUTO DIFF WBC: CPT | Mod: HCNC | Performed by: EMERGENCY MEDICINE

## 2025-06-14 PROCEDURE — 83735 ASSAY OF MAGNESIUM: CPT | Mod: HCNC | Performed by: EMERGENCY MEDICINE

## 2025-06-14 PROCEDURE — 86803 HEPATITIS C AB TEST: CPT | Mod: HCNC | Performed by: EMERGENCY MEDICINE

## 2025-06-14 PROCEDURE — 83690 ASSAY OF LIPASE: CPT | Mod: HCNC | Performed by: EMERGENCY MEDICINE

## 2025-06-14 PROCEDURE — 93010 ELECTROCARDIOGRAM REPORT: CPT | Mod: HCNC,,, | Performed by: INTERNAL MEDICINE

## 2025-06-14 PROCEDURE — 80053 COMPREHEN METABOLIC PANEL: CPT | Mod: HCNC | Performed by: EMERGENCY MEDICINE

## 2025-06-14 PROCEDURE — 80053 COMPREHEN METABOLIC PANEL: CPT | Mod: HCNC | Performed by: STUDENT IN AN ORGANIZED HEALTH CARE EDUCATION/TRAINING PROGRAM

## 2025-06-14 RX ORDER — AMOXICILLIN AND CLAVULANATE POTASSIUM 875; 125 MG/1; MG/1
1 TABLET, FILM COATED ORAL EVERY 12 HOURS
Qty: 20 TABLET | Refills: 0 | Status: ON HOLD | OUTPATIENT
Start: 2025-06-14 | End: 2025-06-17 | Stop reason: HOSPADM

## 2025-06-14 RX ORDER — ACETAMINOPHEN 500 MG
1000 TABLET ORAL
Status: COMPLETED | OUTPATIENT
Start: 2025-06-14 | End: 2025-06-14

## 2025-06-14 RX ORDER — AMOXICILLIN AND CLAVULANATE POTASSIUM 875; 125 MG/1; MG/1
1 TABLET, FILM COATED ORAL EVERY 12 HOURS
Status: DISCONTINUED | OUTPATIENT
Start: 2025-06-14 | End: 2025-06-14 | Stop reason: HOSPADM

## 2025-06-14 RX ADMIN — CETIRIZINE HYDROCHLORIDE 5 MG: 5 TABLET ORAL at 09:06

## 2025-06-14 RX ADMIN — AMOXICILLIN AND CLAVULANATE POTASSIUM 1 TABLET: 875; 125 TABLET, FILM COATED ORAL at 09:06

## 2025-06-14 RX ADMIN — PIPERACILLIN SODIUM AND TAZOBACTAM SODIUM 4.5 G: 4; .5 INJECTION, POWDER, FOR SOLUTION INTRAVENOUS at 12:06

## 2025-06-14 RX ADMIN — TAMSULOSIN HYDROCHLORIDE 0.4 MG: 0.4 CAPSULE ORAL at 09:06

## 2025-06-14 RX ADMIN — ACETAMINOPHEN 1000 MG: 500 TABLET ORAL at 09:06

## 2025-06-14 RX ADMIN — GUAIFENESIN 600 MG: 600 TABLET, EXTENDED RELEASE ORAL at 09:06

## 2025-06-14 RX ADMIN — PANTOPRAZOLE SODIUM 40 MG: 40 TABLET, DELAYED RELEASE ORAL at 09:06

## 2025-06-14 NOTE — PLAN OF CARE
[facility] - Med Surg  Discharge Final Note    Primary Care Provider: Brian Soares MD    Expected Discharge Date: 6/14/2025    Final Discharge Note (most recent)       Final Note - 06/14/25 1135          Final Note    Assessment Type Final Discharge Note     Anticipated Discharge Disposition Home or Self Care        Post-Acute Status    Discharge Delays None known at this time                     Important Message from Medicare             Contact Info       Brian Soares MD   Specialty: Family Medicine   Relationship: PCP - General    [address] [place] [address]   Phone: [phone]       Next Steps: Follow up in 1 week(s)          Discharge home, no home health or dme orders noted.

## 2025-06-14 NOTE — PLAN OF CARE
Discharge education given. Pt verbalized an understanding. IV removed and catheter intact. Pt being discharged with personal belongings. Pt walking with wife down to front lobby at this time.

## 2025-06-14 NOTE — ASSESSMENT & PLAN NOTE
Anemia is likely due to FUO. Most recent hemoglobin and hematocrit are listed below.  Recent Labs     06/12/25  0550 06/13/25  0525 06/14/25  0616   HGB 9.6* 9.6* 10.2*   HCT 29.3* 30.0* 31.3*     Plan  - Monitor serial CBC: Daily  - Transfuse PRBC if patient becomes hemodynamically unstable, symptomatic or H/H drops below 7/21.  - Patient has not received any PRBC transfusions to date  - Patient's anemia is currently stable  -

## 2025-06-14 NOTE — SUBJECTIVE & OBJECTIVE
"Interval History:   He has intermittent low-grade fever.  .  All other cultures are negative till date  CT scan of the chest showed pneumonia  06/13/25-  He has questions about the fever  MBS- no aspiration  Review of Systems   Constitutional:  Negative for activity change, appetite change, chills, diaphoresis, fatigue and fever.   HENT:  Negative for congestion.    Neurological:  Negative for dizziness and facial asymmetry.     Objective:     Vital Signs (Most Recent):  Temp: 98.1 °F (36.7 °C) (06/14/25 0459)  Pulse: 81 (06/14/25 0459)  Resp: 16 (06/14/25 0459)  BP: 123/67 (06/14/25 0459)  SpO2: (!) 93 % (06/14/25 0459) Vital Signs (24h Range):  Temp:  [97.7 °F (36.5 °C)-99.4 °F (37.4 °C)] 98.1 °F (36.7 °C)  Pulse:  [79-88] 81  Resp:  [16-18] 16  SpO2:  [92 %-94 %] 93 %  BP: (114-128)/(56-75) 123/67     Weight: 75.4 kg (166 lb 3.6 oz)  Body mass index is 23.18 kg/m².    Estimated Creatinine Clearance: 96.7 mL/min (based on SCr of 0.8 mg/dL).     Physical Exam  Vitals and nursing note reviewed.   HENT:      Head: Normocephalic.   Eyes:      Pupils: Pupils are equal, round, and reactive to light.   Pulmonary:      Effort: Pulmonary effort is normal.   Abdominal:      General: Abdomen is flat.   Musculoskeletal:         General: Normal range of motion.   Skin:     General: Skin is warm.   Neurological:      Mental Status: He is alert.          Significant Labs: Blood Culture: No results for input(s): "LABBLOO" in the last 4320 hours.  BMP:   Recent Labs   Lab 06/13/25  0525         K 4.0      CO2 22*   BUN 9   CREATININE 0.8   CALCIUM 7.7*     CBC:   Recent Labs   Lab 06/12/25  0550 06/13/25  0525   WBC 2.31* 2.40*   HGB 9.6* 9.6*   HCT 29.3* 30.0*   * 128*     CMP:   Recent Labs   Lab 06/12/25  0550 06/13/25  0525   * 139   K 3.4* 4.0    108   CO2 21* 22*   * 101   BUN 10 9   CREATININE 0.8 0.8   CALCIUM 7.5* 7.7*   PROT 5.7* 5.5*   ALBUMIN 1.6* 1.7*   BILITOT 0.5 0.3 "   ALKPHOS 225* 211*   AST 32 34   ALT 26 22   ANIONGAP 8 9     All pertinent labs within the past 24 hours have been reviewed.    Significant Imaging: I have reviewed all pertinent imaging results/findings within the past 24 hours.

## 2025-06-14 NOTE — ASSESSMENT & PLAN NOTE
This is the only positive results noted on Karius .  Will add Amphotericin and monitor response.  It is still not clear if this alone  explains his high-grade fever.  Will need follow up    06/10-  On empiric fluconazole.  We will monitor clinical response.  06/11- will stop Fluconazole ,repeat Karius assay is neg

## 2025-06-14 NOTE — ASSESSMENT & PLAN NOTE
His serum procalcitonin normal..  It is unclear if this fever is completely infectious.  We will monitor fever curve while on amphotericin.  We will send Bartonella and Coxiella serology.  We will repeat pan CT scan of chest abdomen and pelvis.    06/10-  CT scan of the chest showed pneumonia.  We will continue Zosyn.  Follow Legionella antigen.  We will add Zithromax.  On empiric Zyvox.  We will follow MRSA nares  We will send ESR CRP  06/13- will complete 10 days of Augmentin, serum procal is normal

## 2025-06-14 NOTE — PROGRESS NOTES
[facility]  Infectious Disease  Progress Note    Patient Name: Armando Ingram Jr.  MRN: [id number]  Admission Date: 6/6/2025  Length of Stay: 8 days  Attending Physician: Yoseph Baez MD  Primary Care Provider: Brian Soares MD    Isolation Status: No active isolations  Assessment/Plan:      ID  * Cryptococcosis  This is the only positive results noted on Karius .  Will add Amphotericin and monitor response.  It is still not clear if this alone  explains his high-grade fever.  Will need follow up    06/10-  On empiric fluconazole.  We will monitor clinical response.  06/11- will stop Fluconazole ,repeat Karius assay is neg      Oncology  Hairy cell leukemia  Follow oncology    Other  Fever  His serum procalcitonin normal..  It is unclear if this fever is completely infectious.  We will monitor fever curve while on amphotericin.  We will send Bartonella and Coxiella serology.  We will repeat pan CT scan of chest abdomen and pelvis.    06/10-  CT scan of the chest showed pneumonia.  We will continue Zosyn.  Follow Legionella antigen.  We will add Zithromax.  On empiric Zyvox.  We will follow MRSA nares  We will send ESR CRP  06/13- will complete 10 days of Augmentin, serum procal is normal        Anticipated Disposition:     Thank you for your consult. I will follow-up with patient. Please contact us if you have any additional questions.    Armando Villanueva MD, [facility]  Infectious Disease  [facility]    Subjective:     Principal Problem:Cryptococcosis    HPI:  65 y.o. male with a PMH has a past medical history of Closed right hip fracture, Colon cancer, DVT,  Hairy cell leukemia, and Nephrolithiasis.  He was recently managed in the Hospital for about 3 weeks for fever .  Workup during recent hospitalization included blood cultures which were no growth   respiratory viral panel negative  CT scan of abdomen and pelvis which revealed bladder wall thickening but otherwise negative  TTE with no  "vegetation  Bilateral lower extremity ultrasound revealed chronic right lower extremity DVT  Americo  returned positive for cryptococcal infection  Lumbar puncture on 05/30/2025 revealed an opening pressure of 18 clear CSF Christiana ink cryptococcal antigen MS panel fungal culture and bacterial culture all were negative.    Indium scan was negative.His regime was switched to Fluconazole when fever resolved. However, after 3 hours at home, he called that he has a fever.- T max 102.       Interval History:   He has intermittent low-grade fever.  .  All other cultures are negative till date  CT scan of the chest showed pneumonia  06/13/25-  He has questions about the fever  MBS- no aspiration  Review of Systems   Constitutional:  Negative for activity change, appetite change, chills, diaphoresis, fatigue and fever.   HENT:  Negative for congestion.    Neurological:  Negative for dizziness and facial asymmetry.     Objective:     Vital Signs (Most Recent):  Temp: 98.1 °F (36.7 °C) (06/14/25 0459)  Pulse: 81 (06/14/25 0459)  Resp: 16 (06/14/25 0459)  BP: 123/67 (06/14/25 0459)  SpO2: (!) 93 % (06/14/25 0459) Vital Signs (24h Range):  Temp:  [97.7 °F (36.5 °C)-99.4 °F (37.4 °C)] 98.1 °F (36.7 °C)  Pulse:  [79-88] 81  Resp:  [16-18] 16  SpO2:  [92 %-94 %] 93 %  BP: (114-128)/(56-75) 123/67     Weight: 75.4 kg (166 lb 3.6 oz)  Body mass index is 23.18 kg/m².    Estimated Creatinine Clearance: 96.7 mL/min (based on SCr of 0.8 mg/dL).     Physical Exam  Vitals and nursing note reviewed.   HENT:      Head: Normocephalic.   Eyes:      Pupils: Pupils are equal, round, and reactive to light.   Pulmonary:      Effort: Pulmonary effort is normal.   Abdominal:      General: Abdomen is flat.   Musculoskeletal:         General: Normal range of motion.   Skin:     General: Skin is warm.   Neurological:      Mental Status: He is alert.          Significant Labs: Blood Culture: No results for input(s): "LABBLOO" in the last 4320 hours.  BMP: "   Recent Labs   Lab 06/13/25  0525         K 4.0      CO2 22*   BUN 9   CREATININE 0.8   CALCIUM 7.7*     CBC:   Recent Labs   Lab 06/12/25  0550 06/13/25  0525   WBC 2.31* 2.40*   HGB 9.6* 9.6*   HCT 29.3* 30.0*   * 128*     CMP:   Recent Labs   Lab 06/12/25  0550 06/13/25  0525   * 139   K 3.4* 4.0    108   CO2 21* 22*   * 101   BUN 10 9   CREATININE 0.8 0.8   CALCIUM 7.5* 7.7*   PROT 5.7* 5.5*   ALBUMIN 1.6* 1.7*   BILITOT 0.5 0.3   ALKPHOS 225* 211*   AST 32 34   ALT 26 22   ANIONGAP 8 9     All pertinent labs within the past 24 hours have been reviewed.    Significant Imaging: I have reviewed all pertinent imaging results/findings within the past 24 hours.

## 2025-06-14 NOTE — TELEPHONE ENCOUNTER
[place]    PCP:  Brian Soares MD    Spoke w/Wife, Richie Ingram, on Pts behalf.  S/P hospital discharge today for Cryptococcosis, Hairy Cell Leukemia, Chronic DVT of RLE, Fever, Anemia, Hyponatremia, and Dysphagia.  Wife reports Pt has been in the hospital for a month for Cryptococcosis.  Wife is calling to speak w/Hospitalist, Dr. Salazar.  C/O fever (100.7 currently but was up to 101.2).  He is also seeing ID.  Denies abdominal pain, cough, diarrhea, earache, HA, sore throat, urination pain, SOB, and CP.  Per protocol, care advised is go to ED/UCC now (or PCP triage).  NT spoke w/Discharging Hospitalist, Dr. Salazar.  Hospitalist recommends:  go to the ED.  Wife notified of OCP recommendations.  Wife VU.  Advised to call for worsening/questions/concerns.  VU.    Reason for Disposition   Patient sounds very sick or weak to the triager    Additional Information   Negative: Shock suspected (e.g., cold/pale/clammy skin, too weak to stand, low BP, rapid pulse)   Negative: Difficult to awaken or acting confused (e.g., disoriented, slurred speech)   Negative: [1] Difficulty breathing AND [2] bluish lips, tongue or face   Negative: New-onset rash with multiple purple (or blood-colored) spots or dots   Negative: Sounds like a life-threatening emergency to the triager   Taking antibiotic for infection (follow-up call)   Negative: SEVERE difficulty breathing (e.g., struggling for each breath, speaks in single words)   Negative: Sounds like a life-threatening emergency to the triager   Negative: MODERATE difficulty breathing (e.g., speaks in phrases, SOB even at rest, pulse 100-120)   Negative: Fever > 103 F (39.4 C)    Protocols used: Fever-A-AH, Infection on Antibiotic Follow-up Call-A-AH

## 2025-06-14 NOTE — DISCHARGE SUMMARY
[facility] Medicine  Discharge Summary      Patient Name: Armando Ingram Jr.  MRN: [id number]  [facility]: [id number]  Patient Class: IP- Inpatient  Admission Date: 6/6/2025  Hospital Length of Stay: 8 days  Discharge Date and Time: 06/14/2025 12:17 PM  Attending Physician: Carrillo Salazar MD   Discharging Provider: Carrillo Salazar MD  Primary Care Provider: Brian Soares MD    Primary Care Team: Networked reference to record PCT     HPI:   Armando Ingram Jr. is a 65 y.o. male with a PMH has a past medical history of Closed right hip fracture, Colon cancer, DVT,  Hairy cell leukemia, and Nephrolithiasis. who presented to the ED for further evaluation of acute onset fever with T-max measuring 101.0 F earlier today. Patient reported significant history of hairy cell leukemia in his followed by Dr. Dow and Dr. Flores from Hematology/Oncology and was recently prescribed a Z-Cordell for 4 days for treatment of a cough. Patient was instructed to go to the ED if he endorsed fever greater than 103.0 F but presented to the ED due to ulcer experiencing associated chills, throbbing headache, and persistent fever. He reported no known alleviating or aggravating factors noted with all other review of systems negative except as noted above. He is not currently on active treatment for his leukemia and reported being in his usual state of health prior to onset of symptoms.  Workup during recent hospitalization included blood cultures which were no growth   respiratory viral panel negative  CT scan of abdomen and pelvis which revealed bladder wall thickening but otherwise negative  TTE with no vegetation  Bilateral lower extremity ultrasound revealed chronic right lower extremity DVT  Karius  returned positive for cryptococcal infection  Lumbar puncture on 05/30/2025 revealed an opening pressure of 18 clear CSF Christiana ink cryptococcal antigen MS panel fungal culture and bacterial culture all were negative  Patient was placed on  induction dose therapy of amphotericin and flucytosine for cryptococcal infection  Oncology felt that treatment for his hairy cell leukemia should be deferred  Serum cryptococcal antigen negative  Flucytosine was stopped  Indium scan was negative  Patient spiked temp of 101.9° after flucytosine was discontinued  He was reinstituted with improvement in febrile status.    As fever and cough resolved infectious disease made the decision to stop amphotericin and flucytosine and initiate Diflucan  Initially patient had fever of 99.8 he was observed for 24 hours with no further fevers and was discharged home.  After patient got home he had rigors and temp of 102° therefore he was readmitted to the hospital.  Discussed with Infectious Disease and amphotericin was initiated.  Respiratory infection panel negative  Patient continues to run low-grade temp 99.9°, 99.2, he has rigors associated with these.  Repeat blood cultures remain negative.  Repeat serum cryptococcal antigen is negative  CT scan chest abdomen and pelvis pending    Infections disease to determine antibiotic/antifungal, course of treatment and follow up.      * No surgery found *      Hospital Course:   Patient was admitted after having rigors and a temp of 102° at home and readmitted to the hospital.  He was seen in consultation by Infectious Disease and amphotericin re-initiated.  Patient reports having another rigors in the early hours of the morning temp 99.9°.  Patient denies any complaints today    06/10/2025  Yesterday, had discussions with ID about possible discharging today if patient did fine overnight. Unfortunately, patient spiked another fever of 101F overnight. Zosyn added on to regimen. ID to manage.     06/11/2025  As on the writing of this documentation, T-max of only 100.1 which is an improvement.  Continue current antimicrobial therapy.  Follow up Infectious Disease recommendations.    06/12/2025  Spiked another fever, TMAX 101 early this  morning, while on 5 antimicrobials. Reached out to ID. Will follow up their recommendations. SLP ruled out aspiration risk.     06/13/2025  Afebrile x 24 hours.  Discussed case with Dr. Dow from Hematology, low likelihood that hairy cell leukemia could be contributing to fevers but not impossible.  We will discuss with Infectious Disease how this will change patient's disposition as he is wanting to go home.    Patient afebrile. Discussed case with id. Ok to cont on po augmentin for 10 days. Diflucan dced. Outpatient f/u with ID.      Goals of Care Treatment Preferences:  Code Status: Full Code      SDOH Screening:  The patient was screened for utility difficulties, food insecurity, transport difficulties, housing insecurity, and interpersonal safety and there were no concerns identified this admission.     Consults:   Consults (From admission, onward)          Status Ordering Provider     Inpatient consult to Infectious Diseases  Once        Provider:  Armando Villanueva MD, MEREDITH Collazo            Assessment & Plan  Hairy cell leukemia  Patient has been evaluated by Oncology and until he is infection free we will defer treatment    Chronic deep vein thrombosis (DVT) of right lower extremity, unspecified vein  Stable  On chronic Xarelto    Cryptococcosis    Unknown etiology unknown location  Infectious Disease  will restart amphotericin    During prior hospitalization patient had Karius test positive for cryptococcus  Serum cryptococcal antigen negative blood cultures negative  Lumbar puncture negative for cryptococcal antigen or any other infection  Anemia  Anemia is likely due to FUO. Most recent hemoglobin and hematocrit are listed below.  Recent Labs     06/12/25  0550 06/13/25  0525 06/14/25  0616   HGB 9.6* 9.6* 10.2*   HCT 29.3* 30.0* 31.3*     Plan  - Monitor serial CBC: Daily  - Transfuse PRBC if patient becomes hemodynamically unstable, symptomatic or H/H drops below 7/21.  -  Patient has not received any PRBC transfusions to date  - Patient's anemia is currently stable  -   Dysphagia  06/09/2025  Speech consulted, will f/u with their eval    Fever  Uncertain etiology/remains low-grade but is associated with rigors  ID ordering other studies  CT chest abdomen and pelvis pending  Repeat blood cultures no growth to date  Respiratory infection panel negative    06/09/2025  IMPROVED  Afebrile x 48 hours  Continue to trend fever curve    Hyponatremia  Recent Labs     06/12/25  0550 06/13/25  0525 06/14/25  0616   * 139 137   RESOLVED  -  Final Active Diagnoses:    Diagnosis Date Noted POA    PRINCIPAL PROBLEM:  Cryptococcosis [B45.9] 05/29/2025 Yes    Dysphagia [R13.10] 06/09/2025 Unknown    Anemia [D64.9] 06/06/2025 Yes    Hyponatremia [E87.1] 06/06/2025 Unknown    Fever [R50.9] 05/24/2025 Yes    Chronic deep vein thrombosis (DVT) of right lower extremity, unspecified vein [I82.501] 02/17/2025 Yes    Hairy cell leukemia [C91.40] 06/06/2024 Yes      Problems Resolved During this Admission:       Discharged Condition: good    Disposition:     Follow Up:   Follow-up Information       Brian Soares MD Follow up in 1 week(s).    Specialty: Family Medicine  Contact information:  [address] [address] [address] [phone]                           Patient Instructions:   No discharge procedures on file.    Significant Diagnostic Studies: N/A    Pending Diagnostic Studies:       Procedure Component Value Units Date/Time    Strep Pneumo AG Urine [[id number]] Collected: 06/08/25 1719    Order Status: Sent Lab Status: In process Updated: 06/08/25 1724    Specimen: Urine, Clean Catch            Medications:  Reconciled Home Medications:      Medication List        START taking these medications      amoxicillin-clavulanate 875-125mg 875-125 mg per tablet  Commonly known as: AUGMENTIN  Take 1 tablet by mouth every 12 (twelve) hours. for 10 days            CONTINUE taking these  medications      multivitamin per tablet  Commonly known as: THERAGRAN  Take 1 tablet by mouth once daily.     omeprazole 20 MG capsule  Commonly known as: PRILOSEC  Take 20 mg by mouth once daily.     tamsulosin 0.4 mg Cap  Commonly known as: FLOMAX  Take 1 capsule (0.4 mg total) by mouth once daily.     XARELTO 20 mg Tab  Generic drug: rivaroxaban  Take 1 tablet (20 mg total) by mouth once daily.            STOP taking these medications      fluconazole 200 MG Tab  Commonly known as: DIFLUCAN              Indwelling Lines/Drains at time of discharge:   Lines/Drains/Airways       None                   Time spent on the discharge of patient: 37 minutes         Carrillo Salazar MD  Department of Hospital Medicine  '[facility] Surg

## 2025-06-15 PROBLEM — R09.02 HYPOXEMIA: Status: ACTIVE | Noted: 2025-06-15

## 2025-06-15 PROBLEM — J18.9 PNEUMONIA: Status: ACTIVE | Noted: 2025-06-15

## 2025-06-15 LAB
ABSOLUTE EOSINOPHIL (OHS): 0.01 K/UL
ABSOLUTE MONOCYTE (OHS): 0.02 K/UL (ref 0.3–1)
ABSOLUTE NEUTROPHIL COUNT (OHS): 1.64 K/UL (ref 1.8–7.7)
BASOPHILS # BLD AUTO: 0.01 K/UL
BASOPHILS NFR BLD AUTO: 0.4 %
ERYTHROCYTE [DISTWIDTH] IN BLOOD BY AUTOMATED COUNT: 15.1 % (ref 11.5–14.5)
HCT VFR BLD AUTO: 31 % (ref 40–54)
HGB BLD-MCNC: 10.4 GM/DL (ref 14–18)
IMM GRANULOCYTES # BLD AUTO: 0.05 K/UL (ref 0–0.04)
IMM GRANULOCYTES NFR BLD AUTO: 2 % (ref 0–0.5)
LACTATE SERPL-SCNC: 1.1 MMOL/L (ref 0.5–2.2)
LYMPHOCYTES # BLD AUTO: 0.83 K/UL (ref 1–4.8)
MCH RBC QN AUTO: 30.9 PG (ref 27–31)
MCHC RBC AUTO-ENTMCNC: 33.5 G/DL (ref 32–36)
MCV RBC AUTO: 92 FL (ref 82–98)
NUCLEATED RBC (/100WBC) (OHS): 0 /100 WBC
PLATELET # BLD AUTO: 123 K/UL (ref 150–450)
PMV BLD AUTO: 8.9 FL (ref 9.2–12.9)
RBC # BLD AUTO: 3.37 M/UL (ref 4.6–6.2)
RELATIVE EOSINOPHIL (OHS): 0.4 %
RELATIVE LYMPHOCYTE (OHS): 32.4 % (ref 18–48)
RELATIVE MONOCYTE (OHS): 0.8 % (ref 4–15)
RELATIVE NEUTROPHIL (OHS): 64 % (ref 38–73)
WBC # BLD AUTO: 2.56 K/UL (ref 3.9–12.7)

## 2025-06-15 PROCEDURE — 25000003 PHARM REV CODE 250: Mod: HCNC | Performed by: NURSE PRACTITIONER

## 2025-06-15 PROCEDURE — 11000001 HC ACUTE MED/SURG PRIVATE ROOM: Mod: HCNC

## 2025-06-15 PROCEDURE — 25500020 PHARM REV CODE 255: Mod: HCNC | Performed by: EMERGENCY MEDICINE

## 2025-06-15 PROCEDURE — 85025 COMPLETE CBC W/AUTO DIFF WBC: CPT | Mod: HCNC | Performed by: NURSE PRACTITIONER

## 2025-06-15 PROCEDURE — 21400001 HC TELEMETRY ROOM: Mod: HCNC

## 2025-06-15 PROCEDURE — 83605 ASSAY OF LACTIC ACID: CPT | Mod: HCNC | Performed by: EMERGENCY MEDICINE

## 2025-06-15 PROCEDURE — 93005 ELECTROCARDIOGRAM TRACING: CPT | Mod: HCNC

## 2025-06-15 PROCEDURE — 63600175 PHARM REV CODE 636 W HCPCS: Mod: HCNC | Performed by: NURSE PRACTITIONER

## 2025-06-15 PROCEDURE — 93010 ELECTROCARDIOGRAM REPORT: CPT | Mod: HCNC,,, | Performed by: INTERNAL MEDICINE

## 2025-06-15 PROCEDURE — 63600175 PHARM REV CODE 636 W HCPCS: Mod: HCNC | Performed by: EMERGENCY MEDICINE

## 2025-06-15 RX ORDER — POLYETHYLENE GLYCOL 3350 17 G/17G
17 POWDER, FOR SOLUTION ORAL DAILY PRN
Status: DISCONTINUED | OUTPATIENT
Start: 2025-06-15 | End: 2025-06-17 | Stop reason: HOSPADM

## 2025-06-15 RX ORDER — HYDROCODONE BITARTRATE AND ACETAMINOPHEN 5; 325 MG/1; MG/1
1 TABLET ORAL EVERY 6 HOURS PRN
Refills: 0 | Status: DISCONTINUED | OUTPATIENT
Start: 2025-06-15 | End: 2025-06-17 | Stop reason: HOSPADM

## 2025-06-15 RX ORDER — MORPHINE SULFATE 4 MG/ML
2 INJECTION, SOLUTION INTRAMUSCULAR; INTRAVENOUS EVERY 4 HOURS PRN
Refills: 0 | Status: DISCONTINUED | OUTPATIENT
Start: 2025-06-15 | End: 2025-06-17 | Stop reason: HOSPADM

## 2025-06-15 RX ORDER — TAMSULOSIN HYDROCHLORIDE 0.4 MG/1
0.4 CAPSULE ORAL DAILY
Status: DISCONTINUED | OUTPATIENT
Start: 2025-06-15 | End: 2025-06-17 | Stop reason: HOSPADM

## 2025-06-15 RX ORDER — GLUCAGON 1 MG
1 KIT INJECTION
Status: DISCONTINUED | OUTPATIENT
Start: 2025-06-15 | End: 2025-06-17 | Stop reason: HOSPADM

## 2025-06-15 RX ORDER — ACETAMINOPHEN 650 MG/1
650 SUPPOSITORY RECTAL EVERY 6 HOURS PRN
Status: DISCONTINUED | OUTPATIENT
Start: 2025-06-15 | End: 2025-06-16

## 2025-06-15 RX ORDER — SIMETHICONE 80 MG
1 TABLET,CHEWABLE ORAL 4 TIMES DAILY PRN
Status: DISCONTINUED | OUTPATIENT
Start: 2025-06-15 | End: 2025-06-17 | Stop reason: HOSPADM

## 2025-06-15 RX ORDER — ALUMINUM HYDROXIDE, MAGNESIUM HYDROXIDE, AND SIMETHICONE 1200; 120; 1200 MG/30ML; MG/30ML; MG/30ML
30 SUSPENSION ORAL 4 TIMES DAILY PRN
Status: DISCONTINUED | OUTPATIENT
Start: 2025-06-15 | End: 2025-06-17 | Stop reason: HOSPADM

## 2025-06-15 RX ORDER — CEFTRIAXONE 2 G/1
2 INJECTION, POWDER, FOR SOLUTION INTRAMUSCULAR; INTRAVENOUS
Status: COMPLETED | OUTPATIENT
Start: 2025-06-15 | End: 2025-06-15

## 2025-06-15 RX ORDER — IBUPROFEN 200 MG
16 TABLET ORAL
Status: DISCONTINUED | OUTPATIENT
Start: 2025-06-15 | End: 2025-06-17 | Stop reason: HOSPADM

## 2025-06-15 RX ORDER — CEFTRIAXONE 2 G/1
2 INJECTION, POWDER, FOR SOLUTION INTRAMUSCULAR; INTRAVENOUS
Status: DISCONTINUED | OUTPATIENT
Start: 2025-06-16 | End: 2025-06-17 | Stop reason: HOSPADM

## 2025-06-15 RX ORDER — ACETAMINOPHEN 325 MG/1
650 TABLET ORAL EVERY 8 HOURS PRN
Status: DISCONTINUED | OUTPATIENT
Start: 2025-06-15 | End: 2025-06-17 | Stop reason: HOSPADM

## 2025-06-15 RX ORDER — PANTOPRAZOLE SODIUM 40 MG/1
40 TABLET, DELAYED RELEASE ORAL DAILY
Status: DISCONTINUED | OUTPATIENT
Start: 2025-06-15 | End: 2025-06-17 | Stop reason: HOSPADM

## 2025-06-15 RX ORDER — PROMETHAZINE HYDROCHLORIDE 25 MG/1
25 TABLET ORAL EVERY 6 HOURS PRN
Status: DISCONTINUED | OUTPATIENT
Start: 2025-06-15 | End: 2025-06-17 | Stop reason: HOSPADM

## 2025-06-15 RX ORDER — NALOXONE HCL 0.4 MG/ML
0.02 VIAL (ML) INJECTION
Status: DISCONTINUED | OUTPATIENT
Start: 2025-06-15 | End: 2025-06-17 | Stop reason: HOSPADM

## 2025-06-15 RX ORDER — IBUPROFEN 200 MG
24 TABLET ORAL
Status: DISCONTINUED | OUTPATIENT
Start: 2025-06-15 | End: 2025-06-17 | Stop reason: HOSPADM

## 2025-06-15 RX ORDER — TALC
6 POWDER (GRAM) TOPICAL NIGHTLY PRN
Status: DISCONTINUED | OUTPATIENT
Start: 2025-06-15 | End: 2025-06-17 | Stop reason: HOSPADM

## 2025-06-15 RX ORDER — IPRATROPIUM BROMIDE AND ALBUTEROL SULFATE 2.5; .5 MG/3ML; MG/3ML
3 SOLUTION RESPIRATORY (INHALATION) EVERY 4 HOURS PRN
Status: DISCONTINUED | OUTPATIENT
Start: 2025-06-15 | End: 2025-06-17 | Stop reason: HOSPADM

## 2025-06-15 RX ORDER — ONDANSETRON HYDROCHLORIDE 2 MG/ML
4 INJECTION, SOLUTION INTRAVENOUS EVERY 8 HOURS PRN
Status: DISCONTINUED | OUTPATIENT
Start: 2025-06-15 | End: 2025-06-17 | Stop reason: HOSPADM

## 2025-06-15 RX ORDER — SODIUM CHLORIDE, SODIUM LACTATE, POTASSIUM CHLORIDE, CALCIUM CHLORIDE 600; 310; 30; 20 MG/100ML; MG/100ML; MG/100ML; MG/100ML
INJECTION, SOLUTION INTRAVENOUS CONTINUOUS
Status: DISCONTINUED | OUTPATIENT
Start: 2025-06-15 | End: 2025-06-17

## 2025-06-15 RX ORDER — SODIUM CHLORIDE 0.9 % (FLUSH) 0.9 %
3 SYRINGE (ML) INJECTION EVERY 12 HOURS PRN
Status: DISCONTINUED | OUTPATIENT
Start: 2025-06-15 | End: 2025-06-17 | Stop reason: HOSPADM

## 2025-06-15 RX ADMIN — CEFTRIAXONE SODIUM 2 G: 2 INJECTION, POWDER, FOR SOLUTION INTRAMUSCULAR; INTRAVENOUS at 02:06

## 2025-06-15 RX ADMIN — RIVAROXABAN 20 MG: 20 TABLET, FILM COATED ORAL at 06:06

## 2025-06-15 RX ADMIN — TAMSULOSIN HYDROCHLORIDE 0.4 MG: 0.4 CAPSULE ORAL at 12:06

## 2025-06-15 RX ADMIN — SODIUM CHLORIDE, POTASSIUM CHLORIDE, SODIUM LACTATE AND CALCIUM CHLORIDE: 600; 310; 30; 20 INJECTION, SOLUTION INTRAVENOUS at 08:06

## 2025-06-15 RX ADMIN — IOHEXOL 100 ML: 350 INJECTION, SOLUTION INTRAVENOUS at 01:06

## 2025-06-15 RX ADMIN — PANTOPRAZOLE SODIUM 40 MG: 40 TABLET, DELAYED RELEASE ORAL at 12:06
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
## 2025-06-15 NOTE — PROGRESS NOTES
Pharmacist Renal Dose Adjustment Note    Armando Ingram Jr. is a 65 y.o. male being treated with the medication ceftriaxone.    Patient Data:    Vital Signs (Most Recent):  Temp: 98.6 °F (37 °C) (06/15/25 0333)  Pulse: 92 (06/15/25 0547)  Resp: (!) 25 (06/15/25 0547)  BP: 125/70 (06/15/25 0547)  SpO2: (!) 94 % (06/15/25 0547) Vital Signs (72h Range):  Temp:  [97.7 °F (36.5 °C)-99.7 °F (37.6 °C)]   Pulse:  [62-96]   Resp:  [10-25]   BP: ()/(54-78)   SpO2:  [88 %-97 %]      Recent Labs   Lab 06/13/25  0525 06/14/25  0616 06/14/25 2127   CREATININE 0.8 0.8 0.8     Serum creatinine: 0.8 mg/dL 06/14/25 2127  Estimated creatinine clearance: 96.7 mL/min    Ceftriaxone 1 g IV every 24 hours will be changed to ceftriaxone 2 g IV every 24 hours for the treatment of severe infection - lower respiratory tract infection.    Pharmacist's Name: Kyler Reyes  Pharmacist's Extension: [phone]

## 2025-06-15 NOTE — HPI
Armando Ingram is a 65 year old male with medical history of colon cancer, DVT, Hairy Cell Leukemia, and nephrolithiasis who was recently hospitalized extensively (including re-admit) for management of cryptococcal infection. Infection was noted on Karius, but serum and CSF were negative. He was treated with Amphotericin, and Fluconazole. Repeat Karius on 6/11/25 was negative for Cryptococcosis. He was discharged yesterday, 6/13/25, with assistance from ID who recommended Augmentin over ten days.     This patient returned to ED early this AM with complaints of  fever (TMax 101) despite Tylenol with associated chills and headache.      Patient has been afebrile since arrival. He requires 3L NC. Preliminary CT Chest reportedly demonstrated bilateral lower lobe consolidations are suspicious for pneumonia, trace pericardial effusion, and small hiatal. Case discussed with Infectious Disease who recommends starting Rocephin and consulting pulmonology for possible bronchoscopy for non-resolving pneumonia.
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
## 2025-06-15 NOTE — ASSESSMENT & PLAN NOTE
Patient was discharged 6/13/25 on Augmentin and returned to ED with fever of 101. Prior to discharge patient was afebrile >48 hours. Monitored 12 hours on oral antibiotics. STAT RAD CTA Chest demonstrated bilateral lower lobe consolidations.  --Discussed with Dr. Villanueva who recommends Pulmonology consult for possible bronch .   --2 grams Rocephin  --He was evaluated by Speech Therapy who performed MBSS and it was determined patient was low risk of aspiration with regards to dysphagia; however, aspiration of gastric contents is unknown  --Consult ID.

## 2025-06-15 NOTE — PHARMACY MED REC
"  Admission Medication History     The home medication history was taken by Heidi Winkler.    You may go to "Admission" then "Reconcile Home Medications" tabs to review and/or act upon these items.     The home medication list has been updated by the Pharmacy department.   Please read ALL comments highlighted in yellow.   Please address this information as you see fit.    Feel free to contact us if you have any questions or require assistance.        Medications listed below were obtained from: Patient/family and Analytic software- [employer]      [facility] REC COMPLETED:     Heidi Winkler  [phone]    Current Outpatient Medications on File Prior to Encounter   Medication Sig Dispense Refill Last Dose/Taking    amoxicillin-clavulanate 875-125mg (AUGMENTIN) 875-125 mg per tablet Take 1 tablet by mouth every 12 (twelve) hours. for 10 days 20 tablet 0 Past Week    multivitamin (THERAGRAN) per tablet Take 1 tablet by mouth once daily.   Past Week    omeprazole (PRILOSEC) 20 MG capsule Take 20 mg by mouth once daily.   Past Week    tamsulosin (FLOMAX) 0.4 mg Cap Take 1 capsule (0.4 mg total) by mouth once daily. 30 capsule 11 Past Week    XARELTO 20 mg Tab Take 1 tablet (20 mg total) by mouth once daily. 90 tablet 3 6/14/2025                         .          "

## 2025-06-15 NOTE — ASSESSMENT & PLAN NOTE
STAT RAD CTA Chest demonstrated bilateral lower lobe consolidations.  --Discussed with Dr. Villanueva who recommends Pulmonology consult for possible bronch .   --2 grams Rocephin  --He was evaluated by Speech Therapy who performed MBSS and it was determined patient was low risk of aspiration with regards to dysphagia; however, aspiration of gastric contents is unknown  --Consult ID.   --supplemental oxygen

## 2025-06-15 NOTE — ED PROVIDER NOTES
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 100 mL (100 mLs Intravenous Given 6/15/25 0157)       Current Discharge Medication List                  Scribe Attestation:   Scribe #1: I performed the above scribed service and the documentation accurately describes the services I performed. I attest to the accuracy of the note.     Attending:   Physician Attestation Statement for Scribe #1: I, Oseas Dejesus Jr., MD, personally performed the services described in this documentation, as scribed by Tracy Rhoades, in my presence, and it is both accurate and complete.           Clinical Impression       ICD-10-CM ICD-9-CM   1. Hypoxemia  R09.02 799.02   2. Screening for cardiovascular condition  Z13.6 V81.2   3. Pneumonia  J18.9 486   4. Chest pain  R07.9 786.50   5. Fever  R50.9 780.60       Disposition:   Disposition: Placed in Observation  Condition: Oseas Marie Jr., MD  06/15/25 2115

## 2025-06-15 NOTE — PLAN OF CARE
Discussed poc with pt, pt verbalized understanding    Purposeful rounding every 2hours    VS wnl  Cardiac monitoring in use, pt is NSR, tele monitor # ____  Fall precautions in place, remains injury free  Pt denies c/o pain    IVFs  Accurate I&Os  Abx given as prescribed  Bed locked at lowest position  Call light within reach    Chart check complete  Will cont with POC

## 2025-06-16 LAB
ALBUMIN SERPL BCP-MCNC: 1.9 G/DL (ref 3.5–5.2)
ALP SERPL-CCNC: 198 UNIT/L (ref 40–150)
ALT SERPL W/O P-5'-P-CCNC: 23 UNIT/L (ref 10–44)
ANION GAP (OHS): 8 MMOL/L (ref 8–16)
AST SERPL-CCNC: 28 UNIT/L (ref 11–45)
BILIRUB SERPL-MCNC: 0.3 MG/DL (ref 0.1–1)
BUN SERPL-MCNC: 9 MG/DL (ref 8–23)
CALCIUM SERPL-MCNC: 8.1 MG/DL (ref 8.7–10.5)
CHLORIDE SERPL-SCNC: 106 MMOL/L (ref 95–110)
CO2 SERPL-SCNC: 21 MMOL/L (ref 23–29)
CREAT SERPL-MCNC: 0.8 MG/DL (ref 0.5–1.4)
GFR SERPLBLD CREATININE-BSD FMLA CKD-EPI: >60 ML/MIN/1.73/M2
GLUCOSE SERPL-MCNC: 98 MG/DL (ref 70–110)
MAGNESIUM SERPL-MCNC: 1.9 MG/DL (ref 1.6–2.6)
OHS QRS DURATION: 94 MS
OHS QTC CALCULATION: 380 MS
POTASSIUM SERPL-SCNC: 4.1 MMOL/L (ref 3.5–5.1)
PROT SERPL-MCNC: 6.2 GM/DL (ref 6–8.4)
SODIUM SERPL-SCNC: 135 MMOL/L (ref 136–145)

## 2025-06-16 PROCEDURE — 83735 ASSAY OF MAGNESIUM: CPT | Mod: HCNC | Performed by: NURSE PRACTITIONER

## 2025-06-16 PROCEDURE — 82040 ASSAY OF SERUM ALBUMIN: CPT | Mod: HCNC | Performed by: NURSE PRACTITIONER

## 2025-06-16 PROCEDURE — 25000003 PHARM REV CODE 250: Mod: HCNC | Performed by: NURSE PRACTITIONER

## 2025-06-16 PROCEDURE — 11000001 HC ACUTE MED/SURG PRIVATE ROOM: Mod: HCNC

## 2025-06-16 PROCEDURE — 63600175 PHARM REV CODE 636 W HCPCS: Mod: HCNC | Performed by: NURSE PRACTITIONER

## 2025-06-16 PROCEDURE — 99222 1ST HOSP IP/OBS MODERATE 55: CPT | Mod: HCNC,,, | Performed by: STUDENT IN AN ORGANIZED HEALTH CARE EDUCATION/TRAINING PROGRAM

## 2025-06-16 PROCEDURE — 99900035 HC TECH TIME PER 15 MIN (STAT): Mod: HCNC

## 2025-06-16 PROCEDURE — 36415 COLL VENOUS BLD VENIPUNCTURE: CPT | Mod: HCNC | Performed by: NURSE PRACTITIONER

## 2025-06-16 PROCEDURE — 21400001 HC TELEMETRY ROOM: Mod: HCNC

## 2025-06-16 PROCEDURE — 27000221 HC OXYGEN, UP TO 24 HOURS: Mod: HCNC

## 2025-06-16 PROCEDURE — 94799 UNLISTED PULMONARY SVC/PX: CPT | Mod: HCNC

## 2025-06-16 RX ADMIN — PANTOPRAZOLE SODIUM 40 MG: 40 TABLET, DELAYED RELEASE ORAL at 08:06

## 2025-06-16 RX ADMIN — CEFTRIAXONE SODIUM 2 G: 2 INJECTION, POWDER, FOR SOLUTION INTRAMUSCULAR; INTRAVENOUS at 02:06

## 2025-06-16 RX ADMIN — SODIUM CHLORIDE, POTASSIUM CHLORIDE, SODIUM LACTATE AND CALCIUM CHLORIDE: 600; 310; 30; 20 INJECTION, SOLUTION INTRAVENOUS at 09:06

## 2025-06-16 RX ADMIN — RIVAROXABAN 20 MG: 20 TABLET, FILM COATED ORAL at 05:06

## 2025-06-16 RX ADMIN — TAMSULOSIN HYDROCHLORIDE 0.4 MG: 0.4 CAPSULE ORAL at 08:06

## 2025-06-16 NOTE — PLAN OF CARE
[facility]  Initial Discharge Assessment       Primary Care Provider: Brian Soares MD    Admission Diagnosis: Hypoxemia [R09.02]  Pneumonia [J18.9]  Screening for cardiovascular condition [Z13.6]  Fever [R50.9]  Chest pain [R07.9]    Admission Date: 6/14/2025  Expected Discharge Date:     Transition of Care Barriers: None    Payor: HU[employer] MANAGED MEDICARE / Plan: HU[employer] MEDICARE HMO / Product Type: Capitation /     Extended Emergency Contact Information  Primary Emergency Contact: Richie Ingram  Address: [address] [place] [address] [place]  Mobile Phone: [phone]  Relation: Spouse  Secondary Emergency Contact: Mervin Ingram   [place]  Mobile Phone: [phone]  Relation: Son    Discharge Plan A: Home with family  Discharge Plan B: Home Health      [facility] [address] [place], [place] - [address] [facility] [address]  [address] [address] [address]  Phone: [phone] Fax: [phone]      Initial Assessment (most recent)       Adult Discharge Assessment - 06/16/25 0848          Discharge Assessment    Assessment Type Discharge Planning Assessment     Confirmed/corrected address, phone number and insurance Yes     Confirmed Demographics Correct on Facesheet     Source of Information patient;health record     If unable to respond/provide information was family/caregiver contacted? No Contact Information Available     Communicated LAURI with patient/caregiver Date not available/Unable to determine     People in Home spouse     Name(s) of People in Home Spouse - Richie     Facility Arrived From: Home     Do you expect to return to your current living situation? Yes     Do you have help at home or someone to help you manage your care at home? Yes     Who are your caregiver(s) and their phone number(s)? Spouse     Prior to hospitilization cognitive status: Alert/Oriented     Current cognitive status: Alert/Oriented     Walking or Climbing Stairs  Difficulty no     Dressing/Bathing Difficulty no     Equipment Currently Used at Home none     Readmission within 30 days? No     Patient currently being followed by outpatient case management? No     Do you currently have service(s) that help you manage your care at home? No     Do you take prescription medications? Yes     Do you have prescription coverage? Yes     Do you have any problems affording any of your prescribed medications? TBD     Is the patient taking medications as prescribed? yes     Who is going to help you get home at discharge? Family     How do you get to doctors appointments? car, drives self     Are you on dialysis? No     Do you take coumadin? No     Discharge Plan A Home with family     Discharge Plan B Home Health     DME Needed Upon Discharge  none     Discharge Plan discussed with: Patient     Transition of Care Barriers None                     Readmission Assessment (most recent)       Readmission Assessment - 06/16/25 0847          Readmission    Was this a planned readmission? No     Why were you hospitalized in the last 30 days? Fever     Why were you readmitted? Alarmed about signs/symptoms;Related to previous admission     When you left the hospital how did you feel? Ok     When you left the hospital where did you go? Home with Family     Did patient/caregiver refused recommended DC plan? No     Did you try to manage your symptoms your self? Yes     Did you call anyone? Yes     Did you try to see or did see a doctor or nurse before you came? No     Did you have  a follow-up appointment on discharge? Yes                    Anticipated DC disposition: home    Prior level of function: independent    PCP: Brian Soares MD    Comments: CM confirmed demographics, emergency contacts, PCP and insurance. Needs will depend on hospital progress; CM following for needs.    Discharge Plan A and Plan B have been determined by review of patient's clinical status, future medical and therapeutic  needs, and coverage/benefits for post-acute care in coordination with multidisciplinary team members.

## 2025-06-16 NOTE — ASSESSMENT & PLAN NOTE
"--CTA chest this admission reports bilateral lower lobe aspiration pneumonia  --continue IV ceftriaxone  --patient afebrile so far this admission  --appreciate pulmonology, "aggressively treat reflux; it can take 6-8 weeks for CT to resolve; should he fail to improve over the next 3-5 days will consider bronchoscopy although at this point after several days of antimicrobial therapy the yield is questionable"   --recommend treating as complicated pneumonia with 14 days of antibiotics, which can be oral once patient cleared for discharge  --Above d/w primary team.    "
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
## 2025-06-16 NOTE — ASSESSMENT & PLAN NOTE
--Decision made previous admission to stop treatment of cryptococcus as repeat Karius test no longer reported any organisms and full CNS workup was negative  --See pneumonia  --Monitor fever curve

## 2025-06-16 NOTE — ASSESSMENT & PLAN NOTE
Patient was discharged 6/13/25 on Augmentin and returned to ED with fever of 101. Prior to discharge patient was afebrile >48 hours. Monitored 12 hours on oral antibiotics. STAT RAD CTA Chest demonstrated bilateral lower lobe consolidations.  --Discussed with Dr. Villanueva who recommends Pulmonology consult for possible bronch .   --2 grams Rocephin  --He was evaluated by Speech Therapy who performed MBSS and it was determined patient was low risk of aspiration with regards to dysphagia; however, aspiration of gastric contents is unknown  --Consult ID.     6/16/2025  Continue Rocephin   ID on case   Plan to discuss with Hematology  As to whether hairy cell leukemia is source of fever
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
## 2025-06-16 NOTE — SUBJECTIVE & OBJECTIVE
Interval History:  No acute issues reported overnight.    Review of Systems   Constitutional:  Positive for chills, fatigue and fever.   HENT:  Negative for sinus pressure.    Eyes:  Negative for visual disturbance.   Respiratory:  Negative for chest tightness, shortness of breath and wheezing.    Cardiovascular:  Negative for chest pain, palpitations and leg swelling.   Gastrointestinal:  Negative for abdominal distention, abdominal pain, nausea and vomiting.   Genitourinary:  Negative for difficulty urinating.   Musculoskeletal:  Negative for back pain.   Skin:  Negative for rash.   Neurological:  Positive for weakness. Negative for headaches.   Psychiatric/Behavioral:  Negative for confusion.    All other systems reviewed and are negative.    Objective:     Vital Signs (Most Recent):  Temp: 99.3 °F (37.4 °C) (06/16/25 0340)  Pulse: 94 (06/16/25 0940)  Resp: 18 (06/16/25 0940)  BP: (!) 120/56 (06/16/25 0340)  SpO2: 95 % (06/16/25 0940) Vital Signs (24h Range):  Temp:  [97 °F (36.1 °C)-99.3 °F (37.4 °C)] 99.3 °F (37.4 °C)  Pulse:  [83-97] 94  Resp:  [14-22] 18  SpO2:  [92 %-98 %] 95 %  BP: (114-133)/(56-72) 120/56     Weight: 81.2 kg (179 lb)  Body mass index is 24.97 kg/m².    Intake/Output Summary (Last 24 hours) at 6/16/2025 0959  Last data filed at 6/16/2025 0729  Gross per 24 hour   Intake 2698.74 ml   Output --   Net 2698.74 ml         Physical Exam  Vitals reviewed.   Constitutional:       General: He is not in acute distress.     Appearance: He is well-developed. He is ill-appearing. He is not diaphoretic.   HENT:      Head: Normocephalic and atraumatic.      Mouth/Throat:      Mouth: Mucous membranes are moist.      Pharynx: Oropharynx is clear.   Eyes:      Extraocular Movements: Extraocular movements intact.      Conjunctiva/sclera: Conjunctivae normal.      Pupils: Pupils are equal, round, and reactive to light.   Cardiovascular:      Rate and Rhythm: Normal rate and regular rhythm.      Pulses: Normal  pulses.      Heart sounds: Normal heart sounds. No murmur heard.     No friction rub. No gallop.   Pulmonary:      Effort: Pulmonary effort is normal. No respiratory distress.      Breath sounds: Normal breath sounds. No stridor. No wheezing, rhonchi or rales.   Abdominal:      General: Bowel sounds are normal. There is no distension.      Palpations: Abdomen is soft. There is no mass.      Tenderness: There is no abdominal tenderness. There is no guarding or rebound.   Musculoskeletal:         General: No swelling. Normal range of motion.      Cervical back: Normal range of motion and neck supple.      Right lower leg: No edema.      Left lower leg: No edema.   Skin:     General: Skin is warm and dry.      Findings: No bruising, erythema or lesion.   Neurological:      General: No focal deficit present.      Mental Status: He is alert and oriented to person, place, and time. Mental status is at baseline.   Psychiatric:         Mood and Affect: Mood normal.         Behavior: Behavior normal.         Thought Content: Thought content normal.               Significant Labs: All pertinent labs within the past 24 hours have been reviewed.    Significant Imaging: I have reviewed all pertinent imaging results/findings within the past 24 hours.

## 2025-06-16 NOTE — SUBJECTIVE & OBJECTIVE
Objective:     Vital Signs (Most Recent):  Temp: 97.7 °F (36.5 °C) (06/16/25 1245)  Pulse: 83 (06/16/25 1528)  Resp: 18 (06/16/25 1245)  BP: 117/68 (06/16/25 1245)  SpO2: (!) 94 % (06/16/25 1245) Vital Signs (24h Range):  Temp:  [97.7 °F (36.5 °C)-99.3 °F (37.4 °C)] 97.7 °F (36.5 °C)  Pulse:  [83-97] 83  Resp:  [17-18] 18  SpO2:  [92 %-95 %] 94 %  BP: (114-120)/(56-72) 117/68     Weight: 81.2 kg (179 lb)  Body mass index is 24.97 kg/m².  Intake/Output Summary (Last 24 hours) at 6/16/2025 1622  Last data filed at 6/16/2025 1302  Gross per 24 hour   Intake 3178.74 ml   Output --   Net 3178.74 ml     Physical Exam  HENT:      Head: Normocephalic.      Nose: Nose normal.      Mouth/Throat:      Mouth: Mucous membranes are moist.   Eyes:      General: No scleral icterus.     Conjunctiva/sclera: Conjunctivae normal.      Pupils: Pupils are equal, round, and reactive to light.   Cardiovascular:      Rate and Rhythm: Normal rate and regular rhythm.      Pulses: Normal pulses.      Heart sounds: No murmur heard.  Pulmonary:      Effort: Pulmonary effort is normal. No respiratory distress.      Breath sounds: No wheezing, rhonchi or rales.   Abdominal:      General: There is no distension.      Palpations: Abdomen is soft.      Tenderness: There is no abdominal tenderness.   Musculoskeletal:         General: No swelling.   Skin:     General: Skin is warm.      Capillary Refill: Capillary refill takes less than 2 seconds.      Coloration: Skin is not jaundiced.      Findings: No bruising.   Neurological:      Mental Status: He is alert and oriented to person, place, and time.   Psychiatric:         Mood and Affect: Mood normal.         Behavior: Behavior normal.     Review of Systems   Constitutional:  Negative for chills, fatigue and fever.   Respiratory:  Negative for cough, chest tightness and shortness of breath.    Cardiovascular:  Negative for chest pain.   Gastrointestinal:  Negative for abdominal pain, nausea and  vomiting.   Genitourinary:  Negative for dysuria.   Neurological:  Negative for dizziness and headaches.   Psychiatric/Behavioral:  Negative for agitation and confusion.      Lines/Drains/Airways       Peripheral Intravenous Line  Duration                  Peripheral IV - Single Lumen 06/14/25 2131 20 G Anterior;Distal;Left Forearm 1 day                  Significant Labs:    CBC/Anemia Profile:  Recent Labs   Lab 06/14/25  2127 06/15/25  1238   WBC 2.91* 2.56*   HGB 10.5* 10.4*   HCT 32.1* 31.0*   * 123*   MCV 93 92   RDW 15.0* 15.1*     Chemistries:  Recent Labs   Lab 06/14/25 2127 06/16/25  0623    135*   K 3.7 4.1    106   CO2 21* 21*   BUN 7* 9   CREATININE 0.8 0.8   CALCIUM 8.1* 8.1*   ALBUMIN 2.1* 1.9*   PROT 6.6 6.2   BILITOT 0.3 0.3   ALKPHOS 241* 198*   ALT 29 23   AST 44 28   MG 1.9 1.9     Significant Imaging:  I have reviewed all pertinent imaging results/findings within the past 24 hours.  I have reviewed and interpreted all pertinent imaging results/findings within the past 24 hours.

## 2025-06-16 NOTE — ASSESSMENT & PLAN NOTE
- Patient has a diagnosis of pneumonia. The cause of the pneumonia is suspected to be bacterial in etiology but organism is not known. The pneumonia is improving. The patient has the following signs/symptoms of pneumonia: cough and shortness of breath. The patient does not have a current oxygen requirement and the patient does not have a home oxygen requirement. I have reviewed the pertinent imaging. The following cultures have been collected: Blood cultures The culture results are listed below.   - Current antimicrobial regimen consists of the antibiotics listed below. Will monitor patient closely and continue current treatment plan unchanged.    Antibiotics (From admission, onward)      Start     Stop Route Frequency Ordered    06/16/25 0200  cefTRIAXone injection 2 g         -- IV Every 24 hours (non-standard times) 06/15/25 0612     Microbiology Results (last 7 days)       Procedure Component Value Units Date/Time    Blood culture x two cultures. Draw prior to antibiotics. [[id number]]  (Normal) Collected: 06/14/25 2126    Order Status: Completed Specimen: Blood from Peripheral, Forearm, Right Updated: 06/16/25 1201     Blood Culture No Growth After 24 Hours    Blood culture x two cultures. Draw prior to antibiotics. [[id number]]  (Normal) Collected: 06/14/25 2127    Order Status: Completed Specimen: Blood from Peripheral, Wrist, Left Updated: 06/16/25 1201     Blood Culture No Growth After 24 Hours    Influenza A & B by Molecular [[id number]]  (Normal) Collected: 06/14/25 2115    Order Status: Completed Specimen: Nasopharyngeal Swab Updated: 06/14/25 2212     INFLUENZA A MOLECULAR Negative     INFLUENZA B MOLECULAR  Negative        - Pattern of pneumonia is consistent with aspiration  - He has a fairly sizeable hiatal hernia making this more likely  - Aggressively treat reflux  - Agree with current antibiotics  - It can take 6-8 weeks for CT to resolve  - He has not had appropriate outpatient treatment to cover  for aspiration pneumonia as of yet  - Would give current regimen a full course   - If he improves clinically with resolution of fever and oxygen requirement then repeat CT in 6-8 weeks would be appropriate  - Should he fail to improve over the next 3-5 days will consider bronchoscopy although at this point after several days of antimicrobial therapy the yield is questionable  - Discussed with patient and wife. RN also present. All voiced understanding of plan    - 6/16: Doing well today, now on RA. No complaints. Continue abx. Plan for pulm f/u on d/c with repeat imaging 6-8 weeks

## 2025-06-16 NOTE — PROGRESS NOTES
[facility] Medicine  Progress Note    Patient Name: Armando Ingram Jr.  MRN: [id number]  Patient Class: IP- Inpatient   Admission Date: 6/14/2025  Length of Stay: 1 days  Attending Physician: Carrillo Salazar MD  Primary Care Provider: Brian Soares MD        Subjective     Principal Problem:Fever        HPI:  Armando Ingram is a 65 year old male with medical history of colon cancer, DVT, Hairy Cell Leukemia, and nephrolithiasis who was recently hospitalized extensively (including re-admit) for management of cryptococcal infection. Infection was noted on Karius, but serum and CSF were negative. He was treated with Amphotericin, and Fluconazole. Repeat Karius on 6/11/25 was negative for Cryptococcosis. He was discharged yesterday, 6/13/25, with assistance from ID who recommended Augmentin over ten days.     This patient returned to ED early this AM with complaints of  fever (TMax 101) despite Tylenol with associated chills and headache.      Patient has been afebrile since arrival. He requires 3L NC. Preliminary CT Chest reportedly demonstrated bilateral lower lobe consolidations are suspicious for pneumonia, trace pericardial effusion, and small hiatal. Case discussed with Infectious Disease who recommends starting Rocephin and consulting pulmonology for possible bronchoscopy for non-resolving pneumonia.     Overview/Hospital Course:  06/16/2025  Discussed case with ID.  Will plan to address with Hematology as to whether hairy cell leukemia is the cause of fever.  Continue empiric Rocephin.    Interval History:  No acute issues reported overnight.    Review of Systems   Constitutional:  Positive for chills, fatigue and fever.   HENT:  Negative for sinus pressure.    Eyes:  Negative for visual disturbance.   Respiratory:  Negative for chest tightness, shortness of breath and wheezing.    Cardiovascular:  Negative for chest pain, palpitations and leg swelling.   Gastrointestinal:  Negative for abdominal  distention, abdominal pain, nausea and vomiting.   Genitourinary:  Negative for difficulty urinating.   Musculoskeletal:  Negative for back pain.   Skin:  Negative for rash.   Neurological:  Positive for weakness. Negative for headaches.   Psychiatric/Behavioral:  Negative for confusion.    All other systems reviewed and are negative.    Objective:     Vital Signs (Most Recent):  Temp: 99.3 °F (37.4 °C) (06/16/25 0340)  Pulse: 94 (06/16/25 0940)  Resp: 18 (06/16/25 0940)  BP: (!) 120/56 (06/16/25 0340)  SpO2: 95 % (06/16/25 0940) Vital Signs (24h Range):  Temp:  [97 °F (36.1 °C)-99.3 °F (37.4 °C)] 99.3 °F (37.4 °C)  Pulse:  [83-97] 94  Resp:  [14-22] 18  SpO2:  [92 %-98 %] 95 %  BP: (114-133)/(56-72) 120/56     Weight: 81.2 kg (179 lb)  Body mass index is 24.97 kg/m².    Intake/Output Summary (Last 24 hours) at 6/16/2025 0959  Last data filed at 6/16/2025 0729  Gross per 24 hour   Intake 2698.74 ml   Output --   Net 2698.74 ml         Physical Exam  Vitals reviewed.   Constitutional:       General: He is not in acute distress.     Appearance: He is well-developed. He is ill-appearing. He is not diaphoretic.   HENT:      Head: Normocephalic and atraumatic.      Mouth/Throat:      Mouth: Mucous membranes are moist.      Pharynx: Oropharynx is clear.   Eyes:      Extraocular Movements: Extraocular movements intact.      Conjunctiva/sclera: Conjunctivae normal.      Pupils: Pupils are equal, round, and reactive to light.   Cardiovascular:      Rate and Rhythm: Normal rate and regular rhythm.      Pulses: Normal pulses.      Heart sounds: Normal heart sounds. No murmur heard.     No friction rub. No gallop.   Pulmonary:      Effort: Pulmonary effort is normal. No respiratory distress.      Breath sounds: Normal breath sounds. No stridor. No wheezing, rhonchi or rales.   Abdominal:      General: Bowel sounds are normal. There is no distension.      Palpations: Abdomen is soft. There is no mass.      Tenderness: There is  no abdominal tenderness. There is no guarding or rebound.   Musculoskeletal:         General: No swelling. Normal range of motion.      Cervical back: Normal range of motion and neck supple.      Right lower leg: No edema.      Left lower leg: No edema.   Skin:     General: Skin is warm and dry.      Findings: No bruising, erythema or lesion.   Neurological:      General: No focal deficit present.      Mental Status: He is alert and oriented to person, place, and time. Mental status is at baseline.   Psychiatric:         Mood and Affect: Mood normal.         Behavior: Behavior normal.         Thought Content: Thought content normal.               Significant Labs: All pertinent labs within the past 24 hours have been reviewed.    Significant Imaging: I have reviewed all pertinent imaging results/findings within the past 24 hours.        Assessment & Plan  Fever  Patient was discharged 6/13/25 on Augmentin and returned to ED with fever of 101. Prior to discharge patient was afebrile >48 hours. Monitored 12 hours on oral antibiotics. STAT RAD CTA Chest demonstrated bilateral lower lobe consolidations.  --Discussed with Dr. Villanueva who recommends Pulmonology consult for possible bronch .   --2 grams Rocephin  --He was evaluated by Speech Therapy who performed MBSS and it was determined patient was low risk of aspiration with regards to dysphagia; however, aspiration of gastric contents is unknown  --Consult ID.     6/16/2025  Continue Rocephin   ID on case   Plan to discuss with Hematology  As to whether hairy cell leukemia is source of fever  Hairy cell leukemia  Outpatient follow up with Dr. Dow    Acute deep vein thrombosis (DVT) of right lower extremity  Resume Xarelto    Hypoxemia  Currently  requires 3L nasal cannula  --see plan for pneumonia.    Pneumonia  STAT RAD CTA Chest demonstrated bilateral lower lobe consolidations.  --Discussed with Dr. Villanueva who recommends Pulmonology consult for possible bronch .    --2 grams Rocephin  --He was evaluated by Speech Therapy who performed MBSS and it was determined patient was low risk of aspiration with regards to dysphagia; however, aspiration of gastric contents is unknown  --Consult ID.   --supplemental oxygen  VTE Risk Mitigation (From admission, onward)           Ordered     rivaroxaban tablet 20 mg  With dinner         06/15/25 1012     IP VTE HIGH RISK PATIENT  Once         06/15/25 0614     Place sequential compression device  Until discontinued         06/15/25 0614     Reason for No Pharmacological VTE Prophylaxis  Once        Comments: Planned surgical procedure   Question:  Reasons:  Answer:  Physician Provided (leave comment)    06/15/25 0614                    Discharge Planning   LAURI:      Code Status: Full Code   Medical Readiness for Discharge Date:   Discharge Plan A: Home with family                        Carrillo Salazar MD  Department of Hospital Medicine   [facility] - Med Surg

## 2025-06-16 NOTE — HPI
This is a 66 yo M with hx of Closed right hip fracture, Colon cancer, DVT,  Hairy cell leukemia, and Nephrolithiasis who was recently managed in the hospital for about 3 weeks for fever. Workup during recent hospitalization included blood cultures which were no growth, respiratory viral panel negative, CT scan of abdomen and pelvis which revealed bladder wall thickening but otherwise negative, TTE with no vegetation, bilateral lower extremity ultrasound revealed chronic right lower extremity DVT, and Karius returned positive for cryptococcal infection. However, lumbar puncture on 05/30/2025 revealed an opening pressure of 18 with clear CSF and Christiana ink, cryptococcal antigen, ME panel, fungal culture and bacterial culture all were negative. Indium scan was negative. His regimen was switched from amphotericin and flucytosine to fluconazole when fever resolved. However, after 3 hours at home he called ER stating he had fever of 102 F. During last admission Karius was repeated and came back negative for any organisms.  Fluconazole was then discontinued.  CT chest at that time reported pneumonia so patient was started empirically on Zosyn, azithromycin, and Zyvox.  Serum pro count was normal.  Ultimately discharged on Augmentin.  However the night he returned home he reported a fever of 101 and promptly came back to the hospital.  CTA chest shows bilateral lower lobe aspiration pneumonia.  Patient is started on Rocephin.  ID consulted for pneumonia.

## 2025-06-16 NOTE — HOSPITAL COURSE
Patient was admitted for fever.  CT scan showed bilateral lower lobe pneumonia.  He was started on empiric Rocephin.  ID consult on case.  Patient was afebrile for 48 hours.  Recommendations were made to continue on p.o. cefdinir and Levaquin for 14 days.  Patient was discharged home.

## 2025-06-16 NOTE — PROGRESS NOTES
[facility] Surg  Pulmonology  Progress Note    Patient Name: Armando Ingram Jr.  MRN: [id number]  Admission Date: 6/14/2025  Hospital Length of Stay: 1 days  Code Status: Full Code  Attending Provider: Carrillo Salazar MD  Primary Care Provider: Brian Soares MD   Principal Problem: Fever    Subjective:   66 yo male with colon CA, DVT, hairy cell leukemia, hip fracture, failure to thrive. Recently admitted and diagnosed with cryptococcal infection. Repeat testing is negative. Was discharged yesterday from this facility and returned due to recurrent fever > 102. Mild cough at night and now oxygen requirement at 3L. Was discharged on PO augmentin. CT chest shows bilateral posterior lower lobe airspace opacities unchanged from 8 days ago. Denies sputum or cough. Aside from fever and being hungry feels okay. No chest pain or dyspnea. Wife at bedside     06/16/2025: Doing well, no complaints. On room air.     Objective:     Vital Signs (Most Recent):  Temp: 97.7 °F (36.5 °C) (06/16/25 1245)  Pulse: 83 (06/16/25 1528)  Resp: 18 (06/16/25 1245)  BP: 117/68 (06/16/25 1245)  SpO2: (!) 94 % (06/16/25 1245) Vital Signs (24h Range):  Temp:  [97.7 °F (36.5 °C)-99.3 °F (37.4 °C)] 97.7 °F (36.5 °C)  Pulse:  [83-97] 83  Resp:  [17-18] 18  SpO2:  [92 %-95 %] 94 %  BP: (114-120)/(56-72) 117/68     Weight: 81.2 kg (179 lb)  Body mass index is 24.97 kg/m².  Intake/Output Summary (Last 24 hours) at 6/16/2025 1622  Last data filed at 6/16/2025 1302  Gross per 24 hour   Intake 3178.74 ml   Output --   Net 3178.74 ml     Physical Exam  HENT:      Head: Normocephalic.      Nose: Nose normal.      Mouth/Throat:      Mouth: Mucous membranes are moist.   Eyes:      General: No scleral icterus.     Conjunctiva/sclera: Conjunctivae normal.      Pupils: Pupils are equal, round, and reactive to light.   Cardiovascular:      Rate and Rhythm: Normal rate and regular rhythm.      Pulses: Normal pulses.      Heart sounds: No murmur heard.  Pulmonary:       Effort: Pulmonary effort is normal. No respiratory distress.      Breath sounds: No wheezing, rhonchi or rales.   Abdominal:      General: There is no distension.      Palpations: Abdomen is soft.      Tenderness: There is no abdominal tenderness.   Musculoskeletal:         General: No swelling.   Skin:     General: Skin is warm.      Capillary Refill: Capillary refill takes less than 2 seconds.      Coloration: Skin is not jaundiced.      Findings: No bruising.   Neurological:      Mental Status: He is alert and oriented to person, place, and time.   Psychiatric:         Mood and Affect: Mood normal.         Behavior: Behavior normal.     Review of Systems   Constitutional:  Negative for chills, fatigue and fever.   Respiratory:  Negative for cough, chest tightness and shortness of breath.    Cardiovascular:  Negative for chest pain.   Gastrointestinal:  Negative for abdominal pain, nausea and vomiting.   Genitourinary:  Negative for dysuria.   Neurological:  Negative for dizziness and headaches.   Psychiatric/Behavioral:  Negative for agitation and confusion.      Lines/Drains/Airways       Peripheral Intravenous Line  Duration                  Peripheral IV - Single Lumen 06/14/25 2131 20 G Anterior;Distal;Left Forearm 1 day                  Significant Labs:    CBC/Anemia Profile:  Recent Labs   Lab 06/14/25  2127 06/15/25  1238   WBC 2.91* 2.56*   HGB 10.5* 10.4*   HCT 32.1* 31.0*   * 123*   MCV 93 92   RDW 15.0* 15.1*     Chemistries:  Recent Labs   Lab 06/14/25 2127 06/16/25  0623    135*   K 3.7 4.1    106   CO2 21* 21*   BUN 7* 9   CREATININE 0.8 0.8   CALCIUM 8.1* 8.1*   ALBUMIN 2.1* 1.9*   PROT 6.6 6.2   BILITOT 0.3 0.3   ALKPHOS 241* 198*   ALT 29 23   AST 44 28   MG 1.9 1.9     Significant Imaging:  I have reviewed all pertinent imaging results/findings within the past 24 hours.  I have reviewed and interpreted all pertinent imaging results/findings within the past 24  hours.    Assessment & Plan  Pneumonia  Hypoxemia  - Patient has a diagnosis of pneumonia. The cause of the pneumonia is suspected to be bacterial in etiology but organism is not known. The pneumonia is improving. The patient has the following signs/symptoms of pneumonia: cough and shortness of breath. The patient does not have a current oxygen requirement and the patient does not have a home oxygen requirement. I have reviewed the pertinent imaging. The following cultures have been collected: Blood cultures The culture results are listed below.   - Current antimicrobial regimen consists of the antibiotics listed below. Will monitor patient closely and continue current treatment plan unchanged.    Antibiotics (From admission, onward)      Start     Stop Route Frequency Ordered    06/16/25 0200  cefTRIAXone injection 2 g         -- IV Every 24 hours (non-standard times) 06/15/25 0612     Microbiology Results (last 7 days)       Procedure Component Value Units Date/Time    Blood culture x two cultures. Draw prior to antibiotics. [[id number]]  (Normal) Collected: 06/14/25 2126    Order Status: Completed Specimen: Blood from Peripheral, Forearm, Right Updated: 06/16/25 1201     Blood Culture No Growth After 24 Hours    Blood culture x two cultures. Draw prior to antibiotics. [[id number]]  (Normal) Collected: 06/14/25 2127    Order Status: Completed Specimen: Blood from Peripheral, Wrist, Left Updated: 06/16/25 1201     Blood Culture No Growth After 24 Hours    Influenza A & B by Molecular [[id number]]  (Normal) Collected: 06/14/25 2115    Order Status: Completed Specimen: Nasopharyngeal Swab Updated: 06/14/25 2212     INFLUENZA A MOLECULAR Negative     INFLUENZA B MOLECULAR  Negative        - Pattern of pneumonia is consistent with aspiration  - He has a fairly sizeable hiatal hernia making this more likely  - Aggressively treat reflux  - Agree with current antibiotics  - It can take 6-8 weeks for CT to resolve  - He  has not had appropriate outpatient treatment to cover for aspiration pneumonia as of yet  - Would give current regimen a full course   - If he improves clinically with resolution of fever and oxygen requirement then repeat CT in 6-8 weeks would be appropriate  - Should he fail to improve over the next 3-5 days will consider bronchoscopy although at this point after several days of antimicrobial therapy the yield is questionable  - Discussed with patient and wife. RN also present. All voiced understanding of plan    - 6/16: Doing well today, now on RA. No complaints. Continue abx. Plan for pulm f/u on d/c with repeat imaging 6-8 weeks     Russ Bradford PA-C  Pulmonology  [facility] - Med Surg

## 2025-06-16 NOTE — TELEPHONE ENCOUNTER
Spoke tow errol meek states pt is still in the hosp. Scheduled patient follow up and wife stated she would call me if they can not make it due to being in the hospital still.

## 2025-06-16 NOTE — HPI
64 yo male with colon CA, DVT, hairy cell leukemia, hip fracture, failure to thrive. Recently admitted and diagnosed with cryptococcal infection. Repeat testing is negative. Was discharged yesterday from this facility and returned due to recurrent fever > 102. Mild cough at night and now oxygen requirement at 3L. Was discharged on PO augmentin. CT chest shows bilateral posterior lower lobe airspace opacities unchanged from 8 days ago. Denies sputum or cough. Aside from fever and being hungry feels okay. No chest pain or dyspnea. Wife at bedside

## 2025-06-16 NOTE — PLAN OF CARE
Discussed poc with pt, pt verbalized understanding    Purposeful rounding every 2hours    VS wnl  Cardiac monitoring in use, pt is NSR, tele monitor #[id number]  Fall precautions in place, remains injury free  Pt denies c/o pain or nausea.    IVFs  Accurate I&Os  Bed locked at lowest position  Call light within reach    Chart check complete  Will cont with POC

## 2025-06-16 NOTE — ASSESSMENT & PLAN NOTE
- Patient has a diagnosis of pneumonia. The cause of the pneumonia is suspected to be bacterial in etiology but organism is not known. The pneumonia is improving. The patient has the following signs/symptoms of pneumonia: cough and shortness of breath. The patient does not have a current oxygen requirement and the patient does not have a home oxygen requirement. I have reviewed the pertinent imaging. The following cultures have been collected: Blood cultures The culture results are listed below.   - Current antimicrobial regimen consists of the antibiotics listed below. Will monitor patient closely and continue current treatment plan unchanged.    Antibiotics (From admission, onward)      Start     Stop Route Frequency Ordered    06/16/25 0200  cefTRIAXone injection 2 g         -- IV Every 24 hours (non-standard times) 06/15/25 0612     Microbiology Results (last 7 days)       Procedure Component Value Units Date/Time    Blood culture x two cultures. Draw prior to antibiotics. [[id number]]  (Normal) Collected: 06/14/25 2126    Order Status: Completed Specimen: Blood from Peripheral, Forearm, Right Updated: 06/16/25 1201     Blood Culture No Growth After 24 Hours    Blood culture x two cultures. Draw prior to antibiotics. [[id number]]  (Normal) Collected: 06/14/25 2127    Order Status: Completed Specimen: Blood from Peripheral, Wrist, Left Updated: 06/16/25 1201     Blood Culture No Growth After 24 Hours    Influenza A & B by Molecular [[id number]]  (Normal) Collected: 06/14/25 2115    Order Status: Completed Specimen: Nasopharyngeal Swab Updated: 06/14/25 2212     INFLUENZA A MOLECULAR Negative     INFLUENZA B MOLECULAR  Negative        - Pattern of pneumonia is consistent with aspiration  - He has a fairly sizeable hiatal hernia making this more likely  - Aggressively treat reflux  - Agree with current antibiotics  - It can take 6-8 weeks for CT to resolve  - He has not had appropriate outpatient treatment to cover  for aspiration pneumonia as of yet  - Would give current regimen a full course   - If he improves clinically with resolution of fever and oxygen requirement then repeat CT in 6-8 weeks would be appropriate  - Should he fail to improve over the next 3-5 days will consider bronchoscopy although at this point after several days of antimicrobial therapy the yield is questionable  - Discussed with patient and wife. RN also present. All voiced understanding of plan    - 6/16: Doing well today, now on RA. No complaints. Continue abx. Plan for pulm f/u on d/c with repeat imaging 6-8 weeks

## 2025-06-16 NOTE — ASSESSMENT & PLAN NOTE
Have discussed case with Oncology.  Recommend outpatient discussion regarding potential chemotherapy.  This would be considered more so if treatment of aspiration pneumonia does not finally lead to resolution of fever.

## 2025-06-17 ENCOUNTER — PATIENT OUTREACH (OUTPATIENT)
Dept: ADMINISTRATIVE | Facility: CLINIC | Age: 65
End: 2025-06-17
Payer: MEDICARE

## 2025-06-17 VITALS
SYSTOLIC BLOOD PRESSURE: 120 MMHG | HEIGHT: 71 IN | HEART RATE: 76 BPM | DIASTOLIC BLOOD PRESSURE: 66 MMHG | WEIGHT: 179 LBS | RESPIRATION RATE: 16 BRPM | BODY MASS INDEX: 25.06 KG/M2 | OXYGEN SATURATION: 94 % | TEMPERATURE: 99 F

## 2025-06-17 PROCEDURE — 99900035 HC TECH TIME PER 15 MIN (STAT): Mod: HCNC

## 2025-06-17 PROCEDURE — 63600175 PHARM REV CODE 636 W HCPCS: Mod: HCNC | Performed by: NURSE PRACTITIONER

## 2025-06-17 PROCEDURE — 94799 UNLISTED PULMONARY SVC/PX: CPT | Mod: HCNC

## 2025-06-17 PROCEDURE — 25000003 PHARM REV CODE 250: Mod: HCNC | Performed by: NURSE PRACTITIONER

## 2025-06-17 RX ORDER — LEVOFLOXACIN 750 MG/1
750 TABLET, FILM COATED ORAL DAILY
Qty: 14 TABLET | Refills: 0 | Status: SHIPPED | OUTPATIENT
Start: 2025-06-17 | End: 2025-07-01

## 2025-06-17 RX ORDER — CEFDINIR 300 MG/1
300 CAPSULE ORAL 2 TIMES DAILY
Qty: 28 CAPSULE | Refills: 0 | Status: SHIPPED | OUTPATIENT
Start: 2025-06-17 | End: 2025-07-01

## 2025-06-17 RX ADMIN — TAMSULOSIN HYDROCHLORIDE 0.4 MG: 0.4 CAPSULE ORAL at 09:06

## 2025-06-17 RX ADMIN — PANTOPRAZOLE SODIUM 40 MG: 40 TABLET, DELAYED RELEASE ORAL at 09:06

## 2025-06-17 RX ADMIN — SODIUM CHLORIDE, POTASSIUM CHLORIDE, SODIUM LACTATE AND CALCIUM CHLORIDE: 600; 310; 30; 20 INJECTION, SOLUTION INTRAVENOUS at 05:06

## 2025-06-17 RX ADMIN — CEFTRIAXONE SODIUM 2 G: 2 INJECTION, POWDER, FOR SOLUTION INTRAMUSCULAR; INTRAVENOUS at 01:06

## 2025-06-17 NOTE — PLAN OF CARE
Patient discharged Home with family. AVS given and explained to patient. Patient verbalizes understanding. Tele monitor removed and given to US. IV removed with catheter intact. Prescriptions sent to [facility] pharmacy. Follow up appointments arranged.

## 2025-06-17 NOTE — ASSESSMENT & PLAN NOTE
- Patient has a diagnosis of pneumonia. The cause of the pneumonia is suspected to be bacterial in etiology but organism is not known. The pneumonia is improving. The patient has the following signs/symptoms of pneumonia: cough and shortness of breath. The patient does not have a current oxygen requirement and the patient does not have a home oxygen requirement. I have reviewed the pertinent imaging. The following cultures have been collected: Blood cultures The culture results are listed below.   - Current antimicrobial regimen consists of the antibiotics listed below. Will monitor patient closely and continue current treatment plan unchanged.    Antibiotics (From admission, onward)      Start     Stop Route Frequency Ordered    06/16/25 0200  cefTRIAXone injection 2 g         -- IV Every 24 hours (non-standard times) 06/15/25 0612     Microbiology Results (last 7 days)       Procedure Component Value Units Date/Time    Blood culture x two cultures. Draw prior to antibiotics. [[id number]]  (Normal) Collected: 06/14/25 2126    Order Status: Completed Specimen: Blood from Peripheral, Forearm, Right Updated: 06/17/25 1202     Blood Culture No Growth After 48 Hours    Blood culture x two cultures. Draw prior to antibiotics. [[id number]]  (Normal) Collected: 06/14/25 2127    Order Status: Completed Specimen: Blood from Peripheral, Wrist, Left Updated: 06/17/25 1202     Blood Culture No Growth After 48 Hours    Influenza A & B by Molecular [[id number]]  (Normal) Collected: 06/14/25 2115    Order Status: Completed Specimen: Nasopharyngeal Swab Updated: 06/14/25 2212     INFLUENZA A MOLECULAR Negative     INFLUENZA B MOLECULAR  Negative        - Pattern of pneumonia is consistent with aspiration  - He has a fairly sizeable hiatal hernia making this more likely  - Aggressively treat reflux  - Agree with current antibiotics  - It can take 6-8 weeks for CT to resolve  - He has not had appropriate outpatient treatment to cover  for aspiration pneumonia as of yet  - Would give current regimen a full course   - If he improves clinically with resolution of fever and oxygen requirement then repeat CT in 6-8 weeks would be appropriate  - Should he fail to improve over the next 3-5 days will consider bronchoscopy although at this point after several days of antimicrobial therapy the yield is questionable  - Discussed with patient and wife. RN also present. All voiced understanding of plan  - 6/16: Doing well today, now on RA. No complaints. Continue abx. Plan for pulm f/u on d/c with repeat imaging 6-8 weeks  - 6/17: Will complete oral abx (Cefdinir/Levaquin) for 14 days total. Respiratory status has been stable on RA. No complaints today. Will need repeat CT 6-8 weeks.

## 2025-06-17 NOTE — ASSESSMENT & PLAN NOTE
- Patient has a diagnosis of pneumonia. The cause of the pneumonia is suspected to be bacterial in etiology but organism is not known. The pneumonia is improving. The patient has the following signs/symptoms of pneumonia: cough and shortness of breath. The patient does not have a current oxygen requirement and the patient does not have a home oxygen requirement. I have reviewed the pertinent imaging. The following cultures have been collected: Blood cultures The culture results are listed below.   - Current antimicrobial regimen consists of the antibiotics listed below. Will monitor patient closely and continue current treatment plan unchanged.    Antibiotics (From admission, onward)      Start     Stop Route Frequency Ordered    06/16/25 0200  cefTRIAXone injection 2 g         -- IV Every 24 hours (non-standard times) 06/15/25 0612     Microbiology Results (last 7 days)       Procedure Component Value Units Date/Time    Blood culture x two cultures. Draw prior to antibiotics. [[id number]]  (Normal) Collected: 06/14/25 2126    Order Status: Completed Specimen: Blood from Peripheral, Forearm, Right Updated: 06/17/25 1202     Blood Culture No Growth After 48 Hours    Blood culture x two cultures. Draw prior to antibiotics. [[id number]]  (Normal) Collected: 06/14/25 2127    Order Status: Completed Specimen: Blood from Peripheral, Wrist, Left Updated: 06/17/25 1202     Blood Culture No Growth After 48 Hours    Influenza A & B by Molecular [[id number]]  (Normal) Collected: 06/14/25 2115    Order Status: Completed Specimen: Nasopharyngeal Swab Updated: 06/14/25 2212     INFLUENZA A MOLECULAR Negative     INFLUENZA B MOLECULAR  Negative        - Pattern of pneumonia is consistent with aspiration  - He has a fairly sizeable hiatal hernia making this more likely  - Aggressively treat reflux  - Agree with current antibiotics  - It can take 6-8 weeks for CT to resolve  - He has not had appropriate outpatient treatment to cover  for aspiration pneumonia as of yet  - Would give current regimen a full course   - If he improves clinically with resolution of fever and oxygen requirement then repeat CT in 6-8 weeks would be appropriate  - Should he fail to improve over the next 3-5 days will consider bronchoscopy although at this point after several days of antimicrobial therapy the yield is questionable  - Discussed with patient and wife. RN also present. All voiced understanding of plan  - 6/16: Doing well today, now on RA. No complaints. Continue abx. Plan for pulm f/u on d/c with repeat imaging 6-8 weeks  - 6/17: Will complete oral abx (Cefdinir/Levaquin) for 14 days total. Respiratory status has been stable on RA. No complaints today. Will need repeat CT 6-8 weeks.

## 2025-06-17 NOTE — ASSESSMENT & PLAN NOTE
Patient was discharged 6/13/25 on Augmentin and returned to ED with fever of 101. Prior to discharge patient was afebrile >48 hours. Monitored 12 hours on oral antibiotics. STAT RAD CTA Chest demonstrated bilateral lower lobe consolidations.  --Discussed with Dr. Villanueva who recommends Pulmonology consult for possible bronch .   --2 grams Rocephin  --He was evaluated by Speech Therapy who performed MBSS and it was determined patient was low risk of aspiration with regards to dysphagia; however, aspiration of gastric contents is unknown  --Consult ID.     6/17/2025  Continue Rocephin   ID on case   Plan to discuss with Hematology  As to whether hairy cell leukemia is source of fever

## 2025-06-17 NOTE — SUBJECTIVE & OBJECTIVE
64 yo male with colon CA, DVT, hairy cell leukemia, hip fracture, failure to thrive. Recently admitted and diagnosed with cryptococcal infection. Repeat testing is negative. Was discharged yesterday from this facility and returned due to recurrent fever > 102. Mild cough at night and now oxygen requirement at 3L. Was discharged on PO augmentin. CT chest shows bilateral posterior lower lobe airspace opacities unchanged from 8 days ago. Denies sputum or cough. Aside from fever and being hungry feels okay. No chest pain or dyspnea. Wife at bedside     06/16/2025: Doing well, no complaints. On room air.     06/17/2025: Stable  Objective:     Vital Signs (Most Recent):  Temp: 98.7 °F (37.1 °C) (06/17/25 0848)  Pulse: 76 (06/17/25 1109)  Resp: 16 (06/17/25 0848)  BP: 120/66 (06/17/25 0848)  SpO2: (!) 94 % (06/17/25 0848) Vital Signs (24h Range):  Temp:  [97.9 °F (36.6 °C)-99.5 °F (37.5 °C)] 98.7 °F (37.1 °C)  Pulse:  [] 76  Resp:  [16-18] 16  SpO2:  [92 %-96 %] 94 %  BP: (112-156)/(60-78) 120/66     Weight: 81.2 kg (179 lb)  Body mass index is 24.97 kg/m².  Intake/Output Summary (Last 24 hours) at 6/17/2025 1314  Last data filed at 6/17/2025 0720  Gross per 24 hour   Intake 2992.52 ml   Output --   Net 2992.52 ml     Physical Exam  HENT:      Head: Normocephalic.      Mouth/Throat:      Mouth: Mucous membranes are moist.   Eyes:      Conjunctiva/sclera: Conjunctivae normal.      Pupils: Pupils are equal, round, and reactive to light.   Cardiovascular:      Pulses: Normal pulses.      Heart sounds: No murmur heard.  Pulmonary:      Effort: Pulmonary effort is normal. No respiratory distress.      Breath sounds: No wheezing.   Musculoskeletal:         General: No swelling.   Skin:     General: Skin is warm.      Capillary Refill: Capillary refill takes less than 2 seconds.      Coloration: Skin is not jaundiced.      Findings: No bruising.   Neurological:      Mental Status: He is alert and oriented to person, place,  and time.     Review of Systems   Constitutional:  Negative for chills, fatigue and fever.   Respiratory:  Negative for cough, chest tightness and shortness of breath.    Cardiovascular:  Negative for chest pain.   Gastrointestinal:  Negative for abdominal pain, nausea and vomiting.   Genitourinary:  Negative for dysuria.   Neurological:  Negative for dizziness and headaches.   Psychiatric/Behavioral:  Negative for agitation and confusion.      Significant Labs:    Chemistries:  Recent Labs   Lab 06/16/25  0623   *   K 4.1      CO2 21*   BUN 9   CREATININE 0.8   CALCIUM 8.1*   ALBUMIN 1.9*   PROT 6.2   BILITOT 0.3   ALKPHOS 198*   ALT 23   AST 28   MG 1.9

## 2025-06-17 NOTE — PROGRESS NOTES
No outreaches to be attempted for this encounter only as the patient is non-applicable for a TCC post-discharge follow up call due to being readmitted to the hospital, bed #: A558 A.

## 2025-06-17 NOTE — DISCHARGE SUMMARY
[facility] Medicine  Discharge Summary      Patient Name: Armando Ingram Jr.  MRN: [id number]  [facility]: [id number]  Patient Class: IP- Inpatient  Admission Date: 6/14/2025  Hospital Length of Stay: 2 days  Discharge Date and Time: 6/17/2025  1:20 PM  Attending Physician: No att. providers found   Discharging Provider: Carrillo Salazar MD  Primary Care Provider: Brian Soares MD    Primary Care Team: Networked reference to record PCT     HPI:   Armando Ingram is a 65 year old male with medical history of colon cancer, DVT, Hairy Cell Leukemia, and nephrolithiasis who was recently hospitalized extensively (including re-admit) for management of cryptococcal infection. Infection was noted on Karius, but serum and CSF were negative. He was treated with Amphotericin, and Fluconazole. Repeat Karius on 6/11/25 was negative for Cryptococcosis. He was discharged yesterday, 6/13/25, with assistance from ID who recommended Augmentin over ten days.     This patient returned to ED early this AM with complaints of  fever (TMax 101) despite Tylenol with associated chills and headache.      Patient has been afebrile since arrival. He requires 3L NC. Preliminary CT Chest reportedly demonstrated bilateral lower lobe consolidations are suspicious for pneumonia, trace pericardial effusion, and small hiatal. Case discussed with Infectious Disease who recommends starting Rocephin and consulting pulmonology for possible bronchoscopy for non-resolving pneumonia.     * No surgery found *      Hospital Course:   Patient was admitted for fever.  CT scan showed bilateral lower lobe pneumonia.  He was started on empiric Rocephin.  ID consult on case.  Patient was afebrile for 48 hours.  Recommendations were made to continue on p.o. cefdinir and Levaquin for 14 days.  Patient was discharged home.         Goals of Care Treatment Preferences:  Code Status: Full Code      SDOH Screening:  The patient declined to be screened for utility  difficulties, food insecurity, transport difficulties, housing insecurity, and interpersonal safety, so no concerns could be identified this admission.     Consults:   Consults (From admission, onward)          Status Ordering Provider     Inpatient consult to Pulmonology  Once        Provider:  Doug Case MD    Completed STUART MALDONADO     Inpatient consult to Infectious Diseases  Once        Provider:  Armando Villanueva MD, GALINA Montaño JR            Assessment & Plan  Fever  Patient was discharged 6/13/25 on Augmentin and returned to ED with fever of 101. Prior to discharge patient was afebrile >48 hours. Monitored 12 hours on oral antibiotics. STAT RAD CTA Chest demonstrated bilateral lower lobe consolidations.  --Discussed with Dr. Villanueva who recommends Pulmonology consult for possible bronch .   --2 grams Rocephin  --He was evaluated by Speech Therapy who performed MBSS and it was determined patient was low risk of aspiration with regards to dysphagia; however, aspiration of gastric contents is unknown  --Consult ID.     6/17/2025  Continue Rocephin   ID on case   Plan to discuss with Hematology  As to whether hairy cell leukemia is source of fever  Hairy cell leukemia  Outpatient follow up with Dr. Dow    Acute deep vein thrombosis (DVT) of right lower extremity  Resume Xarelto    Hypoxemia  Currently  requires 3L nasal cannula  --see plan for pneumonia.    Pneumonia  STAT RAD CTA Chest demonstrated bilateral lower lobe consolidations.  --Discussed with Dr. Villanueva who recommends Pulmonology consult for possible bronch .   --2 grams Rocephin  --He was evaluated by Speech Therapy who performed MBSS and it was determined patient was low risk of aspiration with regards to dysphagia; however, aspiration of gastric contents is unknown  --Consult ID.   --supplemental oxygen  Final Active Diagnoses:    Diagnosis Date Noted POA    PRINCIPAL PROBLEM:  Fever [R50.9] 05/24/2025 Yes     Hypoxemia [R09.02] 06/15/2025 Yes    Pneumonia [J18.9] 06/15/2025 Yes    Acute deep vein thrombosis (DVT) of right lower extremity [I82.401] 10/15/2024 Yes    Hairy cell leukemia [C91.40] 06/06/2024 Yes      Problems Resolved During this Admission:       Discharged Condition: good    Disposition: Home or Self Care    Follow Up:   Follow-up Information       Brian Soares MD Follow up in 1 week(s).    Specialty: Family Medicine  Contact information:  [address] [address] [id number] [phone]                           Patient Instructions:   No discharge procedures on file.    Significant Diagnostic Studies: N/A    Pending Diagnostic Studies:       None           Medications:  Reconciled Home Medications:      Medication List        START taking these medications      cefdinir 300 MG capsule  Commonly known as: OMNICEF  Take 1 capsule (300 mg total) by mouth 2 (two) times daily. for 14 days     levoFLOXacin 750 MG tablet  Commonly known as: LEVAQUIN  Take 1 tablet (750 mg total) by mouth once daily. for 14 days            CONTINUE taking these medications      multivitamin per tablet  Commonly known as: THERAGRAN  Take 1 tablet by mouth once daily.     omeprazole 20 MG capsule  Commonly known as: PRILOSEC  Take 20 mg by mouth once daily.     tamsulosin 0.4 mg Cap  Commonly known as: FLOMAX  Take 1 capsule (0.4 mg total) by mouth once daily.     XARELTO 20 mg Tab  Generic drug: rivaroxaban  Take 1 tablet (20 mg total) by mouth once daily.            STOP taking these medications      amoxicillin-clavulanate 875-125mg 875-125 mg per tablet  Commonly known as: AUGMENTIN              Indwelling Lines/Drains at time of discharge:   Lines/Drains/Airways       None                   Time spent on the discharge of patient: 37 minutes         Carrillo Salazar MD  Department of Hospital Medicine  [facility]

## 2025-06-17 NOTE — PROGRESS NOTES
[facility]  Pulmonology  Progress Note    Patient Name: Armando Ingram Jr.  MRN: [id number]  Admission Date: 6/14/2025  Hospital Length of Stay: 2 days  Code Status: Full Code  Attending Provider: Carrillo Salazar MD  Primary Care Provider: Brian Soares MD   Principal Problem: Fever    Subjective:     66 yo male with colon CA, DVT, hairy cell leukemia, hip fracture, failure to thrive. Recently admitted and diagnosed with cryptococcal infection. Repeat testing is negative. Was discharged yesterday from this facility and returned due to recurrent fever > 102. Mild cough at night and now oxygen requirement at 3L. Was discharged on PO augmentin. CT chest shows bilateral posterior lower lobe airspace opacities unchanged from 8 days ago. Denies sputum or cough. Aside from fever and being hungry feels okay. No chest pain or dyspnea. Wife at bedside     06/16/2025: Doing well, no complaints. On room air.     06/17/2025: Stable  Objective:     Vital Signs (Most Recent):  Temp: 98.7 °F (37.1 °C) (06/17/25 0848)  Pulse: 76 (06/17/25 1109)  Resp: 16 (06/17/25 0848)  BP: 120/66 (06/17/25 0848)  SpO2: (!) 94 % (06/17/25 0848) Vital Signs (24h Range):  Temp:  [97.9 °F (36.6 °C)-99.5 °F (37.5 °C)] 98.7 °F (37.1 °C)  Pulse:  [] 76  Resp:  [16-18] 16  SpO2:  [92 %-96 %] 94 %  BP: (112-156)/(60-78) 120/66     Weight: 81.2 kg (179 lb)  Body mass index is 24.97 kg/m².  Intake/Output Summary (Last 24 hours) at 6/17/2025 1314  Last data filed at 6/17/2025 0720  Gross per 24 hour   Intake 2992.52 ml   Output --   Net 2992.52 ml     Physical Exam  HENT:      Head: Normocephalic.      Mouth/Throat:      Mouth: Mucous membranes are moist.   Eyes:      Conjunctiva/sclera: Conjunctivae normal.      Pupils: Pupils are equal, round, and reactive to light.   Cardiovascular:      Pulses: Normal pulses.      Heart sounds: No murmur heard.  Pulmonary:      Effort: Pulmonary effort is normal. No respiratory distress.      Breath sounds: No  wheezing.   Musculoskeletal:         General: No swelling.   Skin:     General: Skin is warm.      Capillary Refill: Capillary refill takes less than 2 seconds.      Coloration: Skin is not jaundiced.      Findings: No bruising.   Neurological:      Mental Status: He is alert and oriented to person, place, and time.     Review of Systems   Constitutional:  Negative for chills, fatigue and fever.   Respiratory:  Negative for cough, chest tightness and shortness of breath.    Cardiovascular:  Negative for chest pain.   Gastrointestinal:  Negative for abdominal pain, nausea and vomiting.   Genitourinary:  Negative for dysuria.   Neurological:  Negative for dizziness and headaches.   Psychiatric/Behavioral:  Negative for agitation and confusion.      Significant Labs:    Chemistries:  Recent Labs   Lab 06/16/25  0623   *   K 4.1      CO2 21*   BUN 9   CREATININE 0.8   CALCIUM 8.1*   ALBUMIN 1.9*   PROT 6.2   BILITOT 0.3   ALKPHOS 198*   ALT 23   AST 28   MG 1.9     Assessment & Plan  Pneumonia  Hypoxemia  - Patient has a diagnosis of pneumonia. The cause of the pneumonia is suspected to be bacterial in etiology but organism is not known. The pneumonia is improving. The patient has the following signs/symptoms of pneumonia: cough and shortness of breath. The patient does not have a current oxygen requirement and the patient does not have a home oxygen requirement. I have reviewed the pertinent imaging. The following cultures have been collected: Blood cultures The culture results are listed below.   - Current antimicrobial regimen consists of the antibiotics listed below. Will monitor patient closely and continue current treatment plan unchanged.    Antibiotics (From admission, onward)      Start     Stop Route Frequency Ordered    06/16/25 0200  cefTRIAXone injection 2 g         -- IV Every 24 hours (non-standard times) 06/15/25 0612     Microbiology Results (last 7 days)       Procedure Component Value  Units Date/Time    Blood culture x two cultures. Draw prior to antibiotics. [[id number]]  (Normal) Collected: 06/14/25 2126    Order Status: Completed Specimen: Blood from Peripheral, Forearm, Right Updated: 06/17/25 1202     Blood Culture No Growth After 48 Hours    Blood culture x two cultures. Draw prior to antibiotics. [[id number]]  (Normal) Collected: 06/14/25 2127    Order Status: Completed Specimen: Blood from Peripheral, Wrist, Left Updated: 06/17/25 1202     Blood Culture No Growth After 48 Hours    Influenza A & B by Molecular [[id number]]  (Normal) Collected: 06/14/25 2115    Order Status: Completed Specimen: Nasopharyngeal Swab Updated: 06/14/25 2212     INFLUENZA A MOLECULAR Negative     INFLUENZA B MOLECULAR  Negative        - Pattern of pneumonia is consistent with aspiration  - He has a fairly sizeable hiatal hernia making this more likely  - Aggressively treat reflux  - Agree with current antibiotics  - It can take 6-8 weeks for CT to resolve  - He has not had appropriate outpatient treatment to cover for aspiration pneumonia as of yet  - Would give current regimen a full course   - If he improves clinically with resolution of fever and oxygen requirement then repeat CT in 6-8 weeks would be appropriate  - Should he fail to improve over the next 3-5 days will consider bronchoscopy although at this point after several days of antimicrobial therapy the yield is questionable  - Discussed with patient and wife. RN also present. All voiced understanding of plan  - 6/16: Doing well today, now on RA. No complaints. Continue abx. Plan for pulm f/u on d/c with repeat imaging 6-8 weeks  - 6/17: Will complete oral abx (Cefdinir/Levaquin) for 14 days total. Respiratory status has been stable on RA. No complaints today. Will need repeat CT 6-8 weeks.      Pulmonary service will sign off but will remain available. Please call with any further questions or concerns.      Russ Bradford PA-C  Pulmonology  [facility]  [facility]

## 2025-06-19 ENCOUNTER — OFFICE VISIT (OUTPATIENT)
Dept: FAMILY MEDICINE | Facility: CLINIC | Age: 65
End: 2025-06-19
Payer: MEDICARE

## 2025-06-19 VITALS
HEART RATE: 101 BPM | HEIGHT: 71 IN | WEIGHT: 179.44 LBS | BODY MASS INDEX: 25.12 KG/M2 | DIASTOLIC BLOOD PRESSURE: 74 MMHG | SYSTOLIC BLOOD PRESSURE: 108 MMHG | OXYGEN SATURATION: 98 %

## 2025-06-19 DIAGNOSIS — J69.0 ASPIRATION PNEUMONIA OF BOTH LOWER LOBES, UNSPECIFIED ASPIRATION PNEUMONIA TYPE: ICD-10-CM

## 2025-06-19 DIAGNOSIS — Z09 HOSPITAL DISCHARGE FOLLOW-UP: Primary | ICD-10-CM

## 2025-06-19 DIAGNOSIS — C91.41 HAIRY CELL LEUKEMIA, IN REMISSION: ICD-10-CM

## 2025-06-19 DIAGNOSIS — Z86.718 HISTORY OF DVT (DEEP VEIN THROMBOSIS): Chronic | ICD-10-CM

## 2025-06-19 PROCEDURE — 99999 PR PBB SHADOW E&M-EST. PATIENT-LVL III: CPT | Mod: PBBFAC,HCNC,, | Performed by: FAMILY MEDICINE
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
## 2025-06-20 ENCOUNTER — OFFICE VISIT (OUTPATIENT)
Dept: INFECTIOUS DISEASES | Facility: CLINIC | Age: 65
End: 2025-06-20
Payer: MEDICARE

## 2025-06-20 ENCOUNTER — PATIENT MESSAGE (OUTPATIENT)
Dept: INFECTIOUS DISEASES | Facility: CLINIC | Age: 65
End: 2025-06-20

## 2025-06-20 ENCOUNTER — LAB VISIT (OUTPATIENT)
Dept: LAB | Facility: HOSPITAL | Age: 65
End: 2025-06-20
Attending: INTERNAL MEDICINE
Payer: MEDICARE

## 2025-06-20 ENCOUNTER — RESULTS FOLLOW-UP (OUTPATIENT)
Dept: HEMATOLOGY/ONCOLOGY | Facility: CLINIC | Age: 65
End: 2025-06-20

## 2025-06-20 VITALS
WEIGHT: 179.44 LBS | HEART RATE: 83 BPM | HEIGHT: 71 IN | SYSTOLIC BLOOD PRESSURE: 123 MMHG | DIASTOLIC BLOOD PRESSURE: 78 MMHG | BODY MASS INDEX: 25.12 KG/M2

## 2025-06-20 DIAGNOSIS — R50.9 FEVER, UNSPECIFIED FEVER CAUSE: Primary | ICD-10-CM

## 2025-06-20 DIAGNOSIS — C91.41 HAIRY CELL LEUKEMIA, IN REMISSION: ICD-10-CM

## 2025-06-20 DIAGNOSIS — Z12.5 SCREENING FOR PROSTATE CANCER: ICD-10-CM

## 2025-06-20 DIAGNOSIS — I82.501 CHRONIC DEEP VEIN THROMBOSIS (DVT) OF RIGHT LOWER EXTREMITY, UNSPECIFIED VEIN: ICD-10-CM

## 2025-06-20 DIAGNOSIS — J18.9 PNEUMONIA OF BOTH LOWER LOBES DUE TO INFECTIOUS ORGANISM: ICD-10-CM

## 2025-06-20 LAB
ABSOLUTE EOSINOPHIL (OHS): 0.05 K/UL
ABSOLUTE MONOCYTE (OHS): 0.07 K/UL (ref 0.3–1)
ABSOLUTE NEUTROPHIL COUNT (OHS): 1.87 K/UL (ref 1.8–7.7)
ALBUMIN SERPL BCP-MCNC: 2.5 G/DL (ref 3.5–5.2)
ALP SERPL-CCNC: 181 UNIT/L (ref 40–150)
ALT SERPL W/O P-5'-P-CCNC: 25 UNIT/L (ref 10–44)
ANION GAP (OHS): 9 MMOL/L (ref 8–16)
AST SERPL-CCNC: 24 UNIT/L (ref 11–45)
BACTERIA BLD CULT: NORMAL
BACTERIA BLD CULT: NORMAL
BASOPHILS # BLD AUTO: 0.01 K/UL
BASOPHILS NFR BLD AUTO: 0.3 %
BILIRUB SERPL-MCNC: 0.3 MG/DL (ref 0.1–1)
BUN SERPL-MCNC: 15 MG/DL (ref 8–23)
CALCIUM SERPL-MCNC: 8.2 MG/DL (ref 8.7–10.5)
CHLORIDE SERPL-SCNC: 107 MMOL/L (ref 95–110)
CO2 SERPL-SCNC: 23 MMOL/L (ref 23–29)
CREAT SERPL-MCNC: 0.9 MG/DL (ref 0.5–1.4)
CRP SERPL-MCNC: 24.7 MG/L
ERYTHROCYTE [DISTWIDTH] IN BLOOD BY AUTOMATED COUNT: 15.1 % (ref 11.5–14.5)
FERRITIN SERPL-MCNC: 846 NG/ML (ref 20–300)
GFR SERPLBLD CREATININE-BSD FMLA CKD-EPI: >60 ML/MIN/1.73/M2
GLUCOSE SERPL-MCNC: 106 MG/DL (ref 70–110)
HCT VFR BLD AUTO: 36.2 % (ref 40–54)
HGB BLD-MCNC: 11.8 GM/DL (ref 14–18)
IMM GRANULOCYTES # BLD AUTO: 0.05 K/UL (ref 0–0.04)
IMM GRANULOCYTES NFR BLD AUTO: 1.4 % (ref 0–0.5)
IRON SATN MFR SERPL: 24 % (ref 20–50)
IRON SERPL-MCNC: 59 UG/DL (ref 45–160)
LDH SERPL-CCNC: 159 U/L (ref 110–260)
LYMPHOCYTES # BLD AUTO: 1.49 K/UL (ref 1–4.8)
MCH RBC QN AUTO: 30.4 PG (ref 27–31)
MCHC RBC AUTO-ENTMCNC: 32.6 G/DL (ref 32–36)
MCV RBC AUTO: 93 FL (ref 82–98)
NUCLEATED RBC (/100WBC) (OHS): 1 /100 WBC
PLATELET # BLD AUTO: 168 K/UL (ref 150–450)
PLATELET BLD QL SMEAR: NORMAL
PMV BLD AUTO: 9.1 FL (ref 9.2–12.9)
POTASSIUM SERPL-SCNC: 4.2 MMOL/L (ref 3.5–5.1)
PROT SERPL-MCNC: 6.7 GM/DL (ref 6–8.4)
PSA SERPL-MCNC: 2.07 NG/ML
RBC # BLD AUTO: 3.88 M/UL (ref 4.6–6.2)
RELATIVE EOSINOPHIL (OHS): 1.4 %
RELATIVE LYMPHOCYTE (OHS): 42.1 % (ref 18–48)
RELATIVE MONOCYTE (OHS): 2 % (ref 4–15)
RELATIVE NEUTROPHIL (OHS): 52.8 % (ref 38–73)
SODIUM SERPL-SCNC: 139 MMOL/L (ref 136–145)
TIBC SERPL-MCNC: 243 UG/DL (ref 250–450)
TRANSFERRIN SERPL-MCNC: 164 MG/DL (ref 200–375)
WBC # BLD AUTO: 3.54 K/UL (ref 3.9–12.7)

## 2025-06-20 PROCEDURE — 86140 C-REACTIVE PROTEIN: CPT | Mod: HCNC

## 2025-06-20 PROCEDURE — 82728 ASSAY OF FERRITIN: CPT | Mod: HCNC

## 2025-06-20 PROCEDURE — 99999 PR PBB SHADOW E&M-EST. PATIENT-LVL III: CPT | Mod: PBBFAC,HCNC,, | Performed by: STUDENT IN AN ORGANIZED HEALTH CARE EDUCATION/TRAINING PROGRAM

## 2025-06-20 PROCEDURE — 84153 ASSAY OF PSA TOTAL: CPT | Mod: HCNC

## 2025-06-20 PROCEDURE — 83615 LACTATE (LD) (LDH) ENZYME: CPT | Mod: HCNC

## 2025-06-20 PROCEDURE — 36415 COLL VENOUS BLD VENIPUNCTURE: CPT | Mod: HCNC

## 2025-06-20 PROCEDURE — 83540 ASSAY OF IRON: CPT | Mod: HCNC

## 2025-06-20 PROCEDURE — 80053 COMPREHEN METABOLIC PANEL: CPT | Mod: HCNC

## 2025-06-20 PROCEDURE — 85025 COMPLETE CBC W/AUTO DIFF WBC: CPT | Mod: HCNC
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
## 2025-06-22 ENCOUNTER — RESULTS FOLLOW-UP (OUTPATIENT)
Dept: FAMILY MEDICINE | Facility: CLINIC | Age: 65
End: 2025-06-22

## 2025-06-23 ENCOUNTER — TELEPHONE (OUTPATIENT)
Dept: FAMILY MEDICINE | Facility: CLINIC | Age: 65
End: 2025-06-23
Payer: MEDICARE

## 2025-06-23 DIAGNOSIS — Z12.5 SCREENING FOR PROSTATE CANCER: Primary | ICD-10-CM

## 2025-06-23 NOTE — TELEPHONE ENCOUNTER
----- Message from Brian Soares MD sent at 6/22/2025 10:13 PM CDT -----  PSA is 2 this time, it used to be a lot of lower the past many years.  Although it is still within the normal range but it has gone higher and not following the natural trend.    Recommend rechecking a PSA in about 2 weeks.  Please pend the order  ----- Message -----  From: Lab, Background User  Sent: 6/20/2025   5:20 PM CDT  To: Brian Soares MD

## 2025-06-26 ENCOUNTER — OFFICE VISIT (OUTPATIENT)
Dept: HEMATOLOGY/ONCOLOGY | Facility: CLINIC | Age: 65
End: 2025-06-26
Payer: MEDICARE

## 2025-06-26 VITALS
TEMPERATURE: 97 F | WEIGHT: 178.63 LBS | BODY MASS INDEX: 25.01 KG/M2 | DIASTOLIC BLOOD PRESSURE: 72 MMHG | HEIGHT: 71 IN | OXYGEN SATURATION: 97 % | SYSTOLIC BLOOD PRESSURE: 112 MMHG | HEART RATE: 86 BPM

## 2025-06-26 DIAGNOSIS — R97.20 ELEVATED PROSTATE SPECIFIC ANTIGEN (PSA): ICD-10-CM

## 2025-06-26 DIAGNOSIS — D61.818 PANCYTOPENIA: ICD-10-CM

## 2025-06-26 DIAGNOSIS — C91.41 HAIRY CELL LEUKEMIA, IN REMISSION: Primary | ICD-10-CM

## 2025-06-26 PROBLEM — R50.81 NEUTROPENIC FEVER: Status: RESOLVED | Noted: 2025-05-17 | Resolved: 2025-06-26

## 2025-06-26 PROBLEM — D70.9 NEUTROPENIC FEVER: Status: RESOLVED | Noted: 2025-05-17 | Resolved: 2025-06-26

## 2025-06-26 PROCEDURE — 99999 PR PBB SHADOW E&M-EST. PATIENT-LVL IV: CPT | Mod: PBBFAC,HCNC,, | Performed by: INTERNAL MEDICINE
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
## 2025-07-29 ENCOUNTER — OFFICE VISIT (OUTPATIENT)
Dept: PULMONOLOGY | Facility: CLINIC | Age: 65
End: 2025-07-29
Payer: MEDICARE

## 2025-07-29 VITALS
BODY MASS INDEX: 26.08 KG/M2 | OXYGEN SATURATION: 98 % | SYSTOLIC BLOOD PRESSURE: 118 MMHG | HEART RATE: 70 BPM | WEIGHT: 186.31 LBS | RESPIRATION RATE: 16 BRPM | DIASTOLIC BLOOD PRESSURE: 70 MMHG | HEIGHT: 71 IN

## 2025-07-29 DIAGNOSIS — J18.9 PNEUMONIA OF BOTH LOWER LOBES DUE TO INFECTIOUS ORGANISM: Primary | ICD-10-CM

## 2025-07-29 PROCEDURE — 99999 PR PBB SHADOW E&M-EST. PATIENT-LVL IV: CPT | Mod: PBBFAC,HCNC,, | Performed by: PHYSICIAN ASSISTANT
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
## 2025-08-22 ENCOUNTER — HOSPITAL ENCOUNTER (OUTPATIENT)
Dept: RADIOLOGY | Facility: HOSPITAL | Age: 65
Discharge: HOME | End: 2025-08-22
Attending: PHYSICIAN ASSISTANT
Payer: MEDICARE

## 2025-08-22 ENCOUNTER — OFFICE VISIT (OUTPATIENT)
Dept: HEMATOLOGY/ONCOLOGY | Facility: CLINIC | Age: 65
End: 2025-08-22
Payer: MEDICARE

## 2025-08-22 VITALS
OXYGEN SATURATION: 99 % | SYSTOLIC BLOOD PRESSURE: 116 MMHG | HEART RATE: 68 BPM | RESPIRATION RATE: 20 BRPM | HEIGHT: 71 IN | WEIGHT: 188.5 LBS | TEMPERATURE: 98 F | DIASTOLIC BLOOD PRESSURE: 75 MMHG | BODY MASS INDEX: 26.39 KG/M2

## 2025-08-22 DIAGNOSIS — D61.818 PANCYTOPENIA: ICD-10-CM

## 2025-08-22 DIAGNOSIS — J18.9 PNEUMONIA OF BOTH LOWER LOBES DUE TO INFECTIOUS ORGANISM: ICD-10-CM

## 2025-08-22 DIAGNOSIS — R79.1 ABNORMAL COAGULATION PROFILE: ICD-10-CM

## 2025-08-22 DIAGNOSIS — C91.41 HAIRY CELL LEUKEMIA, IN REMISSION: Primary | ICD-10-CM

## 2025-08-22 PROCEDURE — 71250 CT THORAX DX C-: CPT | Mod: TC,HCNC

## 2025-08-22 PROCEDURE — 99999 PR PBB SHADOW E&M-EST. PATIENT-LVL III: CPT | Mod: PBBFAC,HCNC,, | Performed by: INTERNAL MEDICINE

## 2025-08-22 PROCEDURE — 71250 CT THORAX DX C-: CPT | Mod: 26,HCNC,, | Performed by: RADIOLOGY

## 2025-08-29 ENCOUNTER — TELEPHONE (OUTPATIENT)
Dept: RADIOLOGY | Facility: HOSPITAL | Age: 65
End: 2025-08-29
Payer: MEDICARE

## 2025-08-29 ENCOUNTER — OFFICE VISIT (OUTPATIENT)
Dept: INFECTIOUS DISEASES | Facility: CLINIC | Age: 65
End: 2025-08-29
Payer: MEDICARE

## 2025-08-29 VITALS — HEART RATE: 71 BPM | SYSTOLIC BLOOD PRESSURE: 110 MMHG | TEMPERATURE: 98 F | DIASTOLIC BLOOD PRESSURE: 75 MMHG

## 2025-08-29 DIAGNOSIS — J18.9 PNEUMONIA OF BOTH LOWER LOBES DUE TO INFECTIOUS ORGANISM: ICD-10-CM

## 2025-08-29 DIAGNOSIS — R50.9 FEVER, UNSPECIFIED FEVER CAUSE: Primary | ICD-10-CM

## 2025-08-29 DIAGNOSIS — C91.41 HAIRY CELL LEUKEMIA, IN REMISSION: ICD-10-CM

## 2025-08-29 PROCEDURE — 99999 PR PBB SHADOW E&M-EST. PATIENT-LVL III: CPT | Mod: PBBFAC,HCNC,, | Performed by: STUDENT IN AN ORGANIZED HEALTH CARE EDUCATION/TRAINING PROGRAM

## 2025-08-29 RX ORDER — RIVAROXABAN 20 MG/1
20 TABLET, FILM COATED ORAL
COMMUNITY
Start: 2025-06-27

## 2025-09-02 ENCOUNTER — TELEPHONE (OUTPATIENT)
Dept: RADIOLOGY | Facility: HOSPITAL | Age: 65
End: 2025-09-02
Payer: MEDICARE

## 2025-09-03 ENCOUNTER — HOSPITAL ENCOUNTER (OUTPATIENT)
Dept: RADIOLOGY | Facility: HOSPITAL | Age: 65
Discharge: HOME | End: 2025-09-03
Attending: INTERNAL MEDICINE
Payer: MEDICARE

## 2025-09-03 VITALS
RESPIRATION RATE: 16 BRPM | WEIGHT: 180 LBS | OXYGEN SATURATION: 94 % | SYSTOLIC BLOOD PRESSURE: 101 MMHG | HEIGHT: 71 IN | HEART RATE: 64 BPM | DIASTOLIC BLOOD PRESSURE: 63 MMHG | BODY MASS INDEX: 25.2 KG/M2

## 2025-09-03 DIAGNOSIS — D61.818 PANCYTOPENIA: ICD-10-CM

## 2025-09-03 DIAGNOSIS — C91.41 HAIRY CELL LEUKEMIA, IN REMISSION: ICD-10-CM

## 2025-09-03 PROCEDURE — C1830 POWER BONE MARROW BX NEEDLE: HCPCS | Mod: HCNC

## 2025-09-03 PROCEDURE — 88237 TISSUE CULTURE BONE MARROW: CPT | Mod: HCNC | Performed by: INTERNAL MEDICINE

## 2025-09-03 PROCEDURE — 88299 UNLISTED CYTOGENETIC STUDY: CPT | Mod: HCNC | Performed by: INTERNAL MEDICINE

## 2025-09-03 PROCEDURE — 88185 FLOWCYTOMETRY/TC ADD-ON: CPT | Mod: HCNC | Performed by: INTERNAL MEDICINE

## 2025-09-03 PROCEDURE — 63600175 PHARM REV CODE 636 W HCPCS: Mod: HCNC | Performed by: PHYSICIAN ASSISTANT

## 2025-09-03 RX ORDER — MIDAZOLAM HYDROCHLORIDE 1 MG/ML
INJECTION, SOLUTION INTRAMUSCULAR; INTRAVENOUS
Status: COMPLETED | OUTPATIENT
Start: 2025-09-03 | End: 2025-09-03

## 2025-09-03 RX ORDER — FENTANYL CITRATE 50 UG/ML
INJECTION, SOLUTION INTRAMUSCULAR; INTRAVENOUS
Status: COMPLETED | OUTPATIENT
Start: 2025-09-03 | End: 2025-09-03

## 2025-09-03 RX ORDER — LIDOCAINE HYDROCHLORIDE 10 MG/ML
INJECTION, SOLUTION EPIDURAL; INFILTRATION; INTRACAUDAL; PERINEURAL
Status: COMPLETED | OUTPATIENT
Start: 2025-09-03 | End: 2025-09-03

## 2025-09-03 RX ADMIN — FENTANYL CITRATE 50 MCG: 50 INJECTION, SOLUTION INTRAMUSCULAR; INTRAVENOUS at 11:09

## 2025-09-03 RX ADMIN — MIDAZOLAM HYDROCHLORIDE 1 MG: 1 INJECTION, SOLUTION INTRAMUSCULAR; INTRAVENOUS at 11:09

## 2025-09-03 RX ADMIN — LIDOCAINE HYDROCHLORIDE 5 ML: 10 INJECTION, SOLUTION EPIDURAL; INFILTRATION; INTRACAUDAL; PERINEURAL at 11:09

## 2025-09-05 LAB
M DNA/RNA EXTRACT AND HOLD RESULT: NORMAL
M DNA/RNA EXTRACTION: NORMAL
SPECIMEN SOURCE: NORMAL

## (undated) DEVICE — SEE MEDLINE ITEM 157117

## (undated) DEVICE — OBTURATOR STAPLER BLDESS 12MM

## (undated) DEVICE — Device

## (undated) DEVICE — APPLICATOR CHLORAPREP ORN 26ML

## (undated) DEVICE — SEE MEDLINE ITEM 152622

## (undated) DEVICE — COVER OVERHEAD SURG LT BLUE

## (undated) DEVICE — SYR 3CC LUER LOC

## (undated) DEVICE — TIP SUC SIGMOIDOSCOPE 18F 12IN

## (undated) DEVICE — MANIFOLD 4 PORT

## (undated) DEVICE — NDL SAFETY 22G X 1.5 ECLIPSE

## (undated) DEVICE — IRRIGATOR ENDOSCOPY DISP.

## (undated) DEVICE — GLOVE PROTEXIS HYDROGEL SZ7

## (undated) DEVICE — ADHESIVE MASTISOL VIAL 48/BX

## (undated) DEVICE — SYR 10CC LUER LOCK

## (undated) DEVICE — SEE MEDLINE ITEM 152739

## (undated) DEVICE — SHEATH ENDOWRIST 45MM

## (undated) DEVICE — COVER TIP CURVED SCISSORS XI

## (undated) DEVICE — SUT SILK 3-0 SH 18IN BLACK

## (undated) DEVICE — STAPLER ENDOWRIST 45 WHITE XI

## (undated) DEVICE — SUT PDS II 1 TP-1 VIL

## (undated) DEVICE — SCISSOR 5MMX35CM DIRECT DRIVE

## (undated) DEVICE — SUT 1 48IN PDS II VIO MONO

## (undated) DEVICE — TUBING HEATED INSUFFLATOR

## (undated) DEVICE — SEALER VESSEL DAVINCI XI

## (undated) DEVICE — DRAPE ARM DAVINCI XI

## (undated) DEVICE — CANNULA REDUCER 12-8MM

## (undated) DEVICE — DRAPE STERI LONG

## (undated) DEVICE — STAPLER ENDOWRIST 45 BLUE XI

## (undated) DEVICE — ELECTRODE REM PLYHSV RETURN 9

## (undated) DEVICE — KIT WING PAD POSITIONING

## (undated) DEVICE — SOL ELECTROLUBE ANTI-STIC

## (undated) DEVICE — GAUZE SPONGE 4X4 12PLY

## (undated) DEVICE — NDL PNEUMO INSUFFLATI 120MM

## (undated) DEVICE — EVACUATOR KIT SMOKE PLUME AWAY

## (undated) DEVICE — SEE MEDLINE ITEM 146292

## (undated) DEVICE — KIT ANTIFOG

## (undated) DEVICE — PACK DRAPE PERI/GYN TIBURON

## (undated) DEVICE — STAPLER CIRCULAR XL SEAL 33MM

## (undated) DEVICE — WARMER DRAPE STERILE LF

## (undated) DEVICE — SOL NS 1000CC

## (undated) DEVICE — CANNULA SEAL 12MM

## (undated) DEVICE — DRAPE ABDOMINAL TIBURON 14X11

## (undated) DEVICE — SUTURE STRATAFIX PGAPCL 2 UNI

## (undated) DEVICE — SYR 30CC LUER LOCK

## (undated) DEVICE — SYR ONLY LUER LOCK 20CC

## (undated) DEVICE — DRAPE COLUMN DAVINCI XI

## (undated) DEVICE — SEAL UNIVERSAL 5MM-8MM XI

## (undated) DEVICE — SUT CTD VICRYL 2-0 UND BR

## (undated) DEVICE — SUT VICRYL 2-0 STRAN J905T

## (undated) DEVICE — SUT SILK 3-0 BLK BR SH 30IN

## (undated) DEVICE — SEE MEDLINE ITEM 157027